# Patient Record
Sex: FEMALE | Race: WHITE | NOT HISPANIC OR LATINO | Employment: OTHER | URBAN - METROPOLITAN AREA
[De-identification: names, ages, dates, MRNs, and addresses within clinical notes are randomized per-mention and may not be internally consistent; named-entity substitution may affect disease eponyms.]

---

## 2017-01-17 ENCOUNTER — RESULTS ONLY (OUTPATIENT)
Dept: OTHER | Facility: CLINIC | Age: 72
End: 2017-01-17

## 2017-01-17 DIAGNOSIS — Z76.82 ORGAN TRANSPLANT CANDIDATE: ICD-10-CM

## 2017-01-17 PROCEDURE — 86833 HLA CLASS II HIGH DEFIN QUAL: CPT | Performed by: TRANSPLANT SURGERY

## 2017-01-17 PROCEDURE — 86832 HLA CLASS I HIGH DEFIN QUAL: CPT | Performed by: TRANSPLANT SURGERY

## 2017-01-20 LAB — PRA SINGLE ANTIGEN IGG ANTIBODY: NORMAL

## 2017-01-24 LAB — UNOS CPRA: 50

## 2017-01-25 LAB
SA1 CELL: NORMAL
SA1 COMMENTS: NORMAL
SA1 HI RISK ABY: NORMAL
SA1 MOD RISK ABY: NORMAL
SA1 TEST METHOD: NORMAL
SA2 CELL: NORMAL
SA2 COMMENTS: NORMAL
SA2 HI RISK ABY UA: NORMAL
SA2 MOD RISK ABY: NORMAL
SA2 TEST METHOD: NORMAL

## 2017-05-25 ENCOUNTER — TELEPHONE (OUTPATIENT)
Dept: TRANSPLANT | Facility: CLINIC | Age: 72
End: 2017-05-25

## 2017-05-25 NOTE — TELEPHONE ENCOUNTER
Call to Lexy today after a conversation with [facility]. She has moved and had all new phone numbers as well. She had labs done recently and Dr. Neri called her and reported they were stable. She has [facility] now and could not keep his appt. I have her a couple local nephrology group numbers and she was going to work on an appointment today. She is not on dialysis. Her numbers and address were updated. I ask that she call me after she has her appt with a nephrologist. She is aware she has 3 years of waiting time.

## 2017-06-28 ENCOUNTER — TRANSFERRED RECORDS (OUTPATIENT)
Dept: HEALTH INFORMATION MANAGEMENT | Facility: CLINIC | Age: 72
End: 2017-06-28

## 2017-09-25 ENCOUNTER — TELEPHONE (OUTPATIENT)
Dept: TRANSPLANT | Facility: CLINIC | Age: 72
End: 2017-09-25

## 2017-09-25 NOTE — TELEPHONE ENCOUNTER
Coordinator left pt msg, requesting call back. Wondering how pt is feeling and to check-in on her kidney function. Contact information provided.

## 2017-09-26 ENCOUNTER — MEDICAL CORRESPONDENCE (OUTPATIENT)
Dept: TRANSPLANT | Facility: CLINIC | Age: 72
End: 2017-09-26

## 2017-10-11 ENCOUNTER — TELEPHONE (OUTPATIENT)
Dept: TRANSPLANT | Facility: CLINIC | Age: 72
End: 2017-10-11

## 2017-10-11 NOTE — TELEPHONE ENCOUNTER
Coordinator left pt another msg. Wanted to check-in and see how pt is feeling. Reminded pt she is currently inactive on kidney wait list d/t being too well. Contact information provided.

## 2018-03-06 ENCOUNTER — TRANSFERRED RECORDS (OUTPATIENT)
Dept: HEALTH INFORMATION MANAGEMENT | Facility: CLINIC | Age: 73
End: 2018-03-06

## 2018-04-10 ENCOUNTER — TELEPHONE (OUTPATIENT)
Dept: TRANSPLANT | Facility: CLINIC | Age: 73
End: 2018-04-10

## 2018-04-10 NOTE — TELEPHONE ENCOUNTER
Coordinator spoke with pt. Pt states she's feeling great and is not on dialysis yet. Pt to call coordinator if she's nearing dialysis so wait list appointments can be made for active status consideration.

## 2018-04-24 ENCOUNTER — MEDICAL CORRESPONDENCE (OUTPATIENT)
Dept: TRANSPLANT | Facility: CLINIC | Age: 73
End: 2018-04-24

## 2018-04-25 ENCOUNTER — MEDICAL CORRESPONDENCE (OUTPATIENT)
Dept: TRANSPLANT | Facility: CLINIC | Age: 73
End: 2018-04-25

## 2019-04-15 ENCOUNTER — DOCUMENTATION ONLY (OUTPATIENT)
Dept: TRANSPLANT | Facility: CLINIC | Age: 74
End: 2019-04-15

## 2019-04-15 DIAGNOSIS — I10 HYPERTENSION: Primary | ICD-10-CM

## 2019-04-15 DIAGNOSIS — Z76.82 ORGAN TRANSPLANT CANDIDATE: ICD-10-CM

## 2019-04-15 DIAGNOSIS — N18.6 ESRD (END STAGE RENAL DISEASE) (H): ICD-10-CM

## 2019-07-11 ENCOUNTER — TELEPHONE (OUTPATIENT)
Dept: TRANSPLANT | Facility: CLINIC | Age: 74
End: 2019-07-11

## 2019-07-11 NOTE — TELEPHONE ENCOUNTER
Patient Call: General  Route to LPN    Reason for call: PT called because her nephrologist told her she can go from inactive to active on the wait list please connect with PT    Call back needed? Yes    Return Call Needed  Same as documented in contacts section  When to return call?: Greater than one day: Route standard priority

## 2019-07-13 ENCOUNTER — TELEPHONE (OUTPATIENT)
Dept: TRANSPLANT | Facility: CLINIC | Age: 74
End: 2019-07-13

## 2019-07-13 NOTE — TELEPHONE ENCOUNTER
Called patient to schedule [facility] appts, we talked over some possible open dates, she confirmed for Tues, Aug 20, mailing letter to home

## 2019-07-16 NOTE — TELEPHONE ENCOUNTER
Coordinator called pt to see if she had any additional questions about her upcoming return waitlist appointments for active status consideration on the kidney transplant list. Pt has no questions at this time.

## 2019-07-23 NOTE — TELEPHONE ENCOUNTER
RECORDS RECEIVED FROM: Internal/Care Everywhere   DATE RECEIVED: 8-20   NOTES STATUS DETAILS   OFFICE NOTE from referring provider    Internal    OFFICE NOTE from other cardiologist    Internal 2013   DISCHARGE SUMMARY from hospital    N/A    DISCHARGE REPORT from the ER   N/A    OPERATIVE REPORT    N/A    MEDICATION LIST   Internal    LABS     BMP   Care Everywhere 7-15-19   CBC   Internal 2013   CMP   Internal 2013   Lipids   Internal 2013   TSH   N/A    DIAGNOSTIC PROCEDURES     EKG   Internal 2013   Monitor Reports   N/A    IMAGING (DISC & REPORT)      Echo   In process Scheduled after appt   Stress Tests   In process Scheduled after appt   Cath   N/A    MRI/MRA   N/A    CT/CTA   N/A

## 2019-08-16 ENCOUNTER — TELEPHONE (OUTPATIENT)
Dept: NEPHROLOGY | Facility: CLINIC | Age: 74
End: 2019-08-16

## 2019-08-16 NOTE — TELEPHONE ENCOUNTER
Patient contacted and reminded of upcoming appointment.  Patient confirmed they will be attending.  Patient instructed to bring updated medications list to appointment.    María Tony CMA    8/16/2019 9:41 AM

## 2019-08-20 ENCOUNTER — PRE VISIT (OUTPATIENT)
Dept: CARDIOLOGY | Facility: CLINIC | Age: 74
End: 2019-08-20

## 2019-08-20 ENCOUNTER — TELEPHONE (OUTPATIENT)
Dept: TRANSPLANT | Facility: CLINIC | Age: 74
End: 2019-08-20

## 2019-08-20 DIAGNOSIS — I10 HYPERTENSION: Primary | ICD-10-CM

## 2019-08-20 DIAGNOSIS — Z76.82 ORGAN TRANSPLANT CANDIDATE: ICD-10-CM

## 2019-08-20 DIAGNOSIS — N18.6 END STAGE RENAL DISEASE (H): ICD-10-CM

## 2019-08-20 NOTE — TELEPHONE ENCOUNTER
Coordinator called and left pt msg. Pt will need to also see surgeon, transplant class, and see dietician as it has been over 5 years since she has been in waitlist clinic. If pt would like to reschedule everything for one day vs coming in over 2 days, requested pt call back. Contact number provided.    Pt returned coordinator's call and would prefer to reschedule all her needed appointments for one day. Staff msg sent to all scheduled providers to notify them pt will be rescheduling.

## 2019-10-10 ENCOUNTER — ALLIED HEALTH/NURSE VISIT (OUTPATIENT)
Dept: TRANSPLANT | Facility: CLINIC | Age: 74
End: 2019-10-10
Attending: INTERNAL MEDICINE
Payer: COMMERCIAL

## 2019-10-10 ENCOUNTER — OFFICE VISIT (OUTPATIENT)
Dept: CARDIOLOGY | Facility: CLINIC | Age: 74
End: 2019-10-10
Attending: NURSE PRACTITIONER
Payer: COMMERCIAL

## 2019-10-10 ENCOUNTER — ANCILLARY PROCEDURE (OUTPATIENT)
Dept: GENERAL RADIOLOGY | Facility: CLINIC | Age: 74
End: 2019-10-10
Attending: INTERNAL MEDICINE
Payer: COMMERCIAL

## 2019-10-10 ENCOUNTER — ANCILLARY PROCEDURE (OUTPATIENT)
Dept: CARDIOLOGY | Facility: CLINIC | Age: 74
End: 2019-10-10
Attending: INTERNAL MEDICINE
Payer: COMMERCIAL

## 2019-10-10 ENCOUNTER — OFFICE VISIT (OUTPATIENT)
Dept: TRANSPLANT | Facility: CLINIC | Age: 74
End: 2019-10-10
Attending: TRANSPLANT SURGERY
Payer: COMMERCIAL

## 2019-10-10 ENCOUNTER — ALLIED HEALTH/NURSE VISIT (OUTPATIENT)
Dept: TRANSPLANT | Facility: CLINIC | Age: 74
End: 2019-10-10
Attending: PHYSICIAN ASSISTANT
Payer: COMMERCIAL

## 2019-10-10 ENCOUNTER — DOCUMENTATION ONLY (OUTPATIENT)
Dept: TRANSPLANT | Facility: CLINIC | Age: 74
End: 2019-10-10

## 2019-10-10 VITALS
BODY MASS INDEX: 26.03 KG/M2 | WEIGHT: 162 LBS | HEART RATE: 59 BPM | HEIGHT: 66 IN | OXYGEN SATURATION: 98 % | SYSTOLIC BLOOD PRESSURE: 126 MMHG | DIASTOLIC BLOOD PRESSURE: 82 MMHG

## 2019-10-10 VITALS
BODY MASS INDEX: 26.07 KG/M2 | HEIGHT: 66 IN | HEART RATE: 59 BPM | RESPIRATION RATE: 18 BRPM | WEIGHT: 162.2 LBS | DIASTOLIC BLOOD PRESSURE: 82 MMHG | SYSTOLIC BLOOD PRESSURE: 126 MMHG

## 2019-10-10 DIAGNOSIS — I73.00 RAYNAUD'S DISEASE WITHOUT GANGRENE: ICD-10-CM

## 2019-10-10 DIAGNOSIS — N18.6 END STAGE RENAL DISEASE (H): ICD-10-CM

## 2019-10-10 DIAGNOSIS — R06.09 DYSPNEA ON EXERTION: ICD-10-CM

## 2019-10-10 DIAGNOSIS — I10 HYPERTENSION: ICD-10-CM

## 2019-10-10 DIAGNOSIS — Z01.818 PRE-TRANSPLANT EVALUATION FOR CHRONIC KIDNEY DISEASE: Primary | ICD-10-CM

## 2019-10-10 DIAGNOSIS — C64.1 RENAL CELL CARCINOMA OF RIGHT KIDNEY (H): ICD-10-CM

## 2019-10-10 DIAGNOSIS — Z76.82 ORGAN TRANSPLANT CANDIDATE: ICD-10-CM

## 2019-10-10 DIAGNOSIS — I10 ESSENTIAL HYPERTENSION: ICD-10-CM

## 2019-10-10 DIAGNOSIS — Z76.82 ORGAN TRANSPLANT CANDIDATE: Primary | ICD-10-CM

## 2019-10-10 DIAGNOSIS — N18.5 CHRONIC KIDNEY DISEASE, STAGE 5 (H): Primary | ICD-10-CM

## 2019-10-10 DIAGNOSIS — Z01.818 PRE-OPERATIVE EXAMINATION: Primary | ICD-10-CM

## 2019-10-10 DIAGNOSIS — R22.1 NECK SWELLING: ICD-10-CM

## 2019-10-10 DIAGNOSIS — Z98.84 GASTRIC BYPASS STATUS FOR OBESITY: ICD-10-CM

## 2019-10-10 DIAGNOSIS — I15.0 RENOVASCULAR HYPERTENSION: ICD-10-CM

## 2019-10-10 DIAGNOSIS — Z76.82 AWAITING ORGAN TRANSPLANT: Primary | ICD-10-CM

## 2019-10-10 LAB
ABO + RH BLD: NORMAL
ALBUMIN SERPL-MCNC: 3.8 G/DL (ref 3.4–5)
ALBUMIN UR-MCNC: NEGATIVE MG/DL
ALP SERPL-CCNC: 38 U/L (ref 40–150)
ALT SERPL W P-5'-P-CCNC: 16 U/L (ref 0–50)
ANION GAP SERPL CALCULATED.3IONS-SCNC: 8 MMOL/L (ref 3–14)
APPEARANCE UR: CLEAR
APTT PPP: 26 SEC (ref 22–37)
AST SERPL W P-5'-P-CCNC: 12 U/L (ref 0–45)
BACTERIA #/AREA URNS HPF: ABNORMAL /HPF
BASOPHILS # BLD AUTO: 0 10E9/L (ref 0–0.2)
BASOPHILS NFR BLD AUTO: 0.3 %
BILIRUB SERPL-MCNC: 0.4 MG/DL (ref 0.2–1.3)
BILIRUB UR QL STRIP: NEGATIVE
BLD GP AB SCN SERPL QL: NORMAL
BLOOD BANK CMNT PATIENT-IMP: NORMAL
BLOOD BANK CMNT PATIENT-IMP: NORMAL
BUN SERPL-MCNC: 60 MG/DL (ref 7–30)
CALCIUM SERPL-MCNC: 8.5 MG/DL (ref 8.5–10.1)
CHLORIDE SERPL-SCNC: 106 MMOL/L (ref 94–109)
CO2 SERPL-SCNC: 25 MMOL/L (ref 20–32)
COLOR UR AUTO: YELLOW
CREAT SERPL-MCNC: 3.68 MG/DL (ref 0.52–1.04)
DIFFERENTIAL METHOD BLD: ABNORMAL
EOSINOPHIL # BLD AUTO: 0.1 10E9/L (ref 0–0.7)
EOSINOPHIL NFR BLD AUTO: 2.3 %
ERYTHROCYTE [DISTWIDTH] IN BLOOD BY AUTOMATED COUNT: 14 % (ref 10–15)
GFR SERPL CREATININE-BSD FRML MDRD: 12 ML/MIN/{1.73_M2}
GLUCOSE SERPL-MCNC: 112 MG/DL (ref 70–99)
GLUCOSE UR STRIP-MCNC: NEGATIVE MG/DL
HCT VFR BLD AUTO: 31.3 % (ref 35–47)
HGB BLD-MCNC: 9.7 G/DL (ref 11.7–15.7)
HGB UR QL STRIP: NEGATIVE
HYALINE CASTS #/AREA URNS LPF: 1 /LPF (ref 0–2)
IMM GRANULOCYTES # BLD: 0 10E9/L (ref 0–0.4)
IMM GRANULOCYTES NFR BLD: 0.3 %
INR PPP: 0.93 (ref 0.86–1.14)
INTERPRETATION ECG - MUSE: NORMAL
KETONES UR STRIP-MCNC: NEGATIVE MG/DL
LEUKOCYTE ESTERASE UR QL STRIP: NEGATIVE
LYMPHOCYTES # BLD AUTO: 1.5 10E9/L (ref 0.8–5.3)
LYMPHOCYTES NFR BLD AUTO: 24.8 %
MCH RBC QN AUTO: 31.8 PG (ref 26.5–33)
MCHC RBC AUTO-ENTMCNC: 31 G/DL (ref 31.5–36.5)
MCV RBC AUTO: 103 FL (ref 78–100)
MONOCYTES # BLD AUTO: 0.4 10E9/L (ref 0–1.3)
MONOCYTES NFR BLD AUTO: 6.8 %
MUCOUS THREADS #/AREA URNS LPF: PRESENT /LPF
NEUTROPHILS # BLD AUTO: 4 10E9/L (ref 1.6–8.3)
NEUTROPHILS NFR BLD AUTO: 65.5 %
NITRATE UR QL: NEGATIVE
NRBC # BLD AUTO: 0 10*3/UL
NRBC BLD AUTO-RTO: 0 /100
PH UR STRIP: 5 PH (ref 5–7)
PHOSPHATE SERPL-MCNC: 4 MG/DL (ref 2.5–4.5)
PLATELET # BLD AUTO: 245 10E9/L (ref 150–450)
POTASSIUM SERPL-SCNC: 4.4 MMOL/L (ref 3.4–5.3)
PROT SERPL-MCNC: 7 G/DL (ref 6.8–8.8)
RBC # BLD AUTO: 3.05 10E12/L (ref 3.8–5.2)
RBC #/AREA URNS AUTO: 1 /HPF (ref 0–2)
SODIUM SERPL-SCNC: 139 MMOL/L (ref 133–144)
SOURCE: ABNORMAL
SP GR UR STRIP: 1.01 (ref 1–1.03)
SPECIMEN EXP DATE BLD: NORMAL
SPECIMEN EXP DATE BLD: NORMAL
SQUAMOUS #/AREA URNS AUTO: <1 /HPF (ref 0–1)
UROBILINOGEN UR STRIP-MCNC: 0 MG/DL (ref 0–2)
WBC # BLD AUTO: 6.2 10E9/L (ref 4–11)
WBC #/AREA URNS AUTO: 1 /HPF (ref 0–5)

## 2019-10-10 PROCEDURE — 80053 COMPREHEN METABOLIC PANEL: CPT | Performed by: NURSE PRACTITIONER

## 2019-10-10 PROCEDURE — 86481 TB AG RESPONSE T-CELL SUSP: CPT | Performed by: NURSE PRACTITIONER

## 2019-10-10 PROCEDURE — 86160 COMPLEMENT ANTIGEN: CPT | Performed by: NURSE PRACTITIONER

## 2019-10-10 PROCEDURE — G0463 HOSPITAL OUTPT CLINIC VISIT: HCPCS | Mod: ZF

## 2019-10-10 PROCEDURE — 86787 VARICELLA-ZOSTER ANTIBODY: CPT | Performed by: NURSE PRACTITIONER

## 2019-10-10 PROCEDURE — 86644 CMV ANTIBODY: CPT | Performed by: NURSE PRACTITIONER

## 2019-10-10 PROCEDURE — G0463 HOSPITAL OUTPT CLINIC VISIT: HCPCS | Mod: 25,ZF

## 2019-10-10 PROCEDURE — 36415 COLL VENOUS BLD VENIPUNCTURE: CPT | Performed by: NURSE PRACTITIONER

## 2019-10-10 PROCEDURE — 86706 HEP B SURFACE ANTIBODY: CPT | Performed by: NURSE PRACTITIONER

## 2019-10-10 PROCEDURE — 99214 OFFICE O/P EST MOD 30 MIN: CPT | Mod: ZP | Performed by: NURSE PRACTITIONER

## 2019-10-10 PROCEDURE — 86901 BLOOD TYPING SEROLOGIC RH(D): CPT | Performed by: NURSE PRACTITIONER

## 2019-10-10 PROCEDURE — 40000866 ZZHCL STATISTIC HIV 1/2 ANTIGEN/ANTIBODY PRETRANSPLANT ONLY: Performed by: NURSE PRACTITIONER

## 2019-10-10 PROCEDURE — 93010 ELECTROCARDIOGRAM REPORT: CPT | Mod: ZP | Performed by: INTERNAL MEDICINE

## 2019-10-10 PROCEDURE — 86886 COOMBS TEST INDIRECT TITER: CPT | Performed by: NURSE PRACTITIONER

## 2019-10-10 PROCEDURE — 84100 ASSAY OF PHOSPHORUS: CPT | Performed by: NURSE PRACTITIONER

## 2019-10-10 PROCEDURE — 86665 EPSTEIN-BARR CAPSID VCA: CPT | Performed by: NURSE PRACTITIONER

## 2019-10-10 PROCEDURE — 86147 CARDIOLIPIN ANTIBODY EA IG: CPT | Performed by: NURSE PRACTITIONER

## 2019-10-10 PROCEDURE — 81001 URINALYSIS AUTO W/SCOPE: CPT | Performed by: NURSE PRACTITIONER

## 2019-10-10 PROCEDURE — 86780 TREPONEMA PALLIDUM: CPT | Performed by: NURSE PRACTITIONER

## 2019-10-10 PROCEDURE — 85610 PROTHROMBIN TIME: CPT | Performed by: NURSE PRACTITIONER

## 2019-10-10 PROCEDURE — 86704 HEP B CORE ANTIBODY TOTAL: CPT | Performed by: NURSE PRACTITIONER

## 2019-10-10 PROCEDURE — 85730 THROMBOPLASTIN TIME PARTIAL: CPT | Performed by: NURSE PRACTITIONER

## 2019-10-10 PROCEDURE — 86850 RBC ANTIBODY SCREEN: CPT | Performed by: NURSE PRACTITIONER

## 2019-10-10 PROCEDURE — 87340 HEPATITIS B SURFACE AG IA: CPT | Performed by: NURSE PRACTITIONER

## 2019-10-10 PROCEDURE — 85670 THROMBIN TIME PLASMA: CPT | Performed by: NURSE PRACTITIONER

## 2019-10-10 PROCEDURE — 93005 ELECTROCARDIOGRAM TRACING: CPT | Mod: ZF

## 2019-10-10 PROCEDURE — 85613 RUSSELL VIPER VENOM DILUTED: CPT | Performed by: NURSE PRACTITIONER

## 2019-10-10 PROCEDURE — 86900 BLOOD TYPING SEROLOGIC ABO: CPT | Performed by: NURSE PRACTITIONER

## 2019-10-10 PROCEDURE — 85025 COMPLETE CBC W/AUTO DIFF WBC: CPT | Performed by: NURSE PRACTITIONER

## 2019-10-10 PROCEDURE — 86803 HEPATITIS C AB TEST: CPT | Performed by: NURSE PRACTITIONER

## 2019-10-10 PROCEDURE — 85730 THROMBOPLASTIN TIME PARTIAL: CPT | Mod: 91 | Performed by: NURSE PRACTITIONER

## 2019-10-10 PROCEDURE — 86905 BLOOD TYPING RBC ANTIGENS: CPT | Performed by: NURSE PRACTITIONER

## 2019-10-10 RX ORDER — TRIAMCINOLONE ACETONIDE 1 MG/G
OINTMENT TOPICAL
Status: ON HOLD | COMMUNITY
Start: 2019-04-03 | End: 2024-09-03

## 2019-10-10 RX ORDER — TRAZODONE HYDROCHLORIDE 50 MG/1
100 TABLET, FILM COATED ORAL AT BEDTIME
COMMUNITY
Start: 2019-04-16

## 2019-10-10 RX ORDER — SODIUM BICARBONATE 650 MG/1
TABLET ORAL
Status: ON HOLD | COMMUNITY
Start: 2019-06-17 | End: 2024-09-06

## 2019-10-10 ASSESSMENT — PATIENT HEALTH QUESTIONNAIRE - PHQ9: SUM OF ALL RESPONSES TO PHQ QUESTIONS 1-9: 1

## 2019-10-10 ASSESSMENT — PAIN SCALES - GENERAL
PAINLEVEL: NO PAIN (0)
PAINLEVEL: NO PAIN (0)

## 2019-10-10 ASSESSMENT — MIFFLIN-ST. JEOR
SCORE: 1261.58
SCORE: 1262.48

## 2019-10-10 NOTE — PROGRESS NOTES
"Kidney Transplant Waitlist - Qualified: 11/16/2013  Lexy Cobb attended the pre-transplant patient education class today by herself as part of her waitlist five year follow up. The [facility] website pre-transplant modules were viewed; class participants were educated on using the site. Pt. verbalized understanding of content presented.     Content reviewed:    Living Donation and how to access that program    Paired exchange    Kidney Donor Profile Index (KDPI)    Waiting list issues (right to decline without penalty, high PHS risk donors, what to expect when called with an offer)    Hospital experience,  length of stay , need to stay locally post-discharge (2-4 weeks)    Surgical options (with pictures)                             Post-surgery lifting and driving restrictions    Post-transplant routines, frequency of lab work and clinic visits    Need to stay locally post-discharge (2-4 weeks)    Role of Transplant Coordinator    Participants were informed of the benefits of transplant as well as potential risks such as infection, cancer, and death.  The need for total adherence with immunosuppression medications and following transplant regimens was stressed.  The overall evaluation/approval/listing process was reviewed.        The patient was provided with the following documents:  What You Need to Know About a Kidney Transplant  Adult Kidney Transplant - A Guide for Patients  SRTR Data Sheet - Kidney  Brochure - Kidney Allocation  Brochure - Multiple Listing and Waiting Time Transfer  What Every Patient Needs to Know (UNOS)  UNOS Facts and Figures  Finding a Donor  [facility] - Quick Start Guide  [facility] Consent  Receipt of Information form    Lexy Cobb signed the  Receipt of Information for Organ Transplant Recipient.\" She was provided Aleah Manley's business card and instructed to call with additional questions.      Summary    Team s concerns/comments:  1. Cardiology.  2  " Lymphadenopathy.  3. Stone HX  4. Rheumatological issues with positive ABDULLAHI and slightly low          complements.    Candidacy category: Yellow    Action/Plan:  1. Cardiology appointment and EKG completed this AM 10/10/2019.           Repeat stress test 10/15/2019.  2. US neck base was ordered.   3. Oxalate   4.  Complements ordered     Pt.to clinic today for five year waitlist follow up appointments.Pt. verbalized understanding of plan discussed today.    Expected Selection Meeting Discussion:10/16/2019

## 2019-10-10 NOTE — PROGRESS NOTES
"  Assessment and Plan:  # {[facility] TRANSPLANT LIVING DONOR ORGAN:[id number]} Transplant Evaluation: Patient is a {desc.:728272} candidate overall. Benefits of a living donor transplant were discussed.    # {[facility] TRANSPLANT ESKD/CKD/DM:241784948}: ***    # Cardiac Risk: ***    # ***: ***    # ***: ***    # ***: ***    # Left supraclavicular lymphadenopathy on exam     # Health Maintenance: 2016 pap smear (NIL), 6/2019 mammogram (negative), 10/2013 (repeart in 10 years per GI) all UTD, dental not UTD (poor dentition).       Discussed the risks and benefits of a transplant, including the risk of surgery and immunosuppression medications.  Patient's overall evaluation will be discussed in the Transplant Program's regular meeting with a final recommendation on the patients suitability for transplant to be made at that time.  Patient was seen in conjunction with {[facility] Nephrologist:[id number]} as part of a shared visit.    Evaluation:  Lexy Cobb was seen in consultation at the request of  {Referring Surgeon:[id number]} for evaluation as a potential {[facility] Living donor organ:170934} transplant recipient.    Reason for Visit:  Lexy Cobb is a 74 year old female with {[facility] TRANSPLANT ESKD/CKD/DM:703381796}, who presents for {[facility] Living donor organ:471063} transplant evaluation.    History of Present Illness:  Lexy Cobb is a 74-year-old female presents with CKD from unclear etiology with no previous biopsy. She had history of  RCC s/p right native nephrectomy with mass totally encapsulated in 2001. She did not require any chemotherapy or radiation. Following her nephrectomy she developed hypertension and had slow progressive kidney disease. She has been inactive on the wait list since 2013 due to being too well.  Please see Dr. Higginbotham's note from 11/12/2013 for full HPI.  Most recent labs include a serum creatinine of 3.0 and EGFR 14. Overall, she is fell fairly well. Her appetite is \"too good\". She complains of " some fatigue but denies nausea and vomiting. Her son is a potential donor, she reports he was evaluation in  2013 and had proteinuria and obesity issues. Her blood pressure is well controlled on triple antihypertensive therapy.      She has no history of heart disease or events. She had a stress test that her initial evaluation in 2013 that was negative. She does not participate in any regular exercise but has no limitations with walking.  She stays active doing household chores, grocery shopping, and caring for her 90-year-old mother.  With exertion she denies chest pain, shortness of breath or claudication symptoms.  She saw cardiology today with plans of a repeat stress test. She has history of obesity s/p  intestinal bypass 46 years ago, with a current BMI of 26.  Her nephrectomy in 2001 was complicated by recurrent SBO s/p ex lap with adhesions removal.            Kidney Disease Hx:        ***       Kidney Disease Dx: { :077152}       Biopsy Proven: {YES WITH WILD CARD/NO:26303240}         On Dialysis: {UMP YES NO NEPH:589492260}       Primary Nephrologist: Dr. Neri       H/o Kidney Stones: Yes; one episode, passed on own in her 20s.        H/o Recurrent/Frequent UTI: No             Cardiac/Vascular Disease Risk Factors:        Cardiac Risk Factors: Hypertension, CKD and Age (Male > 55, Female > 65)       Known CAD: No       Known PAD/Caludication Symptoms: No       Known Heart Failure: No       Arrhythmia: No       Pulmonary Hypertension: No       Valvular Disease: No       Other: None         Functional Capacity/Frailty:        ***      Fatigue/Decreased Energy: [] No [x] Yes    Chest Pain or SOB with Exertion: [x] No [] Yes    Significant Weight Change: [x] No [] Yes    Nausea, Vomiting or Diarrhea: [] No [x] Yes Occasional diarrhea   Fever, Sweats or Chills:  [x] No [] Yes    Leg Swelling [] No [x] Yes        History of Cancer:   ***    Other Significant Medical Issues:   Rheumatological issues including  Raynauds syndrome with positive ABDULLAHI and slightly low complements she has had episodes of vasculitis in the past and an occasional flare of gout, stable without medications.     Left supraclavicular lymphadenopathy on exam     Review of Systems:  A comprehensive review of systems was obtained and negative, except as noted in the HPI or PMH.    Past Medical History:   Medical record was reviewed and PMH was discussed with patient and noted below.  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 Mother      Hypertension Mother      Diabetes Father      Cancer Father         Lung CA       Personal History:   Social History     Socioeconomic History     Marital status:      Spouse name: Not on file     Number of children: 1     Years of education: Not on file     Highest education level: Not on file   Occupational History     Employer: RETIRED   Social Needs     Financial resource strain: Not on file     Food insecurity:      Worry: Not on file     Inability: Not on file     Transportation needs:     Medical: Not on file     Non-medical: Not on file   Tobacco Use     Smoking status: Never Smoker     Smokeless tobacco: Never Used   Substance and Sexual Activity     Alcohol use: No     Drug use: No     Sexual activity: Not on file   Lifestyle     Physical activity:     Days per week: Not on file     Minutes per session: Not on file     Stress: Not on file   Relationships     Social connections:     Talks on phone: Not on file     Gets together: Not on file     Attends Muslim service: Not on file     Active member of club or organization: Not on file     Attends meetings of clubs or organizations: Not on file     Relationship status: Not on file     Intimate partner violence:     Fear of current or ex partner: Not on file     Emotionally abused: Not on file     Physically abused: Not on file     Forced sexual activity: Not on file   Other Topics Concern     Parent/sibling w/ CABG, MI or angioplasty before 65F 55M? Not Asked   Social History Narrative     Not on file       Allergies:  Allergies   Allergen Reactions     Dapsone      Fluoxetine        Medications:  Current Outpatient Medications   Medication Sig     calcitRIOL (ROCALTROL) 0.25 MCG capsule Take 0.25 mcg by mouth daily     cloNIDine (CATAPRES) 0.1 MG tablet Take 0.1 mg by mouth 3 times daily     cyanocobalamin 1000 MCG/ML injection Inject 1 mL as directed every 30 days     darbepoetin braulio-polysorbate (ARANESP, ALBUMIN FREE,) 60 MCG/ML injection Inject 1 mL Subcutaneous every 28 days     diphenhydrAMINE-APAP, sleep, (TYLENOL PM EXTRA STRENGTH)  MG/30ML LIQD Take 1 capful by mouth nightly as needed     Furosemide (LASIX) 20 MG tablet Take 20 mg by mouth daily     hydrALAZINE (APRESOLINE) 25 MG tablet Take 2 tablets by mouth 3 times daily     HYDROcodone-acetaminophen (NORCO) 5-325 MG per tablet Take 1 tablet by mouth every 6 hours as needed     Hyoscyamine 0.15 MG TABS  Take 1 tablet by mouth daily     LORazepam (ATIVAN) 1 MG tablet Take 1 mg by mouth At Bedtime     magnesium oxide (MAG-) 400 MG tablet Take 1 tablet by mouth daily     metoprolol (TOPROL-XL) 100 MG 24 hr tablet Take 2 tablets by mouth daily     NIFEdipine osmotic (PROCARDIA XL) 90 MG 24 hr tablet Take 90 mg by mouth daily     omeprazole (PRILOSEC) 20 MG capsule Take 1 capsule by mouth daily     sodium bicarbonate 650 MG tablet TAKE ONE TABLET BY MOUTH TWICE DAILY     traZODone (DESYREL) 50 MG tablet TAKE 1/2-1 TABLET BY MOUTH AT BEDTIME FOR SLEEP, COULD INCREASE TO 2 TABLETS IF NEEDED     triamcinolone (KENALOG) 0.1 % external ointment      No current facility-administered medications for this visit.        Vitals:  There were no vitals taken for this visit.    Exam:  GENERAL APPEARANCE: alert and no distress  HENT: mouth without ulcers or lesions  LYMPHATICS: no cervical or supraclavicular nodes  RESP: lungs clear to auscultation - no rales, rhonchi or wheezes  CV: regular rhythm, normal rate, no rub, no murmur  FEMORAL PULSES: { :290183}  EDEMA: no LE edema bilaterally  ABDOMEN: soft, nondistended, nontender, bowel sounds normal  MS: extremities normal - no gross deformities noted, no evidence of inflammation in joints, no muscle tenderness  SKIN: no rash    Results:   Recent Results (from the past 336 hour(s))   EKG 12-lead, tracing only (Same Day)    Collection Time: 10/10/19  7:04 AM   Result Value Ref Range    Interpretation ECG Click View Image link to view waveform and result

## 2019-10-10 NOTE — LETTER
10/10/2019       RE: Lexy Cobb   [address]  [place] [address]     Dear Colleague,    Thank you for referring your patient, Lexy Cobb, to the [facility] at [facility]. Please see a copy of my visit note below.    Transplant Surgery Consult Note    Medical record number: [id number]  YOB: 1945,   Consult requested for evaluation of kidney transplant candidacy.    Assessment and Recommendations: Ms. Cobb appears to be a good candidate for kidney transplantation and has a good understanding of the risks and benefits of this approach to the management of renal failure. The following issues should be addressed prior to finalizing her transplant candidacy:     - search for live donors    The majority of our visit was spent in counselling, discussing the medical and surgical risks of kidney transplantation. We discussed approximate wait time and how that is influenced by issues such as blood type and sensitization (PRA) and access to a living donor. I contrasted potential waiting time for living vs  donor kidneys from  normal (0-85%) or higher (%) kidney donor profile index (KDPI) donors and their associated outcomes. I would recommend this individual to consider kidneys from high KDPI donors. Potential surgical complications of kidney transplantation include bleeding, superficial or deep wound complications (infection, hernia, lymphocele), ureteral anastomotic failure (leak or stenosis), graft thrombosis, need for reoperation and other issues such as cardiac complications, pneumonia, deep venous thrombosis, pulmonary embolism, post transplant diabetes and death. The potential for recurrent disease or need for retransplantation was also addressed. We discussed the possible need for ureteral stent (and subsequent removal), and the utility of protocol biopsy and laboratory studies to evaluate for  rejection or recurrent disease. We discussed the risk of graft rejection, our center's average graft and patient survival rates, immunosuppression protocols, as well as the potential opportunity to participate in clinical trials.  We also discussed the average length of stay, recovery process, and posttransplant lab and monitoring protocol.  I emphasized the need for strict immunosuppression medication adherence and the potential for complications of immunosuppression such as skin cancer or lymphoma, as well as a very low but not zero risk of donor-derived disease transmission risks (infection, cancer). Ms. Cobb asked good questions and her candidacy will be reviewed at our Multidisciplinary Selection Committee. Thank you for the opportunity to participate in Ms. Cobb's care.      I would recommend this individual to consider kidneys from high KDPI donors. The reason for this decision is best summarized as: decreased dialysis related morbidity/mortality, accepting lower kidney graft survival rates.    Total time: 30 minutes  Counselling time: 20 minutes    .      ---------------------------------------------------------------------------------------------------    HPI: Ms. Cobb has Chronic renal failure due to unknown etiology.   History of RCC s/p right native nephrectomy in 2001 without recurrence.  Was listed 2013 but has stayed on hold due to being too well.      The patient is not on dialysis.  Has kidney donors:  Yes .      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 Mother      Hypertension Mother      Diabetes Father      Cancer Father         Lung CA     Social History     Socioeconomic History     Marital status:      Spouse name: Not on file     Number of children: 1     Years of education: Not on file     Highest education level: Not on file   Occupational History     Employer: RETIRED   Social Needs     Financial resource strain: Not on file     Food insecurity:     Worry: Not on file     Inability: Not on file     Transportation needs:     Medical: Not on file     Non-medical: Not on file   Tobacco Use     Smoking status: Never Smoker     Smokeless tobacco: Never Used   Substance and Sexual Activity     Alcohol use: No     Drug use: No     Sexual activity: Not on file   Lifestyle     Physical activity:     Days per week: Not on file     Minutes per session: Not on file     Stress: Not on file   Relationships     Social connections:     Talks on phone: Not on file     Gets together: Not on file     Attends Confucianist service: Not on file     Active member of club or organization: Not on file     Attends meetings of clubs or organizations: Not on file     Relationship status: Not on file     Intimate partner violence:     Fear of current or ex partner: Not on file     Emotionally abused: Not on file     Physically abused: Not on file     Forced sexual activity: Not on file   Other Topics Concern     Parent/sibling w/ CABG, MI or angioplasty before 65F 55M? Not Asked    Social History Narrative     Not on file       ROS:   CONSTITUTIONAL:  No fevers or chills  EYES: negative for icterus  ENT:  negative for hearing loss, tinnitus and sore throat  RESPIRATORY:  negative for cough, sputum, dyspnea  CARDIOVASCULAR:  negative for chest pain    GASTROINTESTINAL:  negative for nausea, vomiting, diarrhea or constipation  GENITOURINARY:  negative for incontinence, dysuria, bladder emptying problems  HEME:  No easy bruising  INTEGUMENT:  negative for rash and pruritus  NEURO:  Negative for headache, seizure disorder    Allergies:   Allergies   Allergen Reactions     Dapsone      Fluoxetine        Medications:  Prescription Medications as of 10/16/2019       Rx Number Disp Refills Start End Last Dispensed Date Next Fill Date Owning Pharmacy    calcitRIOL (ROCALTROL) 0.25 MCG capsule            Sig: Take 0.25 mcg by mouth daily    Class: Historical    Route: Oral    cloNIDine (CATAPRES) 0.1 MG tablet            Sig: Take 0.1 mg by mouth 3 times daily    Class: Historical    Route: Oral    cyanocobalamin 1000 MCG/ML injection            Sig: Inject 1 mL as directed every 30 days    Class: Historical    Route: Injection    darbepoetin braulio-polysorbate (ARANESP, ALBUMIN FREE,) 60 MCG/ML injection            Sig: Inject 1 mL Subcutaneous every 28 days    Class: Historical    Route: Subcutaneous    diphenhydrAMINE-APAP, sleep, (TYLENOL PM EXTRA STRENGTH)  MG/30ML LIQD            Sig: Take 1 capful by mouth nightly as needed    Class: Historical    Route: Oral    Furosemide (LASIX) 20 MG tablet            Sig: Take 20 mg by mouth daily    Class: Historical    Route: Oral    hydrALAZINE (APRESOLINE) 25 MG tablet            Sig: Take 2 tablets by mouth 3 times daily    Class: Historical    Route: Oral    magnesium oxide (MAG-) 400 MG tablet            Sig: Take 1 tablet by mouth daily    Class: Historical    Route: Oral    metoprolol (TOPROL-XL) 100 MG 24 hr tablet            Sig: Take  "2 tablets by mouth daily    Class: Historical    Route: Oral    NIFEdipine osmotic (PROCARDIA XL) 90 MG 24 hr tablet            Sig: Take 90 mg by mouth daily    Class: Historical    Route: Oral    omeprazole (PRILOSEC) 20 MG capsule            Sig: Take 1 capsule by mouth daily    Class: Historical    Route: Oral    sodium bicarbonate 650 MG tablet    6/17/2019        Sig: TAKE ONE TABLET BY MOUTH TWICE DAILY    Class: Historical    traZODone (DESYREL) 50 MG tablet    4/16/2019        Sig: TAKE 1/2-1 TABLET BY MOUTH AT BEDTIME FOR SLEEP, COULD INCREASE TO 2 TABLETS IF NEEDED    Class: Historical    triamcinolone (KENALOG) 0.1 % external ointment    4/3/2019        Class: Historical    Route: Topical          Exam:   Vitals:   [unfilled]  Estimated body mass index is 26.18 kg/m  as calculated from the following:    Height as of an earlier encounter on 10/10/19: 1.676 m (5' 6\").    Weight as of an earlier encounter on 10/10/19: 73.6 kg (162 lb 3.2 oz).  Appearance: in no apparent distress.   Skin: normal  Head and Neck: Normal, no rashes or jaundice  Respiratory: easy respirations, no audible wheezing.  Abdomen: rounded    Diagnostics:   Recent Results (from the past 672 hour(s))   EKG 12-lead, tracing only (Same Day)    Collection Time: 10/10/19  7:04 AM   Result Value Ref Range    Interpretation ECG Click View Image link to view waveform and result    Factor 2 and 5 mutation analysis    Collection Time: 10/10/19  1:57 PM   Result Value Ref Range    Copath Report       Patient Name: LIZA MCCLAIN  MR#: [id number]  Specimen #: [id number]  Collected: 10/10/2019 13:57  Received: 10/11/2019 10:48  Reported: 10/11/2019 13:17  Ordering Phy(s): CHIOMA GONZALEZ    For improved result formatting, select 'View Enhanced Report Format' under   Linked Documents section.  _________________________________________    TEST(S) REQUESTED:  Factor 5 Leiden and Factor 2 by PCR    SPECIMEN DESCRIPTION:  Blood    COMMENTS:  Factor 2 " factor 5 testing was previously ordered on this patient. This   testing will be canceled and credited.  See [facility] number [id number] on sample collected 10/28/2013.    [facility] Report Patient Name: LIZA MCCLAIN [facility]#: [id number] #:   [id number]: 10/28/2013  07:07Received: 10/28/2013 09:49Reported: 10/29/2013 17:11Ordering Phy(s):   NAIM S  ISSA_________________________________________TEST(S) REQUESTED:Factor 5   Leiden and Factor 2 by PCRSPECIMEN  DESCRIPTION:BloodMETHODOLOGY:  The regions of genomic DNA containing the    [id number] Factor 5gene mutation (Factor  V Leiden) and the Factor 2(Prothrombin H54806E)gene mutation were   simultaneously amplified using the  polymerase chainreaction. The amplified products were digested with   restrictionendonuclease TaqI and products  were analyzed by gel electrophoresis.RESULTS:Factor V 1691G>A (Leiden)   RESULTS:Mutation analyzed:  1691G>AFactor V 1691G>A (Leiden) Interpretation:   ABSENTFactor V 1691G>A   (Leiden) mutation genotype:  G/GFACTOR 2/PROTHROMBIN RESULTS:Mutation analyzed:   29078D>AFactor 2   Mutation Interpretation:   ABSENTFactor  2 Mutation genotype:   G/GINTERPRETATION:The patient is negative for the   Factor V 1691G>A (Leiden) and  negativefor the Factor 2 mutation.This test was developed and its   performance determined by the [facility].It has   not been cleared or approved by the  U.S. Food and DrugAdministration. The FDA has determined that such   clearance or approvalis  not necessary.  Pursuant to the requirements of CLIA'88, thislaboratory has established   and verified the test's accuracy  andprecision. This test is used for clinical purposes.Electronically   Signed Out By:Tom Griggs MD, PhD  UMPhysiciansTESTING LAB LOCATION:[address]  [phone]    Electronically Signed Out By:  GER     CPT Codes:    TESTING LAB LOCATION:  [facility]  [address] [facility] [address]  [address] [place] [address]  [phone]    COLLECTION SITE:  Client:  [facility]  Location:  [facility] (B)     UA with Microscopic reflex to Culture    Collection Time: 10/10/19  2:15 PM   Result Value Ref Range    Color Urine Yellow     Appearance Urine Clear     Glucose Urine Negative NEG^Negative mg/dL    Bilirubin Urine Negative NEG^Negative    Ketones Urine Negative NEG^Negative mg/dL    Specific Gravity Urine 1.011 1.003 - 1.035    Blood Urine Negative NEG^Negative    pH Urine 5.0 5.0 - 7.0 pH    Protein Albumin Urine Negative NEG^Negative mg/dL    Urobilinogen mg/dL 0.0 0.0 - 2.0 mg/dL    Nitrite Urine Negative NEG^Negative    Leukocyte Esterase Urine Negative NEG^Negative    Source Midstream Urine     WBC Urine 1 0 - 5 /HPF    RBC Urine 1 0 - 2 /HPF    Bacteria Urine Few (A) NEG^Negative /HPF    Squamous Epithelial /HPF Urine <1 0 - 1 /HPF    Mucous Urine Present (A) NEG^Negative /LPF    Hyaline Casts 1 0 - 2 /LPF   ABO type [SFS5809]    Collection Time: 10/10/19  2:24 PM   Result Value Ref Range    ABO A     RH(D) Pos     Specimen Expires 10/13/2019    ABO/Rh type and screen    Collection Time: 10/10/19  2:29 PM   Result Value Ref Range    ABO A     RH(D) Pos     Antibody Screen Neg     Test Valid Only At          [facility]    Specimen Expires 10/13/2019    PRA Single Antigen IgG Antibody    Collection Time: 10/10/19  2:30 PM   Result Value Ref Range    SA1 Test Method SA FCS     SA1 Cell Class I     SA1 Hi Risk Ashley None     SA1 Mod Risk Ashley None     SA1 Comments        Test performed by modified procedure. Serum heat inactivated and tested   by a modified ([place]) protocol including fetal calf serum addition.   High-risk,  mfi >3,000. Mod-risk, mfi 500-3,000.      SA2 Test Method SA FCS     SA2 Cell Class II     SA2 Hi Risk Ashley None     SA2 Mod Risk Ashley None     SA2 Comments        Test performed by modified procedure. Serum heat inactivated and tested   by a modified ([place]) protocol including fetal calf serum addition.   High-risk, mfi >3,000. Mod-risk, mfi 500-3,000.      Protocol Cutoff Plan A, 500 mfi/cumulative      UNOS cPRA 0     Unacceptable Antigen None    CMV Antibody IgG [PFB3768]    Collection Time: 10/10/19  2:30 PM   Result Value Ref Range    CMV Antibody IgG <0.2 0.0 - 0.8 AI   EBV Capsid Antibody IgG [[id number]]    Collection Time: 10/10/19  2:30 PM   Result Value Ref Range    EBV Capsid Antibody IgG >8.0 (H) 0.0 - 0.8 AI   Hepatitis B core antibody [[id number]]    Collection Time: 10/10/19  2:30 PM   Result Value Ref Range    Hepatitis B Core Ashley Nonreactive NR^Nonreactive   Hepatitis B Surface Antibody [[id number]]    Collection Time: 10/10/19  2:30 PM   Result Value Ref Range    Hepatitis B Surface Antibody 46.32 (H) <8.00 m[IU]/mL   Hepatitis B surface antigen [[id number]]    Collection Time: 10/10/19  2:30 PM   Result Value Ref Range    Hep B Surface Agn Nonreactive NR^Nonreactive   Hepatitis C antibody [NFV266]    Collection Time: 10/10/19  2:30 PM   Result Value Ref Range    Hepatitis C Antibody Nonreactive NR^Nonreactive   HIV Antigen Antibody Combo Pretransplant    Collection Time: 10/10/19  2:30 PM   Result Value Ref Range    HIV Antigen Antibody Combo Pretransplant Nonreactive NR^Nonreactive   Varicella Zoster Virus Antibody IgG [[id number]]    Collection Time: 10/10/19  2:30 PM   Result Value Ref Range    Varicella Zoster Virus Antibody IgG 5.5 (H) 0.0 - 0.8 AI   Treponema Abs w Reflex to RPR and Titer    Collection Time: 10/10/19  2:30 PM   Result Value Ref Range    Treponema Antibodies Nonreactive NR^Nonreactive   Antibody titer red cell [[id number]]    Collection Time: 10/10/19  2:30 PM   Result Value Ref Range     Antibody Titer Anti B IgM=16  IgG=8    Blood Group A Subtype [[id number]]    Collection Time: 10/10/19  2:30 PM   Result Value Ref Range    Antigen Type A1 Positive     Comprehensive metabolic panel [LAB17]    Collection Time: 10/10/19  2:31 PM   Result Value Ref Range    Sodium 139 133 - 144 mmol/L    Potassium 4.4 3.4 - 5.3 mmol/L    Chloride 106 94 - 109 mmol/L    Carbon Dioxide 25 20 - 32 mmol/L    Anion Gap 8 3 - 14 mmol/L    Glucose 112 (H) 70 - 99 mg/dL    Urea Nitrogen 60 (H) 7 - 30 mg/dL    Creatinine 3.68 (H) 0.52 - 1.04 mg/dL    GFR Estimate 12 (L) >60 mL/min/[1.73_m2]    GFR Estimate If Black 13 (L) >60 mL/min/[1.73_m2]    Calcium 8.5 8.5 - 10.1 mg/dL    Bilirubin Total 0.4 0.2 - 1.3 mg/dL    Albumin 3.8 3.4 - 5.0 g/dL    Protein Total 7.0 6.8 - 8.8 g/dL    Alkaline Phosphatase 38 (L) 40 - 150 U/L    ALT 16 0 - 50 U/L    AST 12 0 - 45 U/L   Phosphorus    Collection Time: 10/10/19  2:31 PM   Result Value Ref Range    Phosphorus 4.0 2.5 - 4.5 mg/dL   Quantiferon TB Gold Plus    Collection Time: 10/10/19  2:31 PM   Result Value Ref Range    Quantiferon-TB Gold Plus Result Negative NEG^Negative    TB1 Ag minus Nil Value 0.00 IU/mL    TB2 Ag minus Nil Value 0.00 IU/mL    Mitogen minus Nil Result 6.49 IU/mL    Nil Result 0.03 IU/mL   CBC with platelets differential [[id number]]    Collection Time: 10/10/19  2:31 PM   Result Value Ref Range    WBC 6.2 4.0 - 11.0 10e9/L    RBC Count 3.05 (L) 3.8 - 5.2 10e12/L    Hemoglobin 9.7 (L) 11.7 - 15.7 g/dL    Hematocrit 31.3 (L) 35.0 - 47.0 %     (H) 78 - 100 fl    MCH 31.8 26.5 - 33.0 pg    MCHC 31.0 (L) 31.5 - 36.5 g/dL    RDW 14.0 10.0 - 15.0 %    Platelet Count 245 150 - 450 10e9/L    Diff Method Automated Method     % Neutrophils 65.5 %    % Lymphocytes 24.8 %    % Monocytes 6.8 %    % Eosinophils 2.3 %    % Basophils 0.3 %    % Immature Granulocytes 0.3 %    Nucleated RBCs 0 0 /100    Absolute Neutrophil 4.0 1.6 - 8.3 10e9/L    Absolute Lymphocytes 1.5 0.8 - 5.3  10e9/L    Absolute Monocytes 0.4 0.0 - 1.3 10e9/L    Absolute Eosinophils 0.1 0.0 - 0.7 10e9/L    Absolute Basophils 0.0 0.0 - 0.2 10e9/L    Abs Immature Granulocytes 0.0 0 - 0.4 10e9/L    Absolute Nucleated RBC 0.0    Cardiolipin Ashley IgG and IgM [LAB 6836]    Collection Time: 10/10/19  2:31 PM   Result Value Ref Range    Cardiolipin Antibody IgG <1.6 0.0 - 19.9 GPL-U/mL    Cardiolipin Antibody IgM 2.1 0.0 - 19.9 MPL-U/mL   Lupus Anticoagulant Panel [[id number]]    Collection Time: 10/10/19  2:31 PM   Result Value Ref Range    Lupus Result Negative NEG^Negative   INR [[id number]]    Collection Time: 10/10/19  2:31 PM   Result Value Ref Range    INR 0.93 0.86 - 1.14   Partial thromboplastin time [LAB56]    Collection Time: 10/10/19  2:31 PM   Result Value Ref Range    PTT 26 22 - 37 sec   Thrombin time [EWS692]    Collection Time: 10/10/19  2:31 PM   Result Value Ref Range    Thrombin Time 15.2 13.0 - 19.0 sec   Complement C4    Collection Time: 10/10/19  2:31 PM   Result Value Ref Range    Complement C4 21 15 - 50 mg/dL   Complement C3    Collection Time: 10/10/19  2:31 PM   Result Value Ref Range    Complement C3 67 (L) 76 - 169 mg/dL       Again, thank you for allowing me to participate in the care of your patient.      Sincerely,    BLAIR

## 2019-10-10 NOTE — LETTER
10/10/2019       RE: Lexy Cobb  [address] [facility] [facility] [address]     Dear Colleague,    Thank you for referring your patient, Lexy Cobb, to the [facility] SOLID ORGAN TRANSPLANT at [facility]. Please see a copy of my visit note below.      Assessment and Plan:  # Kidney Transplant Evaluation: Patient is a good candidate overall. Benefits of a living donor transplant were discussed.    # CKD unclear etiology with no previous biopsy: although likely from unilateral kidney with history of RCC. She has been inactive on the wait list since 2013 due to being too well. She has had creatinine levels ranging 3s-4 with correlating eGFR of 10-15 for the past few years. She has some fatigue, but no other uremic symptoms. She is ABO-A with a cPRA of 50%. One potential donor. When ready, she may benefit from a kidney transplant.     # RCC (2001): s/p right native nephrectomy with mass totally encapsulated. She did not require any chemotherapy or radiation. CXR today is pending.     # Cardiac Risk: no history of heart disease or events. She has no limitations with walking and denies exertional symptoms.  She had a stress test that her initial evaluation in 2013 that was negative. She saw cariology today with plans for a repeat stress test next week. ECHO/EKG results are pending.     # Rheumatological issues:  with slightly low C3 and positive ABDULLAHI, but no other signs of SLE.  Recommend continued close follow up with Rheumatology. Will recheck complement levels today.      # S/p gastric bypass: over 40 years ago. BMI 26. Will check oxalate level.     # Left supraclavicular fullness on exam: will need to complete neck US.     # Health Maintenance: 2016 pap smear (NIL), 6/2019 mammogram (negative), 10/2013 (repeat in 10 years per GI) all UTD, dental not UTD (poor dentition).      Discussed the risks and benefits of a transplant, including the risk of surgery and  "immunosuppression medications.  Patient's overall evaluation will be discussed in the Transplant Program's regular meeting with a final recommendation on the patients suitability for transplant to be made at that time.  Patient was seen in conjunction with Dr. Ottoniel Boswell as part of a shared visit.    Evaluation:  Lexy Cobb was seen in consultation at the request of Dr. Kannan House for evaluation as a potential kidney transplant recipient.    Reason for Visit:  Lexy Cobb is a 74 year old female with CKD from unknown etiology (no kidney biopsy), who presents for kidney transplant evaluation.    History of Present Illness:  Lexy Cobb is a 74-year-old female presents with CKD from unclear etiology with no previous biopsy, but likely from unilateral kidney. She had history of RCC (2001) s/p right native nephrectomy with mass totally encapsulated. She did not require any chemotherapy or radiation. Following her nephrectomy, she developed hypertension and had slow progressive kidney disease. She has been inactive on the wait list since 2013 due to being too well.  Please see Dr. Higginbotham's note from 11/12/2013 for full HPI. She has had creatinine levels ranging 3s-4 with correlating eGFR of 10-15 for the past few years. Overall, she is feeling fairly well. Her appetite is \"too good\". She complains of some fatigue but denies nausea and vomiting. Her blood pressure is well controlled on triple antihypertensive therapy. Her son is a potential donor.     She has no history of heart disease or events. She had a stress test that her initial evaluation in 2013 that was negative. She does not participate in any regular exercise but has no limitations with walking.  She stays active doing household chores, grocery shopping, and caring for her 90-year-old mother.  With exertion she denies chest pain, shortness of breath or claudication symptoms. She saw cardiology today and will have repeat stress test next " week. Additional surgical history includes intestinal bypass 46 years ago (current BMI of 26).  Her nephrectomy in 2001 was complicated by recurrent SBO s/p ex lap with adhesions removal.         Kidney Disease Hx:        Kidney Disease Dx: Unknown etiology (no kidney biopsy)       Biopsy Proven: No         On Dialysis: No       Primary Nephrologist: Dr. Neri       H/o Kidney Stones: Yes; one episode, passed on own in her 20s.        H/o Recurrent/Frequent UTI: No             Cardiac/Vascular Disease Risk Factors:        Cardiac Risk Factors: Hypertension, CKD and Age (Male > 55, Female > 65)       Known CAD: No       Known PAD/Caludication Symptoms: No       Known Heart Failure: No       Arrhythmia: No       Pulmonary Hypertension: No       Valvular Disease: No       Other: None         Functional Capacity/Frailty:        No limitations with walking.      Fatigue/Decreased Energy: [] No [x] Yes    Chest Pain or SOB with Exertion: [x] No [] Yes    Significant Weight Change: [x] No [] Yes    Nausea, Vomiting or Diarrhea: [] No [x] Yes Occasional diarrhea   Fever, Sweats or Chills:  [x] No [] Yes    Leg Swelling [] No [x] Yes        History of Cancer:   RCC     Other Significant Medical Issues:   Rheumatologic issues: including official diagnosis of Raynauds syndrome. Positive ABDULLAHI and slightly low C3. 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 Seen last by rhematology in 2017 with new onset of gout (appears stable without medications).    Left supraclavicular lymphadenopathy on exam     Review of Systems:  A comprehensive review of systems was obtained and negative, except as noted in the HPI or PMH.    Past Medical History:   Medical record was reviewed and PMH was discussed with patient and noted below.  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 Mother      Hypertension Mother      Diabetes Father      Cancer Father         Lung CA       Personal History:   Social History     Socioeconomic History     Marital status:      Spouse name: Not on file     Number of children: 1     Years of education: Not on file     Highest education level: Not on file   Occupational History     Employer: RETIRED   Social Needs     Financial resource strain: Not on file     Food insecurity:     Worry: Not on file     Inability: Not on file     Transportation needs:     Medical: Not on file     Non-medical: Not on file   Tobacco Use     Smoking status: Never Smoker     Smokeless tobacco: Never Used   Substance and Sexual Activity     Alcohol use: No     Drug use: No     Sexual activity: Not on file   Lifestyle     Physical activity:     Days per week: Not on file     Minutes per session: Not on file     Stress: Not on file   Relationships     Social connections:     Talks on phone: Not on file     Gets together: Not on file     Attends [employer] service: Not on file     Active member of club or organization: Not on  file     Attends meetings of clubs or organizations: Not on file     Relationship status: Not on file     Intimate partner violence:     Fear of current or ex partner: Not on file     Emotionally abused: Not on file     Physically abused: Not on file     Forced sexual activity: Not on file   Other Topics Concern     Parent/sibling w/ CABG, MI or angioplasty before 65F 55M? Not Asked   Social History Narrative     Not on file       Allergies:  Allergies   Allergen Reactions     Dapsone      Fluoxetine        Medications:  Current Outpatient Medications   Medication Sig     calcitRIOL (ROCALTROL) 0.25 MCG capsule Take 0.25 mcg by mouth daily     cloNIDine (CATAPRES) 0.1 MG tablet Take 0.1 mg by mouth 3 times daily     cyanocobalamin 1000 MCG/ML injection Inject 1 mL as directed every 30 days     darbepoetin braulio-polysorbate (ARANESP, ALBUMIN FREE,) 60 MCG/ML injection Inject 1 mL Subcutaneous every 28 days     diphenhydrAMINE-APAP, sleep, (TYLENOL PM EXTRA STRENGTH)  MG/30ML LIQD Take 1 capful by mouth nightly as needed     Furosemide (LASIX) 20 MG tablet Take 20 mg by mouth daily     hydrALAZINE (APRESOLINE) 25 MG tablet Take 2 tablets by mouth 3 times daily     HYDROcodone-acetaminophen (NORCO) 5-325 MG per tablet Take 1 tablet by mouth every 6 hours as needed     Hyoscyamine 0.15 MG TABS Take 1 tablet by mouth daily     LORazepam (ATIVAN) 1 MG tablet Take 1 mg by mouth At Bedtime     magnesium oxide (MAG-) 400 MG tablet Take 1 tablet by mouth daily     metoprolol (TOPROL-XL) 100 MG 24 hr tablet Take 2 tablets by mouth daily     NIFEdipine osmotic (PROCARDIA XL) 90 MG 24 hr tablet Take 90 mg by mouth daily     omeprazole (PRILOSEC) 20 MG capsule Take 1 capsule by mouth daily     sodium bicarbonate 650 MG tablet TAKE ONE TABLET BY MOUTH TWICE DAILY     traZODone (DESYREL) 50 MG tablet TAKE 1/2-1 TABLET BY MOUTH AT BEDTIME FOR SLEEP, COULD INCREASE TO 2 TABLETS IF NEEDED     triamcinolone (KENALOG) 0.1 %  "external ointment      No current facility-administered medications for this visit.        Vitals:  /82   Pulse 59   Resp 18   Ht 1.676 m (5' 6\")   Wt 73.6 kg (162 lb 3.2 oz)   BMI 26.18 kg/m       Exam:  GENERAL APPEARANCE: alert and no distress  HENT: mouth without ulcers or lesions  LYMPHATICS: no cervical or supraclavicular nodes  RESP: lungs clear to auscultation - no rales, rhonchi or wheezes  CV: regular rhythm, normal rate, no rub, no murmur  FEMORAL PULSES: 2+ equal bilaterally.   EDEMA: no LE edema bilaterally  ABDOMEN: soft, nondistended, nontender, bowel sounds normal  MS: extremities normal - no gross deformities noted, no evidence of inflammation in joints, no muscle tenderness  SKIN: no rash    Results:   Recent Results (from the past 336 hour(s))   EKG 12-lead, tracing only (Same Day)    Collection Time: 10/10/19  7:04 AM   Result Value Ref Range    Interpretation ECG Click View Image link to view waveform and result      Patient was seen by myself, Dr. Ottoniel Boswell, in conjunction with Yoel Amanda NP as part of a shared visit.    I personally reviewed past medical and surgical history, vital signs, medications and labs.  Present and past medical history, along with significant physical exam findings were all reviewed with OLEGARIO.    My rush findings:  Lexy Cobb is 74 year old, who presents for Kidney transplant evaluation.    Key management decisions made by me and discussed with OLEGARIO:     1.  Advanced CKD not yet on dialysis.  2.  Transplant candidacy evaluation.  3.  History of for Raynaud's with abnormal ABDULLAHI follows with rheumatology needs to update her complements.  4.  History of renal cell cancer status post unilateral nephrectomy this was remote without evidence of recurrence we will need to update her imaging.  5.  History of gastric bypass will need to update her oxalate level.  6.  Cardiovascular risk stratification patient will need formal cardiology assessment.  7.  " Slight neck fullness on exam most likely normal will get an ultrasound to rule out abnormalities.  8.  Age-appropriate cancer screening as noted above.  Discussion:  Overall Ms. Cobb appears to be a reasonable candidate.  She is fairly active and did not appear to have any barriers to transplantation.  She will need to complete her work-up as delineated above.  Final candidacy decision will be rendered at our multidisciplinary meeting.     Again, thank you for allowing me to participate in the care of your patient.      Sincerely,    BLAIR

## 2019-10-10 NOTE — PATIENT INSTRUCTIONS
Patient Instructions:    It was a pleasure to see you in the cardiology clinic today.    If you have any questions you can reach our nurse triage line at [phone].  Press Option #1 for the [facility], then press Option #4 for nursing or Option #1 for scheduling. We also encourage the use of [facility] to communicate with your HealthCare Provider    Note new medications: none  Stop the following medications: no changes    The results from today include: EKG was normal  Please schedule the stress test and I will contact you with the results.     Control your risk of coronary artery disease with these four lifestyle changes:  - Eating a heart healthy diet by following the American Heart Association Recommendations: Reduce saturated fat and trans fat to 5-6 percent of daily calories and minimizing the amount of trans fat you eat by limiting your intake of red meat and dairy products made with whole milk. It also means choosing skim milk, low-fat or fat-free dairy products, limiting fried food, and cooking with healthy oils such as vegetable oil. A healthy diet should include emphasis on fruits, vegetables, whole grains, poultry, fish and nuts, and limiting sugary foods and beverages. We recommend following the DASH (Dietary Approaches to Stop Hypertension) or Mediterranean Diet.  - Regular Exercise: Just 40 minutes of aerobic exercise of moderate to vigorous intensity done 3-4 times per week is enough to lower both cholesterol and high blood pressure. Brisk walking, swimming, bicycling or a dance class are examples.  - Avoiding Tobacco Smoking: Smoking compounds the risk from other risk factors for heart disease including high cholesterol, high blood pressure, and diabetes. Smokers can lower cholesterol, blood pressure, and protect their arteries by quitting. Ask to learn more about quitting smoking.  - Losing Weight: Being overweight or obese raises your risk of high cholesterol, high  blood pressure, and diabetes which are all risk factors for heart disease. Losing excess weight can improve cholesterol levels, blood pressure, and reduce incidence of diabetes and potentially reverse these disease processes.     Get help if you experience any of these heart attack warning signs: Although some heart attacks are sudden and intense, most start slowly, with mild pain or discomfort. Pay attention to your body -- and call 911 if you feel:  - Chest discomfort: Most heart attacks involve discomfort in the center of the chest that lasts more than a few minutes, or that goes away and comes back. It can feel like uncomfortable pressure, squeezing, fullness or pain.  - Discomfort in other areas of the upper body: Symptoms can include pain or discomfort in one or both arms, the back, neck, jaw or stomach.   - Shortness of breath with or without chest discomfort   - Other signs may include breaking out in a cold sweat, nausea or lightheadedness      Sincerely,    Janak Foster, CNP

## 2019-10-10 NOTE — PROGRESS NOTES
Patient Name: Lexy Cobb  : 1945  Age: 74 year old  MRN: [id number]  Date of Initial Social Work Evaluation: 10/28/2019    Patient on kidney transplant wait list.  Saw today to update psychosocial assessment.      Presenting Information   Living Situation: Patient lives in a home with her  Black in [place], [place].  If not local, plans for short term stay: N/A  Previous Functional Status: Independent with ADLs, wears glasses  Cultural/Language/Spiritual Considerations: None identified at this time    Support System  Primary Support Person  Black  Other support:  Son Rm (l[facility]), a brother in [place], [place], and two sisters who live out of state  Plan for support in immediate post-transplant period:  Black and son Rm    Health Care Directive  Decision Maker: Self  Alternate Decision Maker:  Black is NOK  Health Care Directive: Provided education    Mental Health/Coping:   History of Mental Health: Denied  History of Chemical Health: Denied  Current status: Patient denied any current mental health concerns. Patient reported she has an alcoholic drink a day.  Coping: Patient appears to be coping well.  Services Needed/Recommended: None identified at this time    Financial   Income: Social Security halfway (both patient and )  Impact of transplant on income: None identified at this time  Insurance and medication coverage: [facility] Medicare Advantage Plan with Part D- Discussed Medicare Part B will cover 80% of anti-rejection medications and patient will pay the 20% copay ($200-400) until patient's $3,400 out of pocket max with are is met.   Financial concerns: Patient expressed concerns about affording the cost of Valcyte in the Part D donut hole.  Resources needed: Patient would benefit from applying for the [employer] Val[employer] Patient Assistance Program if the Valcyte copay is high. Patient reported her household income is about  $30,000.    Assessment and recommendations and plan:  Patient continues to not be on dialysis. Patient reported she follows her physician's recommendations, takes her medications as prescribed, and attends her appointments as scheduled. Reviewed transplant education (Medicare, rehabilitation, donor issues, community/financial resources, and psych/family adjustment) as well as psychosocial risks of transplant. Provided patient with a copy of post-transplant informational sheet that includes information on potential costs of medications, Medicare ESRD, post-transplant lodging, etc. Patient seemed to process information well. Appeared well informed, motivated, and able to follow post transplant requirements. Behavior was appropriate during interview. Has adequate income and insurance coverage. Adequate social support. No major contraindications noted for transplant. At this time, patient appears to understand the risks and benefits of transplant.     Nanda Barksdale [facility]    Kidney/Pancreas/Auto Islet Transplant Programs

## 2019-10-10 NOTE — LETTER
10/10/2019      RE: Lexy Cobb  [address]  [facility] [id number]       Dear Colleague,    Thank you for the opportunity to participate in the care of your patient, Lexy Cobb, at the [facility]. Please see a copy of my visit note below.    Chief Complaint:   Chief Complaint   Patient presents with     Follow Up     kidney transplant eval - last seen in 2013       HPI: Returns to clinic today for follow-up regarding possible kidney transplant surgery.  She is a history of chronic kidney disease secondary to hypertensive nephrosclerosis and solitary kidney secondary to nephrectomy for renal cell carcinoma.  She was evaluated for transplant in 2013 at which time she had a dobutamine echocardiogram that did not demonstrate any ischemia.  She reports no major changes or hospitalizations since we saw her back in 2013.  She tells me she is retired and she is quite sedentary.  She does tell me she can walk to the local [employer] restaurant which is approximately 1 mile from her house on a flat surface.  She does not experience any dyspnea or chest discomfort while doing that walk.  She does note that she becomes dyspneic with vacuuming however.  She denies family history of premature coronary disease or prior tobacco use.  She also tells me she is never had any prior cardiac procedures or cardiac history.    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 Mother      Hypertension Mother      Diabetes Father      Cancer Father         Lung CA       Medications:  Current Outpatient Medications   Medication Sig     calcitRIOL (ROCALTROL) 0.25 MCG capsule Take 0.25 mcg by mouth daily     cloNIDine (CATAPRES) 0.1 MG tablet Take 0.1 mg by mouth 3 times daily     cyanocobalamin 1000 MCG/ML injection Inject 1 mL as directed every 30 days     darbepoetin braulio-polysorbate (ARANESP, ALBUMIN FREE,) 60 MCG/ML injection Inject 1 mL Subcutaneous every 28 days     diphenhydrAMINE-APAP, sleep, (TYLENOL PM EXTRA STRENGTH)  MG/30ML LIQD Take 1 capful by mouth nightly as needed     Furosemide (LASIX) 20 MG tablet Take 20 mg by mouth daily     hydrALAZINE (APRESOLINE) 25 MG tablet Take 2 tablets by mouth 3 times daily     magnesium oxide (MAG-) 400 MG tablet Take 1 tablet by mouth daily     metoprolol (TOPROL-XL) 100 MG 24 hr tablet Take 2 tablets by mouth daily     NIFEdipine osmotic (PROCARDIA XL) 90 MG 24 hr tablet Take 90 mg by mouth daily     omeprazole (PRILOSEC) 20 MG capsule Take 1 capsule by mouth daily     sodium bicarbonate 650 MG tablet TAKE ONE TABLET BY MOUTH TWICE DAILY     traZODone (DESYREL) 50 MG tablet TAKE 1/2-1 TABLET BY MOUTH AT BEDTIME FOR SLEEP, COULD INCREASE TO 2 TABLETS IF NEEDED     triamcinolone (KENALOG) 0.1  "% external ointment      HYDROcodone-acetaminophen (NORCO) 5-325 MG per tablet Take 1 tablet by mouth every 6 hours as needed     Hyoscyamine 0.15 MG TABS Take 1 tablet by mouth daily     LORazepam (ATIVAN) 1 MG tablet Take 1 mg by mouth At Bedtime     No current facility-administered medications for this visit.        Review of Systems:  As per HPI otherwise all other systems are negative    Physical Exam:   /82 (BP Location: Left arm, Patient Position: Chair, Cuff Size: Adult Regular)   Pulse 59   Ht 1.676 m (5' 6\")   Wt 73.5 kg (162 lb)   SpO2 98%   BMI 26.15 kg/m     GEN:patient is in no apparent distress.    HEENT: NC/AT.  Sclerae white, no incertus  Neck: No adenopathy.Carotids brisk bilaterally without bruits.  No jugular venous distension.   Heart:RRR. Normal S1, S2. No murmur, rub, click, or gallop.   Lungs: Lungs clear to ausculation bilaterally.  No ronchi, wheezes, rales.  Abdomen: Soft, nontender, nondistended.  Extremities: Trace bilateral lower extremity edema.  2+ bilateral radial pulses  Neurologic: Awake and alert, normal speech, gait and affect  Skin: No petechiae, purpura or rash noted    Labs:  LIPID RESULTS:  Lab Results   Component Value Date    CHOL 166 10/28/2013    HDL 64 10/28/2013    LDL 67 10/28/2013    TRIG 172 (H) 10/28/2013    CHOLHDLRATIO 2.6 10/28/2013       LIVER ENZYME RESULTS:  Lab Results   Component Value Date    AST 18 10/28/2013    ALT 26 10/28/2013       CBC RESULTS:  Lab Results   Component Value Date    WBC 4.8 10/28/2013    RBC 3.22 (L) 10/28/2013    HGB 9.8 (L) 10/28/2013    HCT 31.3 (L) 10/28/2013    MCV 97 10/28/2013    MCH 30.4 10/28/2013    MCHC 31.3 (L) 10/28/2013    RDW 15.0 10/28/2013     10/28/2013       BMP RESULTS:  Lab Results   Component Value Date     10/28/2013    POTASSIUM 4.4 10/28/2013    CHLORIDE 109 10/28/2013    CO2 22 10/28/2013    ANIONGAP 9 10/28/2013    GLC 84 10/28/2013    BUN 45 (H) 10/28/2013    CR 2.23 (H) 10/28/2013    " GFRESTIMATED 22 (L) 10/28/2013    GFRESTBLACK 27 (L) 10/28/2013    CIARA 8.8 10/28/2013        A1C RESULTS:  No results found for: A1C    INR RESULTS:  Lab Results   Component Value Date    INR 0.87 10/28/2013       Diagnostics:  EKG today demonstrates sinus bradycardia at 58 bpm with normal axis and intervals.    Dobutamine echo stress test November 5, 2013:  Interpretation Summary  Normal dobutamine stress echocardiogram. No wall motion abnormalities. Normal   LVEF at rest and with infusion.    Assessment and Plan:   1.  Preoperative Cardiovascular Evaluation:    Revised Cardiovascular Risk Index: 2  Duke Activity Status Index 5.29 METS    She is at elevated cardiovascular risk in the perioperative setting primarily due to high risk surgery and kidney dysfunction.  Her DASI is approximately 5 minutes however somewhat indeterminate and she is unsure what her functional capacity is to some of the questions.  Given her risk factor profile which also includes hypertension I think it is reasonable to proceed with stress testing.  I have ordered a Lexiscan stress test.  She is already scheduled for a transthoracic echocardiogram today.    2.  Hypertension: Blood pressure is well controlled at today's visit    3.  Chronic kidney disease secondary to prior nephrectomy (renal cell carcinoma), hypertension: She is followed by [facility] nephrology.    Given her advanced age she should be evaluated on an annual basis going forward.    YOLETTE Calix CNP  10/10/19  7:34 AM    Addendum on 10/11/19 @ 7:07 AM    Echocardiogram 10/10/2019  Interpretation Summary  Left ventricular function is normal, EF >65%.  Left ventricular hypertrophy.  No significant valve dysfunction.  No pericardial effusion is present.  IVC is not dilated.  Moderately dilated ascending aorta, indexed to 2.4 cm/m2.     Compared to previous study in 2013, there has been an increase in LV  thickening and ascending aorta is now 4.3 cm compared to  prior reported 4.1  Cm.    Janak Foster, YOLETTE CNP  10/11/19  7:07 AM        CC  Patient Care Team:  Khurram Rm as PCP - General (Internal Medicine)  Hal Neri MD as MD (Nephrology)  ULYSSES OSMAN

## 2019-10-10 NOTE — NURSING NOTE
"Chief Complaint   Patient presents with     Transplant Evaluation     Kidney       Blood pressure 126/82, pulse 59, resp. rate 18, height 1.676 m (5' 6\"), weight 73.6 kg (162 lb 3.2 oz).    Amanda Bosch CMA on 10/10/2019 at 9:39 AM    "

## 2019-10-10 NOTE — PROGRESS NOTES
"Outpatient MNT: Kidney Transplant Evaluation    Current BMI: 26.2 (HT 66 in,  lbs/74 kg)  BMI is within criteria of <35 for kidney transplant     Time Spent: 15 minutes  Visit Type: F/U (last seen by OP RD 10/2013)  Referring Physician: Finger  Pt accompanied by: self    Medical dx associated with RD referral  - CKD IV    History of previous txp: none, but is currently inactive on the waitlist   Dialysis: no     Nutrition Assessment  Pt cooks for self and does not follow a renal diet. She does add salt to her cooking and reports she \"eats out as much as I can so I don't have to cook\".     Pt with h/o gastric bypass in 1973.    Appetite: very good     Vitamins, Supplements, Pertinent Meds: mg ox, B12 injection   Herbal Medicines/Supplements: none     Diet Recall  Breakfast None    Lunch Soup and 1/2 s/w (out) or something similar at home    Dinner Quesadilla (out) or meat, potatoes, gravy at home    Snacks Radishes    Beverages Water/carbonated water, coffee    Alcohol 2-3 vodka tonic per day    Dining out A few times/week      Physical Activity  None      Anthropometrics  Height:   66 in   BMI:    26.2    Weight Status:Overweight BMI 25-29.9   Weight:  162 lbs            IBW (lb): 130  % IBW: 125    Wt Hx: Pt reports + MARCUS. Weight overall stable.     Adj/dosing BW: 138 lbs/63 kg       Labs  No results found for: A1C  Potassium   Date Value Ref Range Status   10/28/2013 4.4 3.4 - 5.3 mmol/L Final   5.1 on 8/16/19     PHOSPHORUS: mostly wnl levels per chart review, with high level in June    Malnutrition  % Intake: No decreased intake noted  % Weight Loss: None noted  Subcutaneous Fat Loss: None  Muscle Loss: None  Fluid Accumulation/Edema: None noted  Malnutrition Diagnosis: Patient does not meet two of the above criteria necessary for diagnosing malnutrition     Estimated Nutrition Needs  Energy  7038-9334     (25-30 kcal/kg for maintenance)     Protein  38-50    (0.6-0.8 g/kg for CKD)           Fluid  1 " ml/kcal or per MD   Micronutrient   Na+: <2000 mg/day  K+: 4318-6431 mg/day  Phos: 800-1000 mg/day            Nutrition Diagnosis  Excessive Na+ intake r/t food and nutrition related knowledge deficit AEB diet recall reveals high Na+ foods.    Food and nutrition related knowledge deficit r/t pre kidney transplant eval AEB pt verbalized not hearing pre/post transplant diet guidelines.    Nutrition Intervention  Nutrition education provided:  Discussed sodium intake (low sodium foods and drinks, seasoning food without salt and tips for low sodium diet). Encouraged pt to monitor how much salt she adds in her cooking in addition to frequency of dining out, especially as pt notes MARCUS.     Reviewed post txp diet guidelines in brief (will review in further detail post txp):  (1) Review of proper food safety measures d/t immunosuppressant therapy post-op and increased risk for food-borne illness    (2) Avoid the following post txp d/t risk for rejection, unknown effects on the organs, and/or potential interactions with immunosuppressants:  - Herbal, Chinese, holistic, chiropractic, natural, alternative medicines and supplements  - Detoxes and cleanses  - Weight loss pills  - Protein powders or other products with extracts or herbs (ie green tea extract)    (3) Med regimen and possible side effects    Patient Understanding: Pt verbalized understanding of education provided.  Expected Compliance: Good  Follow-Up Plans: PRN     Nutrition Goals  1. Limit Na+ <2000mg/day  2. Pt to verbalize understanding of 3 aspects of post txp education provided    Provided pt with contact info.   Kaur Diaz RD, LD  Pgr [phone]

## 2019-10-10 NOTE — PROGRESS NOTES
Chief Complaint:   Chief Complaint   Patient presents with     Follow Up     kidney transplant eval - last seen in 2013       HPI: Returns to clinic today for follow-up regarding possible kidney transplant surgery.  She is a history of chronic kidney disease secondary to hypertensive nephrosclerosis and solitary kidney secondary to nephrectomy for renal cell carcinoma.  She was evaluated for transplant in 2013 at which time she had a dobutamine echocardiogram that did not demonstrate any ischemia.  She reports no major changes or hospitalizations since we saw her back in 2013.  She tells me she is retired and she is quite sedentary.  She does tell me she can walk to the local [employer] restaurant which is approximately 1 mile from her house on a flat surface.  She does not experience any dyspnea or chest discomfort while doing that walk.  She does note that she becomes dyspneic with vacuuming however.  She denies family history of premature coronary disease or prior tobacco use.  She also tells me she is never had any prior cardiac procedures or cardiac history.    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 Mother      Hypertension Mother      Diabetes Father      Cancer Father         Lung CA       Medications:  Current Outpatient Medications   Medication Sig     calcitRIOL (ROCALTROL) 0.25 MCG capsule Take 0.25 mcg by mouth daily     cloNIDine (CATAPRES) 0.1 MG tablet Take 0.1 mg by mouth 3 times daily     cyanocobalamin 1000 MCG/ML injection Inject 1 mL as directed every 30 days     darbepoetin braulio-polysorbate (ARANESP, ALBUMIN FREE,) 60 MCG/ML injection Inject 1 mL Subcutaneous every 28 days     diphenhydrAMINE-APAP, sleep, (TYLENOL PM EXTRA STRENGTH)  MG/30ML LIQD Take 1 capful by mouth nightly as needed     Furosemide (LASIX) 20 MG tablet Take 20 mg by mouth daily     hydrALAZINE (APRESOLINE) 25 MG tablet Take 2 tablets by mouth 3 times daily     magnesium oxide (MAG-) 400 MG tablet Take 1 tablet by mouth daily     metoprolol (TOPROL-XL) 100 MG 24 hr tablet Take 2 tablets by mouth daily     NIFEdipine osmotic (PROCARDIA XL) 90 MG 24 hr tablet Take 90 mg by mouth daily     omeprazole (PRILOSEC) 20 MG capsule Take 1 capsule by mouth daily     sodium bicarbonate 650 MG tablet TAKE ONE TABLET BY MOUTH TWICE DAILY     traZODone (DESYREL) 50 MG tablet TAKE 1/2-1 TABLET BY MOUTH AT BEDTIME FOR SLEEP, COULD INCREASE TO 2 TABLETS IF NEEDED     triamcinolone (KENALOG) 0.1 % external ointment      HYDROcodone-acetaminophen (NORCO) 5-325 MG per tablet Take 1 tablet by mouth every 6 hours as needed     Hyoscyamine 0.15 MG TABS Take 1 tablet by mouth daily     LORazepam (ATIVAN) 1 MG tablet Take 1 mg by mouth At Bedtime     No current facility-administered medications for this visit.   "      Review of Systems:  As per HPI otherwise all other systems are negative    Physical Exam:   /82 (BP Location: Left arm, Patient Position: Chair, Cuff Size: Adult Regular)   Pulse 59   Ht 1.676 m (5' 6\")   Wt 73.5 kg (162 lb)   SpO2 98%   BMI 26.15 kg/m    GEN:patient is in no apparent distress.    HEENT: NC/AT.  Sclerae white, no incertus  Neck: No adenopathy.Carotids brisk bilaterally without bruits.  No jugular venous distension.   Heart:RRR. Normal S1, S2. No murmur, rub, click, or gallop.   Lungs: Lungs clear to ausculation bilaterally.  No ronchi, wheezes, rales.  Abdomen: Soft, nontender, nondistended.  Extremities: Trace bilateral lower extremity edema.  2+ bilateral radial pulses  Neurologic: Awake and alert, normal speech, gait and affect  Skin: No petechiae, purpura or rash noted    Labs:  LIPID RESULTS:  Lab Results   Component Value Date    CHOL 166 10/28/2013    HDL 64 10/28/2013    LDL 67 10/28/2013    TRIG 172 (H) 10/28/2013    CHOLHDLRATIO 2.6 10/28/2013       LIVER ENZYME RESULTS:  Lab Results   Component Value Date    AST 18 10/28/2013    ALT 26 10/28/2013       CBC RESULTS:  Lab Results   Component Value Date    WBC 4.8 10/28/2013    RBC 3.22 (L) 10/28/2013    HGB 9.8 (L) 10/28/2013    HCT 31.3 (L) 10/28/2013    MCV 97 10/28/2013    MCH 30.4 10/28/2013    MCHC 31.3 (L) 10/28/2013    RDW 15.0 10/28/2013     10/28/2013       BMP RESULTS:  Lab Results   Component Value Date     10/28/2013    POTASSIUM 4.4 10/28/2013    CHLORIDE 109 10/28/2013    CO2 22 10/28/2013    ANIONGAP 9 10/28/2013    GLC 84 10/28/2013    BUN 45 (H) 10/28/2013    CR 2.23 (H) 10/28/2013    GFRESTIMATED 22 (L) 10/28/2013    GFRESTBLACK 27 (L) 10/28/2013    CIARA 8.8 10/28/2013        A1C RESULTS:  No results found for: A1C    INR RESULTS:  Lab Results   Component Value Date    INR 0.87 10/28/2013       Diagnostics:  EKG today demonstrates sinus bradycardia at 58 bpm with normal axis and " intervals.    Dobutamine echo stress test November 5, 2013:  Interpretation Summary  Normal dobutamine stress echocardiogram. No wall motion abnormalities. Normal   LVEF at rest and with infusion.    Assessment and Plan:   1.  Preoperative Cardiovascular Evaluation:    Revised Cardiovascular Risk Index: 2  Duke Activity Status Index 5.29 METS    She is at elevated cardiovascular risk in the perioperative setting primarily due to high risk surgery and kidney dysfunction.  Her DASI is approximately 5 minutes however somewhat indeterminate and she is unsure what her functional capacity is to some of the questions.  Given her risk factor profile which also includes hypertension I think it is reasonable to proceed with stress testing.  I have ordered a Lexiscan stress test.  She is already scheduled for a transthoracic echocardiogram today.    2.  Hypertension: Blood pressure is well controlled at today's visit    3.  Chronic kidney disease secondary to prior nephrectomy (renal cell carcinoma), hypertension: She is followed by [facility] nephrology.    Given her advanced age she should be evaluated on an annual basis going forward.    YOLETTE Calix CNP  10/10/19  7:34 AM    Addendum on 10/11/19 @ 7:07 AM    Echocardiogram 10/10/2019  Interpretation Summary  Left ventricular function is normal, EF >65%.  Left ventricular hypertrophy.  No significant valve dysfunction.  No pericardial effusion is present.  IVC is not dilated.  Moderately dilated ascending aorta, indexed to 2.4 cm/m2.     Compared to previous study in 2013, there has been an increase in LV  thickening and ascending aorta is now 4.3 cm compared to prior reported 4.1  Cm.    YOLETTE Calix CNP  10/11/19  7:07 AM    Addendum on 12/16/19 @ 1:13 PM    Lexiscan nuclear stress:  Conclusion        The nuclear stress test is negative for inducible myocardial ischemia or infarction.     Left ventricular function is normal.     There is no  prior study for comparison.  CT Images are unremarkable.     ECG Summary   ECG Baseline electrocardiogram demonstrates sinus rhythm. Possible old inferior infarct. The stress electrocardiogram is negative for inducible ischemic EKG changes.       Stress normal.  May be listed for kidney transplant from CV perspective. Repeat stress and echo in one year.     YOLETTE Calix CNP  12/16/19  1:13 PM      CC  Patient Care Team:  Khurram Rm as PCP - General (Internal Medicine)  Hal Neri MD as MD (Nephrology)  ULYSSES OSMAN

## 2019-10-11 LAB
BLD GP AB SCN TITR SERPL: NORMAL {TITER}
C3 SERPL-MCNC: 67 MG/DL (ref 76–169)
C4 SERPL-MCNC: 21 MG/DL (ref 15–50)
CARDIOLIPIN ANTIBODY IGG: <1.6 GPL-U/ML (ref 0–19.9)
CARDIOLIPIN ANTIBODY IGM: 2.1 MPL-U/ML (ref 0–19.9)
CMV IGG SERPL QL IA: <0.2 AI (ref 0–0.8)
COPATH REPORT: NORMAL
EBV VCA IGG SER QL IA: >8 AI (ref 0–0.8)
HBV CORE AB SERPL QL IA: NONREACTIVE
HBV SURFACE AB SERPL IA-ACNC: 46.32 M[IU]/ML
HBV SURFACE AG SERPL QL IA: NONREACTIVE
HCV AB SERPL QL IA: NONREACTIVE
HIV 1+2 AB+HIV1 P24 AG SERPL QL IA: NONREACTIVE
T PALLIDUM AB SER QL: NONREACTIVE
THROMBIN TIME: 15.2 SEC (ref 13–19)
VZV IGG SER QL IA: 5.5 AI (ref 0–0.8)

## 2019-10-11 NOTE — PROGRESS NOTES
Assessment and Plan:  # Kidney Transplant Evaluation: Patient is a good candidate overall. Benefits of a living donor transplant were discussed.    # CKD unclear etiology with no previous biopsy: although likely from unilateral kidney with history of RCC. She has been inactive on the wait list since 2013 due to being too well. She has had creatinine levels ranging 3s-4 with correlating eGFR of 10-15 for the past few years. She has some fatigue, but no other uremic symptoms. She is ABO-A with a cPRA of 50%. One potential donor. When ready, she may benefit from a kidney transplant.     # RCC (2001): s/p right native nephrectomy with mass totally encapsulated. She did not require any chemotherapy or radiation. CXR today is pending.     # Cardiac Risk: no history of heart disease or events. She has no limitations with walking and denies exertional symptoms.  She had a stress test that her initial evaluation in 2013 that was negative. She saw cariology today with plans for a repeat stress test next week. ECHO/EKG results are pending.     # Rheumatological issues:  with slightly low C3 and positive ABDULLAHI, but no other signs of SLE.  Recommend continued close follow up with Rheumatology. Will recheck complement levels today.      # S/p gastric bypass: over 40 years ago. BMI 26. Will check oxalate level.     # Left supraclavicular fullness on exam: will need to complete neck US.     # Health Maintenance: 2016 pap smear (NIL), 6/2019 mammogram (negative), 10/2013 (repeat in 10 years per GI) all UTD, dental not UTD (poor dentition).      Discussed the risks and benefits of a transplant, including the risk of surgery and immunosuppression medications.  Patient's overall evaluation will be discussed in the Transplant Program's regular meeting with a final recommendation on the patients suitability for transplant to be made at that time.  Patient was seen in conjunction with Dr. Ottoniel Boswell as part of a shared  "visit.    Evaluation:  Lexy Cobb was seen in consultation at the request of Dr. Kannan House for evaluation as a potential kidney transplant recipient.    Reason for Visit:  Lexy Cobb is a 74 year old female with CKD from unknown etiology (no kidney biopsy), who presents for kidney transplant evaluation.    History of Present Illness:  Lexy Cobb is a 74-year-old female presents with CKD from unclear etiology with no previous biopsy, but likely from unilateral kidney. She had history of RCC (2001) s/p right native nephrectomy with mass totally encapsulated. She did not require any chemotherapy or radiation. Following her nephrectomy, she developed hypertension and had slow progressive kidney disease. She has been inactive on the wait list since 2013 due to being too well.  Please see Dr. Higginbotham's note from 11/12/2013 for full HPI. She has had creatinine levels ranging 3s-4 with correlating eGFR of 10-15 for the past few years. Overall, she is feeling fairly well. Her appetite is \"too good\". She complains of some fatigue but denies nausea and vomiting. Her blood pressure is well controlled on triple antihypertensive therapy. Her son is a potential donor.     She has no history of heart disease or events. She had a stress test that her initial evaluation in 2013 that was negative. She does not participate in any regular exercise but has no limitations with walking.  She stays active doing household chores, grocery shopping, and caring for her 90-year-old mother.  With exertion she denies chest pain, shortness of breath or claudication symptoms. She saw cardiology today and will have repeat stress test next week. Additional surgical history includes intestinal bypass 46 years ago (current BMI of 26).  Her nephrectomy in 2001 was complicated by recurrent SBO s/p ex lap with adhesions removal.         Kidney Disease Hx:        Kidney Disease Dx: Unknown etiology (no kidney biopsy)       Biopsy " Proven: No         On Dialysis: No       Primary Nephrologist: Dr. Neri       H/o Kidney Stones: Yes; one episode, passed on own in her 20s.        H/o Recurrent/Frequent UTI: No             Cardiac/Vascular Disease Risk Factors:        Cardiac Risk Factors: Hypertension, CKD and Age (Male > 55, Female > 65)       Known CAD: No       Known PAD/Caludication Symptoms: No       Known Heart Failure: No       Arrhythmia: No       Pulmonary Hypertension: No       Valvular Disease: No       Other: None         Functional Capacity/Frailty:        No limitations with walking.      Fatigue/Decreased Energy: [] No [x] Yes    Chest Pain or SOB with Exertion: [x] No [] Yes    Significant Weight Change: [x] No [] Yes    Nausea, Vomiting or Diarrhea: [] No [x] Yes Occasional diarrhea   Fever, Sweats or Chills:  [x] No [] Yes    Leg Swelling [] No [x] Yes        History of Cancer:   RCC     Other Significant Medical Issues:   Rheumatologic issues: including official diagnosis of Raynauds syndrome. Positive ABDULLAHI and slightly low C3. 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 Seen last by rhematology in 2017 with new onset of gout (appears stable without medications).    Left supraclavicular lymphadenopathy on exam     Review of Systems:  A comprehensive review of systems was obtained and negative, except as noted in the HPI or PMH.    Past Medical History:   Medical record was reviewed and PMH was discussed with patient and noted below.  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 Mother      Hypertension Mother      Diabetes Father      Cancer Father         Lung CA       Personal History:   Social History     Socioeconomic History     Marital status:      Spouse name: Not on file     Number of children: 1     Years of education: Not on file     Highest education level: Not on file   Occupational History     Employer: RETIRED   Social Needs     Financial resource strain: Not on file     Food insecurity:     Worry: Not on file     Inability: Not on file     Transportation needs:     Medical: Not on file     Non-medical: Not on file   Tobacco Use     Smoking status: Never Smoker     Smokeless tobacco: Never Used   Substance and Sexual Activity     Alcohol use: No     Drug use: No     Sexual activity: Not on file   Lifestyle     Physical activity:     Days per week: Not on file     Minutes per session: Not on file     Stress: Not on file   Relationships     Social connections:     Talks on phone: Not on file     Gets together: Not on file     Attends Rastafari service: Not on file     Active member of club or organization: Not on file     Attends meetings of clubs or organizations: Not on file     Relationship status: Not on file     Intimate partner violence:     Fear of current or ex partner: Not on file     Emotionally abused: Not on file     Physically abused: Not on file     Forced sexual activity: Not on  "file   Other Topics Concern     Parent/sibling w/ CABG, MI or angioplasty before 65F 55M? Not Asked   Social History Narrative     Not on file       Allergies:  Allergies   Allergen Reactions     Dapsone      Fluoxetine        Medications:  Current Outpatient Medications   Medication Sig     calcitRIOL (ROCALTROL) 0.25 MCG capsule Take 0.25 mcg by mouth daily     cloNIDine (CATAPRES) 0.1 MG tablet Take 0.1 mg by mouth 3 times daily     cyanocobalamin 1000 MCG/ML injection Inject 1 mL as directed every 30 days     darbepoetin braulio-polysorbate (ARANESP, ALBUMIN FREE,) 60 MCG/ML injection Inject 1 mL Subcutaneous every 28 days     diphenhydrAMINE-APAP, sleep, (TYLENOL PM EXTRA STRENGTH)  MG/30ML LIQD Take 1 capful by mouth nightly as needed     Furosemide (LASIX) 20 MG tablet Take 20 mg by mouth daily     hydrALAZINE (APRESOLINE) 25 MG tablet Take 2 tablets by mouth 3 times daily     HYDROcodone-acetaminophen (NORCO) 5-325 MG per tablet Take 1 tablet by mouth every 6 hours as needed     Hyoscyamine 0.15 MG TABS Take 1 tablet by mouth daily     LORazepam (ATIVAN) 1 MG tablet Take 1 mg by mouth At Bedtime     magnesium oxide (MAG-) 400 MG tablet Take 1 tablet by mouth daily     metoprolol (TOPROL-XL) 100 MG 24 hr tablet Take 2 tablets by mouth daily     NIFEdipine osmotic (PROCARDIA XL) 90 MG 24 hr tablet Take 90 mg by mouth daily     omeprazole (PRILOSEC) 20 MG capsule Take 1 capsule by mouth daily     sodium bicarbonate 650 MG tablet TAKE ONE TABLET BY MOUTH TWICE DAILY     traZODone (DESYREL) 50 MG tablet TAKE 1/2-1 TABLET BY MOUTH AT BEDTIME FOR SLEEP, COULD INCREASE TO 2 TABLETS IF NEEDED     triamcinolone (KENALOG) 0.1 % external ointment      No current facility-administered medications for this visit.        Vitals:  /82   Pulse 59   Resp 18   Ht 1.676 m (5' 6\")   Wt 73.6 kg (162 lb 3.2 oz)   BMI 26.18 kg/m      Exam:  GENERAL APPEARANCE: alert and no distress  HENT: mouth without ulcers " or lesions  LYMPHATICS: no cervical or supraclavicular nodes  RESP: lungs clear to auscultation - no rales, rhonchi or wheezes  CV: regular rhythm, normal rate, no rub, no murmur  FEMORAL PULSES: 2+ equal bilaterally.   EDEMA: no LE edema bilaterally  ABDOMEN: soft, nondistended, nontender, bowel sounds normal  MS: extremities normal - no gross deformities noted, no evidence of inflammation in joints, no muscle tenderness  SKIN: no rash    Results:   Recent Results (from the past 336 hour(s))   EKG 12-lead, tracing only (Same Day)    Collection Time: 10/10/19  7:04 AM   Result Value Ref Range    Interpretation ECG Click View Image link to view waveform and result      Patient was seen by myself, Dr. Ottoniel Boswell, in conjunction with Yoel Amanda NP as part of a shared visit.    I personally reviewed past medical and surgical history, vital signs, medications and labs.  Present and past medical history, along with significant physical exam findings were all reviewed with OLEGARIO.    My rush findings:  Lexy Cobb is 74 year old, who presents for Kidney transplant evaluation.    Key management decisions made by me and discussed with OLEGARIO:     1.  Advanced CKD not yet on dialysis.  2.  Transplant candidacy evaluation.  3.  History of for Raynaud's with abnormal ABDULLAHI follows with rheumatology needs to update her complements.  4.  History of renal cell cancer status post unilateral nephrectomy this was remote without evidence of recurrence we will need to update her imaging.  5.  History of gastric bypass will need to update her oxalate level.  6.  Cardiovascular risk stratification patient will need formal cardiology assessment.  7.  Slight neck fullness on exam most likely normal will get an ultrasound to rule out abnormalities.  8.  Age-appropriate cancer screening as noted above.  Discussion:  Overall Ms. Cobb appears to be a reasonable candidate.  She is fairly active and did not appear to have any barriers  to transplantation.  She will need to complete her work-up as delineated above.  Final candidacy decision will be rendered at our multidisciplinary meeting.

## 2019-10-13 LAB
GAMMA INTERFERON BACKGROUND BLD IA-ACNC: 0.03 IU/ML
M TB IFN-G BLD-IMP: NEGATIVE
M TB IFN-G CD4+ BCKGRND COR BLD-ACNC: 6.49 IU/ML
MITOGEN IGNF BCKGRD COR BLD-ACNC: 0 IU/ML
MITOGEN IGNF BCKGRD COR BLD-ACNC: 0 IU/ML

## 2019-10-14 LAB
LA PPP-IMP: NEGATIVE
PROTOCOL CUTOFF: NORMAL
SA1 CELL: NORMAL
SA1 COMMENTS: NORMAL
SA1 HI RISK ABY: NORMAL
SA1 MOD RISK ABY: NORMAL
SA1 TEST METHOD: NORMAL
SA2 CELL: NORMAL
SA2 COMMENTS: NORMAL
SA2 HI RISK ABY UA: NORMAL
SA2 MOD RISK ABY: NORMAL
SA2 TEST METHOD: NORMAL
UNACCEPTABLE ANTIGEN: NORMAL
UNOS CPRA: 0

## 2019-10-16 NOTE — PROGRESS NOTES
Transplant Surgery Consult Note    Medical record number: [id number]  YOB: 1945,   Consult requested for evaluation of kidney transplant candidacy.    Assessment and Recommendations: Ms. Cobb appears to be a good candidate for kidney transplantation and has a good understanding of the risks and benefits of this approach to the management of renal failure. The following issues should be addressed prior to finalizing her transplant candidacy:     - search for live donors    The majority of our visit was spent in counselling, discussing the medical and surgical risks of kidney transplantation. We discussed approximate wait time and how that is influenced by issues such as blood type and sensitization (PRA) and access to a living donor. I contrasted potential waiting time for living vs  donor kidneys from  normal (0-85%) or higher (%) kidney donor profile index (KDPI) donors and their associated outcomes. I would recommend this individual to consider kidneys from high KDPI donors. Potential surgical complications of kidney transplantation include bleeding, superficial or deep wound complications (infection, hernia, lymphocele), ureteral anastomotic failure (leak or stenosis), graft thrombosis, need for reoperation and other issues such as cardiac complications, pneumonia, deep venous thrombosis, pulmonary embolism, post transplant diabetes and death. The potential for recurrent disease or need for retransplantation was also addressed. We discussed the possible need for ureteral stent (and subsequent removal), and the utility of protocol biopsy and laboratory studies to evaluate for rejection or recurrent disease. We discussed the risk of graft rejection, our center's average graft and patient survival rates, immunosuppression protocols, as well as the potential opportunity to participate in clinical trials.  We also discussed the average length of stay, recovery process, and  posttransplant lab and monitoring protocol.  I emphasized the need for strict immunosuppression medication adherence and the potential for complications of immunosuppression such as skin cancer or lymphoma, as well as a very low but not zero risk of donor-derived disease transmission risks (infection, cancer). Ms. Cobb asked good questions and her candidacy will be reviewed at our Multidisciplinary Selection Committee. Thank you for the opportunity to participate in Ms. Cobb's care.      I would recommend this individual to consider kidneys from high KDPI donors. The reason for this decision is best summarized as: decreased dialysis related morbidity/mortality, accepting lower kidney graft survival rates.    Total time: 30 minutes  Counselling time: 20 minutes    .      ---------------------------------------------------------------------------------------------------    HPI: Ms. Cobb has Chronic renal failure due to unknown etiology.   History of RCC s/p right native nephrectomy in 2001 without recurrence.  Was listed 2013 but has stayed on hold due to being too well.      The patient is not on dialysis.  Has kidney donors:  Yes .      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 Mother      Hypertension Mother      Diabetes Father      Cancer Father         Lung CA     Social History     Socioeconomic History     Marital status:      Spouse name: Not on file     Number of children: 1     Years of education: Not on file     Highest education level: Not on file   Occupational History     Employer: RETIRED   Social Needs     Financial resource strain: Not on file     Food insecurity:     Worry: Not on file     Inability: Not on file     Transportation needs:     Medical: Not on file     Non-medical: Not on file   Tobacco Use     Smoking status: Never Smoker     Smokeless tobacco: Never Used   Substance and Sexual Activity     Alcohol use: No     Drug use: No     Sexual activity: Not on file   Lifestyle     Physical activity:     Days per week: Not on file     Minutes per session: Not on file     Stress: Not on file   Relationships     Social connections:     Talks on phone: Not on file     Gets together: Not on file     Attends Gnosticist service: Not on file     Active member of club or organization: Not on file     Attends meetings of clubs or organizations: Not on file     Relationship status: Not on file     Intimate partner violence:     Fear of current or ex partner: Not on file     Emotionally abused: Not on file     Physically abused: Not on file     Forced sexual activity: Not on file   Other Topics Concern     Parent/sibling w/ CABG, MI or angioplasty before 65F 55M? Not Asked   Social History Narrative     Not on file       ROS:   CONSTITUTIONAL:  No fevers or chills  EYES: negative for icterus  ENT:  negative for hearing loss, tinnitus and sore throat  RESPIRATORY:  negative for cough, sputum, dyspnea  CARDIOVASCULAR:  negative for chest pain    GASTROINTESTINAL:  negative  for nausea, vomiting, diarrhea or constipation  GENITOURINARY:  negative for incontinence, dysuria, bladder emptying problems  HEME:  No easy bruising  INTEGUMENT:  negative for rash and pruritus  NEURO:  Negative for headache, seizure disorder    Allergies:   Allergies   Allergen Reactions     Dapsone      Fluoxetine        Medications:  Prescription Medications as of 10/16/2019       Rx Number Disp Refills Start End Last Dispensed Date Next Fill Date Owning Pharmacy    calcitRIOL (ROCALTROL) 0.25 MCG capsule            Sig: Take 0.25 mcg by mouth daily    Class: Historical    Route: Oral    cloNIDine (CATAPRES) 0.1 MG tablet            Sig: Take 0.1 mg by mouth 3 times daily    Class: Historical    Route: Oral    cyanocobalamin 1000 MCG/ML injection            Sig: Inject 1 mL as directed every 30 days    Class: Historical    Route: Injection    darbepoetin braulio-polysorbate (ARANESP, ALBUMIN FREE,) 60 MCG/ML injection            Sig: Inject 1 mL Subcutaneous every 28 days    Class: Historical    Route: Subcutaneous    diphenhydrAMINE-APAP, sleep, (TYLENOL PM EXTRA STRENGTH)  MG/30ML LIQD            Sig: Take 1 capful by mouth nightly as needed    Class: Historical    Route: Oral    Furosemide (LASIX) 20 MG tablet            Sig: Take 20 mg by mouth daily    Class: Historical    Route: Oral    hydrALAZINE (APRESOLINE) 25 MG tablet            Sig: Take 2 tablets by mouth 3 times daily    Class: Historical    Route: Oral    magnesium oxide (MAG-) 400 MG tablet            Sig: Take 1 tablet by mouth daily    Class: Historical    Route: Oral    metoprolol (TOPROL-XL) 100 MG 24 hr tablet            Sig: Take 2 tablets by mouth daily    Class: Historical    Route: Oral    NIFEdipine osmotic (PROCARDIA XL) 90 MG 24 hr tablet            Sig: Take 90 mg by mouth daily    Class: Historical    Route: Oral    omeprazole (PRILOSEC) 20 MG capsule            Sig: Take 1 capsule by mouth daily    Class: Historical     "Route: Oral    sodium bicarbonate 650 MG tablet    6/17/2019        Sig: TAKE ONE TABLET BY MOUTH TWICE DAILY    Class: Historical    traZODone (DESYREL) 50 MG tablet    4/16/2019        Sig: TAKE 1/2-1 TABLET BY MOUTH AT BEDTIME FOR SLEEP, COULD INCREASE TO 2 TABLETS IF NEEDED    Class: Historical    triamcinolone (KENALOG) 0.1 % external ointment    4/3/2019        Class: Historical    Route: Topical          Exam:   Vitals:   [unfilled]  Estimated body mass index is 26.18 kg/m  as calculated from the following:    Height as of an earlier encounter on 10/10/19: 1.676 m (5' 6\").    Weight as of an earlier encounter on 10/10/19: 73.6 kg (162 lb 3.2 oz).  Appearance: in no apparent distress.   Skin: normal  Head and Neck: Normal, no rashes or jaundice  Respiratory: easy respirations, no audible wheezing.  Abdomen: rounded    Diagnostics:   Recent Results (from the past 672 hour(s))   EKG 12-lead, tracing only (Same Day)    Collection Time: 10/10/19  7:04 AM   Result Value Ref Range    Interpretation ECG Click View Image link to view waveform and result    Factor 2 and 5 mutation analysis    Collection Time: 10/10/19  1:57 PM   Result Value Ref Range    Copath Report       Patient Name: LIZA MCCLAIN  MR#: [id number]  Specimen #: [id number]  Collected: 10/10/2019 13:57  Received: 10/11/2019 10:48  Reported: 10/11/2019 13:17  Ordering Phy(s): CHIOMA GONZALEZ    For improved result formatting, select 'View Enhanced Report Format' under   Linked Documents section.  _________________________________________    TEST(S) REQUESTED:  Factor 5 Leiden and Factor 2 by PCR    SPECIMEN DESCRIPTION:  Blood    COMMENTS:  Factor 2 factor 5 testing was previously ordered on this patient. This   testing will be canceled and credited.  See [facility] number [id number] on sample collected 10/28/2013.    [facility] Report Patient Name: LIZA MCCLAIN CMR#: [id number] #:   [id number]: 10/28/2013  07:07Received: 10/28/2013 " 09:49Reported: 10/29/2013 17:11Ordering Phy(s):   BRET SOLOMON_________________________________________TEST(S) REQUESTED:Factor 5   Leiden and Factor 2 by PCRSPECIMEN  DESCRIPTION:BloodMETHODOLOGY:  The regions of genomic DNA containing the    N5298F Factor 5gene mutation (Factor  V Leiden) and the Factor 2(Prothrombin W40185P)gene mutation were   simultaneously amplified using the  polymerase chainreaction. The amplified products were digested with   restrictionendonuclease TaqI and products  were analyzed by gel electrophoresis.RESULTS:Factor V 1691G>A (Leiden)   RESULTS:Mutation analyzed:  1691G>AFactor V 1691G>A (Leiden) Interpretation:   ABSENTFactor V 1691G>A   (Leiden) mutation genotype:  G/GFACTOR 2/PROTHROMBIN RESULTS:Mutation analyzed:   79187A>AFactor 2   Mutation Interpretation:   ABSENTFactor  2 Mutation genotype:   G/GINTERPRETATION:The patient is negative for the   Factor V 1691G>A (Leiden) and  negativefor the Factor 2 mutation.This test was developed and its   performance determined by the [facility].It has   not been cleared or approved by the  U.S. Food and DrugAdministration. The FDA has determined that such   clearance or approvalis  not necessary.  Pursuant to the requirements of CLIA'88, thislaboratory has established   and verified the test's accuracy  andprecision. This test is used for clinical purposes.Electronically   Signed Out By:Tom Griggs MD, PhD  UMPhysiciansTESTING LAB [facility] [id number] [place] [phone]    Electronically Signed Out By:  GER     CPT Codes:    TESTING LAB LOCATION:  [facility]  [address] [facility] [address] [place] [address] [phone]    COLLECTION SITE:  Client:  [facility]  Location:   [facility] (B)     UA with Microscopic reflex to Culture    Collection Time: 10/10/19  2:15 PM   Result Value Ref Range    Color Urine Yellow     Appearance Urine Clear     Glucose Urine Negative NEG^Negative mg/dL    Bilirubin Urine Negative NEG^Negative    Ketones Urine Negative NEG^Negative mg/dL    Specific Gravity Urine 1.011 1.003 - 1.035    Blood Urine Negative NEG^Negative    pH Urine 5.0 5.0 - 7.0 pH    Protein Albumin Urine Negative NEG^Negative mg/dL    Urobilinogen mg/dL 0.0 0.0 - 2.0 mg/dL    Nitrite Urine Negative NEG^Negative    Leukocyte Esterase Urine Negative NEG^Negative    Source Midstream Urine     WBC Urine 1 0 - 5 /HPF    RBC Urine 1 0 - 2 /HPF    Bacteria Urine Few (A) NEG^Negative /HPF    Squamous Epithelial /HPF Urine <1 0 - 1 /HPF    Mucous Urine Present (A) NEG^Negative /LPF    Hyaline Casts 1 0 - 2 /LPF   ABO type [OZJ6008]    Collection Time: 10/10/19  2:24 PM   Result Value Ref Range    ABO A     RH(D) Pos     Specimen Expires 10/13/2019    ABO/Rh type and screen    Collection Time: 10/10/19  2:29 PM   Result Value Ref Range    ABO A     RH(D) Pos     Antibody Screen Neg     Test Valid Only At          [facility]    Specimen Expires 10/13/2019    PRA Single Antigen IgG Antibody    Collection Time: 10/10/19  2:30 PM   Result Value Ref Range    SA1 Test Method SA FCS     SA1 Cell Class I     SA1 Hi Risk Ashley None     SA1 Mod Risk Ashley None     SA1 Comments        Test performed by modified procedure. Serum heat inactivated and tested   by a modified ([place]) protocol including fetal calf serum addition.   High-risk, mfi >3,000. Mod-risk, mfi 500-3,000.      SA2 Test Method SA FCS     SA2 Cell Class II     SA2 Hi Risk Ashley None     SA2 Mod Risk Ashley None     SA2 Comments        Test performed by modified procedure. Serum heat inactivated and tested   by a modified ([place]) protocol including fetal calf serum addition.   High-risk, mfi >3,000. Mod-risk,  mfi 500-3,000.      Protocol Cutoff Plan A, 500 mfi/cumulative      UNOS cPRA 0     Unacceptable Antigen None    CMV Antibody IgG [AXH4553]    Collection Time: 10/10/19  2:30 PM   Result Value Ref Range    CMV Antibody IgG <0.2 0.0 - 0.8 AI   EBV Capsid Antibody IgG [XSO0870]    Collection Time: 10/10/19  2:30 PM   Result Value Ref Range    EBV Capsid Antibody IgG >8.0 (H) 0.0 - 0.8 AI   Hepatitis B core antibody [MDC8689]    Collection Time: 10/10/19  2:30 PM   Result Value Ref Range    Hepatitis B Core Ashley Nonreactive NR^Nonreactive   Hepatitis B Surface Antibody [[id number]]    Collection Time: 10/10/19  2:30 PM   Result Value Ref Range    Hepatitis B Surface Antibody 46.32 (H) <8.00 m[IU]/mL   Hepatitis B surface antigen [PMY086]    Collection Time: 10/10/19  2:30 PM   Result Value Ref Range    Hep B Surface Agn Nonreactive NR^Nonreactive   Hepatitis C antibody [PEI140]    Collection Time: 10/10/19  2:30 PM   Result Value Ref Range    Hepatitis C Antibody Nonreactive NR^Nonreactive   HIV Antigen Antibody Combo Pretransplant    Collection Time: 10/10/19  2:30 PM   Result Value Ref Range    HIV Antigen Antibody Combo Pretransplant Nonreactive NR^Nonreactive   Varicella Zoster Virus Antibody IgG [UUL3108]    Collection Time: 10/10/19  2:30 PM   Result Value Ref Range    Varicella Zoster Virus Antibody IgG 5.5 (H) 0.0 - 0.8 AI   Treponema Abs w Reflex to RPR and Titer    Collection Time: 10/10/19  2:30 PM   Result Value Ref Range    Treponema Antibodies Nonreactive NR^Nonreactive   Antibody titer red cell [PLT0668]    Collection Time: 10/10/19  2:30 PM   Result Value Ref Range    Antibody Titer Anti B IgM=16  IgG=8    Blood Group A Subtype [IRF6339]    Collection Time: 10/10/19  2:30 PM   Result Value Ref Range    Antigen Type A1 Positive     Comprehensive metabolic panel [LAB17]    Collection Time: 10/10/19  2:31 PM   Result Value Ref Range    Sodium 139 133 - 144 mmol/L    Potassium 4.4 3.4 - 5.3 mmol/L    Chloride  106 94 - 109 mmol/L    Carbon Dioxide 25 20 - 32 mmol/L    Anion Gap 8 3 - 14 mmol/L    Glucose 112 (H) 70 - 99 mg/dL    Urea Nitrogen 60 (H) 7 - 30 mg/dL    Creatinine 3.68 (H) 0.52 - 1.04 mg/dL    GFR Estimate 12 (L) >60 mL/min/[1.73_m2]    GFR Estimate If Black 13 (L) >60 mL/min/[1.73_m2]    Calcium 8.5 8.5 - 10.1 mg/dL    Bilirubin Total 0.4 0.2 - 1.3 mg/dL    Albumin 3.8 3.4 - 5.0 g/dL    Protein Total 7.0 6.8 - 8.8 g/dL    Alkaline Phosphatase 38 (L) 40 - 150 U/L    ALT 16 0 - 50 U/L    AST 12 0 - 45 U/L   Phosphorus    Collection Time: 10/10/19  2:31 PM   Result Value Ref Range    Phosphorus 4.0 2.5 - 4.5 mg/dL   Quantiferon TB Gold Plus    Collection Time: 10/10/19  2:31 PM   Result Value Ref Range    Quantiferon-TB Gold Plus Result Negative NEG^Negative    TB1 Ag minus Nil Value 0.00 IU/mL    TB2 Ag minus Nil Value 0.00 IU/mL    Mitogen minus Nil Result 6.49 IU/mL    Nil Result 0.03 IU/mL   CBC with platelets differential [DDM994]    Collection Time: 10/10/19  2:31 PM   Result Value Ref Range    WBC 6.2 4.0 - 11.0 10e9/L    RBC Count 3.05 (L) 3.8 - 5.2 10e12/L    Hemoglobin 9.7 (L) 11.7 - 15.7 g/dL    Hematocrit 31.3 (L) 35.0 - 47.0 %     (H) 78 - 100 fl    MCH 31.8 26.5 - 33.0 pg    MCHC 31.0 (L) 31.5 - 36.5 g/dL    RDW 14.0 10.0 - 15.0 %    Platelet Count 245 150 - 450 10e9/L    Diff Method Automated Method     % Neutrophils 65.5 %    % Lymphocytes 24.8 %    % Monocytes 6.8 %    % Eosinophils 2.3 %    % Basophils 0.3 %    % Immature Granulocytes 0.3 %    Nucleated RBCs 0 0 /100    Absolute Neutrophil 4.0 1.6 - 8.3 10e9/L    Absolute Lymphocytes 1.5 0.8 - 5.3 10e9/L    Absolute Monocytes 0.4 0.0 - 1.3 10e9/L    Absolute Eosinophils 0.1 0.0 - 0.7 10e9/L    Absolute Basophils 0.0 0.0 - 0.2 10e9/L    Abs Immature Granulocytes 0.0 0 - 0.4 10e9/L    Absolute Nucleated RBC 0.0    Cardiolipin Ashley IgG and IgM [LAB 6836]    Collection Time: 10/10/19  2:31 PM   Result Value Ref Range    Cardiolipin Antibody  IgG <1.6 0.0 - 19.9 GPL-U/mL    Cardiolipin Antibody IgM 2.1 0.0 - 19.9 MPL-U/mL   Lupus Anticoagulant Panel [[id number]]    Collection Time: 10/10/19  2:31 PM   Result Value Ref Range    Lupus Result Negative NEG^Negative   INR [YMB9533]    Collection Time: 10/10/19  2:31 PM   Result Value Ref Range    INR 0.93 0.86 - 1.14   Partial thromboplastin time [LAB56]    Collection Time: 10/10/19  2:31 PM   Result Value Ref Range    PTT 26 22 - 37 sec   Thrombin time [YRG075]    Collection Time: 10/10/19  2:31 PM   Result Value Ref Range    Thrombin Time 15.2 13.0 - 19.0 sec   Complement C4    Collection Time: 10/10/19  2:31 PM   Result Value Ref Range    Complement C4 21 15 - 50 mg/dL   Complement C3    Collection Time: 10/10/19  2:31 PM   Result Value Ref Range    Complement C3 67 (L) 76 - 169 mg/dL

## 2019-11-07 ENCOUNTER — DOCUMENTATION ONLY (OUTPATIENT)
Dept: TRANSPLANT | Facility: CLINIC | Age: 74
End: 2019-11-07

## 2019-11-08 ENCOUNTER — TELEPHONE (OUTPATIENT)
Dept: TRANSPLANT | Facility: CLINIC | Age: 74
End: 2019-11-08

## 2019-11-19 ENCOUNTER — HOSPITAL ENCOUNTER (OUTPATIENT)
Dept: NUCLEAR MEDICINE | Facility: CLINIC | Age: 74
Setting detail: NUCLEAR MEDICINE
End: 2019-11-19
Attending: NURSE PRACTITIONER
Payer: COMMERCIAL

## 2019-11-19 ENCOUNTER — HOSPITAL ENCOUNTER (OUTPATIENT)
Dept: ULTRASOUND IMAGING | Facility: CLINIC | Age: 74
Discharge: HOME | End: 2019-11-19
Attending: NURSE PRACTITIONER | Admitting: NURSE PRACTITIONER
Payer: COMMERCIAL

## 2019-11-19 ENCOUNTER — HOSPITAL ENCOUNTER (OUTPATIENT)
Dept: CARDIOLOGY | Facility: CLINIC | Age: 74
End: 2019-11-19
Attending: NURSE PRACTITIONER
Payer: COMMERCIAL

## 2019-11-19 DIAGNOSIS — Z01.818 PRE-OPERATIVE EXAMINATION: ICD-10-CM

## 2019-11-19 DIAGNOSIS — Z98.84 GASTRIC BYPASS STATUS FOR OBESITY: ICD-10-CM

## 2019-11-19 DIAGNOSIS — R22.1 NECK SWELLING: ICD-10-CM

## 2019-11-19 DIAGNOSIS — R06.09 DYSPNEA ON EXERTION: ICD-10-CM

## 2019-11-19 DIAGNOSIS — Z76.82 ORGAN TRANSPLANT CANDIDATE: ICD-10-CM

## 2019-11-19 PROCEDURE — 83945 ASSAY OF OXALATE: CPT | Performed by: NURSE PRACTITIONER

## 2019-11-19 PROCEDURE — 93017 CV STRESS TEST TRACING ONLY: CPT

## 2019-11-19 PROCEDURE — 78452 HT MUSCLE IMAGE SPECT MULT: CPT

## 2019-11-19 PROCEDURE — 93016 CV STRESS TEST SUPVJ ONLY: CPT | Performed by: INTERNAL MEDICINE

## 2019-11-19 PROCEDURE — 93018 CV STRESS TEST I&R ONLY: CPT | Performed by: INTERNAL MEDICINE

## 2019-11-19 PROCEDURE — 25000128 H RX IP 250 OP 636: Performed by: INTERNAL MEDICINE

## 2019-11-19 PROCEDURE — A9502 TC99M TETROFOSMIN: HCPCS | Performed by: NURSE PRACTITIONER

## 2019-11-19 PROCEDURE — 34300033 ZZH RX 343: Performed by: NURSE PRACTITIONER

## 2019-11-19 PROCEDURE — 36415 COLL VENOUS BLD VENIPUNCTURE: CPT | Performed by: NURSE PRACTITIONER

## 2019-11-19 PROCEDURE — 76536 US EXAM OF HEAD AND NECK: CPT

## 2019-11-19 RX ORDER — ALBUTEROL SULFATE 90 UG/1
2 AEROSOL, METERED RESPIRATORY (INHALATION) EVERY 5 MIN PRN
Status: DISCONTINUED | OUTPATIENT
Start: 2019-11-19 | End: 2019-11-20 | Stop reason: HOSPADM

## 2019-11-19 RX ORDER — REGADENOSON 0.08 MG/ML
0.4 INJECTION, SOLUTION INTRAVENOUS ONCE
Status: COMPLETED | OUTPATIENT
Start: 2019-11-19 | End: 2019-11-19

## 2019-11-19 RX ORDER — ACYCLOVIR 200 MG/1
0-1 CAPSULE ORAL
Status: DISCONTINUED | OUTPATIENT
Start: 2019-11-19 | End: 2019-11-20 | Stop reason: HOSPADM

## 2019-11-19 RX ORDER — AMINOPHYLLINE 25 MG/ML
50-100 INJECTION, SOLUTION INTRAVENOUS
Status: DISCONTINUED | OUTPATIENT
Start: 2019-11-19 | End: 2019-11-20 | Stop reason: HOSPADM

## 2019-11-19 RX ADMIN — TETROFOSMIN 9.6 MCI.: 1.38 INJECTION, POWDER, LYOPHILIZED, FOR SOLUTION INTRAVENOUS at 13:58

## 2019-11-19 RX ADMIN — TETROFOSMIN 34.6 MCI.: 1.38 INJECTION, POWDER, LYOPHILIZED, FOR SOLUTION INTRAVENOUS at 15:05

## 2019-11-19 RX ADMIN — REGADENOSON 0.4 MG: 0.08 INJECTION, SOLUTION INTRAVENOUS at 15:03

## 2019-11-19 NOTE — PROGRESS NOTES
Pt here for Lexiscan nuclear stress test.  Medication and side effects reviewed with patient. Lung sounds clear to auscultation bilaterally. Denied caffeine use. Patient tolerated Lexiscan dose without any adverse reactions. Monitored post injection and then taken back to nuclear medicine for follow up imaging.

## 2019-11-23 LAB — OXALATE SERPL-MCNC: 7.5 UMOL/L

## 2019-12-16 LAB
CV STRESS MAX HR HE: 80
RATE PRESSURE PRODUCT: NORMAL
STRESS ECHO BASELINE DIASTOLIC HE: 85
STRESS ECHO BASELINE HR: 67
STRESS ECHO BASELINE SYSTOLIC BP: 123
STRESS ECHO CALCULATED PERCENT HR: 54 %
STRESS ECHO LAST STRESS DIASTOLIC BP: 68
STRESS ECHO LAST STRESS SYSTOLIC BP: 128
STRESS ECHO TARGET HR: 148

## 2019-12-27 ENCOUNTER — TELEPHONE (OUTPATIENT)
Dept: TRANSPLANT | Facility: CLINIC | Age: 74
End: 2019-12-27

## 2019-12-27 NOTE — TELEPHONE ENCOUNTER
Left message asking for return call regarding if she has made a dentist appt or seen dentist. Reminded patient that this is the only thing holding her from being Active on Kidney Transplant wait list.

## 2020-01-09 ENCOUNTER — TRANSFERRED RECORDS (OUTPATIENT)
Dept: HEALTH INFORMATION MANAGEMENT | Facility: CLINIC | Age: 75
End: 2020-01-09

## 2020-01-10 ENCOUNTER — DOCUMENTATION ONLY (OUTPATIENT)
Dept: TRANSPLANT | Facility: CLINIC | Age: 75
End: 2020-01-10

## 2020-01-10 ENCOUNTER — TELEPHONE (OUTPATIENT)
Dept: TRANSPLANT | Facility: CLINIC | Age: 75
End: 2020-01-10

## 2020-01-10 NOTE — TELEPHONE ENCOUNTER
Patient returned call and reports being seen at [facility] in [place] yesterday. Writer requested records for coordinator's review.

## 2020-01-15 ENCOUNTER — DOCUMENTATION ONLY (OUTPATIENT)
Dept: TRANSPLANT | Facility: CLINIC | Age: 75
End: 2020-01-15

## 2020-01-15 ENCOUNTER — TELEPHONE (OUTPATIENT)
Dept: TRANSPLANT | Facility: CLINIC | Age: 75
End: 2020-01-15

## 2020-01-15 ENCOUNTER — COMMITTEE REVIEW (OUTPATIENT)
Dept: TRANSPLANT | Facility: CLINIC | Age: 75
End: 2020-01-15

## 2020-01-15 NOTE — TELEPHONE ENCOUNTER
Coordinator called and spoke to pt's , Reviewed pt has completed her transplant workup and her status was changed to active on kidney wait list. . Reviewed what to do when pt gets a call with a kidney offer.  Reviewed general components of inpt stay and post-transplant routines including frequent appointments here as an outpt x1-2 weeks post-transplant.  Reviewed that pt will be receiving a listing letter and ALA order in the mail.  Next ALA sample is due now(pt will have drawn at nephrologist's office).  Encouraged pt to share living donor web site to anyone who expresses interest in getting tested as a living donor. Requested pt contact me with any changes in her health, insurance, or contact information.  Instructed pt to call with any questions.  verbalized good understanding and had no further questions.       UNOS updated, immunology & PFR notified, change in status letter routed to admin team to send out.

## 2020-01-15 NOTE — COMMITTEE REVIEW
Abdominal Committee Review Note     Evaluation Date: 10/28/2013  Committee Review Date: 1/15/2020    Organ being evaluated for: Kidney    Transplant Phase: Waitlist  Transplant Status: Inactive    Transplant Coordinator: Aleah Manley  Transplant Surgeon:       Referring Physician:     Primary Diagnosis: Hypertensive Nephrosclerosis  Secondary Diagnosis:     Committee Review Members:  Vasu Cannon, MARY   Nephrology Reed Damico MD, Yoel Amanda, APRN CNP, Ottoniel Boswell MD   Nurse Vero Cordero, RN    - Clinical Nanda Barksdale, [facility]   Transplant Jessenia Sheets, DOROTHY, Hannah Guan, DOROTHY, Chiquita Mazariegos, RN, Aleah Manley, DOROTHY, Courtney Robertson, RN, Laury Reich, DOROTHY, Ciera Fregoso MD, Anna Carlton, DOROTHY   Transplant Surgery Emily Magana MD, MD       Transplant Eligibility:     Committee Review Decision: Make Active    Relative Contraindications:     Absolute Contraindications:     Committee Chair Emily Magana MD verbally attested to the committee's decision.    Committee Discussion Details: Committee reviewed pt's workup, including cardiology's note and approved pt for active status on the waitlist.

## 2020-02-06 ENCOUNTER — ORGAN (OUTPATIENT)
Dept: TRANSPLANT | Facility: CLINIC | Age: 75
End: 2020-02-06

## 2020-02-06 NOTE — Clinical Note
Read note for details. Patient doesn't want offers at the moment due to her mother needing extra care in moving from assisted living to a nursing home. Can we have a conversation about this with her--get a timeline for how long she doesn't want offers for?Thanks!

## 2020-02-07 NOTE — TELEPHONE ENCOUNTER
Organ Offer Encounter Information    Organ Offer Information  Organ offer date & time:  2/6/2020  4:24 PM  Coordinator/Fellow/Attending name:  Angie Mendez RN   Organ(s):   Organ UNOS ID Match Run ID Comment Organ Laterality   Kidney  [id number]       Recent infections?:  No    New medications?:  No Recent pregnancy?:  No (Comment: NA)   Angicoagulation medications?:  No Recent vaccinations?:  No (Comment: Unsure when flu shot--maybe in Oct?)   Recent blood transfusions?:  No Recent hospitalizations?:  No   Has your insurance changed in the last 6-12 months?:  Neg    Discussed organ offer with:  Patient  Discussed risk category with Patient/Other:  DCD  Organ offer decision status Patient/Other:  Declined Offer  UNOS decline reason:  801 - Candidate ill, unavailable, refused, or temporarily unsuitable  Additional Comments:  2/6/2020 4:25 PM  Kidney: Backup  MD: Ayesha/Finger  Plan (XM, NPO, Donor OR): Called patient regarding a backup kidney offer from a local donor. Patient informed now is not a good time for her as her mother is moving from an assisted living to a nursing home and she is her primary helper and can't be incapacitated right now. Informed patient we would inform her coordinator so they could discuss timing in the future. She understood and thanked for the call.  Angie Mendez  Transplant Coordinator    Attestation I have discussed all of the above with the Patient/Legal Guardian/Caregiver regarding this organ offer.:  Yes  Coordinator/Fellow/Attending name:  Angie Mendez RN

## 2020-02-10 ENCOUNTER — TELEPHONE (OUTPATIENT)
Dept: TRANSPLANT | Facility: CLINIC | Age: 75
End: 2020-02-10

## 2020-02-10 NOTE — TELEPHONE ENCOUNTER
Pt states her mom is on a waitlist for nursing home. Once her mom is admitted pt will be eligible for kidney offers again. [facility] placed on pt's chart. Pt states she will call coordinator once we can contact her again for offers.

## 2020-02-14 DIAGNOSIS — N18.6 ESRD (END STAGE RENAL DISEASE) (H): ICD-10-CM

## 2020-02-14 DIAGNOSIS — I10 HYPERTENSION: ICD-10-CM

## 2020-02-14 DIAGNOSIS — Z76.82 ORGAN TRANSPLANT CANDIDATE: ICD-10-CM

## 2020-02-18 LAB
PROTOCOL CUTOFF: NORMAL
SA1 CELL: NORMAL
SA1 COMMENTS: NORMAL
SA1 HI RISK ABY: NORMAL
SA1 MOD RISK ABY: NORMAL
SA1 TEST METHOD: NORMAL
SA2 CELL: NORMAL
SA2 COMMENTS: NORMAL
SA2 HI RISK ABY UA: NORMAL
SA2 MOD RISK ABY: NORMAL
SA2 TEST METHOD: NORMAL
UNACCEPTABLE ANTIGEN: NORMAL
UNOS CPRA: 0

## 2020-04-22 ENCOUNTER — RESULTS ONLY (OUTPATIENT)
Dept: OTHER | Facility: CLINIC | Age: 75
End: 2020-04-22

## 2020-04-22 ENCOUNTER — APPOINTMENT (OUTPATIENT)
Dept: LAB | Facility: CLINIC | Age: 75
End: 2020-04-22
Attending: TRANSPLANT SURGERY
Payer: COMMERCIAL

## 2020-04-22 PROCEDURE — 86833 HLA CLASS II HIGH DEFIN QUAL: CPT | Performed by: TRANSPLANT SURGERY

## 2020-04-22 PROCEDURE — 86832 HLA CLASS I HIGH DEFIN QUAL: CPT | Performed by: TRANSPLANT SURGERY

## 2020-04-22 PROCEDURE — 86833 HLA CLASS II HIGH DEFIN QUAL: CPT | Performed by: SURGERY

## 2020-04-28 DIAGNOSIS — Z76.82 ORGAN TRANSPLANT CANDIDATE: ICD-10-CM

## 2020-04-28 DIAGNOSIS — N18.6 ESRD (END STAGE RENAL DISEASE) (H): ICD-10-CM

## 2020-04-29 ENCOUNTER — TELEPHONE (OUTPATIENT)
Dept: TRANSPLANT | Facility: CLINIC | Age: 75
End: 2020-04-29

## 2020-04-29 DIAGNOSIS — N18.6 ESRD (END STAGE RENAL DISEASE) (H): ICD-10-CM

## 2020-04-29 DIAGNOSIS — Z76.82 ORGAN TRANSPLANT CANDIDATE: ICD-10-CM

## 2020-04-29 NOTE — TELEPHONE ENCOUNTER
Provider Call: General  Route to LPN    Reason for call:  Please connect with Dr Neri  (pager#:[phone] ) regarding Wait list update     Call back needed? Yes    Return Call Needed  Same as documented in contacts section  When to return call?: Same day: Route High Priority

## 2020-04-29 NOTE — TELEPHONE ENCOUNTER
Coordinator spoke to Dr. Neri. He discussed with pt that she should be inactive on our waitlist. Dr. Neri states pt's creat has decreased and he feels she may continue to stay steady for a few years. Reviewed pt is currently taking care of her Mother and is not interested in getting a transplant at this time either. Coordinator reviewed pt is on [facility]'s predictive model so likely to get called again with another offer soon (most recent few months ago). Dr. Neri said he had discussed this with the pt as well and they both agreed she should be inactive. Dr. Neri will contact our office when he feels pt should be reviewed again for active status.    Coordinator called pt and left msg with above details. Contact information provided.

## 2020-04-30 ENCOUNTER — DOCUMENTATION ONLY (OUTPATIENT)
Dept: TRANSPLANT | Facility: CLINIC | Age: 75
End: 2020-04-30

## 2020-05-06 ENCOUNTER — COMMITTEE REVIEW (OUTPATIENT)
Dept: TRANSPLANT | Facility: CLINIC | Age: 75
End: 2020-05-06

## 2020-05-07 NOTE — COMMITTEE REVIEW
Abdominal Patient Discussion Note Transplant Coordinator: Aleah Manley  Transplant Surgeon:       Referring Physician:     Committee Review Members:  Nephrology Reed Damico MD, Yoel Amanda, APRN CNP   Nutrition Kaur Diaz, RD   Pharmacy Janak Young, [facility]    - Clinical Nanda Barksdale, [facility], Graciela Keane, [facility]   Transplant Lourdes Salter PA-C, Noreen Morrow RN, Hannah Guan RN, Kannan House MD, Aleah Manley, DOROTHY, Veronica Ozuna, SHAKA, Corutney Robertson, DOROTHY, Laury Reich, DOROTHY, Anna Carlton, DOROTHY       Additional Discussion Notes and Findings: Pt's status changed to inactive per pt and nephrologist's choice.

## 2021-01-13 ENCOUNTER — DOCUMENTATION ONLY (OUTPATIENT)
Dept: TRANSPLANT | Facility: CLINIC | Age: 76
End: 2021-01-13

## 2021-05-28 ENCOUNTER — RECORDS - HEALTHEAST (OUTPATIENT)
Dept: ADMINISTRATIVE | Facility: CLINIC | Age: 76
End: 2021-05-28

## 2021-05-29 ENCOUNTER — RECORDS - HEALTHEAST (OUTPATIENT)
Dept: ADMINISTRATIVE | Facility: CLINIC | Age: 76
End: 2021-05-29

## 2021-07-31 ENCOUNTER — APPOINTMENT (OUTPATIENT)
Dept: RADIOLOGY | Facility: HOSPITAL | Age: 76
End: 2021-07-31
Attending: STUDENT IN AN ORGANIZED HEALTH CARE EDUCATION/TRAINING PROGRAM
Payer: COMMERCIAL

## 2021-07-31 ENCOUNTER — HOSPITAL ENCOUNTER (EMERGENCY)
Facility: HOSPITAL | Age: 76
Discharge: HOME | End: 2021-07-31
Attending: EMERGENCY MEDICINE | Admitting: EMERGENCY MEDICINE
Payer: COMMERCIAL

## 2021-07-31 VITALS
SYSTOLIC BLOOD PRESSURE: 177 MMHG | TEMPERATURE: 97.2 F | RESPIRATION RATE: 16 BRPM | BODY MASS INDEX: 25.5 KG/M2 | HEART RATE: 67 BPM | OXYGEN SATURATION: 97 % | DIASTOLIC BLOOD PRESSURE: 90 MMHG | WEIGHT: 158 LBS

## 2021-07-31 DIAGNOSIS — S52.502A CLOSED FRACTURE OF DISTAL END OF LEFT RADIUS, UNSPECIFIED FRACTURE MORPHOLOGY, INITIAL ENCOUNTER: ICD-10-CM

## 2021-07-31 PROCEDURE — 73090 X-RAY EXAM OF FOREARM: CPT | Mod: LT

## 2021-07-31 PROCEDURE — 25600 CLTX DST RDL FX/EPHYS SEP WO: CPT | Mod: LT

## 2021-07-31 PROCEDURE — 90715 TDAP VACCINE 7 YRS/> IM: CPT | Performed by: EMERGENCY MEDICINE

## 2021-07-31 PROCEDURE — 250N000013 HC RX MED GY IP 250 OP 250 PS 637: Performed by: EMERGENCY MEDICINE

## 2021-07-31 PROCEDURE — 99284 EMERGENCY DEPT VISIT MOD MDM: CPT | Mod: 25

## 2021-07-31 PROCEDURE — 250N000011 HC RX IP 250 OP 636: Performed by: EMERGENCY MEDICINE

## 2021-07-31 PROCEDURE — 90471 IMMUNIZATION ADMIN: CPT | Performed by: EMERGENCY MEDICINE

## 2021-07-31 RX ORDER — HYDROCODONE BITARTRATE AND ACETAMINOPHEN 5; 325 MG/1; MG/1
1 TABLET ORAL EVERY 6 HOURS PRN
Qty: 5 TABLET | Refills: 0 | Status: SHIPPED | OUTPATIENT
Start: 2021-07-31 | End: 2021-08-03

## 2021-07-31 RX ORDER — HYDROCODONE BITARTRATE AND ACETAMINOPHEN 5; 325 MG/1; MG/1
1 TABLET ORAL ONCE
Status: COMPLETED | OUTPATIENT
Start: 2021-07-31 | End: 2021-07-31

## 2021-07-31 RX ADMIN — HYDROCODONE BITARTRATE AND ACETAMINOPHEN 1 TABLET: 5; 325 TABLET ORAL at 18:40

## 2021-07-31 RX ADMIN — TETANUS TOXOID, REDUCED DIPHTHERIA TOXOID AND ACELLULAR PERTUSSIS VACCINE, ADSORBED 0.5 ML: 5; 2.5; 8; 8; 2.5 SUSPENSION INTRAMUSCULAR at 18:41

## 2021-07-31 NOTE — ED PROVIDER NOTES
EMERGENCY DEPARTMENT ENCOUNTER       ED Course & Medical Decision Making       Final Impression  76 year old female here after mechanical fall onto outstretched nondominant left hand resulting in pain and swelling of the left wrist.  X-ray demonstrates distal radius fracture without significant displacement.  She is neurovascularly intact with no signs of open fracture though some very superficial abrasions elsewhere for which Td will be updated.  Volar splint placed and patient will follow-up as outpatient with [facility].  Discussed plan for Tylenol for pain with a small number of Norco tablets for breakthrough pain/sleep.  Discussed risks benefits and need to limit total acetaminophen intake and patient understands and agrees.  Low concern for any other serious injuries as a result of her fall.    6:14 PM I met with the patient to gather history and to perform my initial exam. I discussed the plan for care while in the Emergency Department. PPE (gloves, eye protection, and N95 mask) was worn by me during patient encounters while patient wore mask. Pt declines imaging of L great toe that was injured but not yet imaged out of triage - discussed empiric ian taping in case of fracture.   6:27 PM We discussed plans for discharge and patient is agreeable.    Prior to making a final disposition on this patient the results of patient's tests and other diagnostic studies were discussed with the patient. All questions were answered. Patient expressed understanding of the plan and was amenable to it.    Medications   HYDROcodone-acetaminophen (NORCO) 5-325 MG per tablet 1 tablet (has no administration in time range)   Tdap (tetanus-diptheria-acell pertussis) (BOOSTRIX) injection 0.5 mL (has no administration in time range)          Final Impression     1. Closed fracture of distal end of left radius, unspecified fracture morphology, initial encounter          Chief Complaint     Chief Complaint   Patient presents with      Fall     Arm Pain       Fall and Arm Pain      HPI     Lexy Cobb is a 76 year old female with a history of HTN, CKD4, who presents for evaluation of left arm pain.    Patient reports 1 hour PTA she tripped and fell, landing on her outstretched nondominant left hand. Patient endorses pain to left wrist that is provoked by movement with associated swelling. Denies numbness, tingling, or weakness. She denies head trauma or loss of consciousness. Denies blood thinner use. She notes pain to left great toe and superficial abrasions to left elbow and left index finger. Patient is unsure when her last tetanus was. Patient denies additional medical concerns or complaints at this time. Of note, patient was driven to ED by .    I, Priya Agee am serving as a scribe to document services personally performed by Lillie Salguero MD, based on my observation and the provider's statements to me. I, Lillie Salguero MD attest that Priya Agee is acting in a scribe capacity, has observed my performance of the services and has documented them in accordance with my direction.    Past Medical History     Patient Active Problem List    Diagnosis Date Noted     CKD (chronic kidney disease) stage 4, GFR 15-29 ml/min (H)      Priority: Medium     Renal cell cancer (H)      Priority: Medium     s/p right native nephrectomy       Anemia in chronic renal disease      Priority: Medium     Problem list name updated by automated process. Provider to review       Secondary renal hyperparathyroidism (H)      Priority: Medium     Problem list name updated by automated process. Provider to review       Vitamin D deficiency      Priority: Medium     Hypomagnesemia      Priority: Medium     Obesity      Priority: Medium     s/p gastric bypass       Vitamin B12 deficiency      Priority: Medium     Raynaud's syndrome      Priority: Medium     ABDULLAHI positive      Priority: Medium     Chronic lower back pain      Priority: Medium     Irritable  bowel syndrome      Priority: Medium     GERD (gastroesophageal reflux disease)      Priority: Medium     Trauma to right eye      Priority: Medium     optic nerve injury       Hypertension, renal      Priority: Medium       Past Medical History:   Diagnosis Date     ABDULLAHI positive      Anemia in chronic kidney disease(285.21)      Chronic lower back pain      CKD (chronic kidney disease) stage 4, GFR 15-29 ml/min (H)      GERD (gastroesophageal reflux disease)      Hemorrhoids      Hypertension, renal      Hypomagnesemia      Irritable bowel syndrome      Obesity 1973     Raynaud's syndrome      Renal cell cancer (H) 2001     Secondary hyperparathyroidism (of renal origin)      Small bowel obstruction (H)      Trauma to right eye      Vasculitis of skin      Vitamin B12 deficiency      Vitamin D deficiency      Past Surgical History:   Procedure Laterality Date     APPENDECTOMY       s/p exploratory laproscopic surgery       s/p eye surgery       s/p gastric bypass       s/p right native nephrectomy      RCC     TONSILLECTOMY       Family History   Problem Relation Age of Onset     C.A.D. Mother      Hypertension Mother      Diabetes Father      Cancer Father         Lung CA      Social History     Tobacco Use     Smoking status: Never Smoker     Smokeless tobacco: Never Used   Substance Use Topics     Alcohol use: No     Drug use: No       Relevant past medical, surgical, family and social history as documented above, has been reviewed and discussed with patient. No changes or additions, unless otherwise noted in the HPI.    Current Medications     Patient's Medications   New Prescriptions    HYDROCODONE-ACETAMINOPHEN (NORCO) 5-325 MG TABLET    Take 1 tablet by mouth every 6 hours as needed for breakthrough pain   Previous Medications    CALCITRIOL (ROCALTROL) 0.25 MCG CAPSULE    Take 0.25 mcg by mouth daily    CLONIDINE (CATAPRES) 0.1 MG TABLET    Take 0.1 mg by mouth 3 times daily    CYANOCOBALAMIN 1000 MCG/ML  INJECTION    Inject 1 mL as directed every 30 days    DARBEPOETIN LUIS-POLYSORBATE (ARANESP, ALBUMIN FREE,) 60 MCG/ML INJECTION    Inject 1 mL Subcutaneous every 28 days    DIPHENHYDRAMINE-APAP, SLEEP, (TYLENOL PM EXTRA STRENGTH)  MG/30ML LIQD    Take 1 capful by mouth nightly as needed    FUROSEMIDE (LASIX) 20 MG TABLET    Take 20 mg by mouth daily    HYDRALAZINE (APRESOLINE) 25 MG TABLET    Take 2 tablets by mouth 3 times daily    MAGNESIUM OXIDE (MAG-) 400 MG TABLET    Take 1 tablet by mouth daily    METOPROLOL (TOPROL-XL) 100 MG 24 HR TABLET    Take 2 tablets by mouth daily    NIFEDIPINE OSMOTIC (PROCARDIA XL) 90 MG 24 HR TABLET    Take 90 mg by mouth daily    OMEPRAZOLE (PRILOSEC) 20 MG CAPSULE    Take 1 capsule by mouth daily    SODIUM BICARBONATE 650 MG TABLET    TAKE ONE TABLET BY MOUTH TWICE DAILY    TRAZODONE (DESYREL) 50 MG TABLET    TAKE 1/2-1 TABLET BY MOUTH AT BEDTIME FOR SLEEP, COULD INCREASE TO 2 TABLETS IF NEEDED    TRIAMCINOLONE (KENALOG) 0.1 % EXTERNAL OINTMENT       Modified Medications    No medications on file   Discontinued Medications    No medications on file       Allergies     Allergies   Allergen Reactions     Dapsone      Fluoxetine        Review of Systems     Constitutional: Denies fever,   Eyes: Denies discharge  HENT: Denies sore throat  Respiratory: Denies cough    Cardiovascular: Denies chest pain  GI: Denies vomiting  : Denies dysuria  Musculoskeletal: Positive for mechanical fall, pain and swelling to left wrist, pain to left great toe. Denies myalgias  Skin: Positive for abrasions to left index finger and left elbow. Denies rashes  Neurologic: Denies headache, loss of consciousness, numbness, tingling, weakness      Remainder of systems reviewed, unless noted in HPI all others negative.    Physical Exam     BP (!) 180/90   Pulse 62   Temp 97.2  F (36.2  C) (Tympanic)   Resp 16   Wt 71.7 kg (158 lb)   SpO2 98%   BMI 25.50 kg/m    Constitutional: Awake, alert,  in no acute distress  Head: Normocephalic, atraumatic.  Respiratory: Respirations even, unlabored. Lungs clear to ascultation bilaterally.  Cardiovascular: Regular rate and rhythm. No pitting edema. 2+ radial pulses and pedal pulses. Brisk capillary refill distally.  Musculoskeletal: No deformity. Swelling to left wrist, tenderness over left wrist radial aspect, no step offs or crepitus appreciated, ROM limited by pain. No skin breaks at wrist. Pain with active & passing ROM about left great toe w/o stepoffs/crepitus/deformity/ecchymosis/skin breaks.  Integument: Very superficial abrasion over left index finger overlying DIP joint dorsal aspect. Very superficial partial thickness abrasion to left elbow.  Warm, dry. No rash. No skin breaks over left wrist.  Neurologic: Alert & oriented x 3. Normal speech. Grossly normal motor and sensory function. No focal deficits noted. Strength and sensation intact distally to radial, medial, and ulnar nerve distribution. Full strength and sensation throughout.   Psychiatric: Normal mood and affect. Normal judgment.     Labs   Labs Ordered and Resulted from Time of ED Arrival Up to the Time of Departure from the ED - No data to display      Radiology     I have ordered and reviewed the following imaging exams. Upon independent review of the images and radiology reads, I agree with the reads documented below.    XR Forearm Left 2 Views   Final Result   IMPRESSION: Nondisplaced fracture of the distal radial metaphysis. Moderate surrounding soft tissue swelling. No other fracture visualized. Generalized demineralization. Normal joint alignment is maintained in the elbow and wrist. Moderate degenerative    arthritic changes are present at the base of the thumb and the first CMC and STT joints. Maintained joint spacing elsewhere.         Procedures     PROCEDURE: Splint Placement   INDICATIONS: Nondisplaced fracture of the distal radial metaphysis   NOTE:  A volar splint made of  fiberglass was applied to the left forearm by me with assistance from RN.  As noted in the physical exam, distal CMS was intact prior to placement.  The splint was checked and the fit was adjusted to ensure proper positioning after placement.  Sensation and circulation, as well as motor function, are intact after splint placement and the patient is more comfortable with the splint in place.        Lillie Salguero MD  07/31/21 2239

## 2021-07-31 NOTE — DISCHARGE INSTRUCTIONS
Tylenol 650-1000 mg every 4-6 hours as needed for pain. We have prescribed a stronger opiate pain medication called Norco (hydrocodone-acetaminophen), This medication contains acetaminophen (Tylenol), so please be cautious if taking this with other medications containing acetaminophen. Please ensure that you do not take more than 4,000 mg of acetaminophen total per day. This medication can cause drowsiness, so do not operate heavy machinery or perform certain tasks while taking it.  The medication also commonly causes constipation, so we recommend that you obtain a stool softener over-the-counter to take with it.  This medication can also be habit-forming (addicting), so please take the minimum amount possible for minimum possible duration.    Call for a follow-up appointment with an orthopedic specialist like those at [facility] within the next week or 2 for recheck of your symptoms and decide if you may need any further treatment.  Come back to the ER in the meantime if you have worsening symptoms including severe uncontrolled pain, changes in strength or sensation, or cool/blue/pale fingers that do not improve readily with loosening the Ace wrap.  Thanks, and we hope you feel better soon.

## 2021-07-31 NOTE — ED TRIAGE NOTES
Patient arrives by private car for evaluation of left arm/wrist pain after a fall that occurred at approx 1330 today.

## 2021-08-24 ENCOUNTER — DOCUMENTATION ONLY (OUTPATIENT)
Dept: TRANSPLANT | Facility: CLINIC | Age: 76
End: 2021-08-24

## 2021-08-26 ENCOUNTER — TELEPHONE (OUTPATIENT)
Dept: TRANSPLANT | Facility: CLINIC | Age: 76
End: 2021-08-26

## 2021-08-26 NOTE — TELEPHONE ENCOUNTER
Pt returned call- states that she is still feeling well but has another visit with Dr Neri on 9/13/21. Explained that we could have her active for pristine offers only as an option. Pt will discuss with Dr Neri at next visit and follow up with RNCC. Pt verbalized understanding of information and has no further questions. Encouraged to reach out if questions arise.

## 2021-09-29 ENCOUNTER — TELEPHONE (OUTPATIENT)
Dept: TRANSPLANT | Facility: CLINIC | Age: 76
End: 2021-09-29

## 2021-09-29 NOTE — TELEPHONE ENCOUNTER
Pt states that Dr Neri is going to keep everything the same after this visit and is not looking at dialysis yet. Pt to reach out to coordinator when she is ready to schedule transplant visits. Will follow up in 1/2022 as well. Pt verbalized understanding of information and has no further questions. Encouraged to reach out if questions arise.

## 2022-07-15 ENCOUNTER — TELEPHONE (OUTPATIENT)
Dept: TRANSPLANT | Facility: CLINIC | Age: 77
End: 2022-07-15

## 2022-07-15 NOTE — TELEPHONE ENCOUNTER
Called pt to check in on how she is feeling and interest in transplant. Pt reports she is feeling well and things are stable would like to hold of on RWL appts at this time. RNCC will check in with pt in the Fall. Gave direct line in case questions arise. Pt verbalized understanding of information and has no further questions. Encouraged to reach out if questions arise.

## 2022-11-17 ENCOUNTER — TELEPHONE (OUTPATIENT)
Dept: TRANSPLANT | Facility: CLINIC | Age: 77
End: 2022-11-17

## 2022-11-17 NOTE — TELEPHONE ENCOUNTER
Called pt to check in on interest in RWL appts. Pt reports she is feeling well and would like to hold off for now. Will check in after next neph visit. Pt verbalized understanding of information and has no further questions. Encouraged to reach out if questions arise.

## 2023-04-20 ENCOUNTER — TELEPHONE (OUTPATIENT)
Dept: TRANSPLANT | Facility: CLINIC | Age: 78
End: 2023-04-20
Payer: COMMERCIAL

## 2023-04-20 NOTE — TELEPHONE ENCOUNTER
Pt is on hold on the list  Her Neph wanted her to check in with the transplant Office to review status

## 2023-04-20 NOTE — TELEPHONE ENCOUNTER
Returned call to pt. Pt wanted to verify she is inactive on our list. Verified pt is still inactive and accuring time. She is inactive pt choice as kidney function has been stable, pt reports primary neph feels function has still been stable and do not want to pursue active status yet. Reviewed pt will need multiple return visits with transplant office once ready to pursue active status. Pt will update writer when she is ready to come in for updated visits. Provided direct line for return call if any questions arise.

## 2024-02-01 ENCOUNTER — TELEPHONE (OUTPATIENT)
Dept: TRANSPLANT | Facility: CLINIC | Age: 79
End: 2024-02-01
Payer: COMMERCIAL

## 2024-02-01 DIAGNOSIS — Z76.82 ORGAN TRANSPLANT CANDIDATE: ICD-10-CM

## 2024-02-01 DIAGNOSIS — N18.9 CHRONIC KIDNEY DISEASE (CKD): Primary | ICD-10-CM

## 2024-02-01 DIAGNOSIS — I10 HYPERTENSION: ICD-10-CM

## 2024-02-01 NOTE — TELEPHONE ENCOUNTER
Called pt to check in on interest in updating transplant visits as kidney function is declining. Pt would like to update visits. Reviewed need to update neph, SW, Surgeon, CT a/p, iliac US, Cards and lab (PRA). Orders placed scheduling will be reaching out to arrange.

## 2024-03-29 NOTE — TELEPHONE ENCOUNTER
RECORDS RECEIVED FROM:    DATE RECEIVED:    NOTES STATUS DETAILS   OFFICE NOTE from referring provider  Internal 02/01/24 Vasiliy DE LA VEGA CNP    OFFICE NOTE from other cardiologists  Internal 10/10/19 Neftaly Campos MD [facility]    MEDICATION LIST Internal    GENERAL CARDIO RECORDS   (ALL APPOINTMENT TYPES)     LABS (CBC,BMP,CMP, TSH) Care Everywhere 08/25/23 [facility]    EKG (STRIPS & REPORTS) N/A    MONITORS (STRIPS & REPORTS) N/A    ECHOS (IMAGES AND REPORTS) N/A    STRESS TESTS (IMAGES AND REPORTS) N/A    cMRI (IMAGES AND REPORTS) N/A    Cardiac cath (IMAGES AND REPORTS) N/A    CT/CTA (IMAGES AND REPORTS) N/A

## 2024-04-11 ENCOUNTER — ALLIED HEALTH/NURSE VISIT (OUTPATIENT)
Dept: TRANSPLANT | Facility: CLINIC | Age: 79
End: 2024-04-11
Attending: NURSE PRACTITIONER
Payer: COMMERCIAL

## 2024-04-11 ENCOUNTER — ANCILLARY PROCEDURE (OUTPATIENT)
Dept: CT IMAGING | Facility: CLINIC | Age: 79
End: 2024-04-11
Attending: NURSE PRACTITIONER
Payer: COMMERCIAL

## 2024-04-11 ENCOUNTER — LAB (OUTPATIENT)
Dept: LAB | Facility: CLINIC | Age: 79
End: 2024-04-11
Attending: NURSE PRACTITIONER
Payer: COMMERCIAL

## 2024-04-11 ENCOUNTER — ANCILLARY PROCEDURE (OUTPATIENT)
Dept: ULTRASOUND IMAGING | Facility: CLINIC | Age: 79
End: 2024-04-11
Attending: NURSE PRACTITIONER
Payer: COMMERCIAL

## 2024-04-11 VITALS
HEART RATE: 66 BPM | WEIGHT: 131 LBS | BODY MASS INDEX: 21.83 KG/M2 | SYSTOLIC BLOOD PRESSURE: 153 MMHG | OXYGEN SATURATION: 98 % | HEIGHT: 65 IN | DIASTOLIC BLOOD PRESSURE: 84 MMHG

## 2024-04-11 DIAGNOSIS — Z76.82 ORGAN TRANSPLANT CANDIDATE: ICD-10-CM

## 2024-04-11 DIAGNOSIS — N18.9 CHRONIC KIDNEY DISEASE (CKD): ICD-10-CM

## 2024-04-11 DIAGNOSIS — I10 HYPERTENSION: ICD-10-CM

## 2024-04-11 DIAGNOSIS — I10 HYPERTENSION, UNSPECIFIED TYPE: ICD-10-CM

## 2024-04-11 DIAGNOSIS — I15.0 RENOVASCULAR HYPERTENSION: ICD-10-CM

## 2024-04-11 DIAGNOSIS — N18.5 STAGE 5 CHRONIC KIDNEY DISEASE NOT ON CHRONIC DIALYSIS (H): Primary | ICD-10-CM

## 2024-04-11 DIAGNOSIS — Z98.84 HISTORY OF GASTRIC BYPASS: ICD-10-CM

## 2024-04-11 DIAGNOSIS — Z90.5 H/O RIGHT NEPHRECTOMY: ICD-10-CM

## 2024-04-11 PROCEDURE — 99000 SPECIMEN HANDLING OFFICE-LAB: CPT | Performed by: PATHOLOGY

## 2024-04-11 PROCEDURE — 86833 HLA CLASS II HIGH DEFIN QUAL: CPT | Performed by: NURSE PRACTITIONER

## 2024-04-11 PROCEDURE — 74176 CT ABD & PELVIS W/O CONTRAST: CPT | Mod: GC | Performed by: STUDENT IN AN ORGANIZED HEALTH CARE EDUCATION/TRAINING PROGRAM

## 2024-04-11 PROCEDURE — 36415 COLL VENOUS BLD VENIPUNCTURE: CPT | Performed by: PATHOLOGY

## 2024-04-11 PROCEDURE — 99203 OFFICE O/P NEW LOW 30 MIN: CPT | Performed by: TRANSPLANT SURGERY

## 2024-04-11 PROCEDURE — 99207 PR NO CHARGE COORDINATED CARE PS: CPT

## 2024-04-11 PROCEDURE — 93978 VASCULAR STUDY: CPT | Performed by: RADIOLOGY

## 2024-04-11 PROCEDURE — 86832 HLA CLASS I HIGH DEFIN QUAL: CPT | Performed by: NURSE PRACTITIONER

## 2024-04-11 PROCEDURE — G0463 HOSPITAL OUTPT CLINIC VISIT: HCPCS | Performed by: TRANSPLANT SURGERY

## 2024-04-11 PROCEDURE — 99204 OFFICE O/P NEW MOD 45 MIN: CPT | Performed by: NURSE PRACTITIONER

## 2024-04-11 RX ORDER — OXYBUTYNIN CHLORIDE 5 MG/1
5 TABLET, EXTENDED RELEASE ORAL DAILY
COMMUNITY
Start: 2024-01-10 | End: 2024-09-09

## 2024-04-11 NOTE — PROGRESS NOTES
TRANSPLANT NEPHROLOGY WAITLIST VISIT    Assessment and Plan:  # Kidney Transplant Wait List Evaluation: Patient is a good candidate overall.     Recommendations:  - repeat colonoscopy  due in 2024.   - cardiac risk assessment     # CKD unclear etiology with no previous biopsy: although likely contributed by surgical solitary kidney.  She has been inactive on the wait list since  due to being too well. eGFR low but stable ~ 10-13 ml/min. She has some fatigue, but no other uremic symptoms. She is ABO-A with a cPRA of 50%. When ready, she would likely benefit from a kidney transplant.      # RCC (): s/p right native nephrectomy with mass totally encapsulated. She did not require any chemotherapy or radiation. CT today pending to reassess recurrence.      # Cardiac Risk:  no known history of cardiac disease. Neg stress test last in . Risk assessment pending for .      # Rheumatological issues:  with slightly low C3, positive ABDULLAHI and Raynaud's, but no other signs of SLE.  Outside rheumatology following.     # S/p gastric bypass: over 40 years ago. BMI 22. Last oxalate ~ 7.5.      # Multiple colon polyps: on 2023 colonoscopy, one measuring 30 mm. Path result:    FINAL DIAGNOSIS    A.  Colon, ascending, ascending colon polyp, biopsy:  Tubular adenoma fragments     B.  Colon, transverse, polyps, polypectomies:  Multiple tubular adenomas fragments  Sessile serrated adenomas (x2)       6 month repeat colonoscopy recommended.       # Health Maintenance: 2023 colonoscopy (6 month repeat per GI): mammogram UTD,  dental  UTD.      Discussed the risks and benefits of a transplant, including the risk of surgery and immunosuppression medications.    KDPI: We discussed approximate remaining wait time and how that is influenced by issues such as blood type and sensitization (PRA) and access to a living donor. I contrasted potential waiting time for living vs  donor kidneys from  normal (0-85%) or  "higher (%) kidney donor profile index (KDPI) donors and their associated outcomes. I would recommend Ms. Cobb to consider kidneys from high KDPI donors. The reason for this decision is best summarized as: decreased dialysis related morbidity/mortality, accepting lower kidney graft survival rates.    Patient presently appears to be enough of an acceptable kidney transplant recipient candidate to have any potential kidney donors start the evaluation process.  Patient s overall re-evaluation may require further discussion in the Transplant Program s multidisciplinary selection committee for a final recommendation on the patient s suitability for transplant.     Reason for Visit:  Lexy Cobb is a 79 year old female with CKD from unknown etiology, who presents for kidney transplant wait list evaluation.     Date of Initial Transplant Evaluation:  2012        Current Transplant Phase: Waitlist: Inactive  Official [employer] Listing Date: 11/16/2013  Blood Type: A        cPRA: 0%       Date of cPRA: 4/2020  Transplant Coordinator: Temi Caraballo Transplant Office phone number [phone]        History of Present Illness:   Lexy Cobb is a 79-year-old female who presents for wait list evaluation with history of CKD from unclear etiology, RCC s/p right native nephrectomy (2001).            Interim Events:        One ED visit in 2021 for hand injury.           Kidney Disease:        Kidney Disease Dx: Unknown etiology        On Dialysis: No       Primary Nephrologist: Dr. Neri         Cardiac/Vascular Disease History:  No known history of cardiac disease. Neg stress test last in 2019. Risk assessment pending for 4/24.          Functional Capacity/Frailty:        Stays active with house hold chores, can walk \"blocks and blocks\". Gets a little winded walking at an incline, but denies chest pains.     # Identified Care Partner Post Transplant Surgery:          Allergy Testing " Questions:   Medication that caused a reaction None   Antibiotics used that didn't give an allergic reaction?  Patient doesn't know    COVID Vaccination Up To Date: Not asked      Other Pertinent Transplant Surgical Issues:  Recent Blood Transfusion: No  Previous Abdominal Transplant: No  Bladder Dysfunction: No  Chronic/Recurrent Infections: No  Chronic Anticoagulation: No  Jehovah s Witness: No       Active Problem List:   Patient Active Problem List   Diagnosis    Hypertension, renal    CKD (chronic kidney disease) stage 4, GFR 15-29 ml/min (H)    Renal cell cancer (H)    Anemia in chronic renal disease    Secondary renal hyperparathyroidism (H24)    Vitamin D deficiency    Hypomagnesemia    Obesity    Vitamin B12 deficiency    Raynaud's syndrome    ABDULLAHI positive    Chronic lower back pain    Irritable bowel syndrome    GERD (gastroesophageal reflux disease)    Trauma to right eye       Personal History:  Nonsmoker      Allergies:  Allergies   Allergen Reactions    Dapsone     Fluoxetine         Medications:  Current Outpatient Medications   Medication Sig Dispense Refill    calcitRIOL (ROCALTROL) 0.25 MCG capsule Take 0.25 mcg by mouth daily      cloNIDine (CATAPRES) 0.1 MG tablet Take 0.1 mg by mouth 3 times daily      cyanocobalamin 1000 MCG/ML injection Inject 1 mL as directed every 30 days      darbepoetin braulio-polysorbate (ARANESP, ALBUMIN FREE,) 60 MCG/ML injection Inject 1 mL Subcutaneous every 28 days      diphenhydrAMINE-APAP, sleep, (TYLENOL PM EXTRA STRENGTH)  MG/30ML LIQD Take 1 capful by mouth nightly as needed      Furosemide (LASIX) 20 MG tablet Take 20 mg by mouth daily      hydrALAZINE (APRESOLINE) 25 MG tablet Take 2 tablets by mouth 3 times daily      magnesium oxide (MAG-) 400 MG tablet Take 1 tablet by mouth daily      metoprolol (TOPROL-XL) 100 MG 24 hr tablet Take 2 tablets by mouth daily      NIFEdipine osmotic (PROCARDIA XL) 90 MG 24 hr tablet Take 90 mg by mouth daily       omeprazole (PRILOSEC) 20 MG capsule Take 1 capsule by mouth daily      sodium bicarbonate 650 MG tablet TAKE ONE TABLET BY MOUTH TWICE DAILY      traZODone (DESYREL) 50 MG tablet TAKE 1/2-1 TABLET BY MOUTH AT BEDTIME FOR SLEEP, COULD INCREASE TO 2 TABLETS IF NEEDED      triamcinolone (KENALOG) 0.1 % external ointment        No current facility-administered medications for this visit.        Vitals:  There were no vitals taken for this visit.     Exam:  GENERAL APPEARANCE: alert and no distress  HENT: mouth without ulcers or lesions  LYMPHATICS: no cervical or supraclavicular nodes  RESP: lungs clear to auscultation - no rales, rhonchi or wheezes  CV: regular rhythm, normal rate, no rub, no murmur  EDEMA: no LE edema bilaterally  ABDOMEN: soft, nondistended, nontender, bowel sounds normal  MS: extremities normal - no gross deformities noted, no evidence of inflammation in joints, no muscle tenderness  SKIN: no rash

## 2024-04-11 NOTE — LETTER
"    4/11/2024         RE: Lexy Cobb  [address] [place]  [facility] [address]        Dear Colleague,    Thank you for referring your patient, Lexy Cobb, to the [facility] TRANSPLANT [facility]. Please see a copy of my visit note below.    Surgical eval for kidney transplant in 80 yo with ESRD, no dialysis.     IP  No anatomic contraindications to KT.  Vessels ok and no clinical bladder problems.  PVR a bit high (92 ml), may want to recheck.  Remaining medical issues need serious discussion with 80 yo to assess comorbidities.  Needs updated labs. At 80 yo, there is a high likelihood that she will have some medical comorbidity that increases risk too much.  However, wants a kidney txp despite IS risks.    I spent over 30 min reiterating surgical procedure, risks, recovery and the importance/risk of IS.    Surg eval  No arterial or venous diseases of LE.  No DVT.  No recent UTI, stones, hematuria.  Post void residual <100 ml.  Abd: nothing that precludes    Has no live donors.  No bleeding/infectious/clotting issues with prior surgical procedures (nephrectomy,appy, GIB (1973)  No recent cardiac, neurologic, neoplastic issues.    BP (!) 153/84   Pulse 66   Ht 1.638 m (5' 4.5\")   Wt 59.4 kg (131 lb)   SpO2 98%   BMI 22.14 kg/m    Abd soft, nontender.  No skin lesions.  Groin pulses 4+  No sig edema.    No EPIC labs since 2019 (last eval).  CT: iliac vessels  fine.    Current Outpatient Medications   Medication Sig Dispense Refill     calcitRIOL (ROCALTROL) 0.25 MCG capsule Take 0.25 mcg by mouth daily       cholecalciferol 125 MCG (5000 UT) CAPS 5,000 Units       cyanocobalamin 1000 MCG/ML injection Inject 1 mL as directed every 30 days       darbepoetin braulio-polysorbate (ARANESP, ALBUMIN FREE,) 60 MCG/ML injection Inject 1 mL Subcutaneous every 28 days       diphenhydrAMINE-APAP, sleep, (TYLENOL PM EXTRA STRENGTH)  MG/30ML LIQD Take 1 capful by mouth nightly as needed       Furosemide " (LASIX) 20 MG tablet Take 40 mg by mouth daily       hydrALAZINE (APRESOLINE) 25 MG tablet Take 100 mg by mouth 3 times daily       magnesium oxide (MAG-) 400 MG tablet Take 1 tablet by mouth daily       metoprolol (TOPROL-XL) 100 MG 24 hr tablet Take 50 mg by mouth daily       Multiple Vitamins-Minerals (PRESERVISION AREDS 2 PO) Take 1 capsule by mouth       NIFEdipine osmotic (PROCARDIA XL) 90 MG 24 hr tablet Take 90 mg by mouth daily       omeprazole (PRILOSEC) 20 MG capsule Take 1 capsule by mouth daily       oxyBUTYnin ER (DITROPAN XL) 5 MG 24 hr tablet Take 5 mg by mouth daily       sodium bicarbonate 650 MG tablet TAKE ONE TABLET BY MOUTH TWICE DAILY       traZODone (DESYREL) 50 MG tablet 100 mg at bedtime       cloNIDine (CATAPRES) 0.1 MG tablet Take 0.1 mg by mouth 3 times daily (Patient not taking: Reported on 4/11/2024)       triamcinolone (KENALOG) 0.1 % external ointment  (Patient not taking: Reported on 4/11/2024)       No current facility-administered medications for this visit.   Again, thank you for allowing me to participate in the care of your patient.        Sincerely,        Wesley Forbes MD

## 2024-04-11 NOTE — PROGRESS NOTES
Patient Name: Lexy Cobb  : 1945  Age: 79 year old  MRN: [id number]  Date of Initial Social Work Evaluation:     10/28/2019    Patient on transplant wait list.  Saw today to update psychosocial assessment.      Presenting Information   Living Situation:   Pt resides with spouse in a one level home in [place], [place]    If not local, plans for short term stay:  NA   Previous Functional Status: Independent ADL's     Cultural/Language/Spiritual Considerations: None indicated     Support System  Primary Support Person Spouse Black     Other support:  Son Rm     Plan for support in immediate post-transplant period:   Pt son and spouse will assist her post transplant.    Health Care Directive  Decision Maker: Self     Alternate Decision Maker: NOK- spouse     Health Care Directive: Will bring in copy and Provided education    Mental Health/Coping:   History of Mental Health: No concerns     History of Chemical Health: No concerns     Current status: Pt reports that she is doing mentally and physically good post transplant.    Coping: Appropriate     Services Needed/Recommended: NA     Financial   Income: penitentiary   Impact of transplant on income: None     Insurance and medication coverage: [facility] Medicare Advantage Plan with Part D- Discussed Medicare Part B will cover 80% of anti rejection medications and the patient will pay 20% of co-pay until patient's OOPM with [facility] is met.     Financial concerns: NA- Pt is aware of Part D donut hole concerns.    Resources needed: SW informed Pt of financial costs of transplant     Assessment and recommendations and plan:  Reviewed transplant education (Medicare, rehabilitation, donor issues, community/financial resources, and psych/family adjustment) as well as psychosocial risks of transplant. Provided patient with a copy of post-transplant informational sheet that includes information on potential costs of medications, Medicare ESRD, post-transplant lodging,  etc. Patient seemed to process information well. Appeared well informed, motivated, and able to follow post transplant requirements. Behavior was appropriate during interview. Has adequate income and insurance coverage. Adequate social support. No major contraindications noted for transplant. At this time, patient appears to understand the risks and benefits of transplant.      JULIAN Leiva, [facility]  Outpatient Kidney/Pancreas/Auto Islet Transplant Program   [address] [place] [id number]  [email or web address]  Office: [phone] I Fax: [phone]

## 2024-04-11 NOTE — LETTER
4/11/2024         RE: Lexy Cobb  [address]  [facility] [address]        Dear Colleague,    Thank you for referring your patient, Lexy Cobb, to the [facility] TRANSPLANT [facility]IN[facility]. Please see a copy of my visit note below.    TRANSPLANT NEPHROLOGY WAITLIST VISIT    Assessment and Plan:  # Kidney Transplant Wait List Evaluation: Patient is a good candidate overall.     Recommendations:  - repeat colonoscopy  due in June 2024.   - cardiac risk assessment     # CKD unclear etiology with no previous biopsy: although likely contributed by surgical solitary kidney.  She has been inactive on the wait list since 2013 due to being too well. eGFR low but stable ~ 10-13 ml/min. She has some fatigue, but no other uremic symptoms. She is ABO-A with a cPRA of 50%. When ready, she would likely benefit from a kidney transplant.      # RCC (2001): s/p right native nephrectomy with mass totally encapsulated. She did not require any chemotherapy or radiation. CT today pending to reassess recurrence.      # Cardiac Risk:  no known history of cardiac disease. Neg stress test last in 2019. Risk assessment pending for 4/24.      # Rheumatological issues:  with slightly low C3, positive ABDULLAHI and Raynaud's, but no other signs of SLE.  Outside rheumatology following.     # S/p gastric bypass: over 40 years ago. BMI 22. Last oxalate ~ 7.5.      # Multiple colon polyps: on 12/2023 colonoscopy, one measuring 30 mm. Path result:    FINAL DIAGNOSIS    A.  Colon, ascending, ascending colon polyp, biopsy:  Tubular adenoma fragments     B.  Colon, transverse, polyps, polypectomies:  Multiple tubular adenomas fragments  Sessile serrated adenomas (x2)       6 month repeat colonoscopy recommended.       # Health Maintenance: 12/2023 colonoscopy (6 month repeat per GI): mammogram UTD,  dental  UTD.      Discussed the risks and benefits of a transplant, including the risk of surgery and immunosuppression  medications.    KDPI: We discussed approximate remaining wait time and how that is influenced by issues such as blood type and sensitization (PRA) and access to a living donor. I contrasted potential waiting time for living vs  donor kidneys from  normal (0-85%) or higher (%) kidney donor profile index (KDPI) donors and their associated outcomes. I would recommend Ms. Cobb to consider kidneys from high KDPI donors. The reason for this decision is best summarized as: decreased dialysis related morbidity/mortality, accepting lower kidney graft survival rates.    Patient presently appears to be enough of an acceptable kidney transplant recipient candidate to have any potential kidney donors start the evaluation process.  Patient s overall re-evaluation may require further discussion in the Transplant Program s multidisciplinary selection committee for a final recommendation on the patient s suitability for transplant.     Reason for Visit:  Lexy Cobb is a 79 year old female with CKD from unknown etiology, who presents for kidney transplant wait list evaluation.     Date of Initial Transplant Evaluation:          Current Transplant Phase: Waitlist: Inactive  Official UNOS Listing Date: 2013  Blood Type: A        cPRA: 0%       Date of cPRA: 2020  Transplant Coordinator: Temi Caraballo Transplant Office phone number [phone]        History of Present Illness:   Lexy Cobb is a 79-year-old female who presents for wait list evaluation with history of CKD from unclear etiology, RCC s/p right native nephrectomy ().            Interim Events:        One ED visit in  for hand injury.           Kidney Disease:        Kidney Disease Dx: Unknown etiology        On Dialysis: No       Primary Nephrologist: Dr. Neri         Cardiac/Vascular Disease History:  No known history of cardiac disease. Neg stress test last in . Risk assessment pending for .           "Functional Capacity/Frailty:        Stays active with house hold chores, can walk \"blocks and blocks\". Gets a little winded walking at an incline, but denies chest pains.     # Identified Care Partner Post Transplant Surgery:          Allergy Testing Questions:   Medication that caused a reaction None   Antibiotics used that didn't give an allergic reaction?  Patient doesn't know    COVID Vaccination Up To Date: Not asked      Other Pertinent Transplant Surgical Issues:  Recent Blood Transfusion: No  Previous Abdominal Transplant: No  Bladder Dysfunction: No  Chronic/Recurrent Infections: No  Chronic Anticoagulation: No  Jehovah s Witness: No       Active Problem List:   Patient Active Problem List   Diagnosis     Hypertension, renal     CKD (chronic kidney disease) stage 4, GFR 15-29 ml/min (H)     Renal cell cancer (H)     Anemia in chronic renal disease     Secondary renal hyperparathyroidism (H24)     Vitamin D deficiency     Hypomagnesemia     Obesity     Vitamin B12 deficiency     Raynaud's syndrome     ABDULLAHI positive     Chronic lower back pain     Irritable bowel syndrome     GERD (gastroesophageal reflux disease)     Trauma to right eye       Personal History:  Nonsmoker      Allergies:  Allergies   Allergen Reactions     Dapsone      Fluoxetine         Medications:  Current Outpatient Medications   Medication Sig Dispense Refill     calcitRIOL (ROCALTROL) 0.25 MCG capsule Take 0.25 mcg by mouth daily       cloNIDine (CATAPRES) 0.1 MG tablet Take 0.1 mg by mouth 3 times daily       cyanocobalamin 1000 MCG/ML injection Inject 1 mL as directed every 30 days       darbepoetin braulio-polysorbate (ARANESP, ALBUMIN FREE,) 60 MCG/ML injection Inject 1 mL Subcutaneous every 28 days       diphenhydrAMINE-APAP, sleep, (TYLENOL PM EXTRA STRENGTH)  MG/30ML LIQD Take 1 capful by mouth nightly as needed       Furosemide (LASIX) 20 MG tablet Take 20 mg by mouth daily       hydrALAZINE (APRESOLINE) 25 MG tablet " Take 2 tablets by mouth 3 times daily       magnesium oxide (MAG-) 400 MG tablet Take 1 tablet by mouth daily       metoprolol (TOPROL-XL) 100 MG 24 hr tablet Take 2 tablets by mouth daily       NIFEdipine osmotic (PROCARDIA XL) 90 MG 24 hr tablet Take 90 mg by mouth daily       omeprazole (PRILOSEC) 20 MG capsule Take 1 capsule by mouth daily       sodium bicarbonate 650 MG tablet TAKE ONE TABLET BY MOUTH TWICE DAILY       traZODone (DESYREL) 50 MG tablet TAKE 1/2-1 TABLET BY MOUTH AT BEDTIME FOR SLEEP, COULD INCREASE TO 2 TABLETS IF NEEDED       triamcinolone (KENALOG) 0.1 % external ointment        No current facility-administered medications for this visit.        Vitals:  There were no vitals taken for this visit.     Exam:  GENERAL APPEARANCE: alert and no distress  HENT: mouth without ulcers or lesions  LYMPHATICS: no cervical or supraclavicular nodes  RESP: lungs clear to auscultation - no rales, rhonchi or wheezes  CV: regular rhythm, normal rate, no rub, no murmur  EDEMA: no LE edema bilaterally  ABDOMEN: soft, nondistended, nontender, bowel sounds normal  MS: extremities normal - no gross deformities noted, no evidence of inflammation in joints, no muscle tenderness  SKIN: no rash           Again, thank you for allowing me to participate in the care of your patient.        Sincerely,        YOLETTE Wallace CNP

## 2024-04-12 NOTE — PROGRESS NOTES
"Surgical eval for kidney transplant in 80 yo with ESRD, no dialysis.     IP  No anatomic contraindications to KT.  Vessels ok and no clinical bladder problems.  PVR a bit high (92 ml), may want to recheck.  Remaining medical issues need serious discussion with 80 yo to assess comorbidities.  Needs updated labs. At 80 yo, there is a high likelihood that she will have some medical comorbidity that increases risk too much.  However, wants a kidney txp despite IS risks.    I spent over 30 min reiterating surgical procedure, risks, recovery and the importance/risk of IS.    Surg eval  No arterial or venous diseases of LE.  No DVT.  No recent UTI, stones, hematuria.  Post void residual <100 ml.  Abd: nothing that precludes    Has no live donors.  No bleeding/infectious/clotting issues with prior surgical procedures (nephrectomy,appy, GIB (1973)  No recent cardiac, neurologic, neoplastic issues.    BP (!) 153/84   Pulse 66   Ht 1.638 m (5' 4.5\")   Wt 59.4 kg (131 lb)   SpO2 98%   BMI 22.14 kg/m    Abd soft, nontender.  No skin lesions.  Groin pulses 4+  No sig edema.    No EPIC labs since 2019 (last eval).  CT: iliac vessels  fine.    Current Outpatient Medications   Medication Sig Dispense Refill    calcitRIOL (ROCALTROL) 0.25 MCG capsule Take 0.25 mcg by mouth daily      cholecalciferol 125 MCG (5000 UT) CAPS 5,000 Units      cyanocobalamin 1000 MCG/ML injection Inject 1 mL as directed every 30 days      darbepoetin braulio-polysorbate (ARANESP, ALBUMIN FREE,) 60 MCG/ML injection Inject 1 mL Subcutaneous every 28 days      diphenhydrAMINE-APAP, sleep, (TYLENOL PM EXTRA STRENGTH)  MG/30ML LIQD Take 1 capful by mouth nightly as needed      Furosemide (LASIX) 20 MG tablet Take 40 mg by mouth daily      hydrALAZINE (APRESOLINE) 25 MG tablet Take 100 mg by mouth 3 times daily      magnesium oxide (MAG-) 400 MG tablet Take 1 tablet by mouth daily      metoprolol (TOPROL-XL) 100 MG 24 hr tablet Take 50 mg by " mouth daily      Multiple Vitamins-Minerals (PRESERVISION AREDS 2 PO) Take 1 capsule by mouth      NIFEdipine osmotic (PROCARDIA XL) 90 MG 24 hr tablet Take 90 mg by mouth daily      omeprazole (PRILOSEC) 20 MG capsule Take 1 capsule by mouth daily      oxyBUTYnin ER (DITROPAN XL) 5 MG 24 hr tablet Take 5 mg by mouth daily      sodium bicarbonate 650 MG tablet TAKE ONE TABLET BY MOUTH TWICE DAILY      traZODone (DESYREL) 50 MG tablet 100 mg at bedtime      cloNIDine (CATAPRES) 0.1 MG tablet Take 0.1 mg by mouth 3 times daily (Patient not taking: Reported on 4/11/2024)      triamcinolone (KENALOG) 0.1 % external ointment  (Patient not taking: Reported on 4/11/2024)       No current facility-administered medications for this visit.

## 2024-04-14 LAB — RADIOLOGIST FLAGS: NORMAL

## 2024-04-15 DIAGNOSIS — Z76.82 ORGAN TRANSPLANT CANDIDATE: ICD-10-CM

## 2024-04-15 DIAGNOSIS — N28.9 KIDNEY LESION, NATIVE, LEFT: Primary | ICD-10-CM

## 2024-04-16 ENCOUNTER — TELEPHONE (OUTPATIENT)
Dept: TRANSPLANT | Facility: CLINIC | Age: 79
End: 2024-04-16
Payer: COMMERCIAL

## 2024-04-18 LAB
PROTOCOL CUTOFF: NORMAL
SA 1 CELL: NORMAL
SA 1 TEST METHOD: NORMAL
SA 2 CELL: NORMAL
SA 2 TEST METHOD: NORMAL
SA1 HI RISK ABY: NORMAL
SA1 MOD RISK ABY: NORMAL
SA2 HI RISK ABY: NORMAL
SA2 MOD RISK ABY: NORMAL
UNACCEPTABLE ANTIGENS: NORMAL
UNOS CPRA: 0
ZZZSA 1  COMMENTS: NORMAL
ZZZSA 2 COMMENTS: NORMAL

## 2024-04-24 ENCOUNTER — OFFICE VISIT (OUTPATIENT)
Dept: CARDIOLOGY | Facility: CLINIC | Age: 79
End: 2024-04-24
Attending: NURSE PRACTITIONER
Payer: COMMERCIAL

## 2024-04-24 ENCOUNTER — PRE VISIT (OUTPATIENT)
Dept: CARDIOLOGY | Facility: CLINIC | Age: 79
End: 2024-04-24
Payer: COMMERCIAL

## 2024-04-24 ENCOUNTER — ANCILLARY PROCEDURE (OUTPATIENT)
Dept: ULTRASOUND IMAGING | Facility: CLINIC | Age: 79
End: 2024-04-24
Attending: NURSE PRACTITIONER
Payer: COMMERCIAL

## 2024-04-24 VITALS
DIASTOLIC BLOOD PRESSURE: 74 MMHG | SYSTOLIC BLOOD PRESSURE: 132 MMHG | BODY MASS INDEX: 23.36 KG/M2 | WEIGHT: 138.2 LBS | OXYGEN SATURATION: 98 % | HEART RATE: 69 BPM

## 2024-04-24 DIAGNOSIS — Z76.82 ORGAN TRANSPLANT CANDIDATE: ICD-10-CM

## 2024-04-24 DIAGNOSIS — N28.1 RENAL CYSTS, ACQUIRED, BILATERAL: Primary | ICD-10-CM

## 2024-04-24 DIAGNOSIS — N28.9 KIDNEY LESION, NATIVE, LEFT: ICD-10-CM

## 2024-04-24 DIAGNOSIS — Z01.810 ENCOUNTER FOR PREOPERATIVE ASSESSMENT FOR NONCORONARY CARDIAC SURGERY: Primary | ICD-10-CM

## 2024-04-24 PROCEDURE — 76770 US EXAM ABDO BACK WALL COMP: CPT | Mod: GC | Performed by: RADIOLOGY

## 2024-04-24 PROCEDURE — 93010 ELECTROCARDIOGRAM REPORT: CPT | Performed by: INTERNAL MEDICINE

## 2024-04-24 PROCEDURE — 93005 ELECTROCARDIOGRAM TRACING: CPT

## 2024-04-24 PROCEDURE — 99205 OFFICE O/P NEW HI 60 MIN: CPT | Performed by: INTERNAL MEDICINE

## 2024-04-24 PROCEDURE — G0463 HOSPITAL OUTPT CLINIC VISIT: HCPCS | Performed by: INTERNAL MEDICINE

## 2024-04-24 ASSESSMENT — PAIN SCALES - GENERAL: PAINLEVEL: NO PAIN (0)

## 2024-04-24 NOTE — NURSING NOTE
Chief Complaint   Patient presents with    New Patient     Patient is referred by Yoel Amanda APRN CNP for cardiac evaluation related to possible kidney transplant.     Vitals were taken, medications reconciled, and EKG was performed.    Claudia Gr CNA  9:44 AM

## 2024-04-24 NOTE — PROGRESS NOTES
"Reason for Visit:  Today I have seen Lexy Cobb for CV risk assessmenty  Consult: Yes    HPI : Status / Symptoms / Concerns     80 y/o f with Stage 5 chronic kidney disease not on chronic dialysis for possible kidney transplant.Her past medical history is notable for renal cell carcinoma in 2001 (s/p right native nephrectomy with mass totally encapsulated), hypertension, anemia and Raynaud's. She has been inactive on renal transplant wait list since 2013 due to being too well (eGFR has been low but stable around 10-13 ml/min).     No recent hospitalizations or ED visits. She experiences some shortness of breath with moderate intensity activity, although she doesn't feel \"winded\" per se. She has chronic bilateral lower extremity edema which tends to worsens towards the end of the day. She denies orthopnea, PND, chest pain or pre-syncope.     She is a retired  for [employer]. She had an unremarkable nuclear stress test in 2019.    Cardiovascular risk factor profile:   (+) HTN, (-) DM, (-) hypercholesterolemia, (+)  prior tobacco use, (-) fam Hx premature CAD.     Chest Pain:   No  Shortness of Breath:  No  Ankle Swelling:  Mild  Muscle Aches:  No  Palpitations:   No    Lightheadedness:  No  Fainting:   No  Medication Issues:  No  Recent Test:  No    Past Medical History:   Diagnosis Date    ABDULLAHI positive     Anemia in chronic kidney disease(285.21)     Chronic lower back pain     CKD (chronic kidney disease) stage 4, GFR 15-29 ml/min (H)     GERD (gastroesophageal reflux disease)     Hemorrhoids     Hypertension, renal     Hypomagnesemia     Irritable bowel syndrome     Obesity 1973    s/p gastric bypass    Raynaud's syndrome     Renal cell cancer (H) 2001    s/p right native nephrectomy    Secondary hyperparathyroidism (of renal origin)     Small bowel obstruction (H)     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    Vasculitis of skin     Vitamin B12 deficiency     Vitamin D deficiency     "   Past Surgical History:   Procedure Laterality Date    APPENDECTOMY      s/p exploratory laproscopic surgery      s/p eye surgery      s/p gastric bypass      s/p right native nephrectomy      RCC    TONSILLECTOMY          Other Systems:  Resp - / GI - /MS - /Jasen - /Psy - /Derm - /Hem - / - /Lymph - /ENT -/ Endo -  No other pertinent concern in systems review.     Social History: reports that she has never smoked. She has never used smokeless tobacco. She reports that she does not drink alcohol and does not use drugs.   I have reviewed this patient's social history and updated it with pertinent information if needed.    Family History:  She indicated that the status of her mother is unknown. She indicated that the status of her father is unknown.     Family History   Problem Relation Age of Onset    C.A.D. Mother     Hypertension Mother     Diabetes Father     Cancer Father         Lung CA       Medications:  Current Outpatient Medications   Medication Sig Dispense Refill    calcitRIOL (ROCALTROL) 0.25 MCG capsule Take 0.25 mcg by mouth daily      cholecalciferol 125 MCG (5000 UT) CAPS 5,000 Units      cloNIDine (CATAPRES) 0.1 MG tablet Take 0.1 mg by mouth 3 times daily (Patient not taking: Reported on 4/11/2024)      cyanocobalamin 1000 MCG/ML injection Inject 1 mL as directed every 30 days      darbepoetin braulio-polysorbate (ARANESP, ALBUMIN FREE,) 60 MCG/ML injection Inject 1 mL Subcutaneous every 28 days      diphenhydrAMINE-APAP, sleep, (TYLENOL PM EXTRA STRENGTH)  MG/30ML LIQD Take 1 capful by mouth nightly as needed      Furosemide (LASIX) 20 MG tablet Take 40 mg by mouth daily      hydrALAZINE (APRESOLINE) 25 MG tablet Take 100 mg by mouth 3 times daily      magnesium oxide (MAG-) 400 MG tablet Take 1 tablet by mouth daily      metoprolol (TOPROL-XL) 100 MG 24 hr tablet Take 50 mg by mouth daily      Multiple Vitamins-Minerals (PRESERVISION AREDS 2 PO) Take 1 capsule by mouth      NIFEdipine  osmotic (PROCARDIA XL) 90 MG 24 hr tablet Take 90 mg by mouth daily      omeprazole (PRILOSEC) 20 MG capsule Take 1 capsule by mouth daily      oxyBUTYnin ER (DITROPAN XL) 5 MG 24 hr tablet Take 5 mg by mouth daily      sodium bicarbonate 650 MG tablet TAKE ONE TABLET BY MOUTH TWICE DAILY      traZODone (DESYREL) 50 MG tablet 100 mg at bedtime      triamcinolone (KENALOG) 0.1 % external ointment  (Patient not taking: Reported on 4/11/2024)       No current facility-administered medications for this visit.        Exam:  /74 (BP Location: Right arm, Patient Position: Sitting, Cuff Size: Adult Regular)   Pulse 69   Wt 62.7 kg (138 lb 3.2 oz)   SpO2 98%   BMI 23.36 kg/m     Body mass index is 23.36 kg/m .   General:  Alert, oriented, no acute distress  Eyes:  External exam normal, Conjunctivae noninjected and nonicteric.  Mouth:  Moist mucosa, restored teeth  Ears:  Hearing grossly intact  Neck:  No thyromegaly. Carotid upstroke normal, no carotid bruit, no JVD  Lungs:  Clear to auscultation. No wheezes, crackles, rales or rhonchi,      no accessory muscle use   Heart:  Regular, normal S1 and S2, no obvious murmurs, no rubs or gallops  Abdomen: Soft, non-tender  Extremities: No ankle edema, age appropriate skin without stasis  Pulses: Pedal 2+ bilaterally  Jasen/Psy: Non-focal, normal mood, normal affect      Vital Trend:  Wt Readings from Last 5 Encounters:   04/24/24 62.7 kg (138 lb 3.2 oz)   04/11/24 59.4 kg (131 lb)   07/31/21 71.7 kg (158 lb)   10/10/19 73.6 kg (162 lb 3.2 oz)   10/10/19 73.5 kg (162 lb)     BP Readings from Last 5 Encounters:   04/24/24 132/74   04/11/24 (!) 153/84   07/31/21 (!) 177/90   10/10/19 126/82   10/10/19 126/82     Pulse Readings from Last 5 Encounters:   04/24/24 69   04/11/24 66   07/31/21 67   10/10/19 59   10/10/19 59        Data:     ECG (2024):  Sinusbrady, low voltage QRS    Echocardiogram (2019)  Interpretation Summary  Left ventricular function is normal, EF >65%.  Left  ventricular hypertrophy.  No significant valve dysfunction.  No pericardial effusion is present.  IVC is not dilated.  Moderately dilated ascending aorta, indexed to 2.4 cm/m2.     Nuclear Perfusion Scan (2019):    The nuclear stress test is negative for inducible myocardial ischemia or infarction.    Left ventricular function is normal.    There is no prior study for comparison.    Iliac Duplex (2024)  Occasional ectopic beats. Otherwise negative aorta to  femoral arterial ultrasound. Measurements as in the report.     Lab Review:  Lab Results   Component Value Date    CR 3.68 (H) 10/10/2019    CR 2.23 (H) 10/28/2013    LDL 67 10/28/2013    HDL 64 10/28/2013    POTASSIUM 4.4 10/10/2019    POTASSIUM 4.4 10/28/2013     10/10/2019     10/28/2013         Assessment:     Lexy Cobb is a 79 year old female with CKD5 and HTN with likely some degree of diastolic dysfunction.  Good functional status for age but did not have any recent stress test.  Given that echocardiography provide spoke with structural and functional information I will arrange for a stress echocardiogram.    Age-adjusted clinically low risk of perioperative cardiovascular complications from kidney transplantation.    Addendum:  Nuclear stress test was normal  ZioPatch revealed ectopy (no need for therapy), no evidence for atrial fibrillation       Plan:     1.  Proceed with transplantation    Contingency Plan: Consider to replace metoprolol with a superior antihypertensive agent  Follow-up: As needed    I spent 60min for the chart preparation, visit and documentation   This note was software transcribed.

## 2024-04-24 NOTE — LETTER
"4/24/2024      RE: Lexy Cobb  [address]  [facility] [address]       Dear Colleague,    Thank you for the opportunity to participate in the care of your patient, Lexy Cobb, at the [facility] [place] [facility]. Please see a copy of my visit note below.    Reason for Visit:  Today I have seen Lexy Cobb for CV risk assessmenty  Consult: Yes    HPI : Status / Symptoms / Concerns     80 y/o f with Stage 5 chronic kidney disease not on chronic dialysis for possible kidney transplant.Her past medical history is notable for renal cell carcinoma in 2001 (s/p right native nephrectomy with mass totally encapsulated), hypertension, anemia and Raynaud's. She has been inactive on renal transplant wait list since 2013 due to being too well (eGFR has been low but stable around 10-13 ml/min).     No recent hospitalizations or ED visits. She experiences some shortness of breath with moderate intensity activity, although she doesn't feel \"winded\" per se. She has chronic bilateral lower extremity edema which tends to worsens towards the end of the day. She denies orthopnea, PND, chest pain or pre-syncope.     She is a retired  for [employer]. She had an unremarkable nuclear stress test in 2019.    Cardiovascular risk factor profile:   (+) HTN, (-) DM, (-) hypercholesterolemia, (+)  prior tobacco use, (-) fam Hx premature CAD.     Chest Pain:   No  Shortness of Breath:  No  Ankle Swelling:  Mild  Muscle Aches:  No  Palpitations:   No    Lightheadedness:  No  Fainting:   No  Medication Issues:  No  Recent Test:  No    Past Medical History:   Diagnosis Date     ABDULLAHI positive      Anemia in chronic kidney disease(285.21)      Chronic lower back pain      CKD (chronic kidney disease) stage 4, GFR 15-29 ml/min (H)      GERD (gastroesophageal reflux disease)      Hemorrhoids      Hypertension, renal      Hypomagnesemia      " Irritable bowel syndrome      Obesity 1973    s/p gastric bypass     Raynaud's syndrome      Renal cell cancer (H) 2001    s/p right native nephrectomy     Secondary hyperparathyroidism (of renal origin)      Small bowel obstruction (H)      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     Social History: reports that she has never smoked. She has never used smokeless tobacco. She reports that she does not drink alcohol and does not use drugs.   I have reviewed this patient's social history and updated it with pertinent information if needed.    Family History:  She indicated that the status of her mother is unknown. She indicated that the status of her father is unknown.     Family History   Problem Relation Age of Onset     C.A.D. Mother      Hypertension Mother      Diabetes Father      Cancer Father         Lung CA       Medications:  Current Outpatient Medications   Medication Sig Dispense Refill     calcitRIOL (ROCALTROL) 0.25 MCG capsule Take 0.25 mcg by mouth daily       cholecalciferol 125 MCG (5000 UT) CAPS 5,000 Units       cloNIDine (CATAPRES) 0.1 MG tablet Take 0.1 mg by mouth 3 times daily (Patient not taking: Reported on 4/11/2024)       cyanocobalamin 1000 MCG/ML injection Inject 1 mL as directed every 30 days       darbepoetin braulio-polysorbate (ARANESP, ALBUMIN FREE,) 60 MCG/ML injection Inject 1 mL Subcutaneous every 28 days       diphenhydrAMINE-APAP, sleep, (TYLENOL PM EXTRA STRENGTH)  MG/30ML LIQD Take 1 capful by mouth nightly as needed        Furosemide (LASIX) 20 MG tablet Take 40 mg by mouth daily       hydrALAZINE (APRESOLINE) 25 MG tablet Take 100 mg by mouth 3 times daily       magnesium oxide (MAG-) 400 MG tablet Take 1 tablet by mouth daily       metoprolol (TOPROL-XL) 100 MG 24 hr tablet Take 50 mg by mouth daily       Multiple Vitamins-Minerals (PRESERVISION AREDS 2 PO) Take 1 capsule by mouth       NIFEdipine osmotic (PROCARDIA XL) 90 MG 24 hr tablet Take 90 mg by mouth daily       omeprazole (PRILOSEC) 20 MG capsule Take 1 capsule by mouth daily       oxyBUTYnin ER (DITROPAN XL) 5 MG 24 hr tablet Take 5 mg by mouth daily       sodium bicarbonate 650 MG tablet TAKE ONE TABLET BY MOUTH TWICE DAILY       traZODone (DESYREL) 50 MG tablet 100 mg at bedtime       triamcinolone (KENALOG) 0.1 % external ointment  (Patient not taking: Reported on 4/11/2024)       No current facility-administered medications for this visit.        Exam:  /74 (BP Location: Right arm, Patient Position: Sitting, Cuff Size: Adult Regular)   Pulse 69   Wt 62.7 kg (138 lb 3.2 oz)   SpO2 98%   BMI 23.36 kg/m     Body mass index is 23.36 kg/m .   General:  Alert, oriented, no acute distress  Eyes:  External exam normal, Conjunctivae noninjected and nonicteric.  Mouth:  Moist mucosa, restored teeth  Ears:  Hearing grossly intact  Neck:  No thyromegaly. Carotid upstroke normal, no carotid bruit, no JVD  Lungs:  Clear to auscultation. No wheezes, crackles, rales or rhonchi,      no accessory muscle use   Heart:  Regular, normal S1 and S2, no obvious murmurs, no rubs or gallops  Abdomen: Soft, non-tender  Extremities: No ankle edema, age appropriate skin without stasis  Pulses: Pedal 2+ bilaterally  Jasen/Psy: Non-focal, normal mood, normal affect      Vital Trend:  Wt Readings from Last 5 Encounters:   04/24/24 62.7 kg (138 lb 3.2 oz)   04/11/24 59.4 kg (131 lb)   07/31/21 71.7 kg (158 lb)   10/10/19 73.6 kg (162 lb 3.2 oz)   10/10/19 73.5 kg (162 lb)     BP Readings  from Last 5 Encounters:   04/24/24 132/74   04/11/24 (!) 153/84   07/31/21 (!) 177/90   10/10/19 126/82   10/10/19 126/82     Pulse Readings from Last 5 Encounters:   04/24/24 69   04/11/24 66   07/31/21 67   10/10/19 59   10/10/19 59        Data:     ECG (2024):  Sinusbrady, low voltage QRS    Echocardiogram (2019)  Interpretation Summary  Left ventricular function is normal, EF >65%.  Left ventricular hypertrophy.  No significant valve dysfunction.  No pericardial effusion is present.  IVC is not dilated.  Moderately dilated ascending aorta, indexed to 2.4 cm/m2.     Nuclear Perfusion Scan (2019):     The nuclear stress test is negative for inducible myocardial ischemia or infarction.     Left ventricular function is normal.     There is no prior study for comparison.    Iliac Duplex (2024)  Occasional ectopic beats. Otherwise negative aorta to  femoral arterial ultrasound. Measurements as in the report.     Lab Review:  Lab Results   Component Value Date    CR 3.68 (H) 10/10/2019    CR 2.23 (H) 10/28/2013    LDL 67 10/28/2013    HDL 64 10/28/2013    POTASSIUM 4.4 10/10/2019    POTASSIUM 4.4 10/28/2013     10/10/2019     10/28/2013         Assessment:     Lexy Cobb is a 79 year old female with CKD5 and HTN with likely some degree of diastolic dysfunction.  Good functional status for age but did not have any recent stress test.  Given that echocardiography provide spoke with structural and functional information I will arrange for a dobutamine stress echocardiogram.    Age-adjusted clinically low risk of perioperative cardiovascular complications from kidney transplantation.    Plan:     1.  Dobutamine stress test    Contingency Plan: Consider to replace metoprolol with a superior antihypertensive agent  Follow-up: As needed    I spent 60min for the chart preparation, visit and documentation   This note was software transcribed.        Please do not hesitate to contact me if you have any  questions/concerns.     Sincerely,     Paxton Andersen MD

## 2024-04-24 NOTE — PATIENT INSTRUCTIONS
Dr. Andersen recommends:    Dobutamine stress echo for the near future.    Follow up as needed.    Thank you for your visit today.  Please call me with any questions or concerns.   Kermit Zamora RN  Cardiology Care Coordinator  [phone]

## 2024-04-25 ENCOUNTER — TELEPHONE (OUTPATIENT)
Dept: TRANSPLANT | Facility: CLINIC | Age: 79
End: 2024-04-25
Payer: COMMERCIAL

## 2024-04-25 LAB
ATRIAL RATE - MUSE: 76 BPM
DIASTOLIC BLOOD PRESSURE - MUSE: NORMAL MMHG
INTERPRETATION ECG - MUSE: NORMAL
P AXIS - MUSE: 71 DEGREES
PR INTERVAL - MUSE: 174 MS
QRS DURATION - MUSE: 88 MS
QT - MUSE: 408 MS
QTC - MUSE: 459 MS
R AXIS - MUSE: 12 DEGREES
SYSTOLIC BLOOD PRESSURE - MUSE: NORMAL MMHG
T AXIS - MUSE: 55 DEGREES
VENTRICULAR RATE- MUSE: 76 BPM

## 2024-04-25 NOTE — TELEPHONE ENCOUNTER
Called pt to update MRI needed to better look at hypoechoic region on left kidney. Orders are in. Scheduling should be reaching out. Provided imaging scheduling number for pt to arrange as well. Left  with direct line for return call.

## 2024-05-01 ENCOUNTER — HOSPITAL ENCOUNTER (OUTPATIENT)
Dept: CARDIOLOGY | Facility: CLINIC | Age: 79
Discharge: HOME | End: 2024-05-01
Attending: INTERNAL MEDICINE | Admitting: INTERNAL MEDICINE
Payer: COMMERCIAL

## 2024-05-01 DIAGNOSIS — Z76.82 ORGAN TRANSPLANT CANDIDATE: ICD-10-CM

## 2024-05-01 DIAGNOSIS — Z01.810 ENCOUNTER FOR PREOPERATIVE ASSESSMENT FOR NONCORONARY CARDIAC SURGERY: ICD-10-CM

## 2024-05-01 LAB — LVEF ECHO: NORMAL

## 2024-05-01 PROCEDURE — 93306 TTE W/DOPPLER COMPLETE: CPT

## 2024-05-01 PROCEDURE — 93306 TTE W/DOPPLER COMPLETE: CPT | Mod: 26 | Performed by: INTERNAL MEDICINE

## 2024-05-01 RX ORDER — ATROPINE SULFATE 0.4 MG/ML
.2-2 AMPUL (ML) INJECTION
Status: DISCONTINUED | OUTPATIENT
Start: 2024-05-01 | End: 2024-05-01

## 2024-05-01 RX ORDER — METOPROLOL TARTRATE 1 MG/ML
1-20 INJECTION, SOLUTION INTRAVENOUS
Status: DISCONTINUED | OUTPATIENT
Start: 2024-05-01 | End: 2024-05-01

## 2024-05-01 RX ORDER — DOBUTAMINE HYDROCHLORIDE 200 MG/100ML
10-50 INJECTION INTRAVENOUS CONTINUOUS
Status: DISCONTINUED | OUTPATIENT
Start: 2024-05-01 | End: 2024-05-01

## 2024-05-01 NOTE — PROGRESS NOTES
Pt arrived for Dobutamine stress echo. After pt was hooked up to EKG, it was noticed that patient is currently in atrial fibrillation. Per Dr. Gerardo, we will do a complete echo without dobutamine. Dr. Andersen updated.       Nelia Hamilton  Echo Lab RN  [phone]

## 2024-05-02 ENCOUNTER — ORDERS ONLY (AUTO-RELEASED) (OUTPATIENT)
Dept: CARDIOLOGY | Facility: CLINIC | Age: 79
End: 2024-05-02
Payer: COMMERCIAL

## 2024-05-02 ENCOUNTER — TELEPHONE (OUTPATIENT)
Dept: CARDIOLOGY | Facility: CLINIC | Age: 79
End: 2024-05-02
Payer: COMMERCIAL

## 2024-05-02 ENCOUNTER — CARE COORDINATION (OUTPATIENT)
Dept: CARDIOLOGY | Facility: CLINIC | Age: 79
End: 2024-05-02
Payer: COMMERCIAL

## 2024-05-02 DIAGNOSIS — Z76.82 ORGAN TRANSPLANT CANDIDATE: ICD-10-CM

## 2024-05-02 DIAGNOSIS — Z01.810 ENCOUNTER FOR PREOPERATIVE ASSESSMENT FOR NONCORONARY CARDIAC SURGERY: ICD-10-CM

## 2024-05-02 DIAGNOSIS — I48.91 A-FIB (H): ICD-10-CM

## 2024-05-02 DIAGNOSIS — I48.91 A-FIB (H): Primary | ICD-10-CM

## 2024-05-02 NOTE — PROGRESS NOTES
Patient was not able to have the dobutamine stress test related to being in A-fib. New orders received.  Date: 5/2/2024    Time of Call: 2:07 PM     Diagnosis:  Pre kidney transplant cardiac evaluation     [ VORB ] Ordering provider: Paxton Andersen  Order: 1wk RicooPajaylon and a Lexiscan      Order received by: Kermit Zamora RN     Follow-up/additional notes: Orders placed and patient notified. Message sent to schedulers to reach out to patient to help schedule. Patient also given scheduling number.

## 2024-05-09 ENCOUNTER — HOSPITAL ENCOUNTER (OUTPATIENT)
Dept: CARDIOLOGY | Facility: CLINIC | Age: 79
Discharge: HOME | End: 2024-05-09
Attending: INTERNAL MEDICINE
Payer: COMMERCIAL

## 2024-05-09 ENCOUNTER — HOSPITAL ENCOUNTER (OUTPATIENT)
Dept: NUCLEAR MEDICINE | Facility: CLINIC | Age: 79
Setting detail: NUCLEAR MEDICINE
Discharge: HOME | End: 2024-05-09
Attending: INTERNAL MEDICINE
Payer: COMMERCIAL

## 2024-05-09 VITALS — SYSTOLIC BLOOD PRESSURE: 134 MMHG | DIASTOLIC BLOOD PRESSURE: 73 MMHG | HEART RATE: 74 BPM

## 2024-05-09 DIAGNOSIS — Z76.82 ORGAN TRANSPLANT CANDIDATE: ICD-10-CM

## 2024-05-09 DIAGNOSIS — Z01.810 ENCOUNTER FOR PREOPERATIVE ASSESSMENT FOR NONCORONARY CARDIAC SURGERY: ICD-10-CM

## 2024-05-09 LAB
CV STRESS MAX HR HE: 84
RATE PRESSURE PRODUCT: NORMAL
STRESS ECHO BASELINE DIASTOLIC HE: 77
STRESS ECHO BASELINE HR: 69 BPM
STRESS ECHO BASELINE SYSTOLIC BP: 141
STRESS ECHO CALCULATED PERCENT HR: 59 %
STRESS ECHO LAST STRESS DIASTOLIC BP: 73
STRESS ECHO LAST STRESS SYSTOLIC BP: 124
STRESS ECHO TARGET HR: 143

## 2024-05-09 PROCEDURE — 343N000001 HC RX 343: Performed by: INTERNAL MEDICINE

## 2024-05-09 PROCEDURE — 93017 CV STRESS TEST TRACING ONLY: CPT

## 2024-05-09 PROCEDURE — 93018 CV STRESS TEST I&R ONLY: CPT | Performed by: INTERNAL MEDICINE

## 2024-05-09 PROCEDURE — 93016 CV STRESS TEST SUPVJ ONLY: CPT | Performed by: INTERNAL MEDICINE

## 2024-05-09 PROCEDURE — 78452 HT MUSCLE IMAGE SPECT MULT: CPT

## 2024-05-09 PROCEDURE — 250N000011 HC RX IP 250 OP 636: Performed by: INTERNAL MEDICINE

## 2024-05-09 PROCEDURE — 78452 HT MUSCLE IMAGE SPECT MULT: CPT | Mod: 26 | Performed by: RADIOLOGY

## 2024-05-09 PROCEDURE — A9502 TC99M TETROFOSMIN: HCPCS | Performed by: INTERNAL MEDICINE

## 2024-05-09 RX ORDER — REGADENOSON 0.08 MG/ML
0.4 INJECTION, SOLUTION INTRAVENOUS ONCE
Status: COMPLETED | OUTPATIENT
Start: 2024-05-09 | End: 2024-05-09

## 2024-05-09 RX ORDER — AMINOPHYLLINE 25 MG/ML
50-100 INJECTION, SOLUTION INTRAVENOUS
Status: DISCONTINUED | OUTPATIENT
Start: 2024-05-09 | End: 2024-05-10 | Stop reason: HOSPADM

## 2024-05-09 RX ORDER — CAFFEINE CITRATE 20 MG/ML
60 SOLUTION INTRAVENOUS
Status: DISCONTINUED | OUTPATIENT
Start: 2024-05-09 | End: 2024-05-10 | Stop reason: HOSPADM

## 2024-05-09 RX ORDER — ALBUTEROL SULFATE 90 UG/1
2 AEROSOL, METERED RESPIRATORY (INHALATION) EVERY 5 MIN PRN
Status: DISCONTINUED | OUTPATIENT
Start: 2024-05-09 | End: 2024-05-10 | Stop reason: HOSPADM

## 2024-05-09 RX ORDER — ACYCLOVIR 200 MG/1
0-1 CAPSULE ORAL
Status: DISCONTINUED | OUTPATIENT
Start: 2024-05-09 | End: 2024-05-10 | Stop reason: HOSPADM

## 2024-05-09 RX ADMIN — TETROFOSMIN 42 MILLICURIE: 1.38 INJECTION, POWDER, LYOPHILIZED, FOR SOLUTION INTRAVENOUS at 13:51

## 2024-05-09 RX ADMIN — TETROFOSMIN 11.4 MILLICURIE: 1.38 INJECTION, POWDER, LYOPHILIZED, FOR SOLUTION INTRAVENOUS at 12:21

## 2024-05-09 RX ADMIN — REGADENOSON 0.4 MG: 0.08 INJECTION, SOLUTION INTRAVENOUS at 13:16

## 2024-05-09 NOTE — PROGRESS NOTES
Pt here for Lexiscan nuclear stress test. Medication and side effects reviewed with patient. Lung sounds clear to auscultation bilaterally. Denied caffeine use. Patient tolerated Lexiscan dose without any adverse reactions. VSS. Monitored post injection and then taken nuclear medicine for follow up imaging.

## 2024-05-21 PROCEDURE — 93244 EXT ECG>48HR<7D REV&INTERPJ: CPT | Performed by: INTERNAL MEDICINE

## 2024-05-25 ENCOUNTER — ANCILLARY PROCEDURE (OUTPATIENT)
Dept: MRI IMAGING | Facility: CLINIC | Age: 79
End: 2024-05-25
Attending: NURSE PRACTITIONER
Payer: COMMERCIAL

## 2024-05-25 DIAGNOSIS — Z76.82 ORGAN TRANSPLANT CANDIDATE: ICD-10-CM

## 2024-05-25 DIAGNOSIS — N28.1 RENAL CYSTS, ACQUIRED, BILATERAL: ICD-10-CM

## 2024-05-25 PROCEDURE — 74183 MRI ABD W/O CNTR FLWD CNTR: CPT | Performed by: STUDENT IN AN ORGANIZED HEALTH CARE EDUCATION/TRAINING PROGRAM

## 2024-05-25 PROCEDURE — A9585 GADOBUTROL INJECTION: HCPCS | Performed by: STUDENT IN AN ORGANIZED HEALTH CARE EDUCATION/TRAINING PROGRAM

## 2024-05-25 RX ORDER — GADOBUTROL 604.72 MG/ML
7.5 INJECTION INTRAVENOUS ONCE
Status: COMPLETED | OUTPATIENT
Start: 2024-05-25 | End: 2024-05-25

## 2024-05-25 RX ADMIN — GADOBUTROL 6 ML: 604.72 INJECTION INTRAVENOUS at 14:25

## 2024-05-25 NOTE — DISCHARGE INSTRUCTIONS
MRI Contrast Discharge Instructions    The IV contrast you received today will pass out of your body in your  urine. This will happen in the next 24 hours. You will not feel this process.  Your urine will not change color.    Drink at least 4 extra glasses of water or juice today (unless your doctor  has restricted your fluids). This reduces the stress on your kidneys.  You may take your regular medicines.    If you are on dialysis: It is best to have dialysis today.    If you have a reaction: Most reactions happen right away. If you have  any new symptoms after leaving the hospital (such as hives or swelling),  call your hospital at the correct number below. Or call your family doctor.  If you have breathing distress or wheezing, call 911.    Special instructions: ***    I have read and understand the above information.    Signature:______________________________________ Date:___________    Staff:__________________________________________ Date:___________     Time:__________    [place] [facility] Departments:    ___[place]: [phone]  ___[facility]: [phone]  ___[place]: [phone] ___[facility]: [phone]  ___[facility]: [phone]  ___[facility]: [phone]  ___[phone]  ___[facility]: [phone]  ___[phone]

## 2024-05-29 DIAGNOSIS — K82.4 GALL BLADDER POLYP: Primary | ICD-10-CM

## 2024-05-29 DIAGNOSIS — K76.0 HEPATIC STEATOSIS: ICD-10-CM

## 2024-05-29 DIAGNOSIS — Z76.82 ORGAN TRANSPLANT CANDIDATE: ICD-10-CM

## 2024-05-31 ENCOUNTER — CARE COORDINATION (OUTPATIENT)
Dept: CARDIOLOGY | Facility: CLINIC | Age: 79
End: 2024-05-31
Payer: COMMERCIAL

## 2024-05-31 ENCOUNTER — TELEPHONE (OUTPATIENT)
Dept: TRANSPLANT | Facility: CLINIC | Age: 79
End: 2024-05-31
Payer: COMMERCIAL

## 2024-05-31 NOTE — PROGRESS NOTES
"Patient wore a zio monitor and Dr. Andersen reviewed the results and states: \"Looks like some extra-beats - not concerning \"    Patient was called and notified of Dr. Andersen's comments on zio report.    "

## 2024-05-31 NOTE — TELEPHONE ENCOUNTER
Called after receiving voicemail wondering updates after MRI and cards work up. Writer sent message to cards to follow up with pt on zio patch results. Lexiscan looks negative and requested clearance comments. Discussed need for abdominal MRI for gallbladder polyps and hepatic steatosis. Pt has colonoscopy scheduled at [facility] on 6/10/24.

## 2024-06-06 ENCOUNTER — HOSPITAL ENCOUNTER (OUTPATIENT)
Dept: ULTRASOUND IMAGING | Facility: HOSPITAL | Age: 79
Discharge: HOME | End: 2024-06-06
Attending: NURSE PRACTITIONER | Admitting: NURSE PRACTITIONER
Payer: COMMERCIAL

## 2024-06-06 DIAGNOSIS — K82.4 GALL BLADDER POLYP: ICD-10-CM

## 2024-06-06 DIAGNOSIS — Z76.82 ORGAN TRANSPLANT CANDIDATE: ICD-10-CM

## 2024-06-06 DIAGNOSIS — K76.0 HEPATIC STEATOSIS: ICD-10-CM

## 2024-06-06 PROCEDURE — 76705 ECHO EXAM OF ABDOMEN: CPT

## 2024-06-10 ENCOUNTER — TELEPHONE (OUTPATIENT)
Dept: TRANSPLANT | Facility: CLINIC | Age: 79
End: 2024-06-10
Payer: COMMERCIAL

## 2024-06-12 ENCOUNTER — COMMITTEE REVIEW (OUTPATIENT)
Dept: TRANSPLANT | Facility: CLINIC | Age: 79
End: 2024-06-12
Payer: COMMERCIAL

## 2024-06-12 DIAGNOSIS — N18.6 ESRD (END STAGE RENAL DISEASE) (H): Primary | ICD-10-CM

## 2024-06-12 DIAGNOSIS — Z76.82 ORGAN TRANSPLANT CANDIDATE: ICD-10-CM

## 2024-06-12 NOTE — COMMITTEE REVIEW
Kidney/Pancreas Committee Review Note     Evaluation Date: 10/28/2013  Committee Review Date: 6/12/2024    Organ being evaluated for: Kidney    Transplant Phase: Waitlist  Transplant Status: Inactive    Transplant Coordinator: Temi Caraballo  Transplant Surgeon:        Referring Physician: Hal Neri    Primary Diagnosis: Hypertensive Nephrosclerosis  Secondary Diagnosis:     Committee Review Members:  Nephrology Edin Cardoza MD, Joe De La Cruz MD   Nutrition Kaur Diaz, MARY   Pharmacist Yessica Salamanca, [facility]   Transplant SAE SALEEM, DOROTHY, Romy Acevedo, DOROTHY, Hannah Guan, RN, Chiquita Mazariegos, RN, Nery Carrasco, RN, Veroncia Ozuna, NP, Veronica Jo, RN, Ciera Fregoso MD, Anna Carlton, DOROTHY, Awa Granados RN   Transplant Surgery Ciera Fregoso MD       Transplant Eligibility: Irreversible chronic kidney disease treated w/dialysis or expected need for dialysis    Committee Review Decision: Remain Inactive    Relative Contraindications:     Absolute Contraindications:      Committee Chair Ciera Fregoso MD verbally attested to the committee's decision.    Committee Discussion Details:     CKD from likely from HTN, NSAID use, prior triamterene use, reduced nephron mass, potentially secondary FSGS, solitary kidney (s/p unilateral nephrectomy for RCC) not yet on HD most recent GFR 9 6/2024 ([facility])       RCC: s/p right native nephrectomy with mass totally encapsulated. No chemo or radiation     Cards: risk assessment done with Dr. Andersen (4/24/24), cleared with negative lexiscan 5/9/24      Rheum: (per primary neph notes in [facility]) positive ABDULLAHI and raynauds. Has low c3. Evaluated by Dr. Browne, no clear autoimmune disorder identified (Dr. Browne visit in [place] 3/15/2017) has not been seen since.      S/p gastric bypass: BMI 23, no concerns with BMI in Dr. Forbes visit note from 4/11/24     PAD screening: CT a/p and iliac US updated and reviewed by Dr. Forbes at visit, vessels okay  Completed renal US for lobulated  appearance on left kidney with indeterminate lobulation/ density along posterior lower pole. Done 4/24/24: MRI was recommended due to hypoechoic region with internal vascularity.    abdominal MRI done 5/25/24, hemorrhagic/ proteinaceous cyst in left lower pole. Impression also noted enhancing foci in the gallbladder fundus and hepatic steatosis. Abdominal US recommended.   Abdominal US done: 6/6/24 tiny foci likely represent polyps, recommending follow up US in 12 months. Hepatic steatosis.     GI: multiple colon polyps on colonoscopy. 12/2023 colonoscopy one measured 30mm. Had follow up colonoscopy with Dr. Amezquita with [facility] on 6/10/24. He reached out to me after completing to get recs from transplant. 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 Colonoscopy 6 months ago they removed 13 polyps and none grew back but prep was not sufficient. Today 28 polyps were removed. Report is in [facility]. Appears to be adenomas. Planning on follow up in 6 months but would be willing to do follow up in 3 months if transplant felt needed.     Health maintenance: mammo: UTD, PAP: 4/15/2019 per [facility] no longer needs pap Dental: UTD     Committee determined abdominal US will need to be completed in 1 year but should not hold up active status. She will need to see our colorectal team due to frequent colonoscopies and number of polyps being removed. Also needs updated oxylate level. Pt to be re-reviewed after colorectal visit.

## 2024-06-12 NOTE — TELEPHONE ENCOUNTER
Diagnosis, Referred by & from: Yoel Amanda APRN CNP    Appt date:    NOTES STATUS DETAILS   OFFICE NOTE from referring provider N/A    OFFICE NOTE from other specialist [facility]:   6/5/2024, 12/11/2023 OV with Sujey, CASS   11/27/2023 OV with Jessie, B   11/1/2023 OV with KAILEY Andersen   DISCHARGE SUMMARY from hospital N/A    DISCHARGE REPORT from the ER N/A    OPERATIVE REPORT N/A    MEDICATION LIST Internal    LABS     ANAL PAP/CEA N/A    BIOPSIES/PATHOLOGY RELATED TO DIAGNOSIS [facility]:   11/6/2023, 3/24/2023, 12/21/2023   DIAGNOSTIC PROCEDURES     PFC TESTING (from the Pelvic floor center includes Manometry, PDNL, EMG, etc.) N/A    COLONOSCOPY (most recent all time after 5 years) Care Everywhere 6/10/2024 [facility]  12/21/2023 [facility]  11/6/2023 [facility]  3/24/2023 [facility]   FLEX SIGMOIDOSCOPY N/A    UPPER ENDOSCOPY (EGD) N/A    ERCP N/A    IMAGING (DISC & REPORT)      CT Internal 4/11/2024 CT ABD PEL    MRI Internal 5/25/2024 MR ABD    XRAY N/A    ULTRASOUND  (ENDOANAL/ENDORECTAL) Internal 5/6/2024 US ABD        Records Requested  06/12/24    Facility    Fax:    Outcome

## 2024-06-12 NOTE — PROGRESS NOTES
Called pt to update on selection committee. Pt has genetics consult with [facility] next Friday 6/21/24. Committee would like update oxylate level and colorectal visit prior to active status consideration. Discussed possibility of living donor only status pending colorectal visit. Pt will reach out to family and friends to see if anyone is interested in donating. Provided central scheduling number for pt to arrange lab appt. Colorectal clinic to reach out to pt for scheduling with their clinic. Pt verbalized understanding of information and has no further questions. Encouraged to reach out if questions arise.

## 2024-06-15 ENCOUNTER — LAB (OUTPATIENT)
Dept: LAB | Facility: CLINIC | Age: 79
End: 2024-06-15
Payer: COMMERCIAL

## 2024-06-15 DIAGNOSIS — Z76.82 ORGAN TRANSPLANT CANDIDATE: ICD-10-CM

## 2024-06-15 DIAGNOSIS — N18.6 ESRD (END STAGE RENAL DISEASE) (H): ICD-10-CM

## 2024-06-15 PROCEDURE — 36415 COLL VENOUS BLD VENIPUNCTURE: CPT

## 2024-06-15 PROCEDURE — 83945 ASSAY OF OXALATE: CPT | Mod: 90

## 2024-06-15 PROCEDURE — 99000 SPECIMEN HANDLING OFFICE-LAB: CPT

## 2024-06-18 LAB — OXALATE SERPL-SCNC: 11.1 UMOL/L

## 2024-06-20 ENCOUNTER — TELEPHONE (OUTPATIENT)
Dept: TRANSPLANT | Facility: CLINIC | Age: 79
End: 2024-06-20
Payer: COMMERCIAL

## 2024-06-20 NOTE — TELEPHONE ENCOUNTER
Called pt to update oxalate needs to be <10 for active status consideration. Pt to work with primary nephrologist to lower level. Left VM with direct line for return call.     Called Dr. Neri's office to update on recommendation for pt to work with primary neph to lower oxalate level. Left  with direct line for return call.     6/24/24 addendum: Received voicemail from Dr. Neri requesting call back. Reviewed need oxalate level <10. He will work with pt to lower level.

## 2024-06-21 ENCOUNTER — TRANSFERRED RECORDS (OUTPATIENT)
Dept: HEALTH INFORMATION MANAGEMENT | Facility: CLINIC | Age: 79
End: 2024-06-21

## 2024-06-27 ENCOUNTER — PRE VISIT (OUTPATIENT)
Dept: SURGERY | Facility: CLINIC | Age: 79
End: 2024-06-27

## 2024-06-27 NOTE — TELEPHONE ENCOUNTER
Diagnosis, Referred by & from: FAP Needs princess for Kidney Transplant   Appt date: 7/1/2024   NOTES STATUS DETAILS   OFFICE NOTE from referring provider Internal [facility]:  6/12/24 - SOT Referral from Yoel Amanda NP  4/11/24 - SOT OV with Yoel Amanda NP   OFFICE NOTE from other specialist Care Everywhere [facility]:  6/21/24 - ONC OV with Gina Sheriff GC  6/10/24 - GI OV with Dr. Rehman  6/5/24 - PCC OV with Dr. Rm  11/27/23 - GEN SURG OV with Dr. Negron  11/9/23 - GEN SURG OV with Dr. Holt   DISCHARGE SUMMARY from hospital N/A    DISCHARGE REPORT from the ER N/A    OPERATIVE REPORT N/A    MEDICATION LIST Internal    LABS     BIOPSIES/PATHOLOGY RELATED TO DIAGNOSIS Care Everywhere [facility]:  6/10/24 - Colon Biopsy (Case: [id number])  12/21/23 - Colon Biopsy (Case: [id number])    [place]:  11/6/23 - Colon Biopsy (Case: [id number])  3/24/23 - Colon Biopsy (Case: [id number])   DIAGNOSTIC PROCEDURES     COLONOSCOPY (most recent all time after 5 years) Care Everywhere [facility]:  6/10/24 - Colonoscopy  12/21/23 - Colonoscopy  11/6/23 - Colonoscopy  3/24/23 - Colonoscopy   IMAGING (DISC & REPORT)      CT Internal [facility]:  4/11/24 - CT Abd/Pelvis   MRI Internal [facility]:  5/25/24 - MRI Abdomen   ULTRASOUND  (ENDOANAL/ENDORECTAL) Internal [facility]:  4/24/24 - US Renal

## 2024-07-01 ENCOUNTER — PRE VISIT (OUTPATIENT)
Dept: SURGERY | Facility: CLINIC | Age: 79
End: 2024-07-01

## 2024-07-01 ENCOUNTER — OFFICE VISIT (OUTPATIENT)
Dept: SURGERY | Facility: CLINIC | Age: 79
End: 2024-07-01
Payer: COMMERCIAL

## 2024-07-01 VITALS
HEIGHT: 65 IN | BODY MASS INDEX: 21.89 KG/M2 | OXYGEN SATURATION: 98 % | WEIGHT: 131.4 LBS | DIASTOLIC BLOOD PRESSURE: 64 MMHG | SYSTOLIC BLOOD PRESSURE: 137 MMHG | HEART RATE: 66 BPM

## 2024-07-01 DIAGNOSIS — D13.91 FAP (FAMILIAL ADENOMATOUS POLYPOSIS): Primary | ICD-10-CM

## 2024-07-01 PROCEDURE — 99205 OFFICE O/P NEW HI 60 MIN: CPT | Performed by: COLON & RECTAL SURGERY

## 2024-07-01 ASSESSMENT — PAIN SCALES - GENERAL: PAINLEVEL: NO PAIN (0)

## 2024-07-01 NOTE — NURSING NOTE
"Chief Complaint   Patient presents with    Consult     FAP       Vitals:    07/01/24 0911   BP: 137/64   BP Location: Left arm   Patient Position: Sitting   Cuff Size: Adult Regular   Pulse: 66   SpO2: 98%   Weight: 131 lb 6.4 oz   Height: 5' 4.5\"       Body mass index is 22.21 kg/m .    Ilana Portillo CMA    "

## 2024-07-01 NOTE — LETTER
2024       RE: Lexy Cobb   [address] [place]  [place] [address]       Dear Colleague,    Thank you for referring your patient, Lexy Cobb, to the [facility] [place] at [facility]. Please see a copy of my visit note below.    Colon and Rectal Surgery Clinic Note    RE: Lexy Cobb.  : 1945.  RIA: 2024.    Reason for visit: Colon polyposis - needs clearance for kidney transplant.      HPI: 79 year old female referred here by Yoel DE LA VEGA CNP with nephrology for clearance for a kidney transplant. Patient has past medical history of FAP, h/o renal cell cancer s/p right nephrectomy, CKD stage 4, anemia in setting of chronic renal disease, secondary renal hyperparathyroidism, vitamin D deficiency, hypomagnesemia, vitamin B12 deficiency, Raynaud's syndrome, IBS, and GERD. She [facility] with [facility].   Colonoscopy done by Dr Rehman (2023):  Findings:       A 30 mm polyp was found in the cecum. The polyp was semi-sessile,        Yohana classification Is (protruding, sessile) and lateral spreading.        Preparations were made for mucosal resection. Demarcation of the        lesion was performed with narrow band imaging to clearly identify the        boundaries of the lesion. Eleview was injected to raise the lesion.        Cap and snare mucosal resection was performed. Resection and        retrieval were complete. Resected tissue margins were examined and        clear of polyp tissue. To prevent bleeding after mucosal resection,        five hemostatic clips were successfully placed (MR conditional). Clip        : [employer] OPS. There was no bleeding during, or at        the end, of the procedure.        A 10 mm polyp was found in the cecum. The polyp was sessile. The        polyp was removed with a cold  snare. Resection and retrieval were        complete.        Nine sessile polyps were found in the ascending colon. The polyps        were 5 to 15 mm in size. These polyps were removed with a cold snare.        Resection and retrieval were complete. Estimated blood loss: none.        A 35 mm polyp was found in the proximal ascending colon. The polyp        was multi-lobulated, semi-pedunculated (with a very broad base) and        Yohana classification Is (protruding, sessile). Preparations were made        for mucosal resection. Demarcation of the lesion was performed with        narrow band imaging to clearly identify the boundaries of the lesion.        20 mL of Eleview was injected with adequate lift of the lesion from        the muscularis propria. Cap and snare mucosal resection with Moran net        retrieval was performed. A 40 mm area was resected. Resection and        retrieval were complete. Resected tissue margins were examined and        clear of polyp tissue. There was no bleeding during and at the end of        the procedure. To prevent bleeding after mucosal resection, two        hemostatic clips were successfully placed (MR conditional). Clip        : [employer]. There was no bleeding during, or at        the end, of the procedure.        A 10 mm polyp was found in the transverse colon. The polyp was        sessile. The polyp was removed with a cold snare. Resection and        retrieval were complete.        Diverticula were found in the sigmoid colon.        A large amount of liquid stool was found in the entire colon, making        visualization difficult. Lavage of the area was performed using        copious amounts of sterile water, resulting in clearance with fair        visualization.        There was a large lipoma, at the hepatic flexure.   Impression:            - One 30 mm polyp in the cecum, removed with                          Eleview lift and piecemeal hot cap-assisted  mucosal                          resection. Resected and retrieved. Clips (MR                          conditional) were placed. Clip : Authentic Response.                          - One 10 mm polyp in the cecum, removed with a cold                          snare. Resected and retrieved.                          - Nine 5 to 15 mm polyps in the ascending colon,                          removed with a cold snare. Resected and retrieved.                          - One 35 mm polyp in the proximal ascending colon,                          removed with Eleview lift and piecemeal hot,                          cap-assisted mucosal resection. Resected and                          retrieved. Clips (MR conditional) were placed. Clip                          : Authentic Response.                          - One 10 mm polyp in the transverse colon, removed                          with a cold snare. Resected and retrieved.                          - A large lipoma was seen at the hepatic flexure                          - Diverticulosis in the sigmoid colon.                          - Stool in the entire examined colon.                          - Mucosal resection was performed. Resection and                          retrieval were complete.   FINAL DIAGNOSIS     A.  Colon, cecum, polypectomy:  Tubular adenoma     B.  Colon, cecum, ascending, polypectomies:  Tubulovillous adenoma and tubular adenoma fragments     C.  Colon, ascending, transverse, polypectomies:  Tubulovillous adenoma  Tubular adenoma fragments          Colonoscopy by Dr. Rehman (6/10/24):  Impression:   - Post-polypectomy scar in the cecum. Biopsied.  - Three 5 to 8 mm polyps in the ascending colon,   removed with a cold snare. Resected and retrieved.  - A tattoo was seen in the transverse colon. A   post-polypectomy scar was found at the tattoo site.   There was no evidence of residual polyp tissue.  - 18 4 to 12  mm polyps in the transverse colon,   removed with a cold snare. Resected and retrieved.  - One 12 mm polyp in the transverse colon, removed   with saline lift and en bloc hot snare mucosal   resection. Resected and retrieved. Clip (MR   conditional) was placed. Clip : SysClass. Tattooed.  - One 15 mm polyp in the descending colon, removed   with underwater EMR technique with en bloc hot   snare mucosal resection. Resected and retrieved.   Clips (MR conditional) were placed. Clip   : SysClass. Tattooed.  - Two 7 to 8 mm polyps in the descending colon,   removed with a cold snare. Resected and retrieved.  - One 5 mm polyp in the sigmoid colon, removed with   a cold snare. Resected and retrieved.  - Diverticulosis in the sigmoid colon, in the   descending colon and in the ascending colon.  - Non-bleeding internal hemorrhoids.  Recommendation: - Discharge patient to home.  - Refer to a genetics counselor at appointment to   be scheduled.  - Repeat colonoscopy in 6 months with extended   bowel preparation for surveillance of multiple   polyps. This can be performed sooner (but no sooner   than 3 months) if needed to expedite the renal   transplant process.  - Return to referring physician.  - MRI testing above 3 gerry should not be performed   without physician approval due to recent endoclip   placement.  - Patient has a contact number available for   emergencies. The signs and symptoms of potential   delayed complications (bleeding, clip migration,   perforation requiring emergent surgery or repeat   endoscopy, post polypectomy syndrome) were   discussed with the patient. Return to normal   activities tomorrow. Written discharge instructions   were provided to the patient.  - No aspirin, ibuprofen, naproxen, or other      PATHOLOGY:  A. Colon, cecum, scar, biopsy:  No diagnostic abnormality  B. Colon, ascending, transverse, descending, sigmoid, polypectomy:  Tubular adenoma  fragments  C. Colon, transverse, polypectomy:  Tubulovillous adenoma  D. Colon, descending, polypectomy:  Tubular adenoma    CT AP (4/11/2024)   IMPRESSION:   1.Moderate atherosclerotic calcification of the abdominal aorta; mild  atherosclerotic calcification of bilateral common iliac arteries; no  significant atherosclerotic calcification of bilateral internal iliac  arteries and bilateral external iliac arteries.  2. Postsurgical changes of right-sided nephrectomy. No recurrent mass.  Lobulated appearance of the left kidney with indeterminate  lobulation/density along the posterior lower pole, uncertain if it  represents parenchymal lobulation versus cyst with  hemorrhagic/proteinaceous content or focal lesion. Recommend renal  ultrasound for further evaluation.  3. Intraluminal oval-shaped hyperdensity in the distal ileum and right  lower quadrant possibly ingested  hyperdensity/foreign body, no associated transition to suggest  high-grade obstruction, mild fecalization of the adjacent small bowel  contents may suggest slow transit versus less likely developing   partial obstruction, if clinical concern repeat imaging  evaluation  may be considered to evaluate for passage.  4. Colonic fat attenuations possibly lipomas.  5.  Trace left pleural effusion. Additional findings as above   US abdomen (6/6/24)  IMPRESSION:  1.  Multiple tiny foci along the gallbladder wall likely represent polyps. Recommend follow-up ultrasound in 12 months.  2.  Hepatic steatosis.   MR abdomen (5/25/24)   IMPRESSION:  1.  Several left renal cysts including a hemorrhagic/ proteinaceous  cyst in the left lower pole which likely corresponds to the findings  on recent ultrasound. No suspicious renal masses. Status post right  nephrectomy.   2.  Several enhancing foci in the gallbladder fundus may represent  polyps. Consider ultrasound for further evaluation.   3.  Small left and trace right pleural effusions.   4.  Hepatic steatosis.      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 Mother     Hypertension Mother     Diabetes Father     Cancer Father         Lung CA       Medications:  Current Outpatient Medications   Medication Sig Dispense Refill    calcitRIOL (ROCALTROL) 0.25 MCG capsule Take 0.25 mcg by mouth daily      cholecalciferol 125 MCG (5000 UT) CAPS 5,000 Units      cyanocobalamin 1000 MCG/ML injection Inject 1 mL as directed every 30 days      darbepoetin braulio-polysorbate (ARANESP, ALBUMIN FREE,) 60 MCG/ML injection Inject 1 mL Subcutaneous every 28 days      diphenhydrAMINE-APAP, sleep, (TYLENOL PM EXTRA STRENGTH)  MG/30ML LIQD Take 1 capful by mouth nightly as needed      Furosemide (LASIX) 20 MG tablet Take 40 mg by mouth daily      hydrALAZINE (APRESOLINE) 25 MG tablet Take 100 mg by mouth 3 times daily      magnesium oxide (MAG-) 400 MG tablet Take 1 tablet by mouth daily      metoprolol (TOPROL-XL) 100 MG 24 hr tablet Take 50 mg by mouth daily      Multiple Vitamins-Minerals (PRESERVISION  "AREDS 2 PO) Take 1 capsule by mouth      NIFEdipine osmotic (PROCARDIA XL) 90 MG 24 hr tablet Take 90 mg by mouth daily      omeprazole (PRILOSEC) 20 MG capsule Take 1 capsule by mouth daily      oxyBUTYnin ER (DITROPAN XL) 5 MG 24 hr tablet Take 5 mg by mouth daily      sodium bicarbonate 650 MG tablet TAKE ONE TABLET BY MOUTH TWICE DAILY      traZODone (DESYREL) 50 MG tablet 100 mg at bedtime      cloNIDine (CATAPRES) 0.1 MG tablet Take 0.1 mg by mouth 3 times daily (Patient not taking: Reported on 4/11/2024)      triamcinolone (KENALOG) 0.1 % external ointment  (Patient not taking: Reported on 4/11/2024)         Allergies:  Allergies   Allergen Reactions    Dapsone     Fluoxetine        Social history:   Social History     Tobacco Use    Smoking status: Never    Smokeless tobacco: Never   Substance Use Topics    Alcohol use: No     Marital status: .    Physical Examination:  /64 (BP Location: Left arm, Patient Position: Sitting, Cuff Size: Adult Regular)   Pulse 66   Ht 1.638 m (5' 4.5\")   Wt 59.6 kg (131 lb 6.4 oz)   SpO2 98%   BMI 22.21 kg/m    General: Well hydrated. No acute distress.    Investigations:  None.    Procedures:  None.    ASSESSMENT  79-year-old female with colon polyposis with tubular and tubulovillous adenomas status post endoscopic excision.  Pathology report does not specify if presence of high-grade dysplasia.  Repeat colonoscopy this month showed 1 additional transverse colon adenoma but otherwise normal.  Her genetic testing is pending.  This is unlikely to be FAP. Risks, benefits, and alternatives of operative treatment were thoroughly discussed with the patient, he/she understands these well and agrees to proceed.    PLAN  Would recommend pathology review to rule out high-grade dysplasia as well as finalize genetic testing before proceeding with transplant.  Continue close endoscopic surveillance per GI.  No indication for surgery at this time.    60 minutes spent on " the date of encounter performing chart review, history and exam, documentation.       Referring Provider:  YOLETTE Cochran CNP  [address] [place] [address]     Primary Care Provider:  Khurram Rm      Again, thank you for allowing me to participate in the care of your patient.      Sincerely,    Og Rock MD

## 2024-07-01 NOTE — PROGRESS NOTES
Colon and Rectal Surgery Clinic Note    RE: Lexy Cobb.  : 1945.  RIA: 2024.    Reason for visit: Colon polyposis - needs clearance for kidney transplant.      HPI: 79 year old female referred here by Yoel DE LA VEGA CNP with nephrology for clearance for a kidney transplant. Patient has past medical history of FAP, h/o renal cell cancer s/p right nephrectomy, CKD stage 4, anemia in setting of chronic renal disease, secondary renal hyperparathyroidism, vitamin D deficiency, hypomagnesemia, vitamin B12 deficiency, Raynaud's syndrome, IBS, and GERD. She met with a genetic counselor on 2024 with [facility] with results pending.   Colonoscopy done by Dr Rehman (2023):  Findings:       A 30 mm polyp was found in the cecum. The polyp was semi-sessile,        Yohana classification Is (protruding, sessile) and lateral spreading.        Preparations were made for mucosal resection. Demarcation of the        lesion was performed with narrow band imaging to clearly identify the        boundaries of the lesion. Eleview was injected to raise the lesion.        Cap and snare mucosal resection was performed. Resection and        retrieval were complete. Resected tissue margins were examined and        clear of polyp tissue. To prevent bleeding after mucosal resection,        five hemostatic clips were successfully placed (MR conditional). Clip        : [employer]. There was no bleeding during, or at        the end, of the procedure.        A 10 mm polyp was found in the cecum. The polyp was sessile. The        polyp was removed with a cold snare. Resection and retrieval were        complete.        Nine sessile polyps were found in the ascending colon. The polyps        were 5 to 15 mm in size. These polyps were removed with a cold snare.        Resection and retrieval were complete. Estimated blood loss: none.        A 35 mm polyp was found in the proximal ascending  colon. The polyp        was multi-lobulated, semi-pedunculated (with a very broad base) and        Yohana classification Is (protruding, sessile). Preparations were made        for mucosal resection. Demarcation of the lesion was performed with        narrow band imaging to clearly identify the boundaries of the lesion.        20 mL of Eleview was injected with adequate lift of the lesion from        the muscularis propria. Cap and snare mucosal resection with Moran net        retrieval was performed. A 40 mm area was resected. Resection and        retrieval were complete. Resected tissue margins were examined and        clear of polyp tissue. There was no bleeding during and at the end of        the procedure. To prevent bleeding after mucosal resection, two        hemostatic clips were successfully placed (MR conditional). Clip        : [employer]. There was no bleeding during, or at        the end, of the procedure.        A 10 mm polyp was found in the transverse colon. The polyp was        sessile. The polyp was removed with a cold snare. Resection and        retrieval were complete.        Diverticula were found in the sigmoid colon.        A large amount of liquid stool was found in the entire colon, making        visualization difficult. Lavage of the area was performed using        copious amounts of sterile water, resulting in clearance with fair        visualization.        There was a large lipoma, at the hepatic flexure.   Impression:            - One 30 mm polyp in the cecum, removed with                          Eleview lift and piecemeal hot cap-assisted mucosal                          resection. Resected and retrieved. Clips (MR                          conditional) were placed. Clip : [employer].                          - One 10 mm polyp in the cecum, removed with a cold                          snare. Resected and retrieved.                           - Nine 5 to 15 mm polyps in the ascending colon,                          removed with a cold snare. Resected and retrieved.                          - One 35 mm polyp in the proximal ascending colon,                          removed with Eleview lift and piecemeal hot,                          cap-assisted mucosal resection. Resected and                          retrieved. Clips (MR conditional) were placed. Clip                          : [employer].                          - One 10 mm polyp in the transverse colon, removed                          with a cold snare. Resected and retrieved.                          - A large lipoma was seen at the hepatic flexure                          - Diverticulosis in the sigmoid colon.                          - Stool in the entire examined colon.                          - Mucosal resection was performed. Resection and                          retrieval were complete.   FINAL DIAGNOSIS     A.  Colon, cecum, polypectomy:  Tubular adenoma     B.  Colon, cecum, ascending, polypectomies:  Tubulovillous adenoma and tubular adenoma fragments     C.  Colon, ascending, transverse, polypectomies:  Tubulovillous adenoma  Tubular adenoma fragments          Colonoscopy by Dr. Rehman (6/10/24):  Impression:   - Post-polypectomy scar in the cecum. Biopsied.  - Three 5 to 8 mm polyps in the ascending colon,   removed with a cold snare. Resected and retrieved.  - A tattoo was seen in the transverse colon. A   post-polypectomy scar was found at the tattoo site.   There was no evidence of residual polyp tissue.  - 18 4 to 12 mm polyps in the transverse colon,   removed with a cold snare. Resected and retrieved.  - One 12 mm polyp in the transverse colon, removed   with saline lift and en bloc hot snare mucosal   resection. Resected and retrieved. Clip (MR   conditional) was placed. Clip : [employer]. Tattooed.  - One 15 mm polyp in the  descending colon, removed   with underwater EMR technique with en bloc hot   snare mucosal resection. Resected and retrieved.   Clips (MR conditional) were placed. Clip   : [employer]. Tattooed.  - Two 7 to 8 mm polyps in the descending colon,   removed with a cold snare. Resected and retrieved.  - One 5 mm polyp in the sigmoid colon, removed with   a cold snare. Resected and retrieved.  - Diverticulosis in the sigmoid colon, in the   descending colon and in the ascending colon.  - Non-bleeding internal hemorrhoids.  Recommendation: - Discharge patient to home.  - Refer to a genetics counselor at appointment to   be scheduled.  - Repeat colonoscopy in 6 months with extended   bowel preparation for surveillance of multiple   polyps. This can be performed sooner (but no sooner   than 3 months) if needed to expedite the renal   transplant process.  - Return to referring physician.  - MRI testing above 3 gerry should not be performed   without physician approval due to recent endoclip   placement.  - Patient has a contact number available for   emergencies. The signs and symptoms of potential   delayed complications (bleeding, clip migration,   perforation requiring emergent surgery or repeat   endoscopy, post polypectomy syndrome) were   discussed with the patient. Return to normal   activities tomorrow. Written discharge instructions   were provided to the patient.  - No aspirin, ibuprofen, naproxen, or other      PATHOLOGY:  A. Colon, cecum, scar, biopsy:  No diagnostic abnormality  B. Colon, ascending, transverse, descending, sigmoid, polypectomy:  Tubular adenoma fragments  C. Colon, transverse, polypectomy:  Tubulovillous adenoma  D. Colon, descending, polypectomy:  Tubular adenoma    CT AP (4/11/2024)   IMPRESSION:   1.Moderate atherosclerotic calcification of the abdominal aorta; mild  atherosclerotic calcification of bilateral common iliac arteries; no  significant atherosclerotic  calcification of bilateral internal iliac  arteries and bilateral external iliac arteries.  2. Postsurgical changes of right-sided nephrectomy. No recurrent mass.  Lobulated appearance of the left kidney with indeterminate  lobulation/density along the posterior lower pole, uncertain if it  represents parenchymal lobulation versus cyst with  hemorrhagic/proteinaceous content or focal lesion. Recommend renal  ultrasound for further evaluation.  3. Intraluminal oval-shaped hyperdensity in the distal ileum and right  lower quadrant possibly ingested  hyperdensity/foreign body, no associated transition to suggest  high-grade obstruction, mild fecalization of the adjacent small bowel  contents may suggest slow transit versus less likely developing   partial obstruction, if clinical concern repeat imaging  evaluation  may be considered to evaluate for passage.  4. Colonic fat attenuations possibly lipomas.  5.  Trace left pleural effusion. Additional findings as above   US abdomen (6/6/24)  IMPRESSION:  1.  Multiple tiny foci along the gallbladder wall likely represent polyps. Recommend follow-up ultrasound in 12 months.  2.  Hepatic steatosis.   MR abdomen (5/25/24)   IMPRESSION:  1.  Several left renal cysts including a hemorrhagic/ proteinaceous  cyst in the left lower pole which likely corresponds to the findings  on recent ultrasound. No suspicious renal masses. Status post right  nephrectomy.   2.  Several enhancing foci in the gallbladder fundus may represent  polyps. Consider ultrasound for further evaluation.   3.  Small left and trace right pleural effusions.   4.  Hepatic steatosis.     Medical history:  Past Medical History:   Diagnosis Date    ABDULLAHI positive     Anemia in chronic kidney disease(285.21)     Chronic lower back pain     CKD (chronic kidney disease) stage 4, GFR 15-29 ml/min (H)     GERD (gastroesophageal reflux disease)     Hemorrhoids     Hypertension, renal     Hypomagnesemia     Irritable bowel  syndrome     Obesity 1973    s/p gastric bypass    Raynaud's syndrome     Renal cell cancer (H) 2001    s/p right native nephrectomy    Secondary hyperparathyroidism (of renal origin)     Small bowel obstruction (H)     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    Vasculitis of skin     Vitamin B12 deficiency     Vitamin D deficiency        Surgical history:  Past Surgical History:   Procedure Laterality Date    APPENDECTOMY      s/p exploratory laproscopic surgery      s/p eye surgery      s/p gastric bypass      s/p right native nephrectomy      RCC    TONSILLECTOMY         Family history:  Family History   Problem Relation Age of Onset    C.A.D. Mother     Hypertension Mother     Diabetes Father     Cancer Father         Lung CA       Medications:  Current Outpatient Medications   Medication Sig Dispense Refill    calcitRIOL (ROCALTROL) 0.25 MCG capsule Take 0.25 mcg by mouth daily      cholecalciferol 125 MCG (5000 UT) CAPS 5,000 Units      cyanocobalamin 1000 MCG/ML injection Inject 1 mL as directed every 30 days      darbepoetin braulio-polysorbate (ARANESP, ALBUMIN FREE,) 60 MCG/ML injection Inject 1 mL Subcutaneous every 28 days      diphenhydrAMINE-APAP, sleep, (TYLENOL PM EXTRA STRENGTH)  MG/30ML LIQD Take 1 capful by mouth nightly as needed      Furosemide (LASIX) 20 MG tablet Take 40 mg by mouth daily      hydrALAZINE (APRESOLINE) 25 MG tablet Take 100 mg by mouth 3 times daily      magnesium oxide (MAG-) 400 MG tablet Take 1 tablet by mouth daily      metoprolol (TOPROL-XL) 100 MG 24 hr tablet Take 50 mg by mouth daily      Multiple Vitamins-Minerals (PRESERVISION AREDS 2 PO) Take 1 capsule by mouth      NIFEdipine osmotic (PROCARDIA XL) 90 MG 24 hr tablet Take 90 mg by mouth daily      omeprazole (PRILOSEC) 20 MG capsule Take 1 capsule by mouth daily      oxyBUTYnin ER (DITROPAN XL) 5 MG 24 hr tablet Take 5 mg by mouth daily      sodium bicarbonate 650 MG tablet TAKE ONE TABLET BY MOUTH  "TWICE DAILY      traZODone (DESYREL) 50 MG tablet 100 mg at bedtime      cloNIDine (CATAPRES) 0.1 MG tablet Take 0.1 mg by mouth 3 times daily (Patient not taking: Reported on 4/11/2024)      triamcinolone (KENALOG) 0.1 % external ointment  (Patient not taking: Reported on 4/11/2024)         Allergies:  Allergies   Allergen Reactions    Dapsone     Fluoxetine        Social history:   Social History     Tobacco Use    Smoking status: Never    Smokeless tobacco: Never   Substance Use Topics    Alcohol use: No     Marital status: .    Physical Examination:  /64 (BP Location: Left arm, Patient Position: Sitting, Cuff Size: Adult Regular)   Pulse 66   Ht 1.638 m (5' 4.5\")   Wt 59.6 kg (131 lb 6.4 oz)   SpO2 98%   BMI 22.21 kg/m    General: Well hydrated. No acute distress.    Investigations:  None.    Procedures:  None.    ASSESSMENT  79-year-old female with colon polyposis with tubular and tubulovillous adenomas status post endoscopic excision.  Pathology report does not specify if presence of high-grade dysplasia.  Repeat colonoscopy this month showed 1 additional transverse colon adenoma but otherwise normal.  Her genetic testing is pending.  This is unlikely to be FAP. Risks, benefits, and alternatives of operative treatment were thoroughly discussed with the patient, he/she understands these well and agrees to proceed.    PLAN  Would recommend pathology review to rule out high-grade dysplasia as well as finalize genetic testing before proceeding with transplant.  Continue close endoscopic surveillance per GI.  No indication for surgery at this time.    60 minutes spent on the date of encounter performing chart review, history and exam, documentation.     Og Rock MD, MSc, FACS, FASCRS.    Chief, Division of Colon & Rectal Surgery  Stanley M. Goldberg, MD Endowed Chair, Colon & Rectal Surgery  Department of Surgery  [facility]      Referring " Provider:  YOLETTE Cochran CNP  [address] [place] [address]     Primary Care Provider:  Khurram Rm    CC:  YOLETTE Cochran CNP

## 2024-08-22 ENCOUNTER — TELEPHONE (OUTPATIENT)
Dept: TRANSPLANT | Facility: CLINIC | Age: 79
End: 2024-08-22
Payer: COMMERCIAL

## 2024-08-22 NOTE — TELEPHONE ENCOUNTER
Received call from pt. Checking in on next steps, she has had colorectal visit. Genetics sent her a letter stating no issues and oxalate recheck completed. Will review chart and present at committee next week. Will notify pt if anything further is needed. Pt verbalized understanding of information and has no further questions. Encouraged to reach out if questions arise.     Called genetic clinic to get results from testing completed. Transferred to genetic assistant. Left  with direct line for return call.     Called primary neph office to touch base on oxlate level. Oxalate repeat was done 7/31/24. They will fax results. Oxalate level was <2.0.     Received call back from Genetic clinic. They will fax results to us. Provided clinic fax.

## 2024-08-27 NOTE — TELEPHONE ENCOUNTER
Called genetics clinic. Have not received report, requesting report be faxed to our clinic provided fax. Left VM with direct line for return call.     8/27/24 1400 addendum: Received call back from [facility]. They faxed report on Thursday last week. They will send again. Direct line for call back is [phone]    8/28/24 1022 addendum: returned call to genetics. Spoke with Cindi. Discussed have not received report. Provided alternative fax and requested attention to writer.

## 2024-08-28 ENCOUNTER — COMMITTEE REVIEW (OUTPATIENT)
Dept: TRANSPLANT | Facility: CLINIC | Age: 79
End: 2024-08-28
Payer: COMMERCIAL

## 2024-08-28 ENCOUNTER — TELEPHONE (OUTPATIENT)
Dept: TRANSPLANT | Facility: CLINIC | Age: 79
End: 2024-08-28
Payer: COMMERCIAL

## 2024-08-28 NOTE — TELEPHONE ENCOUNTER
Called patient to inform them of status change to ACTIVE on the Kidney alone list today 24. Status change letter and PRA orders to be mailed. Patient is in agreement with listing ACTIVE status.      Discussed:   - Pt is eligible for  donor offers and pt can receive calls 24 hours a day, 7 days a week, and on call staff need to be able to reach pt or one of emergency contacts within 30 minutes or they might skip over to next recipient on list.   -Please make sure your phone is charged at all times and your voicemail is not full   -Your transplant on call coordinator will give you directions about when and where you will need to come to the hospital for transplant  -The transplant coordinator will call you to discuss your current health status, the offer, and plans to move forward.  Some organ offer calls will require you to come to the hospital immediately; some may not be as time sensitive.  It may be possible for you to come to the hospital and, for various reasons, the transplant could be cancelled.  This is often called a  dry run .  - Reviewed the right to accept or decline offer, without penalty  - Expected length of surgical procedure is about 4-6 hours, expected inpatient length of stay post transplant is 3-4 days, and potential to stay locally post transplant. Offered resources for lodging in the area  - Verified contact information as documented in Epic. Confirmed emergency contact information  -Instructed patient about importance of having PRAs drawn every 3 months via: (Mail[facility]). Encouraged them to set reminder in their preferred calendar to stay on top of their quarterly labs  -Reminded pt to stay up on health maintenance and to call with any updates on their health status, insurance or contact information.   -Reminded pt to inform RNCC as soon as possible if they test positive for COVID.  -Donor website was provided     -Last PRA was 24     -Provided name and contact information  for additional questions or concerns.  Pt expressed excellent understanding of all and was in good agreement with the plan.

## 2024-08-28 NOTE — COMMITTEE REVIEW
Kidney/Pancreas Committee Review Note     Evaluation Date: 10/28/2013  Committee Review Date: 8/28/2024    Organ being evaluated for: Kidney    Transplant Phase: Waitlist  Transplant Status: Inactive    Transplant Coordinator: Temi Caraballo  Transplant Surgeon:        Referring Physician: Hal Neri    Primary Diagnosis: Hypertensive Nephrosclerosis  Secondary Diagnosis:     Committee Review Members:  Nephrology Janna Scott, NP, Yoel Amanda, APRN CNP, Andres Higginbotham MD   Nutrition Kaur Diaz, MARY   Pharmacist Rosa Ball, [facility]    - Clinical Haylie Nicholas, MSW, Jb Oakes, MSW, Page Gallegos, MSW   Transplant SAE SALEEM, RN, Romy Acevedo, RN, Hannah Guan, RN, Chiquita Mazariegos, RN, Nery Carrasco, RN, Temi Caraballo, RN, Veronica Jo, RN, Rocio To, RN, Anna Carlton, DOROTHY, Awa Granados, RN   Transplant Surgery Ciera Fregoso MD       Transplant Eligibility: Irreversible chronic kidney disease treated w/dialysis or expected need for dialysis    Committee Review Decision: Make Active    Relative Contraindications:     Absolute Contraindications:      Committee Chair Ciera Fregoso MD verbally attested to the committee's decision.    Committee Discussion Details:     80yo with CKD from likely from HTN, NSAID use, prior triamterene use, reduced nephron mass, potentially secondary FSGS, solitary kidney (s/p unilateral nephrectomy for RCC) not yet on HD most recent GFR 8 8/2024 (CE)       Last discussed at committee on 6/12/24. at that time she needed colorectal and genetics consult completed due to large number of polyps. Also needed oxalate level improved.      Oxalate recheck with primary neph from 7/31/24 was <2.      Genetics: completed through [facility]. Waiting on official report but no genetic cause for polyps per pt and genetic clinic.      Colorectal: Seen by Dr. Rock on 7/1/24. unlikely to be FAP. No indication for surgery. Should  continue to follow with endoscopic surveillance per GI.      Pathology from 6/2024 colonoscopy was tubular adenoma and tubulovillous adenoma      Dialysis access issues: unable to place PD cath due to dense adhesions to anterior abdominal wall (unable to get insufflation) 8/5/24 (CE). She was seen by outside vascular surgery as well and does not appear that she has an option for fistula in either upper or lower arm. (8/21/24 CE)      Urology: seen after discharge from PD cath attempt due to retention. Felt it was related to anesthesia constipation and narcotics. Catheter removed and normal voiding trial. 8/15/24 (CE)     Committee determined that pt is acceptable for active status on the waitlist. Will verify insurance and update listing once obtained.

## 2024-08-29 ENCOUNTER — DOCUMENTATION ONLY (OUTPATIENT)
Dept: TRANSPLANT | Facility: CLINIC | Age: 79
End: 2024-08-29
Payer: COMMERCIAL

## 2024-08-29 ENCOUNTER — TELEPHONE (OUTPATIENT)
Dept: TRANSPLANT | Facility: CLINIC | Age: 79
End: 2024-08-29
Payer: COMMERCIAL

## 2024-08-29 DIAGNOSIS — N18.6 ESRD (END STAGE RENAL DISEASE) (H): Primary | ICD-10-CM

## 2024-08-29 DIAGNOSIS — Z76.82 ORGAN TRANSPLANT CANDIDATE: ICD-10-CM

## 2024-08-29 NOTE — TELEPHONE ENCOUNTER
Hepatitis C Donor Acceptance      -Called pt in regards to the possibility of accepting a  or living donor kidney that has known Hepatitis C infection (past or current).  Informed pt due to shortage of organs, the transplant team is always looking for safe alternatives to increase chances of transplant. One means of increasing chance of transplant is to accept an organ that may have been exposed to hepatitis C.  Even if the organ accepted has preliminary evidence of hepatitis C infection, there is a possibility that pt may not contract hepatitis C from it.  If hepatitis C is contracted, it is treatable.     -Reviewed that Hepatitis C (HCV) is an infection caused by the Hepatitis C virus. Hepatitis C may be spread from one person to another through contact with the blood of an infected person.  This virus can attack the liver and cause injury. If not treated, this virus can lead to liver injury.     -Reviewed that all  donor offer organs are checked for hepatitis C infection. A small number of organs test clearly positive for active hepatitis C infection. A few organs have testing that can indicate the donor may have been infected with hepatitis C but it is not clear whether the organ is currently infected with the virus or may have been infected in the past.     -If the donor has been exposed to hepatitis C but DOES NOT have active virus in the bloodstream, it is very unlikely that pt will develop hepatitis C infection. If the donor DOES have active virus in the bloodstream, there is a very high likelihood (almost 100%) that hep C will develop. Regardless, pt will be monitored closely by your post transplant team for any signs of Hepatitis C infection and treated appropriately.     -Reviewed there are several medications available to treat hepatitis C, and the treatments are more effective and have fewer side effects than previous treatment options. These medications are taken by mouth daily for 4 to  24 weeks.  When taken as prescribed, these medications have cure rates of 90% to 100% based on currently available data from large studies.  They are safe to take after transplant.     -Reviewed that if pt chooses not to accept a hepatitis C exposed donor, it will not affect their status on the transplant list.  Donor organs that are hepatitis C-exposed will not be offered to you. We will continue to consider non-hepatitis C -exposed donor offers for you. Pt care will not be negatively affected in any way.      -Answered patient questions regarding Hep C donor acceptance     -Pt declines to be listed for Hep C positive donors  and Pt would like to review consent and pt education and make decision at a later time.  Will send consent and pt education via [employer]. Pt is aware that consent will be required prior to listing change. Once signed consent is uploaded will update chart and UNOS appropriately   Pt does not have an email for docusign and would like consent mailed. Requested.

## 2024-08-31 DIAGNOSIS — Z76.82 AWAITING ORGAN TRANSPLANT: Primary | ICD-10-CM

## 2024-09-01 ENCOUNTER — ORGAN (OUTPATIENT)
Dept: TRANSPLANT | Facility: CLINIC | Age: 79
End: 2024-09-01
Payer: COMMERCIAL

## 2024-09-01 DIAGNOSIS — Z76.82 AWAITING ORGAN TRANSPLANT: Primary | ICD-10-CM

## 2024-09-01 PROCEDURE — 36415 COLL VENOUS BLD VENIPUNCTURE: CPT

## 2024-09-01 PROCEDURE — 86833 HLA CLASS II HIGH DEFIN QUAL: CPT | Performed by: SURGERY

## 2024-09-01 PROCEDURE — 86825 HLA X-MATH NON-CYTOTOXIC: CPT | Performed by: SURGERY

## 2024-09-01 PROCEDURE — 86832 HLA CLASS I HIGH DEFIN QUAL: CPT | Performed by: SURGERY

## 2024-09-01 NOTE — TELEPHONE ENCOUNTER
There are three types of donors - living donors, brain dead donors, and DCD donors.  Living donation would be like if your family member donated an organ to you.  Brain death donors are  donors who have no brain stem reflexes, who have been declared dead, and who's heart is beating only because they are attached to a ventilator.  And then there is your type of offer - DCD donation.    DCD stands for donation after circulatory death.  It is when the potential donor had a brain injury that is not sustainable with meaningful life, but has not deteriorated to the point they are able to be declared brain dead. In these cases, the family is choosing to withdraw the ventilator and any life supporting treatments to allow the donors heart to stop naturally.  There is a chance that the donor may not pass in a time frame suitable for donation and transplant, but without moving forward, we have no way of knowing for sure.    The important part for you to know about this is kidneys from DCD donors have the same long term outcomes as kidneys from brain dead donors.  Sometimes they take longer to come to full power after transplant. This is called 'delayed graft function' and may result in the need for some dialysis treatments for the first few days or weeks.  We do our best to try to avoid situations where long term DGF is expected but as with every transplant, living donors and brain dead donors included, there is always a risk of DGF    Gabriela Beal RN

## 2024-09-02 ENCOUNTER — HOSPITAL ENCOUNTER (INPATIENT)
Facility: CLINIC | Age: 79
LOS: 3 days | Discharge: HOME | DRG: 652 | End: 2024-09-06
Attending: SURGERY | Admitting: SURGERY
Payer: COMMERCIAL

## 2024-09-02 ENCOUNTER — ANESTHESIA EVENT (OUTPATIENT)
Dept: SURGERY | Facility: CLINIC | Age: 79
DRG: 652 | End: 2024-09-02
Payer: COMMERCIAL

## 2024-09-02 ENCOUNTER — APPOINTMENT (OUTPATIENT)
Dept: LAB | Facility: CLINIC | Age: 79
DRG: 652 | End: 2024-09-02
Attending: SURGERY
Payer: COMMERCIAL

## 2024-09-02 ENCOUNTER — ORGAN (OUTPATIENT)
Dept: TRANSPLANT | Facility: CLINIC | Age: 79
End: 2024-09-02

## 2024-09-02 ENCOUNTER — APPOINTMENT (OUTPATIENT)
Dept: GENERAL RADIOLOGY | Facility: CLINIC | Age: 79
DRG: 652 | End: 2024-09-02
Payer: COMMERCIAL

## 2024-09-02 DIAGNOSIS — Z76.82 AWAITING ORGAN TRANSPLANT: Primary | ICD-10-CM

## 2024-09-02 DIAGNOSIS — D84.9 IMMUNOSUPPRESSION (H): Primary | ICD-10-CM

## 2024-09-02 DIAGNOSIS — Z94.0 STATUS POST KIDNEY TRANSPLANT: ICD-10-CM

## 2024-09-02 DIAGNOSIS — Z76.82 KIDNEY TRANSPLANT CANDIDATE: ICD-10-CM

## 2024-09-02 DIAGNOSIS — I12.9 HYPERTENSION, RENAL: ICD-10-CM

## 2024-09-02 LAB
ABO/RH(D): NORMAL
ALBUMIN SERPL BCG-MCNC: 4 G/DL (ref 3.5–5.2)
ALP SERPL-CCNC: 50 U/L (ref 40–150)
ALT SERPL W P-5'-P-CCNC: <5 U/L (ref 0–50)
ANION GAP SERPL CALCULATED.3IONS-SCNC: 20 MMOL/L (ref 7–15)
ANTIBODY SCREEN: NEGATIVE
APTT PPP: 28 SECONDS (ref 22–38)
AST SERPL W P-5'-P-CCNC: 13 U/L (ref 0–45)
BASOPHILS # BLD AUTO: 0 10E3/UL (ref 0–0.2)
BASOPHILS NFR BLD AUTO: 0 %
BILIRUB SERPL-MCNC: 0.3 MG/DL
BUN SERPL-MCNC: 84.5 MG/DL (ref 8–23)
CALCIUM SERPL-MCNC: 7.9 MG/DL (ref 8.8–10.4)
CHLORIDE SERPL-SCNC: 109 MMOL/L (ref 98–107)
CHOLEST SERPL-MCNC: 134 MG/DL
CREAT SERPL-MCNC: 5.18 MG/DL (ref 0.51–0.95)
EGFRCR SERPLBLD CKD-EPI 2021: 8 ML/MIN/1.73M2
EOSINOPHIL # BLD AUTO: 0.2 10E3/UL (ref 0–0.7)
EOSINOPHIL NFR BLD AUTO: 3 %
ERYTHROCYTE [DISTWIDTH] IN BLOOD BY AUTOMATED COUNT: 15 % (ref 10–15)
FASTING STATUS PATIENT QL REPORTED: NO
FASTING STATUS PATIENT QL REPORTED: NO
GLUCOSE SERPL-MCNC: 91 MG/DL (ref 70–99)
HBA1C MFR BLD: 4.4 %
HBV CORE AB SERPL QL IA: NONREACTIVE
HBV SURFACE AB SERPL IA-ACNC: 62.7 M[IU]/ML
HBV SURFACE AB SERPL IA-ACNC: REACTIVE M[IU]/ML
HBV SURFACE AG SERPL QL IA: NONREACTIVE
HCO3 SERPL-SCNC: 14 MMOL/L (ref 22–29)
HCT VFR BLD AUTO: 32.7 % (ref 35–47)
HCV AB SERPL QL IA: NONREACTIVE
HDLC SERPL-MCNC: 81 MG/DL
HGB BLD-MCNC: 10.2 G/DL (ref 11.7–15.7)
HIV 1+2 AB+HIV1 P24 AG SERPL QL IA: NONREACTIVE
IMM GRANULOCYTES # BLD: 0 10E3/UL
IMM GRANULOCYTES NFR BLD: 1 %
INR PPP: 0.93 (ref 0.85–1.15)
LACTATE SERPL-SCNC: 2 MMOL/L (ref 0.7–2)
LDLC SERPL CALC-MCNC: 33 MG/DL
LYMPHOCYTES # BLD AUTO: 1 10E3/UL (ref 0.8–5.3)
LYMPHOCYTES NFR BLD AUTO: 17 %
MCH RBC QN AUTO: 33.2 PG (ref 26.5–33)
MCHC RBC AUTO-ENTMCNC: 31.2 G/DL (ref 31.5–36.5)
MCV RBC AUTO: 107 FL (ref 78–100)
MONOCYTES # BLD AUTO: 0.6 10E3/UL (ref 0–1.3)
MONOCYTES NFR BLD AUTO: 9 %
NEUTROPHILS # BLD AUTO: 4.2 10E3/UL (ref 1.6–8.3)
NEUTROPHILS NFR BLD AUTO: 70 %
NONHDLC SERPL-MCNC: 53 MG/DL
NRBC # BLD AUTO: 0 10E3/UL
NRBC BLD AUTO-RTO: 0 /100
PLATELET # BLD AUTO: 233 10E3/UL (ref 150–450)
POTASSIUM SERPL-SCNC: 4.3 MMOL/L (ref 3.4–5.3)
PROT SERPL-MCNC: 6.7 G/DL (ref 6.4–8.3)
RBC # BLD AUTO: 3.07 10E6/UL (ref 3.8–5.2)
SARS-COV-2 RNA RESP QL NAA+PROBE: NEGATIVE
SODIUM SERPL-SCNC: 143 MMOL/L (ref 135–145)
SPECIMEN EXPIRATION DATE: NORMAL
TRIGL SERPL-MCNC: 101 MG/DL
WBC # BLD AUTO: 6 10E3/UL (ref 4–11)

## 2024-09-02 PROCEDURE — 83945 ASSAY OF OXALATE: CPT

## 2024-09-02 PROCEDURE — 86704 HEP B CORE ANTIBODY TOTAL: CPT

## 2024-09-02 PROCEDURE — 87635 SARS-COV-2 COVID-19 AMP PRB: CPT

## 2024-09-02 PROCEDURE — 86803 HEPATITIS C AB TEST: CPT

## 2024-09-02 PROCEDURE — 80061 LIPID PANEL: CPT

## 2024-09-02 PROCEDURE — 36415 COLL VENOUS BLD VENIPUNCTURE: CPT

## 2024-09-02 PROCEDURE — 86900 BLOOD TYPING SEROLOGIC ABO: CPT

## 2024-09-02 PROCEDURE — 86825 HLA X-MATH NON-CYTOTOXIC: CPT | Performed by: SURGERY

## 2024-09-02 PROCEDURE — 87389 HIV-1 AG W/HIV-1&-2 AB AG IA: CPT

## 2024-09-02 PROCEDURE — 83036 HEMOGLOBIN GLYCOSYLATED A1C: CPT

## 2024-09-02 PROCEDURE — 81001 URINALYSIS AUTO W/SCOPE: CPT

## 2024-09-02 PROCEDURE — 86901 BLOOD TYPING SEROLOGIC RH(D): CPT

## 2024-09-02 PROCEDURE — 86665 EPSTEIN-BARR CAPSID VCA: CPT

## 2024-09-02 PROCEDURE — 85730 THROMBOPLASTIN TIME PARTIAL: CPT

## 2024-09-02 PROCEDURE — 83605 ASSAY OF LACTIC ACID: CPT | Performed by: SURGERY

## 2024-09-02 PROCEDURE — 85025 COMPLETE CBC W/AUTO DIFF WBC: CPT

## 2024-09-02 PROCEDURE — 86706 HEP B SURFACE ANTIBODY: CPT

## 2024-09-02 PROCEDURE — 87340 HEPATITIS B SURFACE AG IA: CPT

## 2024-09-02 PROCEDURE — 999N000128 HC STATISTIC PERIPHERAL IV START W/O US GUIDANCE

## 2024-09-02 PROCEDURE — 86644 CMV ANTIBODY: CPT

## 2024-09-02 PROCEDURE — 80053 COMPREHEN METABOLIC PANEL: CPT

## 2024-09-02 PROCEDURE — 86790 VIRUS ANTIBODY NOS: CPT

## 2024-09-02 PROCEDURE — 87521 HEPATITIS C PROBE&RVRS TRNSC: CPT

## 2024-09-02 PROCEDURE — 93005 ELECTROCARDIOGRAM TRACING: CPT

## 2024-09-02 PROCEDURE — 85610 PROTHROMBIN TIME: CPT

## 2024-09-02 PROCEDURE — 84156 ASSAY OF PROTEIN URINE: CPT

## 2024-09-02 PROCEDURE — 71046 X-RAY EXAM CHEST 2 VIEWS: CPT

## 2024-09-02 PROCEDURE — 71046 X-RAY EXAM CHEST 2 VIEWS: CPT | Mod: 26 | Performed by: RADIOLOGY

## 2024-09-02 RX ORDER — ACETAMINOPHEN 650 MG/1
650 SUPPOSITORY RECTAL ONCE
Status: COMPLETED | OUTPATIENT
Start: 2024-09-02 | End: 2024-09-02

## 2024-09-02 RX ORDER — ACETAMINOPHEN 325 MG/1
975 TABLET ORAL ONCE
Status: COMPLETED | OUTPATIENT
Start: 2024-09-02 | End: 2024-09-02

## 2024-09-02 RX ORDER — LIDOCAINE 40 MG/G
CREAM TOPICAL
Status: DISCONTINUED | OUTPATIENT
Start: 2024-09-02 | End: 2024-09-03 | Stop reason: HOSPADM

## 2024-09-02 RX ORDER — CEFUROXIME SODIUM 1.5 G/16ML
1.5 INJECTION, POWDER, FOR SOLUTION INTRAVENOUS SEE ADMIN INSTRUCTIONS
Status: DISCONTINUED | OUTPATIENT
Start: 2024-09-02 | End: 2024-09-03 | Stop reason: HOSPADM

## 2024-09-02 RX ORDER — CEFUROXIME SODIUM 1.5 G/16ML
1.5 INJECTION, POWDER, FOR SOLUTION INTRAVENOUS ONCE
Status: DISCONTINUED | OUTPATIENT
Start: 2024-09-02 | End: 2024-09-03 | Stop reason: HOSPADM

## 2024-09-02 ASSESSMENT — COPD QUESTIONNAIRES: COPD: 0

## 2024-09-02 ASSESSMENT — ENCOUNTER SYMPTOMS
DYSRHYTHMIAS: 0
SEIZURES: 0

## 2024-09-02 ASSESSMENT — COLUMBIA-SUICIDE SEVERITY RATING SCALE - C-SSRS
6. HAVE YOU EVER DONE ANYTHING, STARTED TO DO ANYTHING, OR PREPARED TO DO ANYTHING TO END YOUR LIFE?: NO
2. HAVE YOU ACTUALLY HAD ANY THOUGHTS OF KILLING YOURSELF IN THE PAST MONTH?: NO
6. HAVE YOU EVER DONE ANYTHING, STARTED TO DO ANYTHING, OR PREPARED TO DO ANYTHING TO END YOUR LIFE?: NO
1. IN THE PAST MONTH, HAVE YOU WISHED YOU WERE DEAD OR WISHED YOU COULD GO TO SLEEP AND NOT WAKE UP?: NO
2. HAVE YOU ACTUALLY HAD ANY THOUGHTS OF KILLING YOURSELF SINCE LAST CONTACT?: NO

## 2024-09-02 ASSESSMENT — ACTIVITIES OF DAILY LIVING (ADL)
ADLS_ACUITY_SCORE: 22
ADLS_ACUITY_SCORE: 35
ADLS_ACUITY_SCORE: 35

## 2024-09-02 ASSESSMENT — LIFESTYLE VARIABLES: TOBACCO_USE: 0

## 2024-09-02 NOTE — TELEPHONE ENCOUNTER
TRANSPLANT OR REPORT    Organ: Kidney  Laterality (if known): pend.  Organ Location: Local    UN ID: [id number]   Donor OR Time: 2100  Expected/Actual Cross Clamp Time: 2300  Expected Organ Arrival Time: 0400    Surgeon: Ildefonso  Time in OR: 0600  Time in 3C (N/A for LI): 0500    Recipient Details  Admission ETA: 2000  Unit: 7A  Isolation: no  Latex Allergy: no  : no  Diagnosis: no    Kidney/Panc Transplants  XM Status (Need to wait for XM?): yes      Transplant Coordinator Contact Info: Ekaterina       Vessel Bank Information  Transplant hospitals must not store a donor s extra vessels if the donor has tested positive for any of the following:   - HIV by antibody, antigen, or nucleic acid test (VERÓNICA)   - Hepatitis B surface antigen (HBsAg)   - Hepatitis B (HBV) by VERÓNICA   - Hepatitis C (HCV) by antibody or VERÓNICA     Extra vessels from donors that do not test positive for HIV, HBV, or HCV as above may be stored

## 2024-09-02 NOTE — TELEPHONE ENCOUNTER
Organ Offer Encounter Information    Organ Offer Information  Organ offer date & time: 2024  9:42 AM  Coordinator/Fellow/Attending name: Gabriela Beal RN   Organ(s):  Organ UNOS ID Match Run ID Comment Organ Laterality   Kidney [id number] MNOP primary         Recent infections?: No      New medications?: No Recent pregnancy?: No     Angicoagulation medications?: No Recent vaccinations?: No     Recent blood transfusions?: No Recent hospitalizations?: No   Has your insurance changed in the last 6-12 months?: Neg    Discussed organ offer with: Patient  Patient/Caregiver name: Lexy  Discussed risk category with Patient/Other: DCD  Patient/Other asked to speak to a surgeon?: No  Discussed program-specific outcomes: Asked questions regarding SRTR, verbalized understanding  Right to decline organ offer without penalty, Patient/Other: Aware of option to decline without penalty  Organ offer decision status Patient/Other: Accepted Offer  Organ disposition: Case Cancelled - Other (specify) (Comment: DCD donor; donor did no )  Additional Comments: 2024 9:44 AM  Kidney: local DCD kidney offer  Called patient to discuss primary kidney offer. DCD offer reviewed along with risk for DGF. Patient agreeable to move forward. Patient able to come [facility] for lab draw for HLA FXM today at 1330. Plan to update patient with NPO time once donor OR is set, plan to keep at home until donor expires.   MD: Ildefonso  OPO Contact: [facility]  VXM Results: no recent samples. Remote DSA, low MFI. Likely compatible  XM Plan (FXM must be done with serum no older than 10 days from transplant): outpatient FXM prior to OR   Donor/Recip HCV Status: neg/neg  (HCV+ Donors - Discuss HCV genotyping/quant testing with MD & send Epic in basket message to [facility] HCV POOL - Include Donor UNOS ID.  Follow HCVGENOTYPING Smart Phrase)  Is this an ABO A2 donor to ABO B Recipient: no  (In the event of A2 to B transplant, Anti-A titers need  to be collected at the time of the crossmatch or admission - whichever is first.  Use Novonicse .A2toB for instructions.)  Plan (Admission, NPO, Donor OR): admit pending donor arresting. NPO pending donor OR time.   - - -   COVID Screening  In the past month, have you:  Or anyone close to you had a positive COVID test or suspected to have COVID: no   Had any COVID symptoms (Fever, Cough, Short of Breath, Loss of Taste/Smell, Rash): no    11:20 AM  HLA FXM ordered. Immunology Cristel notified. Acute care lab notified of ETA for lab draw for HLA FXM.   Gabriela Beal RN    9/1/2024 7:27 PM:   Called Lexy, introduced myself as the coordinator taking over. Provided my contact information if she were to have any questions. Informed her we are still waiting for her FXM results.   Veronica Fernandes RN  Transplant Coordinator     9/1/2024 11:20 PM:   FXM negative, Dr Nieto and Dr Fregoso updated. Per Dr Nieto, NPO at 10 am. Spoke to Lexy's spouse, Ashley, he is aware and will pass the information along to Lexy.   Veronica Fernandes RN  Transplant Coordinator     9/2/2024 7:45 AM:  Called Lexy and spoke to Ashley; gave them my contact information and confirmed NPO after 10:00. Let him know that I would call them between [phone] to let them know if the donor passed in a time suitable for transplant and admit her if so.   Ekaterina Chaudhari RN  Donor      Admissions: Christopher @ 0826  Unit: 7A  Update Provider Entering Orders (XM Plan & COVID Testing):  paged @ [phone]  Immunology: FXM is done and negative  [facility] ([phone]): paged @ [phone]    To Do:  Book OR:   Blood Bank:   TransNet/ABO Verification:   Add Organ:     9/2/2024 6:16 PM:  Donor did no arrest; notified rosalva Pugh, RAMYA and dr. Nieto.   Ekaterina Chaudhari RN  Donor    Attestation I have discussed all of the above with the Patient/Legal Guardian/Caregiver regarding this organ offer.:  Yes  Coordinator/Fellow/Attending name: Gabriela Beal, RN

## 2024-09-03 ENCOUNTER — LAB REQUISITION (OUTPATIENT)
Dept: LAB | Facility: CLINIC | Age: 79
End: 2024-09-03
Payer: COMMERCIAL

## 2024-09-03 ENCOUNTER — DOCUMENTATION ONLY (OUTPATIENT)
Dept: TRANSPLANT | Facility: CLINIC | Age: 79
End: 2024-09-03

## 2024-09-03 ENCOUNTER — APPOINTMENT (OUTPATIENT)
Dept: ULTRASOUND IMAGING | Facility: CLINIC | Age: 79
DRG: 652 | End: 2024-09-03
Attending: STUDENT IN AN ORGANIZED HEALTH CARE EDUCATION/TRAINING PROGRAM
Payer: COMMERCIAL

## 2024-09-03 ENCOUNTER — APPOINTMENT (OUTPATIENT)
Dept: GENERAL RADIOLOGY | Facility: CLINIC | Age: 79
DRG: 652 | End: 2024-09-03
Attending: STUDENT IN AN ORGANIZED HEALTH CARE EDUCATION/TRAINING PROGRAM
Payer: COMMERCIAL

## 2024-09-03 ENCOUNTER — ANESTHESIA (OUTPATIENT)
Dept: SURGERY | Facility: CLINIC | Age: 79
DRG: 652 | End: 2024-09-03
Payer: COMMERCIAL

## 2024-09-03 PROBLEM — Z76.82 KIDNEY TRANSPLANT CANDIDATE: Status: ACTIVE | Noted: 2024-09-03

## 2024-09-03 LAB
ALBUMIN MFR UR ELPH: 105 MG/DL
ALBUMIN UR-MCNC: 100 MG/DL
ANION GAP SERPL CALCULATED.3IONS-SCNC: 14 MMOL/L (ref 7–15)
APPEARANCE UR: ABNORMAL
ATRIAL RATE - MUSE: 72 BPM
BACTERIA #/AREA URNS HPF: ABNORMAL /HPF
BASE EXCESS BLDA CALC-SCNC: -11.1 MMOL/L (ref -3–3)
BILIRUB UR QL STRIP: NEGATIVE
BUN SERPL-MCNC: 75 MG/DL (ref 8–23)
CA-I BLD-MCNC: 4.3 MG/DL (ref 4.4–5.2)
CALCIUM SERPL-MCNC: 8.2 MG/DL (ref 8.8–10.4)
CHLORIDE SERPL-SCNC: 107 MMOL/L (ref 98–107)
CMV DNA SPEC NAA+PROBE-ACNC: NOT DETECTED IU/ML
CMV IGG SERPL IA-ACNC: <0.2 U/ML
CMV IGG SERPL IA-ACNC: NORMAL
COLOR UR AUTO: ABNORMAL
CREAT SERPL-MCNC: 4.24 MG/DL (ref 0.51–0.95)
CREAT UR-MCNC: 49.8 MG/DL
DIASTOLIC BLOOD PRESSURE - MUSE: NORMAL MMHG
EBV VCA IGG SER IA-ACNC: 632 U/ML
EBV VCA IGG SER IA-ACNC: POSITIVE
EBV VCA IGM SER IA-ACNC: <10 U/ML
EBV VCA IGM SER IA-ACNC: NORMAL
EGFRCR SERPLBLD CKD-EPI 2021: 10 ML/MIN/1.73M2
ERYTHROCYTE [DISTWIDTH] IN BLOOD BY AUTOMATED COUNT: 15 % (ref 10–15)
GLUCOSE BLD-MCNC: 117 MG/DL (ref 70–99)
GLUCOSE SERPL-MCNC: 132 MG/DL (ref 70–99)
GLUCOSE UR STRIP-MCNC: NEGATIVE MG/DL
HCO3 BLDA-SCNC: 15 MMOL/L (ref 21–28)
HCO3 SERPL-SCNC: 14 MMOL/L (ref 22–29)
HCT VFR BLD AUTO: 27.8 % (ref 35–47)
HGB BLD-MCNC: 8.1 G/DL (ref 11.7–15.7)
HGB BLD-MCNC: 8.8 G/DL (ref 11.7–15.7)
HGB BLD-MCNC: 8.8 G/DL (ref 11.7–15.7)
HGB BLD-MCNC: 9.2 G/DL (ref 11.7–15.7)
HGB UR QL STRIP: NEGATIVE
HOLD SPECIMEN: NORMAL
INTERPRETATION ECG - MUSE: NORMAL
KETONES UR STRIP-MCNC: NEGATIVE MG/DL
LACTATE BLD-SCNC: 0.9 MMOL/L (ref 0.7–2)
LEUKOCYTE ESTERASE UR QL STRIP: ABNORMAL
MAGNESIUM SERPL-MCNC: 1.5 MG/DL (ref 1.7–2.3)
MCH RBC QN AUTO: 33.7 PG (ref 26.5–33)
MCHC RBC AUTO-ENTMCNC: 31.7 G/DL (ref 31.5–36.5)
MCV RBC AUTO: 107 FL (ref 78–100)
MUCOUS THREADS #/AREA URNS LPF: PRESENT /LPF
NITRATE UR QL: NEGATIVE
O2/TOTAL GAS SETTING VFR VENT: 40 %
OXYHGB MFR BLDA: 96 % (ref 92–100)
P AXIS - MUSE: NORMAL DEGREES
PCO2 BLDA: 35 MM HG (ref 35–45)
PH BLDA: 7.25 [PH] (ref 7.35–7.45)
PH UR STRIP: 6 [PH] (ref 5–7)
PHOSPHATE SERPL-MCNC: 6 MG/DL (ref 2.5–4.5)
PLATELET # BLD AUTO: 223 10E3/UL (ref 150–450)
PO2 BLDA: 155 MM HG (ref 80–105)
POTASSIUM BLD-SCNC: 4.7 MMOL/L (ref 3.4–5.3)
POTASSIUM SERPL-SCNC: 3.5 MMOL/L (ref 3.4–5.3)
POTASSIUM SERPL-SCNC: 3.9 MMOL/L (ref 3.4–5.3)
POTASSIUM SERPL-SCNC: 4.9 MMOL/L (ref 3.4–5.3)
PR INTERVAL - MUSE: 160 MS
PROT/CREAT 24H UR: 2.11 MG/MG CR (ref 0–0.2)
QRS DURATION - MUSE: 90 MS
QT - MUSE: 436 MS
QTC - MUSE: 477 MS
R AXIS - MUSE: 41 DEGREES
RBC # BLD AUTO: 2.61 10E6/UL (ref 3.8–5.2)
RBC URINE: 1 /HPF
SAO2 % BLDA: 99 % (ref 95–96)
SODIUM BLD-SCNC: 134 MMOL/L (ref 135–145)
SODIUM SERPL-SCNC: 135 MMOL/L (ref 135–145)
SP GR UR STRIP: 1.01 (ref 1–1.03)
SPERM #/AREA URNS HPF: ABNORMAL /HPF
SQUAMOUS EPITHELIAL: 5 /HPF
SYSTOLIC BLOOD PRESSURE - MUSE: NORMAL MMHG
T AXIS - MUSE: 63 DEGREES
UROBILINOGEN UR STRIP-MCNC: NORMAL MG/DL
VENTRICULAR RATE- MUSE: 72 BPM
WBC # BLD AUTO: 10.3 10E3/UL (ref 4–11)
WBC CLUMPS #/AREA URNS HPF: PRESENT /HPF
WBC URINE: >182 /HPF

## 2024-09-03 PROCEDURE — 82947 ASSAY GLUCOSE BLOOD QUANT: CPT | Performed by: STUDENT IN AN ORGANIZED HEALTH CARE EDUCATION/TRAINING PROGRAM

## 2024-09-03 PROCEDURE — 50360 RNL ALTRNSPLJ W/O RCP NFRCT: CPT | Mod: LT | Performed by: SURGERY

## 2024-09-03 PROCEDURE — 710N000010 HC RECOVERY PHASE 1, LEVEL 2, PER MIN: Performed by: SURGERY

## 2024-09-03 PROCEDURE — 250N000025 HC SEVOFLURANE, PER MIN: Performed by: SURGERY

## 2024-09-03 PROCEDURE — 258N000003 HC RX IP 258 OP 636: Performed by: SURGERY

## 2024-09-03 PROCEDURE — 83735 ASSAY OF MAGNESIUM: CPT | Performed by: STUDENT IN AN ORGANIZED HEALTH CARE EDUCATION/TRAINING PROGRAM

## 2024-09-03 PROCEDURE — 812N000003 HC ACQUISITION KIDNEY CADAVER

## 2024-09-03 PROCEDURE — 36415 COLL VENOUS BLD VENIPUNCTURE: CPT | Performed by: STUDENT IN AN ORGANIZED HEALTH CARE EDUCATION/TRAINING PROGRAM

## 2024-09-03 PROCEDURE — 93010 ELECTROCARDIOGRAM REPORT: CPT | Performed by: INTERNAL MEDICINE

## 2024-09-03 PROCEDURE — 250N000011 HC RX IP 250 OP 636

## 2024-09-03 PROCEDURE — 999N000065 XR CHEST PORT 1 VIEW

## 2024-09-03 PROCEDURE — 250N000009 HC RX 250

## 2024-09-03 PROCEDURE — 120N000011 HC R&B TRANSPLANT UMMC

## 2024-09-03 PROCEDURE — 250N000011 HC RX IP 250 OP 636: Performed by: SURGERY

## 2024-09-03 PROCEDURE — 250N000009 HC RX 250: Performed by: PHYSICIAN ASSISTANT

## 2024-09-03 PROCEDURE — 360N000077 HC SURGERY LEVEL 4, PER MIN: Performed by: SURGERY

## 2024-09-03 PROCEDURE — 258N000003 HC RX IP 258 OP 636

## 2024-09-03 PROCEDURE — 93005 ELECTROCARDIOGRAM TRACING: CPT

## 2024-09-03 PROCEDURE — 250N000013 HC RX MED GY IP 250 OP 250 PS 637: Performed by: STUDENT IN AN ORGANIZED HEALTH CARE EDUCATION/TRAINING PROGRAM

## 2024-09-03 PROCEDURE — 76776 US EXAM K TRANSPL W/DOPPLER: CPT

## 2024-09-03 PROCEDURE — 99222 1ST HOSP IP/OBS MODERATE 55: CPT | Mod: FS | Performed by: NURSE PRACTITIONER

## 2024-09-03 PROCEDURE — 370N000017 HC ANESTHESIA TECHNICAL FEE, PER MIN: Performed by: SURGERY

## 2024-09-03 PROCEDURE — 84100 ASSAY OF PHOSPHORUS: CPT | Performed by: STUDENT IN AN ORGANIZED HEALTH CARE EDUCATION/TRAINING PROGRAM

## 2024-09-03 PROCEDURE — 84132 ASSAY OF SERUM POTASSIUM: CPT | Performed by: STUDENT IN AN ORGANIZED HEALTH CARE EDUCATION/TRAINING PROGRAM

## 2024-09-03 PROCEDURE — 258N000003 HC RX IP 258 OP 636: Performed by: STUDENT IN AN ORGANIZED HEALTH CARE EDUCATION/TRAINING PROGRAM

## 2024-09-03 PROCEDURE — 71045 X-RAY EXAM CHEST 1 VIEW: CPT | Mod: 26 | Performed by: RADIOLOGY

## 2024-09-03 PROCEDURE — 250N000013 HC RX MED GY IP 250 OP 250 PS 637

## 2024-09-03 PROCEDURE — 85018 HEMOGLOBIN: CPT | Performed by: STUDENT IN AN ORGANIZED HEALTH CARE EDUCATION/TRAINING PROGRAM

## 2024-09-03 PROCEDURE — 85027 COMPLETE CBC AUTOMATED: CPT | Performed by: STUDENT IN AN ORGANIZED HEALTH CARE EDUCATION/TRAINING PROGRAM

## 2024-09-03 PROCEDURE — 99100 ANES PT EXTEME AGE<1 YR&>70: CPT

## 2024-09-03 PROCEDURE — 250N000012 HC RX MED GY IP 250 OP 636 PS 637: Performed by: STUDENT IN AN ORGANIZED HEALTH CARE EDUCATION/TRAINING PROGRAM

## 2024-09-03 PROCEDURE — 0TY10Z0 TRANSPLANTATION OF LEFT KIDNEY, ALLOGENEIC, OPEN APPROACH: ICD-10-PCS | Performed by: SURGERY

## 2024-09-03 PROCEDURE — 258N000003 HC RX IP 258 OP 636: Performed by: PHYSICIAN ASSISTANT

## 2024-09-03 PROCEDURE — 86832 HLA CLASS I HIGH DEFIN QUAL: CPT | Performed by: TRANSPLANT SURGERY

## 2024-09-03 PROCEDURE — 86833 HLA CLASS II HIGH DEFIN QUAL: CPT | Performed by: TRANSPLANT SURGERY

## 2024-09-03 PROCEDURE — 82330 ASSAY OF CALCIUM: CPT

## 2024-09-03 PROCEDURE — 258N000002 HC RX IP 258 OP 250: Performed by: STUDENT IN AN ORGANIZED HEALTH CARE EDUCATION/TRAINING PROGRAM

## 2024-09-03 PROCEDURE — 250N000011 HC RX IP 250 OP 636: Performed by: STUDENT IN AN ORGANIZED HEALTH CARE EDUCATION/TRAINING PROGRAM

## 2024-09-03 PROCEDURE — 76776 US EXAM K TRANSPL W/DOPPLER: CPT | Mod: 26 | Performed by: RADIOLOGY

## 2024-09-03 PROCEDURE — 999N000141 HC STATISTIC PRE-PROCEDURE NURSING ASSESSMENT: Performed by: SURGERY

## 2024-09-03 PROCEDURE — 272N000001 HC OR GENERAL SUPPLY STERILE: Performed by: SURGERY

## 2024-09-03 PROCEDURE — 99100 ANES PT EXTEME AGE<1 YR&>70: CPT | Performed by: ANESTHESIOLOGY

## 2024-09-03 PROCEDURE — 250N000011 HC RX IP 250 OP 636: Mod: JZ

## 2024-09-03 RX ORDER — AMOXICILLIN 250 MG
1 CAPSULE ORAL 2 TIMES DAILY
Status: DISCONTINUED | OUTPATIENT
Start: 2024-09-03 | End: 2024-09-06 | Stop reason: HOSPADM

## 2024-09-03 RX ORDER — POLYETHYLENE GLYCOL 3350 17 G/17G
17 POWDER, FOR SOLUTION ORAL DAILY
Status: DISCONTINUED | OUTPATIENT
Start: 2024-09-04 | End: 2024-09-06 | Stop reason: HOSPADM

## 2024-09-03 RX ORDER — METOPROLOL TARTRATE 1 MG/ML
5 INJECTION, SOLUTION INTRAVENOUS EVERY 6 HOURS
Status: DISCONTINUED | OUTPATIENT
Start: 2024-09-04 | End: 2024-09-03

## 2024-09-03 RX ORDER — FENTANYL CITRATE 50 UG/ML
INJECTION, SOLUTION INTRAMUSCULAR; INTRAVENOUS PRN
Status: DISCONTINUED | OUTPATIENT
Start: 2024-09-03 | End: 2024-09-03

## 2024-09-03 RX ORDER — FENTANYL CITRATE 50 UG/ML
25 INJECTION, SOLUTION INTRAMUSCULAR; INTRAVENOUS EVERY 5 MIN PRN
Status: DISCONTINUED | OUTPATIENT
Start: 2024-09-03 | End: 2024-09-03 | Stop reason: HOSPADM

## 2024-09-03 RX ORDER — SODIUM CHLORIDE 9 MG/ML
1000 INJECTION, SOLUTION INTRAVENOUS CONTINUOUS PRN
Status: CANCELLED | OUTPATIENT
Start: 2024-09-03

## 2024-09-03 RX ORDER — VALGANCICLOVIR 450 MG/1
450 TABLET, FILM COATED ORAL
Status: DISCONTINUED | OUTPATIENT
Start: 2024-09-04 | End: 2024-09-05

## 2024-09-03 RX ORDER — MYCOPHENOLATE MOFETIL 250 MG/1
750 CAPSULE ORAL
Status: DISCONTINUED | OUTPATIENT
Start: 2024-09-03 | End: 2024-09-06 | Stop reason: HOSPADM

## 2024-09-03 RX ORDER — ATORVASTATIN CALCIUM 10 MG/1
10 TABLET, FILM COATED ORAL DAILY
Status: DISCONTINUED | OUTPATIENT
Start: 2024-09-04 | End: 2024-09-06 | Stop reason: HOSPADM

## 2024-09-03 RX ORDER — NALOXONE HYDROCHLORIDE 0.4 MG/ML
0.2 INJECTION, SOLUTION INTRAMUSCULAR; INTRAVENOUS; SUBCUTANEOUS
Status: DISCONTINUED | OUTPATIENT
Start: 2024-09-03 | End: 2024-09-06 | Stop reason: HOSPADM

## 2024-09-03 RX ORDER — ONDANSETRON 2 MG/ML
INJECTION INTRAMUSCULAR; INTRAVENOUS PRN
Status: DISCONTINUED | OUTPATIENT
Start: 2024-09-03 | End: 2024-09-03

## 2024-09-03 RX ORDER — FUROSEMIDE 10 MG/ML
INJECTION INTRAMUSCULAR; INTRAVENOUS PRN
Status: DISCONTINUED | OUTPATIENT
Start: 2024-09-03 | End: 2024-09-03

## 2024-09-03 RX ORDER — NALOXONE HYDROCHLORIDE 0.4 MG/ML
0.4 INJECTION, SOLUTION INTRAMUSCULAR; INTRAVENOUS; SUBCUTANEOUS
Status: DISCONTINUED | OUTPATIENT
Start: 2024-09-03 | End: 2024-09-06 | Stop reason: HOSPADM

## 2024-09-03 RX ORDER — FENTANYL CITRATE 50 UG/ML
50 INJECTION, SOLUTION INTRAMUSCULAR; INTRAVENOUS EVERY 5 MIN PRN
Status: DISCONTINUED | OUTPATIENT
Start: 2024-09-03 | End: 2024-09-03 | Stop reason: HOSPADM

## 2024-09-03 RX ORDER — METOPROLOL TARTRATE 1 MG/ML
5 INJECTION, SOLUTION INTRAVENOUS EVERY 6 HOURS
Status: DISCONTINUED | OUTPATIENT
Start: 2024-09-03 | End: 2024-09-04

## 2024-09-03 RX ORDER — CALCIUM CHLORIDE 100 MG/ML
INJECTION INTRAVENOUS; INTRAVENTRICULAR PRN
Status: DISCONTINUED | OUTPATIENT
Start: 2024-09-03 | End: 2024-09-03

## 2024-09-03 RX ORDER — FUROSEMIDE 40 MG
40 TABLET ORAL DAILY PRN
Status: ON HOLD | COMMUNITY
End: 2024-09-06

## 2024-09-03 RX ORDER — HYDROMORPHONE HCL IN WATER/PF 6 MG/30 ML
0.2 PATIENT CONTROLLED ANALGESIA SYRINGE INTRAVENOUS
Status: DISCONTINUED | OUTPATIENT
Start: 2024-09-03 | End: 2024-09-06 | Stop reason: HOSPADM

## 2024-09-03 RX ORDER — SODIUM CHLORIDE 450 MG/100ML
INJECTION, SOLUTION INTRAVENOUS CONTINUOUS PRN
Status: DISCONTINUED | OUTPATIENT
Start: 2024-09-03 | End: 2024-09-04

## 2024-09-03 RX ORDER — SODIUM CHLORIDE 9 MG/ML
1000 INJECTION, SOLUTION INTRAVENOUS CONTINUOUS PRN
Status: DISCONTINUED | OUTPATIENT
Start: 2024-09-03 | End: 2024-09-04

## 2024-09-03 RX ORDER — BISACODYL 10 MG
10 SUPPOSITORY, RECTAL RECTAL DAILY PRN
Status: DISCONTINUED | OUTPATIENT
Start: 2024-09-06 | End: 2024-09-06 | Stop reason: HOSPADM

## 2024-09-03 RX ORDER — SULFAMETHOXAZOLE AND TRIMETHOPRIM 400; 80 MG/1; MG/1
1 TABLET ORAL
Status: DISCONTINUED | OUTPATIENT
Start: 2024-09-04 | End: 2024-09-05

## 2024-09-03 RX ORDER — DEXAMETHASONE SODIUM PHOSPHATE 4 MG/ML
4 INJECTION, SOLUTION INTRA-ARTICULAR; INTRALESIONAL; INTRAMUSCULAR; INTRAVENOUS; SOFT TISSUE
Status: DISCONTINUED | OUTPATIENT
Start: 2024-09-03 | End: 2024-09-03 | Stop reason: HOSPADM

## 2024-09-03 RX ORDER — MANNITOL 20 G/100ML
INJECTION, SOLUTION INTRAVENOUS PRN
Status: DISCONTINUED | OUTPATIENT
Start: 2024-09-03 | End: 2024-09-03

## 2024-09-03 RX ORDER — DIPHENHYDRAMINE HCL 12.5MG/5ML
12.5 LIQUID (ML) ORAL EVERY 6 HOURS PRN
Status: DISCONTINUED | OUTPATIENT
Start: 2024-09-03 | End: 2024-09-06 | Stop reason: HOSPADM

## 2024-09-03 RX ORDER — LIDOCAINE HYDROCHLORIDE 20 MG/ML
INJECTION, SOLUTION INFILTRATION; PERINEURAL PRN
Status: DISCONTINUED | OUTPATIENT
Start: 2024-09-03 | End: 2024-09-03

## 2024-09-03 RX ORDER — NALOXONE HYDROCHLORIDE 0.4 MG/ML
0.1 INJECTION, SOLUTION INTRAMUSCULAR; INTRAVENOUS; SUBCUTANEOUS
Status: DISCONTINUED | OUTPATIENT
Start: 2024-09-03 | End: 2024-09-03 | Stop reason: HOSPADM

## 2024-09-03 RX ORDER — PANTOPRAZOLE SODIUM 40 MG/1
40 TABLET, DELAYED RELEASE ORAL
Status: DISCONTINUED | OUTPATIENT
Start: 2024-09-04 | End: 2024-09-04

## 2024-09-03 RX ORDER — MAGNESIUM OXIDE 400 MG/1
400 TABLET ORAL
Status: DISCONTINUED | OUTPATIENT
Start: 2024-09-05 | End: 2024-09-04

## 2024-09-03 RX ORDER — PROCHLORPERAZINE MALEATE 5 MG
5 TABLET ORAL EVERY 6 HOURS PRN
Status: DISCONTINUED | OUTPATIENT
Start: 2024-09-03 | End: 2024-09-06 | Stop reason: HOSPADM

## 2024-09-03 RX ORDER — HYDROMORPHONE HCL IN WATER/PF 6 MG/30 ML
0.4 PATIENT CONTROLLED ANALGESIA SYRINGE INTRAVENOUS EVERY 5 MIN PRN
Status: DISCONTINUED | OUTPATIENT
Start: 2024-09-03 | End: 2024-09-03 | Stop reason: HOSPADM

## 2024-09-03 RX ORDER — DIPHENHYDRAMINE HYDROCHLORIDE 50 MG/ML
12.5 INJECTION INTRAMUSCULAR; INTRAVENOUS EVERY 6 HOURS PRN
Status: DISCONTINUED | OUTPATIENT
Start: 2024-09-03 | End: 2024-09-06 | Stop reason: HOSPADM

## 2024-09-03 RX ORDER — METOPROLOL SUCCINATE 50 MG/1
50 TABLET, EXTENDED RELEASE ORAL DAILY
Status: ON HOLD | COMMUNITY
End: 2024-09-06

## 2024-09-03 RX ORDER — HYDRALAZINE HYDROCHLORIDE 100 MG/1
100 TABLET, FILM COATED ORAL 3 TIMES DAILY
Status: ON HOLD | COMMUNITY
End: 2024-09-06

## 2024-09-03 RX ORDER — ESMOLOL HYDROCHLORIDE 10 MG/ML
INJECTION INTRAVENOUS PRN
Status: DISCONTINUED | OUTPATIENT
Start: 2024-09-03 | End: 2024-09-03

## 2024-09-03 RX ORDER — PANTOPRAZOLE SODIUM 40 MG/1
40 TABLET, DELAYED RELEASE ORAL DAILY
Status: DISCONTINUED | OUTPATIENT
Start: 2024-09-03 | End: 2024-09-03

## 2024-09-03 RX ORDER — SODIUM CHLORIDE, SODIUM LACTATE, POTASSIUM CHLORIDE, CALCIUM CHLORIDE 600; 310; 30; 20 MG/100ML; MG/100ML; MG/100ML; MG/100ML
INJECTION, SOLUTION INTRAVENOUS CONTINUOUS PRN
Status: DISCONTINUED | OUTPATIENT
Start: 2024-09-03 | End: 2024-09-03

## 2024-09-03 RX ORDER — ACETAMINOPHEN 325 MG/1
975 TABLET ORAL ONCE
Status: COMPLETED | OUTPATIENT
Start: 2024-09-03 | End: 2024-09-03

## 2024-09-03 RX ORDER — HYDROMORPHONE HCL IN WATER/PF 6 MG/30 ML
0.2 PATIENT CONTROLLED ANALGESIA SYRINGE INTRAVENOUS EVERY 5 MIN PRN
Status: DISCONTINUED | OUTPATIENT
Start: 2024-09-03 | End: 2024-09-03 | Stop reason: HOSPADM

## 2024-09-03 RX ORDER — ONDANSETRON 2 MG/ML
4 INJECTION INTRAMUSCULAR; INTRAVENOUS EVERY 6 HOURS PRN
Status: DISCONTINUED | OUTPATIENT
Start: 2024-09-03 | End: 2024-09-06 | Stop reason: HOSPADM

## 2024-09-03 RX ORDER — TACROLIMUS 0.5 MG/1
2 CAPSULE ORAL
Status: DISCONTINUED | OUTPATIENT
Start: 2024-09-03 | End: 2024-09-06

## 2024-09-03 RX ORDER — DEXTROSE MONOHYDRATE AND SODIUM CHLORIDE 5; .9 G/100ML; G/100ML
INJECTION, SOLUTION INTRAVENOUS CONTINUOUS
Status: CANCELLED | OUTPATIENT
Start: 2024-09-03

## 2024-09-03 RX ORDER — LIDOCAINE 40 MG/G
CREAM TOPICAL
Status: DISCONTINUED | OUTPATIENT
Start: 2024-09-03 | End: 2024-09-03 | Stop reason: HOSPADM

## 2024-09-03 RX ORDER — FUROSEMIDE 10 MG/ML
80 INJECTION INTRAMUSCULAR; INTRAVENOUS
Status: DISCONTINUED | OUTPATIENT
Start: 2024-09-03 | End: 2024-09-04

## 2024-09-03 RX ORDER — HALOPERIDOL 5 MG/ML
1 INJECTION INTRAMUSCULAR
Status: DISCONTINUED | OUTPATIENT
Start: 2024-09-03 | End: 2024-09-03 | Stop reason: HOSPADM

## 2024-09-03 RX ORDER — ONDANSETRON 4 MG/1
4 TABLET, ORALLY DISINTEGRATING ORAL EVERY 30 MIN PRN
Status: DISCONTINUED | OUTPATIENT
Start: 2024-09-03 | End: 2024-09-03 | Stop reason: HOSPADM

## 2024-09-03 RX ORDER — ONDANSETRON 4 MG/1
4 TABLET, ORALLY DISINTEGRATING ORAL EVERY 6 HOURS PRN
Status: DISCONTINUED | OUTPATIENT
Start: 2024-09-03 | End: 2024-09-06 | Stop reason: HOSPADM

## 2024-09-03 RX ORDER — DEXTROSE, SODIUM CHLORIDE, SODIUM LACTATE, POTASSIUM CHLORIDE, AND CALCIUM CHLORIDE 5; .6; .31; .03; .02 G/100ML; G/100ML; G/100ML; G/100ML; G/100ML
INJECTION, SOLUTION INTRAVENOUS CONTINUOUS
Status: DISCONTINUED | OUTPATIENT
Start: 2024-09-03 | End: 2024-09-04

## 2024-09-03 RX ORDER — ACETAMINOPHEN 325 MG/1
650 TABLET ORAL EVERY 4 HOURS PRN
Status: DISCONTINUED | OUTPATIENT
Start: 2024-09-06 | End: 2024-09-06 | Stop reason: HOSPADM

## 2024-09-03 RX ORDER — SODIUM CHLORIDE, SODIUM LACTATE, POTASSIUM CHLORIDE, CALCIUM CHLORIDE 600; 310; 30; 20 MG/100ML; MG/100ML; MG/100ML; MG/100ML
INJECTION, SOLUTION INTRAVENOUS CONTINUOUS
Status: DISCONTINUED | OUTPATIENT
Start: 2024-09-03 | End: 2024-09-03 | Stop reason: HOSPADM

## 2024-09-03 RX ORDER — OXYCODONE HYDROCHLORIDE 5 MG/1
5 TABLET ORAL EVERY 4 HOURS PRN
Status: DISCONTINUED | OUTPATIENT
Start: 2024-09-03 | End: 2024-09-06 | Stop reason: HOSPADM

## 2024-09-03 RX ORDER — FUROSEMIDE 40 MG
80 TABLET ORAL DAILY
Status: ON HOLD | COMMUNITY
End: 2024-09-06

## 2024-09-03 RX ORDER — CEFUROXIME SODIUM 1.5 G/16ML
1.5 INJECTION, POWDER, FOR SOLUTION INTRAVENOUS SEE ADMIN INSTRUCTIONS
Status: DISCONTINUED | OUTPATIENT
Start: 2024-09-03 | End: 2024-09-03 | Stop reason: HOSPADM

## 2024-09-03 RX ORDER — CEFUROXIME SODIUM 1.5 G/16ML
1.5 INJECTION, POWDER, FOR SOLUTION INTRAVENOUS ONCE
Status: COMPLETED | OUTPATIENT
Start: 2024-09-03 | End: 2024-09-03

## 2024-09-03 RX ORDER — METHOCARBAMOL 500 MG/1
500 TABLET, FILM COATED ORAL EVERY 6 HOURS PRN
Status: DISCONTINUED | OUTPATIENT
Start: 2024-09-03 | End: 2024-09-06 | Stop reason: HOSPADM

## 2024-09-03 RX ORDER — ONDANSETRON 2 MG/ML
4 INJECTION INTRAMUSCULAR; INTRAVENOUS EVERY 30 MIN PRN
Status: DISCONTINUED | OUTPATIENT
Start: 2024-09-03 | End: 2024-09-03 | Stop reason: HOSPADM

## 2024-09-03 RX ORDER — SODIUM CHLORIDE 450 MG/100ML
INJECTION, SOLUTION INTRAVENOUS CONTINUOUS PRN
Status: CANCELLED | OUTPATIENT
Start: 2024-09-03

## 2024-09-03 RX ORDER — CALCIUM CARBONATE 500 MG/1
500 TABLET, CHEWABLE ORAL DAILY PRN
Status: DISCONTINUED | OUTPATIENT
Start: 2024-09-03 | End: 2024-09-06 | Stop reason: HOSPADM

## 2024-09-03 RX ORDER — ACETAMINOPHEN 325 MG/1
975 TABLET ORAL EVERY 8 HOURS
Status: COMPLETED | OUTPATIENT
Start: 2024-09-03 | End: 2024-09-06

## 2024-09-03 RX ORDER — PROPOFOL 10 MG/ML
INJECTION, EMULSION INTRAVENOUS PRN
Status: DISCONTINUED | OUTPATIENT
Start: 2024-09-03 | End: 2024-09-03

## 2024-09-03 RX ORDER — HYDROMORPHONE HCL IN WATER/PF 6 MG/30 ML
0.1 PATIENT CONTROLLED ANALGESIA SYRINGE INTRAVENOUS
Status: DISCONTINUED | OUTPATIENT
Start: 2024-09-03 | End: 2024-09-06 | Stop reason: HOSPADM

## 2024-09-03 RX ADMIN — Medication 10 MG: at 10:07

## 2024-09-03 RX ADMIN — CEFUROXIME 1.5 G: 1.5 INJECTION, POWDER, FOR SOLUTION INTRAVENOUS at 07:00

## 2024-09-03 RX ADMIN — FENTANYL CITRATE 50 MCG: 50 INJECTION INTRAMUSCULAR; INTRAVENOUS at 11:17

## 2024-09-03 RX ADMIN — FENTANYL CITRATE 50 MCG: 50 INJECTION, SOLUTION INTRAMUSCULAR; INTRAVENOUS at 11:35

## 2024-09-03 RX ADMIN — FUROSEMIDE 60 MG: 10 INJECTION, SOLUTION INTRAVENOUS at 09:40

## 2024-09-03 RX ADMIN — OXYCODONE HYDROCHLORIDE 2.5 MG: 5 TABLET ORAL at 22:36

## 2024-09-03 RX ADMIN — HYDROMORPHONE HYDROCHLORIDE 0.2 MG: 1 INJECTION, SOLUTION INTRAMUSCULAR; INTRAVENOUS; SUBCUTANEOUS at 11:08

## 2024-09-03 RX ADMIN — ESMOLOL HYDROCHLORIDE 50 MG: 10 INJECTION, SOLUTION INTRAVENOUS at 06:30

## 2024-09-03 RX ADMIN — PROCHLORPERAZINE EDISYLATE 5 MG: 5 INJECTION INTRAMUSCULAR; INTRAVENOUS at 16:40

## 2024-09-03 RX ADMIN — SENNOSIDES AND DOCUSATE SODIUM 1 TABLET: 8.6; 5 TABLET ORAL at 21:07

## 2024-09-03 RX ADMIN — MYCOPHENOLATE MOFETIL 1000 MG: 500 INJECTION, POWDER, LYOPHILIZED, FOR SOLUTION INTRAVENOUS at 07:31

## 2024-09-03 RX ADMIN — ONDANSETRON 4 MG: 2 INJECTION INTRAMUSCULAR; INTRAVENOUS at 22:10

## 2024-09-03 RX ADMIN — CALCIUM CHLORIDE INJECTION 200 MG: 100 INJECTION, SOLUTION INTRAVENOUS at 09:13

## 2024-09-03 RX ADMIN — SODIUM CHLORIDE 20 MG: 9 INJECTION, SOLUTION INTRAVENOUS at 07:23

## 2024-09-03 RX ADMIN — FENTANYL CITRATE 50 MCG: 50 INJECTION, SOLUTION INTRAMUSCULAR; INTRAVENOUS at 11:41

## 2024-09-03 RX ADMIN — SODIUM CHLORIDE, SODIUM LACTATE, POTASSIUM CHLORIDE, CALCIUM CHLORIDE AND DEXTROSE MONOHYDRATE: 5; 600; 310; 30; 20 INJECTION, SOLUTION INTRAVENOUS at 12:06

## 2024-09-03 RX ADMIN — Medication 50 MG: at 06:57

## 2024-09-03 RX ADMIN — MANNITOL 50 G: 20 INJECTION, SOLUTION INTRAVENOUS at 09:40

## 2024-09-03 RX ADMIN — HYDROMORPHONE HYDROCHLORIDE 0.2 MG: 0.2 INJECTION, SOLUTION INTRAMUSCULAR; INTRAVENOUS; SUBCUTANEOUS at 12:18

## 2024-09-03 RX ADMIN — PROPOFOL 20 MG: 10 INJECTION, EMULSION INTRAVENOUS at 11:09

## 2024-09-03 RX ADMIN — TACROLIMUS 2 MG: 0.5 CAPSULE ORAL at 17:58

## 2024-09-03 RX ADMIN — Medication 30 MG: at 08:41

## 2024-09-03 RX ADMIN — LIDOCAINE HYDROCHLORIDE 100 MG: 20 INJECTION, SOLUTION INFILTRATION; PERINEURAL at 06:26

## 2024-09-03 RX ADMIN — PROPOFOL 100 MG: 10 INJECTION, EMULSION INTRAVENOUS at 06:27

## 2024-09-03 RX ADMIN — CALCIUM CARBONATE (ANTACID) CHEW TAB 500 MG 500 MG: 500 CHEW TAB at 19:02

## 2024-09-03 RX ADMIN — SODIUM CHLORIDE 500 ML: 9 INJECTION, SOLUTION INTRAVENOUS at 12:50

## 2024-09-03 RX ADMIN — CALCIUM CHLORIDE INJECTION 400 MG: 100 INJECTION, SOLUTION INTRAVENOUS at 10:19

## 2024-09-03 RX ADMIN — Medication 50 MG: at 06:30

## 2024-09-03 RX ADMIN — CALCIUM CHLORIDE INJECTION 200 MG: 100 INJECTION, SOLUTION INTRAVENOUS at 09:43

## 2024-09-03 RX ADMIN — ONDANSETRON 4 MG: 2 INJECTION INTRAMUSCULAR; INTRAVENOUS at 15:38

## 2024-09-03 RX ADMIN — Medication 100 MG: at 10:54

## 2024-09-03 RX ADMIN — ESMOLOL HYDROCHLORIDE 50 MG: 10 INJECTION, SOLUTION INTRAVENOUS at 06:26

## 2024-09-03 RX ADMIN — FENTANYL CITRATE 50 MCG: 50 INJECTION INTRAMUSCULAR; INTRAVENOUS at 07:33

## 2024-09-03 RX ADMIN — HYDROMORPHONE HYDROCHLORIDE 0.2 MG: 0.2 INJECTION, SOLUTION INTRAMUSCULAR; INTRAVENOUS; SUBCUTANEOUS at 14:07

## 2024-09-03 RX ADMIN — SODIUM CHLORIDE 500 MG: 9 INJECTION, SOLUTION INTRAVENOUS at 06:47

## 2024-09-03 RX ADMIN — ACETAMINOPHEN 975 MG: 325 TABLET ORAL at 06:06

## 2024-09-03 RX ADMIN — MYCOPHENOLATE MOFETIL 750 MG: 250 CAPSULE ORAL at 17:58

## 2024-09-03 RX ADMIN — METOPROLOL TARTRATE 5 MG: 5 INJECTION INTRAVENOUS at 22:10

## 2024-09-03 RX ADMIN — Medication 20 MG: at 09:22

## 2024-09-03 RX ADMIN — SODIUM CHLORIDE, POTASSIUM CHLORIDE, SODIUM LACTATE AND CALCIUM CHLORIDE: 600; 310; 30; 20 INJECTION, SOLUTION INTRAVENOUS at 06:27

## 2024-09-03 RX ADMIN — CALCIUM CHLORIDE INJECTION 200 MG: 100 INJECTION, SOLUTION INTRAVENOUS at 10:07

## 2024-09-03 RX ADMIN — METOPROLOL TARTRATE 5 MG: 5 INJECTION INTRAVENOUS at 16:32

## 2024-09-03 RX ADMIN — SODIUM CHLORIDE 1000 ML: 9 INJECTION, SOLUTION INTRAVENOUS at 12:10

## 2024-09-03 RX ADMIN — PROPOFOL 50 MG: 10 INJECTION, EMULSION INTRAVENOUS at 06:30

## 2024-09-03 RX ADMIN — Medication 20 MG: at 08:00

## 2024-09-03 RX ADMIN — HYDROMORPHONE HYDROCHLORIDE 0.4 MG: 0.2 INJECTION, SOLUTION INTRAMUSCULAR; INTRAVENOUS; SUBCUTANEOUS at 11:48

## 2024-09-03 RX ADMIN — FENTANYL CITRATE 50 MCG: 50 INJECTION INTRAMUSCULAR; INTRAVENOUS at 08:57

## 2024-09-03 RX ADMIN — ONDANSETRON 4 MG: 2 INJECTION INTRAMUSCULAR; INTRAVENOUS at 10:24

## 2024-09-03 RX ADMIN — HYDROMORPHONE HYDROCHLORIDE 0.2 MG: 0.2 INJECTION, SOLUTION INTRAMUSCULAR; INTRAVENOUS; SUBCUTANEOUS at 12:24

## 2024-09-03 RX ADMIN — SODIUM CHLORIDE, POTASSIUM CHLORIDE, SODIUM LACTATE AND CALCIUM CHLORIDE 500 ML: 600; 310; 30; 20 INJECTION, SOLUTION INTRAVENOUS at 16:28

## 2024-09-03 RX ADMIN — HYDROMORPHONE HYDROCHLORIDE 0.4 MG: 0.2 INJECTION, SOLUTION INTRAMUSCULAR; INTRAVENOUS; SUBCUTANEOUS at 12:30

## 2024-09-03 RX ADMIN — FUROSEMIDE 80 MG: 10 INJECTION, SOLUTION INTRAMUSCULAR; INTRAVENOUS at 16:31

## 2024-09-03 RX ADMIN — HYDROMORPHONE HYDROCHLORIDE 0.4 MG: 0.2 INJECTION, SOLUTION INTRAMUSCULAR; INTRAVENOUS; SUBCUTANEOUS at 11:59

## 2024-09-03 RX ADMIN — HYDROMORPHONE HYDROCHLORIDE 0.3 MG: 1 INJECTION, SOLUTION INTRAMUSCULAR; INTRAVENOUS; SUBCUTANEOUS at 10:29

## 2024-09-03 RX ADMIN — SODIUM CHLORIDE 1000 ML: 4.5 INJECTION, SOLUTION INTRAVENOUS at 18:12

## 2024-09-03 RX ADMIN — FENTANYL CITRATE 50 MCG: 50 INJECTION INTRAMUSCULAR; INTRAVENOUS at 10:10

## 2024-09-03 ASSESSMENT — ACTIVITIES OF DAILY LIVING (ADL)
ADLS_ACUITY_SCORE: 22
DEPENDENT_IADLS:: INDEPENDENT
ADLS_ACUITY_SCORE: 22

## 2024-09-03 NOTE — CONSULTS
[facility]  Transplant Nephrology Consult Note  Date of Admission:  9/2/2024  Today's Date: 09/03/2024  Requesting physician: Ciera Fregoso MD    Reason for Consult:  Kidney transplant 09/03/24    Recommendations:   - No acute indications for dialysis.  - Check oxalate levels due to history of gastric bypass (pending).  - Continue current immunosuppressive regimen. Managed by Transplant Surgery.  - Continue to monitor strict I/Os.  - Monitor BMP daily per primary team. Replete electrolytes as indicated.     Assessment & Plan   # DDKT: Stable   - Baseline Creatinine: TBD   - Proteinuria: Not checked post transplant   - DSA Hx: No DSA   - Last cPRA: 0%   - BK Viremia: Not checked post transplant   - Kidney Tx Biopsy Hx: No biopsy history.    # Immunosuppression: Tacrolimus immediate release (goal 8-10), Mycophenolate mofetil (dose 750 mg every 12 hours), and Methylprednisolone (dose taper)   - Induction with Recent Transplant:  Low Intensity Protocol   - Continue with intensive monitoring of immunosuppression for efficacy and toxicity.   - Historical Changes in Immunosuppression: None   - Changes: Not at this time    # Infection Prevention:      - PJP: Sulfa/TMP (Bactrim)  - CMV: Valganciclovir (Valcyte)  - Thrush: None  - CMV IgG Ab High Risk Discordance (D+/R-): Unknown  - EBV IgG Ab High Risk Discordance (D+/R-): Unknown    # Hypertension: Controlled;  Goal BP: < 140/90 (Hospitalization goal)   - Changes: Not at this time    # Anemia in Chronic Renal Disease: Hgb: Stable, low      HIEU: No   - Iron studies: Iron WNL at 89.     # Mineral Bone Disorder:    - Secondary renal hyperparathyroidism; PTH level: Not checked recently        On treatment: Calcitriol  - Vitamin D; level: Normal        On supplement: Yes  - Calcium; level: Low        On supplement: No  - Phosphorus; level: High        On supplement: No    # Electrolytes:   - Potassium; level: Normal        On  supplement: No  - Magnesium; level: Low        On supplement: Yes  - Bicarbonate; level: Low        On supplement: Yes  - Sodium; level: Normal    # Paroxysmal atrial fibrillation   - Patient was reportedly in AF during her stress test in May 2024. Subsequent Zio patch was negative for AF. She is on metoprolol at home. Management per Transplant surgery.     # Other Significant PMH:   - Secondary hyperparathyroidism (of renal origin): Stable on Calcitrol    # Transplant History:  Etiology of Kidney Failure: Unknown etiology. CKD likely from hypertension, NSAID use, prior triamterene use, reduced nephron mass, potentially secondary FSGS, and solitary kidney (s/p unilateral nephrectomy for RCC).  Tx: DDKT  Transplant: 9/3/2024 (Kidney)  Significant transplant-related complications: None    Recommendations were communicated to the primary team via this note.    Seen and discussed with Dr. Higginbotham.     Paula Johnson APRN CNP  Transplant Nephrology  Contact information via Vocera Web Console or via [facility] Paging/Directory        Physician Attestation     I saw and evaluated Lexy Cobb as part of a shared APRN/PA visit.     I personally reviewed the vital signs, medications, and labs.    I personally provided a substantive portion of care for this patient and I approve the care plan as written by the OLEGARIO.  I was involved with Medical Decision Making including: Please see A&P for additional details of medical decision making.  MANAGEMENT DISCUSSED with the following over the past 24 hours: No acute indications for dialysis.  Agree with low intensity induction.  Recommend restarting metoprolol with atrial fibrillation.     Andres Higginbotham MD  Date of Service (when I saw the patient): 09/03/24    History of Present Illness  Lexy Cobb is a 79 year old female with past medical history significant for hypertension, chronic kidney disease stage IV, anemia, GERD, Raynaud's syndrome, obesity, secondary  hyperparathyroidism (of renal origin), Vitamin B12 deficiency, and Vitamin D deficiency who was admitted after kidney transplant. She has no history of dialysis or line placement. She denies history of anuria.     She has a history of RCC in 2001 s/p right native nephrectomy with mass totally encapsulated. She did not require chemoradiation. After her nephrectomy she developed hypertension and CKD in the remaining kidney. She underwent kidney transplant today. No lenora or post operative complications. She is currently vitally stable on 2L NC.     This afternoon she denies dizziness, chest pain, abdominal pain. She is not passing gas. Gutierrez catheter in place and she is making adequate amounts of urine.     Review of Systems   The 10 point Review of Systems is negative other than noted in the HPI or here.      MEDICATIONS:  Current Facility-Administered Medications   Medication Dose Route Frequency Provider Last Rate Last Admin    acetaminophen (TYLENOL) tablet 975 mg  975 mg Oral Q8H Micha Nieto MD        [START ON 9/4/2024] atorvastatin (LIPITOR) tablet 10 mg  10 mg Oral Daily Micha Nieto MD        furosemide (LASIX) injection 80 mg  80 mg Intravenous BID Micha Nieto MD        [START ON 9/5/2024] magnesium oxide (MAG-OX) tablet 400 mg  400 mg Oral Daily with lunch Micha Nieto MD        [START ON 9/4/2024] metoprolol (LOPRESSOR) injection 5 mg  5 mg Intravenous Q6H Micha Nieto MD        mycophenolate (GENERIC EQUIVALENT) capsule 750 mg  750 mg Oral BID IS Micha Nieto MD        pantoprazole (PROTONIX) EC tablet 40 mg  40 mg Oral Daily Micha Nieto MD        [START ON 9/4/2024] polyethylene glycol (MIRALAX) Packet 17 g  17 g Oral Daily Micha Nieto MD        senna-docusate (SENOKOT-S/PERICOLACE) 8.6-50 MG per tablet 1 tablet  1 tablet Oral BID Micha Nieto MD        sodium chloride (PF) 0.9% PF flush 10 mL  10 mL Intracatheter Q8H Micha Nieto MD        sulfamethoxazole-trimethoprim  (BACTRIM) 400-80 MG per tablet 1 tablet  1 tablet Oral Daily Micha Nieto MD        tacrolimus (GENERIC EQUIVALENT) capsule 1.5 mg  0.03 mg/kg Oral BID IS Micha Nieto MD        [START ON 2024] valGANciclovir (VALCYTE) tablet 450 mg  450 mg Oral Once per day on  Micha Nieto MD         Current Facility-Administered Medications   Medication Dose Route Frequency Provider Last Rate Last Admin    dextrose 5% in lactated ringers infusion   Intravenous Continuous Micha Nieto MD 75 mL/hr at 24 1500 Rate Verify at 24 1500    sodium chloride 0.45% infusion   Intravenous Continuous PRN Micha Nieto MD        sodium chloride 0.9 % infusion  1,000 mL Intravenous Continuous PRN Micha Nieto  mL/hr at 24 1500 Rate Change at 24 1500       Physical Exam   Temp  Av.2  F (36.8  C)  Min: 97.8  F (36.6  C)  Max: 98.8  F (37.1  C)  Arterial Line BP  Min: 99/44  Max: 126/56  Arterial Line MAP (mmHg)  Av.5 mmHg  Min: 65 mmHg  Max: 84 mmHg      Pulse  Av.4  Min: 63  Max: 84 Resp  Av.9  Min: 8  Max: 21  SpO2  Av.8 %  Min: 98 %  Max: 100 %    CVP (mmHg): 4 mmHgBP 133/74   Pulse 84   Temp 97.8  F (36.6  C) (Axillary)   Resp 11   Wt 55.7 kg (122 lb 12.8 oz)   SpO2 99%   BMI 20.75 kg/m     Date 24 07 - 24 0659   Shift 6406-8415 6370-1615 6153-9295 24 Hour Total   INTAKE   I.V. 2500   2500   Shift Total(mL/kg) 2500(44.88)   2500(44.88)   OUTPUT   Urine 705 250  955   Drains 180   180   Blood 200   200   Shift Total(mL/kg) 1085(19.48) 250(4.49)  1335(23.97)   Weight (kg) 55.7 55.7 55.7 55.7      Admit Weight: 55.7 kg (122 lb 12.8 oz)     GENERAL APPEARANCE: alert and no distress  HENT: mouth without ulcers or lesions  RESP: lungs clear to auscultation - no rales, rhonchi or wheezes  CV: regular rhythm, normal rate, no rub, no murmur  EDEMA: no LE edema bilaterally  ABDOMEN: soft, nondistended, nontender, bowel sounds normal  MS:  extremities normal - no gross deformities noted, no evidence of inflammation in joints, no muscle tenderness  SKIN: no rash  TX KIDNEY: mild TTP  DIALYSIS ACCESS:  None    Data   All labs reviewed by me.  CMP  Recent Labs   Lab 09/03/24 1136 09/03/24 0959 09/02/24 2157    134* 143   POTASSIUM 4.9 4.7 4.3   CHLORIDE 107  --  109*   CO2 14*  --  14*   ANIONGAP 14  --  20*   * 117* 91   BUN 75.0*  --  84.5*   CR 4.24*  --  5.18*   GFRESTIMATED 10*  --  8*   CIARA 8.2*  --  7.9*   MAG 1.5*  --   --    PHOS 6.0*  --   --    PROTTOTAL  --   --  6.7   ALBUMIN  --   --  4.0   BILITOTAL  --   --  0.3   ALKPHOS  --   --  50   AST  --   --  13   ALT  --   --  <5     CBC  Recent Labs   Lab 09/03/24 1136 09/03/24 0959 09/02/24 2157   HGB 8.8* 8.1* 10.2*   WBC 10.3  --  6.0   RBC 2.61*  --  3.07*   HCT 27.8*  --  32.7*   *  --  107*   MCH 33.7*  --  33.2*   MCHC 31.7  --  31.2*   RDW 15.0  --  15.0     --  233     INR  Recent Labs   Lab 09/02/24 2157   INR 0.93   PTT 28     ABG  Recent Labs   Lab 09/03/24 0959   PH 7.25*   PCO2 35   PO2 155*   HCO3 15*   O2PER 40.0      Urine Studies  Recent Labs   Lab Test 09/02/24  2345 10/10/19  1415   COLOR Light Yellow Yellow   APPEARANCE Slightly Cloudy* Clear   URINEGLC Negative Negative   URINEBILI Negative Negative   URINEKETONE Negative Negative   SG 1.011 1.011   UBLD Negative Negative   URINEPH 6.0 5.0   PROTEIN 100* Negative   NITRITE Negative Negative   LEUKEST Large* Negative   RBCU 1 1   WBCU >182* 1     No lab results found.    No lab results found.    No lab results found.    IMAGING:  All imaging studies reviewed by me.    Past Medical History    I have reviewed this patient's medical history and updated it with pertinent information if needed.   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  Past Surgical History:   Procedure Laterality Date    APPENDECTOMY      s/p exploratory laproscopic surgery      s/p eye surgery      s/p gastric bypass      s/p right native nephrectomy      RCC    TONSILLECTOMY         Family History   I have reviewed this patient's family history and updated it with pertinent information if needed.   Family History   Problem Relation Age of Onset    C.A.D. Mother     Hypertension Mother     Diabetes Father     Cancer Father         Lung CA       Social History   I have reviewed this patient's social history and updated it with pertinent information if needed. Lexy Cobb  reports that she has never smoked. She has never used smokeless tobacco. She reports that she does not drink alcohol and does not use drugs.    Prior to Admission Medications   Medications Prior to Admission   Medication Sig Dispense Refill Last Dose    Furosemide (LASIX) 20 MG tablet Take 40 mg by mouth daily   9/2/2024 at 0800    hydrALAZINE (APRESOLINE) 25 MG tablet Take 100 mg by mouth 3 times daily   9/2/2024 at 2000    metoprolol (TOPROL-XL) 100 MG 24 hr tablet Take 50 mg by mouth daily   9/2/2024 at 0800    NIFEdipine osmotic (PROCARDIA XL) 90 MG 24 hr tablet Take 90 mg by mouth daily   9/2/2024 at 2000    omeprazole (PRILOSEC) 20 MG capsule Take 1 capsule by mouth daily   9/2/2024 at 0800    calcitRIOL (ROCALTROL) 0.25 MCG  capsule Take 0.25 mcg by mouth daily       cholecalciferol 125 MCG (5000 UT) CAPS 5,000 Units       cloNIDine (CATAPRES) 0.1 MG tablet Take 0.1 mg by mouth 3 times daily (Patient not taking: Reported on 4/11/2024)       cyanocobalamin 1000 MCG/ML injection Inject 1 mL as directed every 30 days       darbepoetin braulio-polysorbate (ARANESP, ALBUMIN FREE,) 60 MCG/ML injection Inject 1 mL Subcutaneous every 28 days       diphenhydrAMINE-APAP, sleep, (TYLENOL PM EXTRA STRENGTH)  MG/30ML LIQD Take 1 capful by mouth nightly as needed       magnesium oxide (MAG-) 400 MG tablet Take 1 tablet by mouth daily       Multiple Vitamins-Minerals (PRESERVISION AREDS 2 PO) Take 1 capsule by mouth       oxyBUTYnin ER (DITROPAN XL) 5 MG 24 hr tablet Take 5 mg by mouth daily       sodium bicarbonate 650 MG tablet TAKE ONE TABLET BY MOUTH TWICE DAILY       traZODone (DESYREL) 50 MG tablet 100 mg at bedtime       triamcinolone (KENALOG) 0.1 % external ointment  (Patient not taking: Reported on 4/11/2024)

## 2024-09-03 NOTE — PROGRESS NOTES
Patient arrived to 7A from PACU at 1500 after kidney transplant surgery. 1500 urine measured per fluid replacement orders. A/Ox4, VSS, RA. Patient c/o nausea, waiting for med orders to be approved by pharmacy. Settled patient and gave report to evening RN.

## 2024-09-03 NOTE — H&P
[facility]    History and Physical  Transplant Surgery     Date of Admission:  9/2/2024    Assessment & Plan   Lexy Cobb is a 79 year old female who presents with CKD 5 from likely from HTN, NSAID use, prior triamterene use, reduced nephron mass, potentially secondary FSGS, solitary kidney (s/p unilateral nephrectomy for RCC) not yet on HD most recent GFR 8 8/2024 who presents for possible kidney transplant.     -NPO  -CXR, EKG, shower, and other pre-op checklist items  -Needs consent for OR in AM    Oleg Keith MD    Code Status   Full Code    Chief Complaint   Kidney Transplant    History of Present Illness   Lexy Cobb is a 79 yr old female with Chronic Kidney Disease Stage 5 due to hypertension, nonsteroidal use, prior triamterene use, reduced nephron mass, potentially secondary FSGS, solitary kidney (s/p unilateral nephrectomy for RCC) not yet on HD most recent GFR 8 8/2024 who presents for possible kidney transplant. She has had several dialysis access issues including being unable to place PD cath due to dense adhesions to anterior abdominal wall (unable to get insufflation). She was seen by outside vascular surgery as well and does not appear that she has an option for fistula in either upper or lower arm. She denies concerns on arrival to [facility]. Has taken her first pre-op shower and getting labs drawn.     Past Medical History    I have reviewed this patient's medical history and updated it with pertinent information if needed.   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  Past Surgical History:   Procedure Laterality Date    APPENDECTOMY      s/p exploratory laproscopic surgery      s/p eye surgery      s/p gastric bypass      s/p right native nephrectomy      RCC    TONSILLECTOMY         Prior to Admission Medications   Prior to Admission Medications   Prescriptions Last Dose Informant Patient Reported? Taking?   Furosemide (LASIX) 20 MG tablet   Yes No   Sig: Take 40 mg by mouth daily   Multiple Vitamins-Minerals (PRESERVISION AREDS 2 PO)   Yes No   Sig: Take 1 capsule by mouth   NIFEdipine osmotic (PROCARDIA XL) 90 MG 24 hr tablet   Yes No   Sig: Take 90 mg by mouth daily   calcitRIOL (ROCALTROL) 0.25 MCG capsule   Yes No   Sig: Take 0.25 mcg by mouth daily   cholecalciferol 125 MCG (5000 UT) CAPS   Yes No   Si,000 Units   cloNIDine (CATAPRES) 0.1 MG tablet   Yes No   Sig: Take 0.1 mg by mouth 3 times daily   Patient not taking: Reported on 2024   cyanocobalamin 1000 MCG/ML injection   Yes No   Sig: Inject 1 mL as directed every 30 days   darbepoetin braulio-polysorbate (ARANESP, ALBUMIN FREE,) 60 MCG/ML injection   Yes No   Sig: Inject 1 mL Subcutaneous every 28 days   diphenhydrAMINE-APAP, sleep, (TYLENOL PM EXTRA STRENGTH)  MG/30ML LIQD   Yes No   Sig: Take 1 capful by mouth nightly as needed   hydrALAZINE (APRESOLINE) 25 MG tablet   Yes No   Sig: Take 100 mg by mouth 3 times daily   magnesium oxide (MAG-) 400 MG tablet   Yes No   Sig: Take 1 tablet by mouth daily   metoprolol (TOPROL-XL) 100 MG 24 hr tablet   Yes No   Sig: Take 50 mg by mouth daily   omeprazole (PRILOSEC) 20 MG capsule   Yes No    Sig: Take 1 capsule by mouth daily   oxyBUTYnin ER (DITROPAN XL) 5 MG 24 hr tablet   Yes No   Sig: Take 5 mg by mouth daily   sodium bicarbonate 650 MG tablet   Yes No   Sig: TAKE ONE TABLET BY MOUTH TWICE DAILY   traZODone (DESYREL) 50 MG tablet   Yes No   Si mg at bedtime   triamcinolone (KENALOG) 0.1 % external ointment   Yes No   Patient not taking: Reported on 2024      Facility-Administered Medications: None     Allergies   Allergies   Allergen Reactions    Dapsone     Fluoxetine     Iron Diarrhea    Latex Rash       Social History   I have reviewed this patient's social history and updated it with pertinent information if needed. Lexy Cobb  reports that she has never smoked. She has never used smokeless tobacco. She reports that she does not drink alcohol and does not use drugs.    Family History   I have reviewed this patient's family history and updated it with pertinent information if needed.   Family History   Problem Relation Age of Onset    C.A.D. Mother     Hypertension Mother     Diabetes Father     Cancer Father         Lung CA       Review of Systems   The 10 point Review of Systems is negative other than noted in the HPI or here.     Physical Exam   Temp: 97.8  F (36.6  C) Temp src: Oral BP: 122/57 Pulse: 65   Resp: 16 SpO2: 99 % O2 Device: None (Room air)    Vital Signs with Ranges  Temp:  [97.8  F (36.6  C)-98.8  F (37.1  C)] 97.8  F (36.6  C)  Pulse:  [65] 65  Resp:  [16] 16  BP: (122-123)/(57-68) 122/57  SpO2:  [99 %] 99 %  122 lbs 12.8 oz    Gen: Alert and conversational adult female in NAD  Pulm: Nonlabored Breathing on RA  CV: RRR  Abd: Soft, nontender, nondistended  : No spicer present  Skin: WWP  Extremities: No peripheral edema  Neuro: CN grossly intact, no focal deficits  Psych: Appropriate in conversation     Data   Results for orders placed or performed during the hospital encounter of 24 (from the past 24 hour(s))   EKG 12-lead, tracing only   Result Value  Ref Range    Systolic Blood Pressure  mmHg    Diastolic Blood Pressure  mmHg    Ventricular Rate 72 BPM    Atrial Rate 72 BPM    SC Interval 160 ms    QRS Duration 90 ms     ms    QTc 477 ms    P Axis  degrees    R AXIS 41 degrees    T Axis 63 degrees    Interpretation ECG       Sinus rhythm with Premature atrial complexes  Low voltage QRS  Cannot rule out Anterior infarct (cited on or before 24-Apr-2024)  Abnormal ECG  When compared with ECG of 24-Apr-2024 09:40,  Nonspecific T wave abnormality no longer evident in Anterior leads     Asymptomatic COVID-19 Virus (Coronavirus) by PCR Nasopharyngeal    Specimen: Nasopharyngeal; Swab   Result Value Ref Range    SARS CoV2 PCR Negative Negative    Narrative    Testing was performed using the Xpert Xpress SARS-CoV-2 Assay on the Cepheid Gene-Xpert Instrument Systems. Additional information about this assay can be found via the Test Directory. This [place] FDA cleared test should be ordered for the detection of SARS-CoV-2 in individuals with signs and symptoms of respiratory tract infection. This test is for in vitro diagnostic use under the US FDA for laboratories certified under CLIA to perform high complexity testing. A negative result does not rule out the presence of PCR inhibitors in the specimen or target RNA concentration below the limit of detection for the assay. The possibility of a false negative should be considered if the patient's recent exposure or clinical presentation suggests COVID-19. This test was validated by [facility]. These Laboratories are certified under the  Clinical Laboratory Improvement Amendments (CLIA) as qualified to perform high complexity testing.   CBC with platelets differential    Narrative    The following orders were created for panel order CBC with platelets differential.  Procedure                               Abnormality         Status                     ---------                               -----------          ------                     CBC with platelets and d...[[id number]]  Abnormal            Final result                 Please view results for these tests on the individual orders.   INR   Result Value Ref Range    INR 0.93 0.85 - 1.15   Partial thromboplastin time   Result Value Ref Range    aPTT 28 22 - 38 Seconds   ABO/Rh type and screen    Narrative    The following orders were created for panel order ABO/Rh type and screen.  Procedure                               Abnormality         Status                     ---------                               -----------         ------                     Adult Type and Screen[[id number]]                            Final result                 Please view results for these tests on the individual orders.   Lactic Acid Whole Blood w/ 1x repeat in 2 hrs when >2   Result Value Ref Range    Lactic Acid, Initial 2.0 0.7 - 2.0 mmol/L   CBC with platelets and differential   Result Value Ref Range    WBC Count 6.0 4.0 - 11.0 10e3/uL    RBC Count 3.07 (L) 3.80 - 5.20 10e6/uL    Hemoglobin 10.2 (L) 11.7 - 15.7 g/dL    Hematocrit 32.7 (L) 35.0 - 47.0 %     (H) 78 - 100 fL    MCH 33.2 (H) 26.5 - 33.0 pg    MCHC 31.2 (L) 31.5 - 36.5 g/dL    RDW 15.0 10.0 - 15.0 %    Platelet Count 233 150 - 450 10e3/uL    % Neutrophils 70 %    % Lymphocytes 17 %    % Monocytes 9 %    % Eosinophils 3 %    % Basophils 0 %    % Immature Granulocytes 1 %    NRBCs per 100 WBC 0 <1 /100    Absolute Neutrophils 4.2 1.6 - 8.3 10e3/uL    Absolute Lymphocytes 1.0 0.8 - 5.3 10e3/uL    Absolute Monocytes 0.6 0.0 - 1.3 10e3/uL    Absolute Eosinophils 0.2 0.0 - 0.7 10e3/uL    Absolute Basophils 0.0 0.0 - 0.2 10e3/uL    Absolute Immature Granulocytes 0.0 <=0.4 10e3/uL    Absolute NRBCs 0.0 10e3/uL   Adult Type and Screen   Result Value Ref Range    ABO/RH(D) A POS     Antibody Screen Negative Negative    SPECIMEN EXPIRATION DATE [id number]        plan with the fellow/resident/OLEGARIO.    I concur with the findings in this note.    Time spent on admission activities: 45 minutes.

## 2024-09-03 NOTE — ANESTHESIA PROCEDURE NOTES
Central Line/PA Catheter Placement    Pre-Procedure   Staff -        Anesthesiologist:  Rosendo Scales MD       Resident/Fellow: Robert Wells MD       Performed By: anesthesiologist and resident       Location: OR       Pre-Anesthestic Checklist: patient identified, IV checked, site marked, risks and benefits discussed, informed consent, monitors and equipment checked, pre-op evaluation and at physician/surgeon's request  Timeout:       Correct Patient: Yes        Correct Procedure: Yes        Correct Site: Yes        Correct Position: Yes        Correct Laterality: Yes   Line Placement:   This line was placed Post Induction starting at 9/3/2024 7:01 AM and ending at 9/3/2024 7:13 AM    Procedure   Procedure: central line       Laterality: right       Insertion Site: internal jugular.       Patient Position: Trendelenburg  Sterile Prep        All elements of maximal sterile barrier technique followed       Patient Prep/Sterile Barriers: draped, hand hygiene, gloves , hat , mask , draped, gown, sterile gel and probe cover       Skin prep: Chloraprep  Insertion/Injection        Technique: ultrasound guided        1. Ultrasound was used to evaluate the access site.       2. Vein evaluated via ultrasound for patency/adequacy.       3. Using real-time ultrasound the needle/catheter was observed entering the artery/vein.       Type: CVC       Catheter Size: 7 Fr       Catheter Length: 20       Number of Lumens: triple lumen  Narrative         Secured by: suture       Tegaderm and Biopatch dressing used.       Complications: None apparent,        blood aspirated from all lumens,        All lumens flushed: Yes       Verification method: Ultrasound

## 2024-09-03 NOTE — OR NURSING
Dr. Nieto paged, patient systolic less than 100.    Ordered 500ml bolus of NS. Hold further pain medication with MAPs less than 65

## 2024-09-03 NOTE — ANESTHESIA PREPROCEDURE EVALUATION
Anesthesia Pre-Procedure Evaluation    Patient: Lexy Cobb   MRN: [id number] : 1945        Procedure : Procedure(s):  Transplant kidney recipient  donor          Past Medical History:   Diagnosis Date    ABDULLAHI positive     Anemia in chronic kidney disease(285.21)     Chronic lower back pain     CKD (chronic kidney disease) stage 4, GFR 15-29 ml/min (H)     GERD (gastroesophageal reflux disease)     Hemorrhoids     Hypertension, renal     Hypomagnesemia     Irritable bowel syndrome     Obesity 1973    s/p gastric bypass    Raynaud's syndrome     Renal cell cancer (H)     s/p right native nephrectomy    Secondary hyperparathyroidism (of renal origin)     Small bowel obstruction (H)     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    Vasculitis of skin     Vitamin B12 deficiency     Vitamin D deficiency       Past Surgical History:   Procedure Laterality Date    APPENDECTOMY      s/p exploratory laproscopic surgery      s/p eye surgery      s/p gastric bypass      s/p right native nephrectomy      RCC    TONSILLECTOMY        Allergies   Allergen Reactions    Dapsone     Fluoxetine     Iron Diarrhea    Latex Rash      Social History     Tobacco Use    Smoking status: Never    Smokeless tobacco: Never   Substance Use Topics    Alcohol use: No      Wt Readings from Last 1 Encounters:   24 55.7 kg (122 lb 12.8 oz)        Anesthesia Evaluation            ROS/MED HX  ENT/Pulmonary:    (-) tobacco use, asthma and COPD   Neurologic:    (-) no seizures, no CVA and no TIA   Cardiovascular:     (+)  hypertension- -   -  - -                                 Previous cardiac testing   Echo: Date: 2024 Results:  Left ventricular size, wall motion and function are normal. The ejection  fraction is 55-60%.  Right ventricular function, chamber size, wall motion, and thickness are  normal.  Severe left atrial enlargement is present.  Mild tricuspid insufficiency is present. The right ventricular  systolic  pressure is 32mmHg above the right atrial pressure.  Mild aortic insufficiency is present.  Mild mitral insufficiency is present.  Moderate dilation of ascending aorta, measuring 4.5 cm  IVC is normal.  No pericardial effusion is present.     This study was compared with the study from 10/10/19 .  No significant changes noted.    Stress Test:  Date: 5/2024 Results:     The nuclear stress test is negative for inducible myocardial ischemia or infarction.     Left ventricular function is normal.     A prior study was conducted on 11/19/2019.  This study has no change when compared with the prior study.  ECG Reviewed:  Date: 9/2024 Results:  NSR w/ PACs  Cath:  Date: Results:   (-) CAD, arrhythmias and stent   METS/Exercise Tolerance: >4 METS    Hematologic:       Musculoskeletal:       GI/Hepatic:     (+) GERD, Asymptomatic on medication,               (-) liver disease   Renal/Genitourinary:     (+) renal disease, type: CRI,            Endo:    (-) Type II DM, thyroid disease and obesity   Psychiatric/Substance Use:       Infectious Disease:       Malignancy:   (+) Malignancy, History of Other.Other CA RCC s/p R nephrectomy Remission status post Surgery.    Other:            Physical Exam    Airway        Mallampati: II   TM distance: > 3 FB   Neck ROM: full   Mouth opening: > 3 cm    Respiratory Devices and Support         Dental       (+) Modest Abnormalities - crowns, retainers, 1 or 2 missing teeth      Cardiovascular          Rhythm and rate: regular and normal     Pulmonary           breath sounds clear to auscultation   (-) no rhonchi and no rales        OUTSIDE LABS:  CBC:   Lab Results   Component Value Date    WBC 6.0 09/02/2024    WBC 6.2 10/10/2019    HGB 10.2 (L) 09/02/2024    HGB 9.7 (L) 10/10/2019    HCT 32.7 (L) 09/02/2024    HCT 31.3 (L) 10/10/2019     09/02/2024     10/10/2019     BMP:   Lab Results   Component Value Date     10/10/2019     10/28/2013    POTASSIUM  "4.4 10/10/2019    POTASSIUM 4.4 10/28/2013    CHLORIDE 106 10/10/2019    CHLORIDE 109 10/28/2013    CO2 25 10/10/2019    CO2 22 10/28/2013    BUN 60 (H) 10/10/2019    BUN 45 (H) 10/28/2013    CR 3.68 (H) 10/10/2019    CR 2.23 (H) 10/28/2013     (H) 10/10/2019    GLC 84 10/28/2013     COAGS:   Lab Results   Component Value Date    PTT 28 09/02/2024    INR 0.93 09/02/2024     POC: No results found for: \"BGM\", \"HCG\", \"HCGS\"  HEPATIC:   Lab Results   Component Value Date    ALBUMIN 3.8 10/10/2019    PROTTOTAL 7.0 10/10/2019    ALT 16 10/10/2019    AST 12 10/10/2019    ALKPHOS 38 (L) 10/10/2019    BILITOTAL 0.4 10/10/2019     OTHER:   Lab Results   Component Value Date    LACT 2.0 09/02/2024    CIARA 8.5 10/10/2019    PHOS 4.0 10/10/2019       Anesthesia Plan    ASA Status:  4    NPO Status:  NPO Appropriate    Anesthesia Type: General.     - Airway: ETT   Induction: Intravenous, Propofol.   Maintenance: Inhalation.   Techniques and Equipment:     - Lines/Monitors: Central Line, Arterial Line, 2nd IV     Consents    Anesthesia Plan(s) and associated risks, benefits, and realistic alternatives discussed. Questions answered and patient/representative(s) expressed understanding.     - Discussed:     - Discussed with:  Patient            Postoperative Care    Pain management: IV analgesics.   PONV prophylaxis: Dexamethasone or Solumedrol, Ondansetron (or other 5HT-3)     Comments:               Glenn Cody MD    I have reviewed the pertinent notes and labs in the chart from the past 30 days and (re)examined the patient.  Any updates or changes from those notes are reflected in this note.                  "

## 2024-09-03 NOTE — TELEPHONE ENCOUNTER
Organ Offer Encounter Information    Organ Offer Information  Organ offer date & time: 9/2/2024  6:04 PM  Coordinator/Fellow/Attending name: Ekaterina Chaudhari, RN   Organ(s):  Organ UNOS ID Match Run ID Comment Organ Laterality   Kidney [id number] MNOP, rank 2         Recent infections?: No      New medications?: No Recent pregnancy?: No     Angicoagulation medications?: No Recent vaccinations?: No     Recent blood transfusions?: No Recent hospitalizations?: No   Has your insurance changed in the last 6-12 months?: Neg    Discussed organ offer with: Patient  Patient/Caregiver name: Lexy  Discussed risk category with Patient/Other: N/A  Understood donor criteria, verbalized understanding  Patient/Other asked to speak to a surgeon?: No  Discussed program-specific outcomes: Did not have questions regarding SRTR  Right to decline organ offer without penalty, Patient/Other: Aware of option to decline without penalty  Organ offer decision status Patient/Other: Accepted Offer  Organ disposition: Transplanted  Additional Comments: 9/2/2024 6:06 PM  Kidney: Local, rank 2  MD: Ildefonso/Gerson  OPO Contact: LS imports  VXM Results: Compatible (but results >4 months prospective crossmatch needed)  XM Plan (FXM must be done with serum no older than 10 days from transplant): Will run crossmatch ASAP on in lab serum   Donor/Recip HCV Status: neg/neg  (HCV+ Donors - Discuss HCV genotyping/quant testing with MD & send Epic in basket message to [facility] HCV POOL - Include Donor UNOS ID.  Follow HCVGENOTYPING Smart Phrase)  Is this an ABO A2 donor to ABO B Recipient:no  (In the event of A2 to B transplant, Anti-A titers need to be collected at the time of the crossmatch or admission - whichever is first.  Use smartphrase .A2toB for instructions.)  Plan (Admission, NPO, Donor OR): NPO at midnight. Admit this evening, recipient OR to be set for 0600 pending biopsy and pump numbers  - - -   COVID Screening  In the past  month, have you:  Or anyone close to you had a positive COVID test or suspected to have COVID: no   Had any COVID symptoms (Fever, Cough, Short of Breath, Loss of Taste/Smell, Rash): no    Admissions: admission ETA changed to 2000  Unit: 7A; halle aware of [id number] ETA   Update Provider Entering Orders (XM Plan & COVID Testing):JUAN DAVID PABLO [ Msg Id [id number] ] paged @ [phone]  Immunology: Paged Cristel; orders placed to run FXM on in lab serum from yesterday; will start running around 2000  [facility] ([phone]): paged @ 1315  Book OR: Irasema @ [phone]  Blood Bank: Matt @ 0530  TransNet/ABO Verification: printed @ 0504  Add Organ:  added LEFT kidney @ 0515    9/3/2024 5:43 AM:  FXM is negative; Dr. Nieto notified and email sent to dr. Fregoso. Discussed anatomy, biopsy, pump and photos with Dr. Nieto. Accepted LEFT kidney.   Ekaterina Chaudhari RN  Donor    Attestation I have discussed all of the above with the Patient/Legal Guardian/Caregiver regarding this organ offer.: Yes  Coordinator/Fellow/Attending name: Ekaterina Chaudhari RN

## 2024-09-03 NOTE — ANESTHESIA PROCEDURE NOTES
Arterial Line Procedure Note    Pre-Procedure   Staff -        Anesthesiologist:  Glenn Cody MD       Resident/Fellow: Robert Wells MD       Performed By: anesthesiologist and resident       Location: OR       Pre-Anesthestic Checklist: patient identified, IV checked, risks and benefits discussed, informed consent, monitors and equipment checked, pre-op evaluation and at physician/surgeon's request  Timeout:       Correct Patient: Yes        Correct Procedure: Yes        Correct Site: Yes        Correct Position: Yes   Line Placement:   This line was placed Post Induction starting at 9/3/2024 6:35 AM and ending at 9/3/2024 6:46 AM  Procedure   Procedure: arterial line       Diagnosis: Hemodynamic monitoring       Laterality: right       Insertion Site: radial.  Sterile Prep        Standard elements of sterile barrier followed       Skin prep: Chloraprep  Insertion/Injection        Technique: ultrasound guided        1. Ultrasound was used to evaluate the access site.       2. Artery evaluated via ultrasound for patency/adequacy.       3. Using real-time ultrasound the needle/catheter was observed entering the artery/vein.       Catheter size: 20 G, 2 in.  Narrative        Tegaderm dressing used.       Complications: None apparent,        Arterial waveform: Yes        IBP within 10% of NIBP: Yes

## 2024-09-03 NOTE — PROVIDER NOTIFICATION
Kym S verbally made aware that pt has been afib since pacu. Last EKG yesterday was SR with PAC.       CVP is 0 to 1.  ML bolus x 1.

## 2024-09-03 NOTE — PLAN OF CARE
Goal Outcome Evaluation:      Plan of Care Reviewed With: patient  Overall Patient Progress: no change    Outcome Evaluation: Pt A&Ox4, VSS. Kidney transplant at 0600 today  /83 (BP Location: Left arm)   Pulse 63   Temp 98.8  F (37.1  C) (Oral)   Resp 16   Wt 55.7 kg (122 lb 12.8 oz)   SpO2 98%   BMI 20.75 kg/m      Shift:   Isolation Status: Standard  VS: Stable on RA, afebrile  Neuro: Aox4  Behaviors: Calm and cooperative  B  Labs/Imaging: Creatinine: 5.18, Hemoglobin: 10.2. Covid negative. EKG, chest x-ray, UA, done.  Respiratory: WDL  Cardiac: WDL  Pain/Nausea: Denies  PRN: None given  Diet: NPO  LDA: L PIV - SL  Infusion(s): None  GI/: Voids spontaneously without difficulty, last BM   Skin: No new deficits found  Mobility: Steady UAL  Plan: Plan for kidney transplant at 0600. Continue with plan of care, will notify MD with any changes.

## 2024-09-03 NOTE — PROGRESS NOTES
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     Graft function: POD#0 DBDKT  Kidney:   -Cr: 5.18-->4.24, good urine output, Post-op US normal    Immunosuppressed status secondary to medications:   -cPRA= 0  -Basiliximab 20mg intra-op  -Steroid taper per protocol  -MMF: 750 BID  -Tacro: Goal level 8-10. Check level POD#3    Neuro:   Acute post-op pain: Controlled. Continue scheduled acetaminophen and oxycodone PRN.     Hematology:   Anemia of chronic disease: Hgb 8.8 from 10.2 pre-op. No signs of bleeding. Continue to monitor.     Cardiorespiratory:   A fib: Post-op Afib. Only had 1 episode prior during stress test. Rate controlled. Received 5mg metoprolol. EKG unchanged from prior.   Hx of HTN: Metoprolol 5mg,       GI/Nutrition:   Diet: Clear liquid diet.   GERD: Tums   Bowel Regimen: Senna       Endocrine: No acute concerns.  Hgb A1c= 4.4     Fluid/Electrolytes:   CVP=1-2, 500cc bolus given   MIV: Straight rate today    :   Gutierrez x3 days due to new surgical anastomosis     Infectious disease: Afebrile. No acute issues     Prophylaxis: DVT, pneumocystis (Bactrim)    Disposition: 7A     Post-op check completed:  -PRN and scheduled analgesics  -PRN antiemetics  -D5LR 75 mL/hr  -Regular diet  -Monitor I&O  -IS/CDB Q1H  -OOB this evening    OLEGARIO/Fellow/Resident Provider: Chiquita Zapata MD  Urology, PGY-1    Faculty: Dr. Kellogg  _________________________________________________________________    Interval History: History is obtained from the patient  Doing well overall. She vomited x2 after surgery and is still mildly nauseous, but feels better after vomiting.  Admits to mild heartburn. Denies SOB, chest pain, heart palpitations, and HA. She is making good urine. Has not yet passed gas.     ROS:   A 10-point review of systems was negative except as noted above.    Meds:  Current Facility-Administered Medications   Medication Dose Route Frequency Provider Last Rate Last Admin    acetaminophen (TYLENOL) tablet 975 mg  975 mg Oral Q8H Micha Nieto MD        [START ON 9/4/2024] atorvastatin (LIPITOR) tablet 10 mg  10 mg Oral Daily Micha Nieto MD        furosemide (LASIX) injection 80 mg  80 mg Intravenous BID Micha Nieto MD   80 mg at 09/03/24 1631    [START ON 9/5/2024] magnesium oxide (MAG-OX) tablet 400 mg  400 mg Oral Daily with lunch Micha Nieto MD        metoprolol (LOPRESSOR) injection 5 mg  5 mg Intravenous Q6H Kym Cheung PA-C   5 mg at 09/03/24 1632    mycophenolate (GENERIC EQUIVALENT) capsule 750 mg  750 mg Oral BID IS Micha Nieto MD        [START ON 9/4/2024] pantoprazole (PROTONIX) EC tablet 40 mg  40 mg Oral BID AC Kym Cheung PA-C        [START ON 9/4/2024] polyethylene glycol (MIRALAX) Packet 17 g  17 g Oral Daily Micha Nieto MD        senna-docusate (SENOKOT-S/PERICOLACE) 8.6-50 MG per tablet 1 tablet  1 tablet Oral BID Micha Nieto MD        sodium chloride (PF) 0.9% PF flush 10 mL  10 mL Intracatheter Q8H Micha Nieto MD   10 mL at 09/03/24 1633    [START ON 9/4/2024] sulfamethoxazole-trimethoprim (BACTRIM) 400-80 MG per tablet 1 tablet  1 tablet Oral Once per day on Monday Wednesday Friday Micha Nieto MD        tacrolimus (GENERIC EQUIVALENT) capsule 2 mg  2 mg Oral BID IS Micha Nieto MD        [START ON 9/4/2024] valGANciclovir (VALCYTE) tablet 450 mg  450 mg Oral Once per day on Wednesday Saturday Micha Nieto MD           Physical Exam:     Admit Weight: 55.7 kg (122 lb 12.8 oz)    Current vitals:   /73   Pulse 74   Temp 98.1  F (36.7  C) (Oral)   Resp 12   Wt 55.7 kg (122 lb 12.8 oz)    SpO2 100%   BMI 20.75 kg/m      Vital sign ranges:    Temp:  [97.8  F (36.6  C)-98.8  F (37.1  C)] 98.1  F (36.7  C)  Pulse:  [] 74  Resp:  [8-21] 12  BP: ()/(45-83) 114/73  MAP:  [65 mmHg-84 mmHg] 65 mmHg  Arterial Line BP: ()/(44-56) 99/44  SpO2:  [98 %-100 %] 100 %    General Appearance: in no apparent distress.   Skin: Warm, perfused  Heart: Irregularly irregular. Perfused   Lungs: Non-labored on room air   Abdomen: The abdomen is soft, non-tender, and incision is c/d/i  : spicer is present.  Urine is clear.  Extremities: edema: mild  Neurologic: awake, alert, and oriented. Tremor absent..     Data:   CMP  Recent Labs   Lab 09/03/24  1136 09/03/24  0959 09/02/24  2157    134* 143   POTASSIUM 4.9 4.7 4.3   CHLORIDE 107  --  109*   CO2 14*  --  14*   * 117* 91   BUN 75.0*  --  84.5*   CR 4.24*  --  5.18*   GFRESTIMATED 10*  --  8*   CIARA 8.2*  --  7.9*   ICAW  --  4.3*  --    MAG 1.5*  --   --    PHOS 6.0*  --   --    ALBUMIN  --   --  4.0   BILITOTAL  --   --  0.3   ALKPHOS  --   --  50   AST  --   --  13   ALT  --   --  <5     CBC  Recent Labs   Lab 09/03/24  1136 09/03/24  0959 09/02/24  2157   HGB 8.8* 8.1* 10.2*   WBC 10.3  --  6.0     --  233   A1C  --   --  4.4

## 2024-09-03 NOTE — ANESTHESIA POSTPROCEDURE EVALUATION
Patient: Lexy Cobb    Procedure: Procedure(s):  Transplant kidney recipient  donor       Anesthesia Type:  General    Note:  Disposition: Inpatient   Postop Pain Control: Uneventful            Sign Out: Well controlled pain   PONV: No   Neuro/Psych: Uneventful            Sign Out: Acceptable/Baseline neuro status   Airway/Respiratory: Uneventful            Sign Out: Acceptable/Baseline resp. status   CV/Hemodynamics: Uneventful            Sign Out: Acceptable CV status; No obvious hypovolemia; No obvious fluid overload (Central line on XR appears okay to use, however, still awaiting final read on XR. Frequent PACs.)   Other NRE: NONE   DID A NON-ROUTINE EVENT OCCUR? No           Last vitals:  Vitals Value Taken Time   /54 24 1350   Temp 36.7  C (98  F) 24 1125   Pulse 67 24 1356   Resp 12 24 1355   SpO2 100 % 24 1356   Vitals shown include unfiled device data.    Electronically Signed By: Jessica Moody DO  September 3, 2024  1:56 PM

## 2024-09-03 NOTE — ANESTHESIA CARE TRANSFER NOTE
Patient: Lexy Cobb    Procedure: Procedure(s):  Transplant kidney recipient  donor       Diagnosis: ESRD (end stage renal disease) (H) [N18.6]  Diagnosis Additional Information: No value filed.    Anesthesia Type:   General     Note:    Oropharynx: oropharynx clear of all foreign objects and spontaneously breathing  Level of Consciousness: awake  Oxygen Supplementation: face mask  Level of Supplemental Oxygen (L/min / FiO2): 6  Independent Airway: airway patency satisfactory and stable  Dentition: dentition unchanged  Vital Signs Stable: post-procedure vital signs reviewed and stable  Report to RN Given: handoff report given  Patient transferred to: PACU    Handoff Report: Identifed the Patient, Identified the Reponsible Provider, Reviewed the pertinent medical history, Discussed the surgical course, Reviewed Intra-OP anesthesia mangement and issues during anesthesia, Set expectations for post-procedure period and Allowed opportunity for questions and acknowledgement of understanding  Vitals:  Vitals Value Taken Time   /55 24 1130   Temp     Pulse 69 24 1132   Resp 12 24 1132   SpO2 100 % 24 1132   Vitals shown include unfiled device data.    Electronically Signed By: YOLETTE Wigigns CRNA  September 3, 2024  11:33 AM

## 2024-09-03 NOTE — OR NURSING
Dr. Nieto aware patient urine output went from 150ml the first hour to 50ml the second hour. No further orders at this time.     Ok to proceed to the floor as along as MAPs are 65 or above.

## 2024-09-03 NOTE — PROGRESS NOTES
"CLINICAL NUTRITION SERVICES - ASSESSMENT NOTE     Nutrition Prescription    RECOMMENDATIONS FOR MDs/PROVIDERS TO ORDER:  ADAT to regular    Malnutrition Status:    Pt meets criteria for at least moderate malnutrition in the context of acute on chronic illness    Recommendations already ordered by Registered Dietitian (RD):  PRN snacks/supplements    Future/Additional Recommendations:  Minimize diet restrictions as able d/t high calorie/protein needs post-transplant.  Oral supplements as needed to help meet nutritional needs.     Provide post-transplant diet ed and handouts as able.    Obtain NFPE and nutrition hx as able.       REASON FOR ASSESSMENT  Lexy Cobb is a 79 year old female seen by the dietitian for MD Order- Assess & Educate post-op SOT    NUTRITION HISTORY  Pt seen by Outpatient SOT RD 10/2019. Per that note:  Nutrition Assessment  Pt cooks for self and does not follow a renal diet. She does add salt to her cooking and reports she \"eats out as much as I can so I don't have to cook\".      Pt with h/o gastric bypass in 1973.     Appetite: very good      Vitamins, Supplements, Pertinent Meds: mg ox, B12 injection   Herbal Medicines/Supplements: none      Diet Recall  Breakfast None    Lunch Soup and 1/2 s/w (out) or something similar at home    Dinner Ques[place]illa (out) or meat, potatoes, gravy at home    Snacks Radishes    Beverages Water/carbonated water, coffee    Alcohol 2-3 vodka tonic per day    Dining out A few times/week       Physical Activity  None      CURRENT NUTRITION ORDERS  Diet: RAMILA-Clear Liquid  Intake/Tolerance: Pt just arrived to unit s/p DDKT    LABS  Labs reviewed  -Phos 6.0 (H)    MEDICATIONS  Reviewed    ANTHROPOMETRICS  Height:   Ht Readings from Last 2 Encounters:   07/01/24 1.638 m (5' 4.5\")   04/11/24 1.638 m (5' 4.5\")   Most Recent Weight: 55.7 kg (122 lb 12.8 oz)    IBW: 56.8 kg   BMI: 20.75 kg/m2; Normal BMI  Weight History: ~8.3% wt loss within the past 3 months  Wt " Readings from Last 20 Encounters:   09/02/24 55.7 kg (122 lb 12.8 oz)   07/01/24 59.6 kg (131 lb 6.4 oz)   04/24/24 62.7 kg (138 lb 3.2 oz)   04/11/24 59.4 kg (131 lb)   07/31/21 71.7 kg (158 lb)   10/10/19 73.6 kg (162 lb 3.2 oz)   10/10/19 73.5 kg (162 lb)   11/05/13 71.2 kg (157 lb)   10/28/13 69.4 kg (153 lb)   Care Everywhere  60.3 kg (133 lb) 06/05/2024 10:26 AM CDT     60.3 kg (133 lb) 03/08/2024 10:51 AM CST     Dosing Weight: 56 kg (actual, based on admit wt of 55.7 kg on 9/2)    ASSESSED NUTRITION NEEDS:  Estimated Energy Needs: 1604-2630 kcals (30-35 Kcal/Kg)  Justification: increased needs post-op SOT x 6-8 wks  Estimated Protein Needs:  grams protein (1.3-2 gm/Kg)  Justification: increased needs post op SOT x 6-8 wks  Estimated Fluid Needs: per MD pending fluid status and adequate UOP    PHYSICAL FINDINGS  See malnutrition section below.  -S/p DDKT today 9/3    MALNUTRITION  % Intake: Unable to assess**  % Weight Loss: > 7.5% in 3 months (severe)  Subcutaneous Fat Loss: Unable to assess**  Muscle Loss: Unable to assess**  Fluid Accumulation/Edema: Mild (2+ edema per RN flowsheets)  Malnutrition Diagnosis: Pt meets criteria for at least moderate malnutrition in the context of acute on chronic illness  **pt just transferred to unit, deferred in-person visit today    NUTRITION DIAGNOSIS:  Food and nutrition-related knowledge deficit r/t length of time since previous post-transplant education AEB review of chart record, and MD consult for nutrition education.     INTERVENTIONS  Implementation  Nutrition Education: Unable to provide d/t not appropriate. Pt just arrived to unit s/p DDKT.    Goals  1. Patient will verbalize understanding of 3 important aspects of post-transplant diet guidelines.   2. PO intake >50% meals TID.    Monitoring/Evaluation  Progress toward goals will be monitored and evaluated per protocol.    Sylvia Dent RD, LD  Available on Vocera - can search by name or unit  Dietitian  **Clinical Nutrition is no longer available via pager

## 2024-09-03 NOTE — ANESTHESIA PROCEDURE NOTES
Airway       Patient location during procedure: OR       Procedure Start/Stop Times: 9/3/2024 6:33 AM  Staff -        Anesthesiologist:  Glenn Cody MD       Resident/Fellow: Robert Wells MD       Performed By: with residents and resident       Procedure performed by resident/fellow/CRNA in presence of a teaching physician.    Consent for Airway        Urgency: elective  Indications and Patient Condition       Indications for airway management: lenora-procedural       Induction type:intravenous       Mask difficulty assessment: 1 - vent by mask    Final Airway Details       Final airway type: endotracheal airway       Successful airway: ETT - single and Oral  Endotracheal Airway Details        ETT size (mm): 7.0       Cuffed: yes       Successful intubation technique: direct laryngoscopy       DL Blade Type: MAC 3       Grade View of Cords: 1       Adjucts: stylet       Position: Right       Measured from: lips       Secured at (cm): 21    Post intubation assessment        Placement verified by: capnometry, equal breath sounds and chest rise        Number of attempts at approach: 1       Number of other approaches attempted: 0       Secured with: pink tape       Ease of procedure: easy       Dentition: Intact and Unchanged    Medication(s) Administered   Medication Administration Time: 9/3/2024 6:33 AM

## 2024-09-03 NOTE — OR NURSING
Dr. Nieto at bedside, ok to proceed to the floor once patient meets criteria and labs, x-ray and ultrasound are completed.

## 2024-09-03 NOTE — PROGRESS NOTES
Admitted/transferred from:   Time of arrival on unit 2030  2 RN full  skin assessment completed by Sara BENSON and Pilar ADAMS  Skin assessment finding: issues found L PIV, healing abd incision x2 JENNIFER, generalized bruising on R and L arms    Interventions/actions: skin interventions        Will continue to monitor.

## 2024-09-03 NOTE — CONSULTS
"Care Management Initial Consult    General Information  Assessment completed with: Patient,    Type of CM/SW Visit: Initial Assessment    Primary Care Provider verified and updated as needed: No   Readmission within the last 30 days: no previous admission in last 30 days      Reason for Consult: discharge planning  Advance Care Planning: Advance Care Planning Reviewed: no concerns identified  Declined having a HCD or wanting a blank one at this time     Communication Assessment  Patient's communication style: spoken language (English or Bilingual)    Hearing Difficulty or Deaf: yes   Wear Glasses or Blind: yes    Cognitive  Cognitive/Neuro/Behavioral: WDL  Level of Consciousness: alert  Arousal Level: opens eyes spontaneously  Orientation: oriented x 4             Living Environment:   People in home: spouse Black  Current living Arrangements: house      Able to return to prior arrangements: yes     Family/Social Support:  Care provided by: self  Provides care for: no one  Marital Status:   Support system: Children,   Black       Description of Support System: Supportive, Involved    Support Assessment: Adequate family and caregiver support    Current Resources:   Patient receiving home care services: No        Community Resources: None  Equipment currently used at home: none  Supplies currently used at home: None    Employment/Financial:  Employment Status: retired        Financial Concerns: other (see comments) (\"We'll see\")     Does the patient's insurance plan have a 3 day qualifying hospital stay waiver?  No    Lifestyle & Psychosocial Needs:  Social Determinants of Health     Food Insecurity: Not on file   Depression: Not at risk (5/24/2024)    [facility] from [facility]    PHQ-2     PHQ-2 Score: 2   Housing Stability: Not on file   Tobacco Use: Low Risk  (8/21/2024)    Received from [facility]    Patient History     Smoking Tobacco Use: Never     Smokeless Tobacco Use: Never     " "Passive Exposure: Not on file   Financial Resource Strain: Not on file   Alcohol Use: Not on file   Transportation Needs: Not on file   Physical Activity: Not on file   Interpersonal Safety: Unknown (8/7/2024)    Received from [facility]    Humiliation, Afraid, Rape, and Kick questionnaire     Fear of Current or Ex-Partner: Not on file     Emotionally Abused: Patient unable to answer     Physically Abused: Patient unable to answer     Sexually Abused: Patient unable to answer   Stress: Not on file   Social Connections: Not on file   Health Literacy: Not on file       Functional Status:  Prior to admission patient needed assistance:   Dependent ADLs:: Independent  Dependent IADLs:: Independent       Mental Health Status:  Mental Health Status: No Current Concerns       Chemical Dependency Status:  Chemical Dependency Status: No Current Concerns             Values/Beliefs:  Spiritual, Cultural Beliefs, Yazidi Practices, Values that affect care: yes       Cultural/Yazidi Practices Patient Routinely Participates In: prayer  Values/Beliefs Comment: consult placed to spiritual care; RNCC consulted to assist with \"adjustment to disability\"    Discussed  Partnership in Safe Discharge Planning  document with patient/family: No    Additional Information:  RNCC spoke with patient at bedside and introduced care management role. Patient agreeable to start the conversation regarding patient's trajectory of care and potential discharge needs.    The following has been updated in the patient's chart: PCP, address, contact and insurance. Patient endorsed wanting her nephrologist (Dr Neri) as well as PCP (Dr Rm) sent a hand-off upon discharge. HCD not on file, although it was noted in previous notes she had one to scan in. At this time, she declines a blank HCD.  is NOK.     Previous Arrangements: Patient comes from home with spouse, performing ADLs and iADLs independently. Spouse was at bedside prior to this " "assessment and he appeared able-bodied and a good historian as well as their son.  Previous DME: None  Transportation: Patient typically drives self; family will be able to drive her home and to appointments as needed  Finances: When asked if she has concerns, she responded with \"We'll see.\" Patient stated she is currently receiving social security and has insurance. Reassurance given that care management team always attempts to assist patient in finding services that are covered if they are needed at time of discharge  Psychosocial: Patient/family identify as Ca[employer]ism; RNCC placed a consult to  services at this time.      Next Steps:   -follow through course of hospital stay for post-discharge needs    Writing RNCC will now defer future discharge needs to the primary care management team on unit 7A, and they have been made aware of this transition. It was a pleasure taking part in this patient's plan of care.    Olimpia Mari RN Care Coordinator  [facility]  [phone] (updated daily)        "

## 2024-09-03 NOTE — OP NOTE
Transplant Surgery  Operative Note     Procedure date:  09/03/24    Preoperative diagnosis:  Chronic renal failure due to Hypertension, NSAID overuse, triamterene and one remaining kidney after right nephrectomy for RCC    Postoperative diagnosis:  Same,     Procedure:  1. Left kidney  transplant,  Donation after Brain Death, Right  iliac fossa, without vascular reconstruction. A J-J ureteral stent was NOT placed.  2. Kidney allograft preparation on Back Table      Surgeon:  OLEKSANDR ZUNIGA    Fellow/Assistant:  Micha Nieto MD , fellow,   There was no qualified resident to assist with this procedure.    Anesthesia:  General    Specimen:  None    Drains:  Erlin drain    Urine output:  280 mls    Estimated blood loss:  200 cc    Fluids administered:   2500 cc     Indication: The patient has Chronic renal failure due to hypertenion, nsaid use, triamterene and solitary kidney s/p nephrectomy for RCC and received an organ offer for a Donation after Brain Death kidney allograft. After discussing the risks and benefits of proceeding, the patient agreed to proceed with surgery and provided informed consent.  Findings: Integrity of recipient artery: atherosclerotic soft plaque   Intraoperative Events: no unusual events, some bleeding from the donated kidney, the kidney had dense adherent fat which we did not completely remove due to the risk of decapsilating the kidney and thus there was some oozing from the perinephric fat,     Final ABO/Crossmatch verification: After the donor organ arrived to the operating room and prior to anastomosis, I participated in the transplant pre-verification upon organ receipt timeout by visually verifying the donor ID, organ and laterality, donor blood type, recipient unique identifier, recipient blood type, and that the donor and recipient are blood type compatible. The crossmatch was done prospectively; the T cell flow crossmatch result was negative and B cell flow crossmatch result was  negative prior to anastomosis.  The patient received Basiliximab on induction.    Donor Organ Information:   Donor UNOS ID:  [id number]    Donor arterial clamp on:  9/2/2024 10:25 PM    Total ischemic time:  679 min    Cold ischemic time:  679 min    Warm ischemic time:  39 min    Preservation fluid:  UW      Back Table Details:   Procedure:  Bench preparation of the kidney allograft for transplantation without vascular reconstruction    Surgeon:  OLEKSANDR ZUNIGA    Faculty Co-Surgeon:  OLEKSANDR ZUNIGA    Fellow/Assistant:  Micha Nieto MD fellow, No other qualified resident available    Donor arrival to recipient room:  9/3/2024  8:20 AM    Graft injury:  None    Graft biopsy:  yes    Organ received on:  Pump    Pump resistance:  0.17    Pump flow:  149    Arterial anatomy:  Single artery on a patch    Donor arterial quality:  adequate    Venous anatomy:  Single vein    Ureteral anatomy:  Single ureter    Any reconstruction:  No     Artery:  No reconstruction    Vein:  No reconstruction     Complications: None.    Findings:  adherent fat, and copious fat on the kidney      None.    Back Table Preparation:  The donor kidney was received and inspected. It had been flushed with heparinized. The graft was prepared on the back table by removing perinephric fat and ligating venous tributaries and lymphatics. The ureter was also cleaned of excess tissue. If required, reconstruction was performed as detailed above. The kidney was stored in iced cold preservation solution until ready for transplantation. Faculty was present for the critical portions of the procedure.    Operative Procedure:   Arterial anastomosis start:  9/3/2024  9:05 AM    Arterial unclamp:  9/3/2024  9:44 AM    Extra vessels used:    no      The patient was brought to the operating room, placed in a supine position, and a time out was performed. Sequential compression devices were placed on both lower extremities and general endotracheal anesthesia was  induced.  The patient was given IV antibiotics and a Gutierrez catheter. A central line was placed by Anesthesia service. The abdomen was then shaved, prepped, and draped in the usual sterile fashion.  An incision was made in the right lower quadrant and carried down through the subcutaneous tissue and the abdominal wall fascia. If encountered, the epigastric vessels were ligated in continuity, divided and secured with surgical clips. The right iliac artery and vein were exposed. The retractor system was placed and the lymphatics overlying the vessels were serially ligated and divided.     The patient was not heparinized. We applied atraumatic vascular clamps and the donor kidney was brought to the operative field. We made a venotomy and the renal vein was anastomosed to the recipient right external iliac vein  in an end-to-side fashion. An arteriotomy was made and the donor renal artery was anastomosed to the recipient right external iliac artery  in an end to side fashion. The patient was simultaneously loaded with IV mannitol, Lasix and volume. The renal artery was protected and the clamps were removed. After several cardiac cycles, we opened the renal artery and the kidney had excellent reperfusion and was firm, with mild edema, without congestion, and pink .    The transplant ureter was managed by creating a Lisch Gregoir anastomosis with absorbable suture. The kidney made immediate large amount of urine prior to implantation.    Hemostasis was obtained, the anastomoses inspected, and the kidney placed in the iliac fossa. After placement, the vessel lay was inspected and found to be acceptable. The kidney position was lateral. The field was irrigated with antibiotic solution. A drain was placed. The retractor was removed and the abdominal wall fascia reapproximated. Subcutaneous tissues were irrigated and hemostasis obtained.  The skin was reapproximated with running subcuticular stitch and dermabond was applied.    All needle, sponge and instrument counts were correct x 2. The patient was awakened, extubated, and transferred to PACU for post-op monitoring. Faculty was present for key portions of the procedure.    I was present during the key portions of the procedure, and I was immediately available for the entire procedure. There was no qualified resident available to assist with the entire procedure. The fellow noted above participated as the first assistant in all parts of the dictated procedure and was the primary in the opening and closure with assistance from me as needed.

## 2024-09-03 NOTE — PROGRESS NOTES
Patient removed from OS waitlist after  donor kidney transplant. OS ID [id number].    Donor Has Risk Criteria for Transmission of HIV/HCV/HBV: No  Recipient Notified of Risk Criteria: N/A

## 2024-09-03 NOTE — DISCHARGE INSTRUCTIONS
Nutrition Services - Post-Transplant Diet Guidelines   Follow recommendations on the Guide to Your Diet after Transplant handout (summary below).    You have increased energy and protein needs for six to eight weeks after transplant. Your goal is to consume at least 75 grams of protein per day during this time frame.   Eat a heart-healthy diet (low sodium, low saturated and trans-fat) once you are outside of the 6-8 week post-transplant window, and once your appetite improves to normal  In some cases (but not in all cases), adjust potassium intake   Take calcium and vitamin D supplement if your doctor or team orders  Take measures to prevent food poisoning: store and prepare foods to the proper temperature, practice good handwashing, heat all deli meat (to 165 degrees Fahrenheit), avoid raw fish and meats (including uncooked eggs, non-pasteurized or raw milk, and mold-ripened, non-pasteurized, and raw cheeses [including Brie, Camembert, Roquefort, Stilton, Gorgonzola and Diaz or other soft, unpasteurized cheeses such as Mexican queso fresco]), and throw out leftovers older than two days.   Do not consume grapefruit or pomegranate-containing products. These can interact with your medications.   Avoid the following post txp d/t risk for rejection, unknown effects on the organs, and/or potential interactions with immunosuppressants:       - Herbal, Chinese, holistic, chiropractic, natural, alternative medicines and supplements       - Detoxes and cleanses       - Weight loss pills       - Protein powders or other products with extracts or herbs (ie green tea extract)  If you have any nutrition-related questions or concerns after discharge, please contact our outpatient transplant dietitian, Kaur Diaz at [phone]

## 2024-09-04 ENCOUNTER — DOCUMENTATION ONLY (OUTPATIENT)
Dept: TRANSPLANT | Facility: CLINIC | Age: 79
End: 2024-09-04
Payer: COMMERCIAL

## 2024-09-04 ENCOUNTER — TELEPHONE (OUTPATIENT)
Dept: PHARMACY | Facility: CLINIC | Age: 79
End: 2024-09-04
Payer: COMMERCIAL

## 2024-09-04 PROBLEM — D84.9 IMMUNOSUPPRESSION (H): Status: ACTIVE | Noted: 2024-09-04

## 2024-09-04 LAB
ANION GAP SERPL CALCULATED.3IONS-SCNC: 12 MMOL/L (ref 7–15)
BASOPHILS # BLD AUTO: 0 10E3/UL (ref 0–0.2)
BASOPHILS NFR BLD AUTO: 0 %
BUN SERPL-MCNC: 46.1 MG/DL (ref 8–23)
CALCIUM SERPL-MCNC: 7.7 MG/DL (ref 8.8–10.4)
CHLORIDE SERPL-SCNC: 112 MMOL/L (ref 98–107)
CREAT SERPL-MCNC: 2.21 MG/DL (ref 0.51–0.95)
DONOR IDENTIFICATION: NORMAL
DSA COMMENTS: NORMAL
DSA PRESENT: NO
DSA TEST METHOD: NORMAL
EGFRCR SERPLBLD CKD-EPI 2021: 22 ML/MIN/1.73M2
EOSINOPHIL # BLD AUTO: 0 10E3/UL (ref 0–0.7)
EOSINOPHIL NFR BLD AUTO: 0 %
ERYTHROCYTE [DISTWIDTH] IN BLOOD BY AUTOMATED COUNT: 15.2 % (ref 10–15)
GLUCOSE BLDC GLUCOMTR-MCNC: 129 MG/DL (ref 70–99)
GLUCOSE BLDC GLUCOMTR-MCNC: 142 MG/DL (ref 70–99)
GLUCOSE BLDC GLUCOMTR-MCNC: 149 MG/DL (ref 70–99)
GLUCOSE BLDC GLUCOMTR-MCNC: 184 MG/DL (ref 70–99)
GLUCOSE SERPL-MCNC: 147 MG/DL (ref 70–99)
HBV DNA SERPL QL NAA+PROBE: NORMAL
HCO3 SERPL-SCNC: 19 MMOL/L (ref 22–29)
HCT VFR BLD AUTO: 25.4 % (ref 35–47)
HCV RNA SERPL QL NAA+PROBE: NORMAL
HGB BLD-MCNC: 7.8 G/DL (ref 11.7–15.7)
HGB BLD-MCNC: 8.2 G/DL (ref 11.7–15.7)
HGB BLD-MCNC: 8.4 G/DL (ref 11.7–15.7)
HGB BLD-MCNC: 9 G/DL (ref 11.7–15.7)
HIV1+2 RNA SERPL QL NAA+PROBE: NORMAL
IMM GRANULOCYTES # BLD: 0.1 10E3/UL
IMM GRANULOCYTES NFR BLD: 1 %
LYMPHOCYTES # BLD AUTO: 0.8 10E3/UL (ref 0.8–5.3)
LYMPHOCYTES NFR BLD AUTO: 8 %
MAGNESIUM SERPL-MCNC: 1.4 MG/DL (ref 1.7–2.3)
MCH RBC QN AUTO: 33.1 PG (ref 26.5–33)
MCHC RBC AUTO-ENTMCNC: 30.7 G/DL (ref 31.5–36.5)
MCV RBC AUTO: 108 FL (ref 78–100)
MONOCYTES # BLD AUTO: 1.1 10E3/UL (ref 0–1.3)
MONOCYTES NFR BLD AUTO: 11 %
NEUTROPHILS # BLD AUTO: 8.1 10E3/UL (ref 1.6–8.3)
NEUTROPHILS NFR BLD AUTO: 80 %
NRBC # BLD AUTO: 0 10E3/UL
NRBC BLD AUTO-RTO: 0 /100
ORGAN: NORMAL
PHOSPHATE SERPL-MCNC: 5 MG/DL (ref 2.5–4.5)
PLATELET # BLD AUTO: 218 10E3/UL (ref 150–450)
POTASSIUM SERPL-SCNC: 3.2 MMOL/L (ref 3.4–5.3)
POTASSIUM SERPL-SCNC: 3.2 MMOL/L (ref 3.4–5.3)
POTASSIUM SERPL-SCNC: 3.5 MMOL/L (ref 3.4–5.3)
RBC # BLD AUTO: 2.36 10E6/UL (ref 3.8–5.2)
SA 1  COMMENTS: NORMAL
SA 1  COMMENTS: NORMAL
SA 1 CELL: NORMAL
SA 1 CELL: NORMAL
SA 1 TEST METHOD: NORMAL
SA 1 TEST METHOD: NORMAL
SA 2 CELL: NORMAL
SA 2 CELL: NORMAL
SA 2 COMMENTS: NORMAL
SA 2 COMMENTS: NORMAL
SA 2 TEST METHOD: NORMAL
SA 2 TEST METHOD: NORMAL
SA1 HI RISK ABY: NORMAL
SA1 HI RISK ABY: NORMAL
SA1 MOD RISK ABY: NORMAL
SA1 MOD RISK ABY: NORMAL
SA2 HI RISK ABY: NORMAL
SA2 HI RISK ABY: NORMAL
SA2 MOD RISK ABY: NORMAL
SA2 MOD RISK ABY: NORMAL
SODIUM SERPL-SCNC: 143 MMOL/L (ref 135–145)
UNACCEPTABLE ANTIGENS: NORMAL
UNOS CPRA: 0
WBC # BLD AUTO: 10.1 10E3/UL (ref 4–11)

## 2024-09-04 PROCEDURE — 36592 COLLECT BLOOD FROM PICC: CPT | Performed by: STUDENT IN AN ORGANIZED HEALTH CARE EDUCATION/TRAINING PROGRAM

## 2024-09-04 PROCEDURE — 258N000003 HC RX IP 258 OP 636: Performed by: STUDENT IN AN ORGANIZED HEALTH CARE EDUCATION/TRAINING PROGRAM

## 2024-09-04 PROCEDURE — 36591 DRAW BLOOD OFF VENOUS DEVICE: CPT | Performed by: STUDENT IN AN ORGANIZED HEALTH CARE EDUCATION/TRAINING PROGRAM

## 2024-09-04 PROCEDURE — 84132 ASSAY OF SERUM POTASSIUM: CPT | Performed by: NURSE PRACTITIONER

## 2024-09-04 PROCEDURE — 80048 BASIC METABOLIC PNL TOTAL CA: CPT | Performed by: STUDENT IN AN ORGANIZED HEALTH CARE EDUCATION/TRAINING PROGRAM

## 2024-09-04 PROCEDURE — 85025 COMPLETE CBC W/AUTO DIFF WBC: CPT | Performed by: STUDENT IN AN ORGANIZED HEALTH CARE EDUCATION/TRAINING PROGRAM

## 2024-09-04 PROCEDURE — 36415 COLL VENOUS BLD VENIPUNCTURE: CPT | Performed by: STUDENT IN AN ORGANIZED HEALTH CARE EDUCATION/TRAINING PROGRAM

## 2024-09-04 PROCEDURE — 250N000009 HC RX 250: Performed by: PHYSICIAN ASSISTANT

## 2024-09-04 PROCEDURE — 85018 HEMOGLOBIN: CPT | Performed by: STUDENT IN AN ORGANIZED HEALTH CARE EDUCATION/TRAINING PROGRAM

## 2024-09-04 PROCEDURE — 250N000013 HC RX MED GY IP 250 OP 250 PS 637: Performed by: SURGERY

## 2024-09-04 PROCEDURE — 258N000003 HC RX IP 258 OP 636: Performed by: NURSE PRACTITIONER

## 2024-09-04 PROCEDURE — 250N000013 HC RX MED GY IP 250 OP 250 PS 637: Performed by: PHYSICIAN ASSISTANT

## 2024-09-04 PROCEDURE — 83735 ASSAY OF MAGNESIUM: CPT | Performed by: STUDENT IN AN ORGANIZED HEALTH CARE EDUCATION/TRAINING PROGRAM

## 2024-09-04 PROCEDURE — 87086 URINE CULTURE/COLONY COUNT: CPT

## 2024-09-04 PROCEDURE — 258N000003 HC RX IP 258 OP 636: Performed by: PHYSICIAN ASSISTANT

## 2024-09-04 PROCEDURE — 36592 COLLECT BLOOD FROM PICC: CPT | Performed by: NURSE PRACTITIONER

## 2024-09-04 PROCEDURE — 250N000012 HC RX MED GY IP 250 OP 636 PS 637: Performed by: STUDENT IN AN ORGANIZED HEALTH CARE EDUCATION/TRAINING PROGRAM

## 2024-09-04 PROCEDURE — 87086 URINE CULTURE/COLONY COUNT: CPT | Performed by: PHYSICIAN ASSISTANT

## 2024-09-04 PROCEDURE — 85004 AUTOMATED DIFF WBC COUNT: CPT | Performed by: STUDENT IN AN ORGANIZED HEALTH CARE EDUCATION/TRAINING PROGRAM

## 2024-09-04 PROCEDURE — 250N000011 HC RX IP 250 OP 636: Performed by: STUDENT IN AN ORGANIZED HEALTH CARE EDUCATION/TRAINING PROGRAM

## 2024-09-04 PROCEDURE — 250N000011 HC RX IP 250 OP 636: Performed by: NURSE PRACTITIONER

## 2024-09-04 PROCEDURE — 250N000012 HC RX MED GY IP 250 OP 636 PS 637: Performed by: PHYSICIAN ASSISTANT

## 2024-09-04 PROCEDURE — 99233 SBSQ HOSP IP/OBS HIGH 50: CPT | Performed by: INTERNAL MEDICINE

## 2024-09-04 PROCEDURE — 84100 ASSAY OF PHOSPHORUS: CPT | Performed by: STUDENT IN AN ORGANIZED HEALTH CARE EDUCATION/TRAINING PROGRAM

## 2024-09-04 PROCEDURE — 84132 ASSAY OF SERUM POTASSIUM: CPT | Performed by: STUDENT IN AN ORGANIZED HEALTH CARE EDUCATION/TRAINING PROGRAM

## 2024-09-04 PROCEDURE — 250N000013 HC RX MED GY IP 250 OP 250 PS 637: Performed by: STUDENT IN AN ORGANIZED HEALTH CARE EDUCATION/TRAINING PROGRAM

## 2024-09-04 PROCEDURE — 120N000003 HC R&B IMCU UMMC

## 2024-09-04 RX ORDER — POTASSIUM CHLORIDE 750 MG/1
20 TABLET, EXTENDED RELEASE ORAL ONCE
Status: COMPLETED | OUTPATIENT
Start: 2024-09-04 | End: 2024-09-04

## 2024-09-04 RX ORDER — EPINEPHRINE 1 MG/ML
0.3 INJECTION, SOLUTION, CONCENTRATE INTRAVENOUS EVERY 5 MIN PRN
Status: CANCELLED | OUTPATIENT
Start: 2024-09-07

## 2024-09-04 RX ORDER — ALBUTEROL SULFATE 90 UG/1
1-2 AEROSOL, METERED RESPIRATORY (INHALATION)
Status: CANCELLED
Start: 2024-09-07

## 2024-09-04 RX ORDER — PREDNISONE 10 MG/1
10 TABLET ORAL DAILY
Status: DISCONTINUED | OUTPATIENT
Start: 2024-09-24 | End: 2024-09-06 | Stop reason: HOSPADM

## 2024-09-04 RX ORDER — METOPROLOL TARTRATE 25 MG/1
25 TABLET, FILM COATED ORAL 2 TIMES DAILY
Status: DISCONTINUED | OUTPATIENT
Start: 2024-09-04 | End: 2024-09-06 | Stop reason: HOSPADM

## 2024-09-04 RX ORDER — METHYLPREDNISOLONE SODIUM SUCCINATE 125 MG/2ML
125 INJECTION, POWDER, LYOPHILIZED, FOR SOLUTION INTRAMUSCULAR; INTRAVENOUS
Status: CANCELLED
Start: 2024-09-07

## 2024-09-04 RX ORDER — NICOTINE POLACRILEX 4 MG
15-30 LOZENGE BUCCAL
Status: DISCONTINUED | OUTPATIENT
Start: 2024-09-04 | End: 2024-09-06 | Stop reason: HOSPADM

## 2024-09-04 RX ORDER — HEPARIN SODIUM,PORCINE 10 UNIT/ML
5-20 VIAL (ML) INTRAVENOUS DAILY PRN
Status: CANCELLED | OUTPATIENT
Start: 2024-09-07

## 2024-09-04 RX ORDER — PANTOPRAZOLE SODIUM 40 MG/1
40 TABLET, DELAYED RELEASE ORAL 2 TIMES DAILY
Status: DISCONTINUED | OUTPATIENT
Start: 2024-09-04 | End: 2024-09-06 | Stop reason: HOSPADM

## 2024-09-04 RX ORDER — PREDNISONE 5 MG/1
5 TABLET ORAL DAILY
Status: DISCONTINUED | OUTPATIENT
Start: 2024-10-01 | End: 2024-09-06 | Stop reason: HOSPADM

## 2024-09-04 RX ORDER — DEXTROSE MONOHYDRATE 25 G/50ML
25-50 INJECTION, SOLUTION INTRAVENOUS
Status: DISCONTINUED | OUTPATIENT
Start: 2024-09-04 | End: 2024-09-06 | Stop reason: HOSPADM

## 2024-09-04 RX ORDER — METHYLPREDNISOLONE SODIUM SUCCINATE 125 MG/2ML
100 INJECTION, POWDER, LYOPHILIZED, FOR SOLUTION INTRAMUSCULAR; INTRAVENOUS ONCE
Status: COMPLETED | OUTPATIENT
Start: 2024-09-05 | End: 2024-09-05

## 2024-09-04 RX ORDER — PREDNISONE 20 MG/1
20 TABLET ORAL DAILY
Status: DISCONTINUED | OUTPATIENT
Start: 2024-09-10 | End: 2024-09-06 | Stop reason: HOSPADM

## 2024-09-04 RX ORDER — MEPERIDINE HYDROCHLORIDE 25 MG/ML
25 INJECTION INTRAMUSCULAR; INTRAVENOUS; SUBCUTANEOUS EVERY 30 MIN PRN
Status: CANCELLED | OUTPATIENT
Start: 2024-09-07

## 2024-09-04 RX ORDER — ALBUTEROL SULFATE 0.83 MG/ML
2.5 SOLUTION RESPIRATORY (INHALATION)
Status: CANCELLED | OUTPATIENT
Start: 2024-09-07

## 2024-09-04 RX ORDER — PREDNISONE 20 MG/1
40 TABLET ORAL DAILY
Status: DISCONTINUED | OUTPATIENT
Start: 2024-09-08 | End: 2024-09-06 | Stop reason: HOSPADM

## 2024-09-04 RX ORDER — DIPHENHYDRAMINE HYDROCHLORIDE 50 MG/ML
50 INJECTION INTRAMUSCULAR; INTRAVENOUS
Status: CANCELLED
Start: 2024-09-07

## 2024-09-04 RX ORDER — SODIUM CHLORIDE, SODIUM LACTATE, POTASSIUM CHLORIDE, CALCIUM CHLORIDE 600; 310; 30; 20 MG/100ML; MG/100ML; MG/100ML; MG/100ML
INJECTION, SOLUTION INTRAVENOUS CONTINUOUS
Status: DISCONTINUED | OUTPATIENT
Start: 2024-09-04 | End: 2024-09-05

## 2024-09-04 RX ORDER — CALCIUM CARBONATE 500(1250)
500 TABLET ORAL 2 TIMES DAILY
Status: DISCONTINUED | OUTPATIENT
Start: 2024-09-04 | End: 2024-09-06 | Stop reason: HOSPADM

## 2024-09-04 RX ORDER — MAGNESIUM OXIDE 400 MG/1
400 TABLET ORAL
Status: DISCONTINUED | OUTPATIENT
Start: 2024-09-04 | End: 2024-09-06 | Stop reason: HOSPADM

## 2024-09-04 RX ORDER — HEPARIN SODIUM (PORCINE) LOCK FLUSH IV SOLN 100 UNIT/ML 100 UNIT/ML
5 SOLUTION INTRAVENOUS
Status: CANCELLED | OUTPATIENT
Start: 2024-09-07

## 2024-09-04 RX ADMIN — FUROSEMIDE 80 MG: 10 INJECTION, SOLUTION INTRAMUSCULAR; INTRAVENOUS at 08:36

## 2024-09-04 RX ADMIN — POTASSIUM CHLORIDE 20 MEQ: 750 TABLET, EXTENDED RELEASE ORAL at 13:10

## 2024-09-04 RX ADMIN — SENNOSIDES AND DOCUSATE SODIUM 1 TABLET: 8.6; 5 TABLET ORAL at 08:36

## 2024-09-04 RX ADMIN — METOPROLOL TARTRATE 25 MG: 25 TABLET, FILM COATED ORAL at 20:23

## 2024-09-04 RX ADMIN — ACETAMINOPHEN 975 MG: 325 TABLET ORAL at 08:36

## 2024-09-04 RX ADMIN — METHOCARBAMOL 500 MG: 500 TABLET ORAL at 08:36

## 2024-09-04 RX ADMIN — METHOCARBAMOL 500 MG: 500 TABLET ORAL at 00:10

## 2024-09-04 RX ADMIN — SENNOSIDES AND DOCUSATE SODIUM 1 TABLET: 8.6; 5 TABLET ORAL at 20:23

## 2024-09-04 RX ADMIN — ACETAMINOPHEN 975 MG: 325 TABLET ORAL at 16:30

## 2024-09-04 RX ADMIN — ACETAMINOPHEN 975 MG: 325 TABLET ORAL at 00:10

## 2024-09-04 RX ADMIN — INSULIN ASPART 1 UNITS: 100 INJECTION, SOLUTION INTRAVENOUS; SUBCUTANEOUS at 16:35

## 2024-09-04 RX ADMIN — MYCOPHENOLATE MOFETIL 750 MG: 250 CAPSULE ORAL at 18:17

## 2024-09-04 RX ADMIN — OXYCODONE HYDROCHLORIDE 2.5 MG: 5 TABLET ORAL at 13:10

## 2024-09-04 RX ADMIN — INSULIN ASPART 1 UNITS: 100 INJECTION, SOLUTION INTRAVENOUS; SUBCUTANEOUS at 13:18

## 2024-09-04 RX ADMIN — METOPROLOL TARTRATE 5 MG: 5 INJECTION INTRAVENOUS at 04:05

## 2024-09-04 RX ADMIN — MAGNESIUM OXIDE TAB 400 MG (241.3 MG ELEMENTAL MG) 400 MG: 400 (241.3 MG) TAB at 13:10

## 2024-09-04 RX ADMIN — SODIUM CHLORIDE 1000 ML: 9 INJECTION, SOLUTION INTRAVENOUS at 01:07

## 2024-09-04 RX ADMIN — OXYCODONE HYDROCHLORIDE 5 MG: 5 TABLET ORAL at 22:34

## 2024-09-04 RX ADMIN — METHYLPREDNISOLONE SODIUM SUCCINATE 250 MG: 125 INJECTION, POWDER, LYOPHILIZED, FOR SOLUTION INTRAMUSCULAR; INTRAVENOUS at 11:08

## 2024-09-04 RX ADMIN — TACROLIMUS 2 MG: 0.5 CAPSULE ORAL at 08:35

## 2024-09-04 RX ADMIN — SODIUM CHLORIDE, SODIUM LACTATE, POTASSIUM CHLORIDE, CALCIUM CHLORIDE AND DEXTROSE MONOHYDRATE: 5; 600; 310; 30; 20 INJECTION, SOLUTION INTRAVENOUS at 00:18

## 2024-09-04 RX ADMIN — PANTOPRAZOLE SODIUM 40 MG: 40 TABLET, DELAYED RELEASE ORAL at 08:36

## 2024-09-04 RX ADMIN — SULFAMETHOXAZOLE AND TRIMETHOPRIM 1 TABLET: 400; 80 TABLET ORAL at 08:36

## 2024-09-04 RX ADMIN — POLYETHYLENE GLYCOL 3350 17 G: 17 POWDER, FOR SOLUTION ORAL at 08:36

## 2024-09-04 RX ADMIN — TACROLIMUS 2 MG: 0.5 CAPSULE ORAL at 18:17

## 2024-09-04 RX ADMIN — SODIUM CHLORIDE, POTASSIUM CHLORIDE, SODIUM LACTATE AND CALCIUM CHLORIDE: 600; 310; 30; 20 INJECTION, SOLUTION INTRAVENOUS at 13:11

## 2024-09-04 RX ADMIN — ATORVASTATIN CALCIUM 10 MG: 10 TABLET, FILM COATED ORAL at 08:36

## 2024-09-04 RX ADMIN — ACETAMINOPHEN 975 MG: 325 TABLET ORAL at 23:21

## 2024-09-04 RX ADMIN — CALCIUM 500 MG: 500 TABLET ORAL at 20:23

## 2024-09-04 RX ADMIN — MYCOPHENOLATE MOFETIL 750 MG: 250 CAPSULE ORAL at 08:36

## 2024-09-04 RX ADMIN — PANTOPRAZOLE SODIUM 40 MG: 40 TABLET, DELAYED RELEASE ORAL at 20:23

## 2024-09-04 RX ADMIN — VALGANCICLOVIR 450 MG: 450 TABLET, FILM COATED ORAL at 08:36

## 2024-09-04 ASSESSMENT — ACTIVITIES OF DAILY LIVING (ADL)
ADLS_ACUITY_SCORE: 25
ADLS_ACUITY_SCORE: 25
ADLS_ACUITY_SCORE: 22
ADLS_ACUITY_SCORE: 31
ADLS_ACUITY_SCORE: 25
ADLS_ACUITY_SCORE: 31
ADLS_ACUITY_SCORE: 31
ADLS_ACUITY_SCORE: 25
ADLS_ACUITY_SCORE: 31
ADLS_ACUITY_SCORE: 25
ADLS_ACUITY_SCORE: 25
ADLS_ACUITY_SCORE: 22
ADLS_ACUITY_SCORE: 31
ADLS_ACUITY_SCORE: 25
DEPENDENT_IADLS:: INDEPENDENT
ADLS_ACUITY_SCORE: 31
ADLS_ACUITY_SCORE: 25
ADLS_ACUITY_SCORE: 25

## 2024-09-04 NOTE — PHARMACY-TRANSPLANT NOTE
Adult Kidney Transplant Post Operative Note    79 year old female s/p DDKT on 9/3/2024 2/2 chronic renal failure 2/2 solitary kidney after native nephrectomy for RCC followed by hypertension, nsaid overuse, triamterene use   + Post op c/b Afib      Planned immunosuppression regimen per kidney transplant protocol:  INDUCTION:  Low intensity, PRA 0%  9/3 (POD 0): basiliximab 20 mg IV x1, methylprednisolone 500 mg x1  9/4 (POD 1): methylprednisolone 250 mg x1  9/5 (POD 2): methylprednisolone 100 mg x1  9/6 (POD 3): steroid taper  9/7 (POD 4): basiliximab 20 mg IV x1     MAINTENANCE:   - Mycophenolate 750 mg BID  - Tacrolimus with goal trough levels of 8-10 mcg/L for first 6 months post-transplant     Opportunistic pathogen prophylaxis includes:  - PJP: trimethoprim/sulfamethoxazole  - CMV D?/R-: Valganciclovir for 3 to 6 months duration tentatively       Atrial fibrillation:  Post-op Afib. Only had 1 episode prior during stress test. Zio 5/2024 with runs of NSVT and PSVT. Rate controlled. 25 mg metoprolol BID. EKG unchanged from prior. Hold PTA Procardia.       Patient is not enrolled in medication study.    Pharmacy will monitor for medication interactions and immunosuppression levels in conjunction with the team. Medication therapy needs for discharge planning will continue to be addressed throughout the current admission via multidisciplinary rounds and order review.  Pharmacy will make recommendations as appropriate.   Garfield Moore, VivD

## 2024-09-04 NOTE — TELEPHONE ENCOUNTER
A pharmacist spent 30 minutes providing medication teaching with Lexy Cobb for discharge with a focus on new medications/dose changes.  The patient was very alert and engaged and asked great questions.The discharge medication list was reviewed with the patient/family and the following points were discussed, as applicable: Name, description, purpose, dose/strength, duration of medications, special storage requirements, common side effects, food/medications to avoid, action to be taken if dose is missed, when to call MD, safe disposal of unused medications, and how to obtain refills.  The patient will be responsible for managing medications. Additionally, the following transplant related education was covered: Purpose of medication card, Medication videos, Timing of medications and day of lab draw considerations , Emesis, Prescription Insurance , and Discharge process for receiving meds   Patient will  transplant supplies including 7 day pill organizer, thermometer, and BP monitor at the discharge pharmacy along with medications.  Patient chooses to receive medications from [facility] pharmacy.   Clinical Pharmacy Consult:                                                      Transplant Specific:   Date of Transplant: 9/3/2024  Type of Transplant: kidney  First Transplant: yes  History of rejection: no    Immunosuppression Regimen   TAC 2 mg qAM & 2 mg qPM, Prednisone taper being determined, and  mg qAM & 750 mg qPM  Patient specific goal: 8 to 10  Most recent level: pending, date: pending  Immunosuppressant Levels:  pending  Pt adherent to lab draws: yes  Scr:   Lab Results   Component Value Date    CR 2.21 09/04/2024    CR 3.68 10/10/2019     Side effects: no side effects    Prophylactic Medications  PJP Prophylactic: Bactrim SS three times per week  Scheduled Discontinue Date: Lifelong Anticipated date N/A    Antifungal: Not needed thus far  Scheduled Discontinue Date: N/A Anticipated date  N/A    CMV Prophylactic: CrCl 10 to 24 mL/minute: Valcyte 450 mg twice weekly   Scheduled Discontinue Date:  ?? (3 to 6 months - donor CMV status still pending at time of this note).  Anticipated date TBD    Acid Reducer: Protonix (pantoprazole)  Scheduled Reviewed Date:  TBD    Vascular Prophylactic: TBD  Scheduled Discontinue Date:  TBD        Reminders:  Bring to first clinic appt: med box, med card, bp monitor, all medications being taken, and lab book.  2.   MTM pharmacist visit on first clinic appt and if ok, again in 3 to 4 months during follow up appt.  3.   Avoid Grapefruit and Grapefruit juice.   4.   Avoid herbal supplements. If wish to take other medications or supplements, call your coordinator.   5.   Keep lab appts.   6.   Can use apps on phone like Spindle to help manage medication lists and reminders.   7.   Make sure you are protecting your skin by wearing long sleeves and applying sunscreen to exposed skin, for any significant time in the sun.     Transplant Coordinator is Gina Huizar.      Julian [facility]

## 2024-09-04 NOTE — PROGRESS NOTES
Handoff information     Type of transplant: DDKT   Date of transplant: 9/3/24  Direct/non-direct/PEP- n/a  Transplant history: Naive  (Why they lost previous tx graft)  Outstanding items for patient: none  Pertinent history: CKD from HTN, NSAID, prior triamterene use reduced nephron mass, potentially secondary FSGS, solitary kidney (s/p unilateral nephrectomy for RCC) not yet on HD. RCC s/p right native nephrectomy. S/p Gastric bypass. Following with GI for multiple colon polyps (seen by outside genetics and colorectal) due for colonoscopy with Dr. Baker through [facility] in December.   Barriers to post transplant care: none  Patient Status Form Completed: done  A2 to B transplant?: no

## 2024-09-04 NOTE — PHARMACY-ADMISSION MEDICATION HISTORY
Pharmacist Admission Medication History    Admission medication history is complete. The information provided in this note is only as accurate as the sources available at the time of the update.    Information Source(s): Patient via in-person, dispense record    Pertinent Information:   - Patient knew all medications, doses and last doses.  - Home pharmacy is [facility]    Changes made to PTA medication list:  Added:   Missing instructions to cholecalciferol  Furosemide 40 mg tablets, scheduled and PRN  Hydralazine 100 mg tablets  Metoprolol XL 50 mg tablets  Deleted:   Furosemide 20 mg tablets  Hydralazine 25 mg tablets  Metoprolol  mg tablets  Clonidine 0.1 mg tabs (patient reports not taking and no recent fills)  Triamcinolone ointment (patient reports she no longers takes this and no recent fills)  Changed:   Tylenol PM from [facility] PRN to [facility]      Allergies reviewed with patient and updates made in EHR: yes    Medication History Completed By:   Janna Toney, PharmD      PTA Med List   Medication Sig Last Dose    calcitRIOL (ROCALTROL) 0.25 MCG capsule Take 0.25 mcg by mouth daily 9/2/2024 at AM    cholecalciferol 125 MCG (5000 UT) CAPS Take 5,000 Units by mouth daily. 9/2/2024 at AM    furosemide (LASIX) 40 MG tablet Take 80 mg by mouth daily. 9/2/2024 at AM    furosemide (LASIX) 40 MG tablet Take 40 mg by mouth daily as needed (leg swelling). PRN in the afternoon, in addition to scheduled AM dose Past Week at unknown    hydrALAZINE (APRESOLINE) 100 MG tablet Take 100 mg by mouth 3 times daily. 9/2/2024 at PM    magnesium oxide (MAG-) 400 MG tablet Take 1 tablet by mouth daily Past Week at AM    metoprolol succinate ER (TOPROL XL) 50 MG 24 hr tablet Take 50 mg by mouth daily. 9/2/2024 at AM    Multiple Vitamins-Minerals (PRESERVISION AREDS 2 PO) Take 1 capsule by mouth 2 times daily. 9/2/2024 at AM    NIFEdipine osmotic (PROCARDIA XL) 90 MG 24 hr tablet Take 90 mg by mouth daily 9/2/2024 at  2000    omeprazole (PRILOSEC) 20 MG capsule Take 1 capsule by mouth daily 9/2/2024 at 0800    oxyBUTYnin ER (DITROPAN XL) 5 MG 24 hr tablet Take 5 mg by mouth daily 9/2/2024 at AM    sodium bicarbonate 650 MG tablet TAKE ONE TABLET BY MOUTH TWICE DAILY 9/2/2024 at AM

## 2024-09-04 NOTE — PROGRESS NOTES
Transplant Surgery  Inpatient Daily Progress Note  09/04/2024    Assessment & Plan: Lexy Cobb is a 79 year old female who presents with CKD 5 from likely from HTN, NSAID use, prior triamterene use, reduced nephron mass, potentially secondary FSGS, solitary kidney (s/p unilateral nephrectomy for RCC) not yet on HD most recent GFR 8 8/2024 who is s/p DBD kidney transplant on 9/3/2024.     Graft function: POD#1 DBDKT  Kidney:   -Cr: 5.18-->4.24 --> 2.21, good urine output, Post-op US normal    Immunosuppressed status secondary to medications:   -cPRA= 0  -Basiliximab 20mg intra-op, next dose POD#4  -Steroid taper per protocol  -MMF: 750 BID  -Tacro: Goal level 8-10. Check level POD#3    Neuro:   Acute post-op pain: Controlled. Continue scheduled acetaminophen and oxycodone PRN.     Hematology:   Anemia of chronic disease: Hgb 8.8 from 10.2 pre-op. No signs of bleeding. Continue to monitor.     Cardiorespiratory:   A fib: Post-op Afib. Only had 1 episode prior during stress test. Zio 5/2024 with runs of NSVT and PSVT. Rate controlled. 25 mg metoprolol BID. EKG unchanged from prior. Hold PTA Procardia.   Hx of HTN: Metoprolol 25 mg BID      GI/Nutrition:   Diet: Clear liquid diet.   GERD: Tums   Bowel Regimen: Senna     Endocrine:   Steroid induced hyperglycemia: sliding scale insulin available   Hgb A1c= 4.4     Fluid/Electrolytes:   CVP=1-5  MIV: Straight rate today    :   Gutierrez x3 days due to new surgical anastomosis   Mixed urinary incontinence: Hold home Ditropan.     Infectious disease: Afebrile. No acute issues     Prophylaxis: DVT, pneumocystis (Bactrim), CMV (Valcyte)    Disposition: 7A, discharge pending duration of post-op recovery - potentially discharge Friday to home    OLEGARIO/Fellow/Resident Provider:   YOLETTE Mckenna, CNP  Adult Solid Organ Transplant   Contact: Vocera Web Console    Kidney Transplant Surgery  NP/PA 4801 (Mon-Fri 6a-5p)  Fellow 2423   Overnight: Surg Resident. [facility]  Transplant Surgery Abdominal    Faculty: Dr. House  _________________________________________________________________    Interval History: History is obtained from the patient  Doing well overall. Doing well this AM, she offers no specific complaints.     ROS:   A 10-point review of systems was negative except as noted above.    Meds:  Current Facility-Administered Medications   Medication Dose Route Frequency Provider Last Rate Last Admin    acetaminophen (TYLENOL) tablet 975 mg  975 mg Oral Q8H Micha Nieto MD   975 mg at 09/04/24 0836    atorvastatin (LIPITOR) tablet 10 mg  10 mg Oral Daily Micha Nieto MD   10 mg at 09/04/24 0836    insulin aspart (NovoLOG) injection (RAPID ACTING)  1-7 Units Subcutaneous TID AC Kym Cheung PA-C        insulin aspart (NovoLOG) injection (RAPID ACTING)  1-5 Units Subcutaneous At Bedtime Kym Cheung PA-C        magnesium oxide (MAG-OX) tablet 400 mg  400 mg Oral Daily with lunch Kym Cheung PA-C        mycophenolate (GENERIC EQUIVALENT) capsule 750 mg  750 mg Oral BID IS Micha Nieto MD   750 mg at 09/04/24 0836    pantoprazole (PROTONIX) EC tablet 40 mg  40 mg Oral BID Ciera Fregoso MD        polyethylene glycol (MIRALAX) Packet 17 g  17 g Oral Daily Micha Nieto MD   17 g at 09/04/24 0836    senna-docusate (SENOKOT-S/PERICOLACE) 8.6-50 MG per tablet 1 tablet  1 tablet Oral BID Micha Nieto MD   1 tablet at 09/04/24 0836    sodium chloride (PF) 0.9% PF flush 10 mL  10 mL Intracatheter Q8H Micha Nieto MD   10 mL at 09/03/24 1633    sulfamethoxazole-trimethoprim (BACTRIM) 400-80 MG per tablet 1 tablet  1 tablet Oral Once per day on Monday Wednesday Friday Micha Nieto MD   1 tablet at 09/04/24 0836    tacrolimus (GENERIC EQUIVALENT) capsule 2 mg  2 mg Oral BID IS Micha Nieto MD   2 mg at 09/04/24 0835    valGANciclovir (VALCYTE) tablet 450 mg  450 mg Oral Once per day on Wednesday Saturday Micha Nieto MD   450 mg at  09/04/24 0836       Physical Exam:     Admit Weight: 55.7 kg (122 lb 12.8 oz)    Current vitals:   BP (!) 150/82 (BP Location: Right arm)   Pulse 99   Temp 98.1  F (36.7  C) (Oral)   Resp 16   Wt 55.7 kg (122 lb 12.8 oz)   SpO2 100%   BMI 20.75 kg/m      Vital sign ranges:    Temp:  [97.8  F (36.6  C)-99.1  F (37.3  C)] 98.1  F (36.7  C)  Pulse:  [] 99  Resp:  [8-21] 16  BP: ()/(45-96) 150/82  SpO2:  [96 %-100 %] 100 %    General Appearance: in no apparent distress.   Skin: Warm, perfused  Heart: Irregularly irregular. She appears adequately perfused   Lungs: Non-labored on room air   Abdomen: The abdomen is soft, non-tender, and incision is c/d/i  : Gutierrez is present.  Urine is clear.  Extremities: edema: mild  Neurologic: awake, alert, and oriented. Tremor absent..     Data:   CMP  Recent Labs   Lab 09/04/24  1107 09/04/24  0934 09/04/24  0606 09/03/24  1718 09/03/24  1136 09/03/24  0959 09/02/24  2157   NA  --   --  143  --  135 134* 143   POTASSIUM  --  3.2* 3.5  3.5   < > 4.9 4.7 4.3   CHLORIDE  --   --  112*  --  107  --  109*   CO2  --   --  19*  --  14*  --  14*   *  --  147*  --  132* 117* 91   BUN  --   --  46.1*  --  75.0*  --  84.5*   CR  --   --  2.21*  --  4.24*  --  5.18*   GFRESTIMATED  --   --  22*  --  10*  --  8*   CIARA  --   --  7.7*  --  8.2*  --  7.9*   ICAW  --   --   --   --   --  4.3*  --    MAG  --   --  1.4*  --  1.5*  --   --    PHOS  --   --  5.0*  --  6.0*  --   --    ALBUMIN  --   --   --   --   --   --  4.0   BILITOTAL  --   --   --   --   --   --  0.3   ALKPHOS  --   --   --   --   --   --  50   AST  --   --   --   --   --   --  13   ALT  --   --   --   --   --   --  <5    < > = values in this interval not displayed.     CBC  Recent Labs   Lab 09/04/24  0934 09/04/24  0606 09/03/24  1718 09/03/24  1136 09/03/24  0959 09/02/24  2157   HGB 8.4* 7.8*   < > 8.8*   < > 10.2*   WBC  --  10.1  --  10.3  --  6.0   PLT  --  218  --  223  --  233   A1C  --   --   --    --   --  4.4    < > = values in this interval not displayed.

## 2024-09-04 NOTE — PLAN OF CARE
Vitals: cvp 0-3. Provider aware.   Tele : afib team aware, metoprolol restarted. Room air. Capno stable.   Blood glucose: POD 1 .  Pain/nausea: refused tylenol. Refused pain meds as of now. Emesis x 1. Zofran x 1. Compazine x 1.   Diet: clears took oral pills fine.   Lines: PIVR, L saline locked. TLIJ infusing cvp, mivf and replacements.   : Gutierrez OP good. 200-650 ML. Yellow.   GI: no bm, no flatus.   Drains: R RASHEEDA OP good OP 69 ml, dark red. CDI.   Skin: Admitted/transferred from:   Time of arrival on unit 1726  2 RN full  skin assessment completed by Eli Recio.   Skin assessment finding: issues found abdomen incision bloody, cleaned, applied ABD pad and will CTM. Has a R RASHEEDA CDI.     Interventions/actions: skin interventions monitor OP incision.   Mobility: noob, resting.

## 2024-09-04 NOTE — PROGRESS NOTES
BP (!) 131/92 (BP Location: Right arm)   Pulse 99   Temp 98.1  F (36.7  C) (Oral)   Resp 16   Wt 59 kg (130 lb 1.6 oz)   SpO2 98%   BMI 21.99 kg/m      Shift: [id number]  Isolation Status: none  VS: stable on room air, afebrile  Neuro: Aox4  Behaviors: calm, cooperative  BG: achs  Labs: K= 3.2, Hgb=9  Respiratory: WDL  Cardiac: a-fib  Pain/Nausea: abdominal pain/denies nausea  PRN: robaxin x1, oxycodone 2.5 mg x1  Diet: Regular  IV Access: Right PIV, right triple lumen IJ  Infusion(s): LR@75  Lines/Drains: PIV, internal jugular, right RASHEEDA  GI/: spicer/no flatus  Skin: RLQ hockey stick, JENNIFER, right RASHEEDA  Mobility: ax1  Events/Education: pharm ed done  Plan: Continue POC

## 2024-09-04 NOTE — CONSULTS
Care Management Initial Consult    General Information  Assessment completed with: Patient,    Type of CM/SW Visit: Initial Assessment    Primary Care Provider verified and updated as needed: No   Readmission within the last 30 days: no previous admission in last 30 days      Reason for Consult: discharge planning, other (see comments) (Post Tx Support)  Advance Care Planning: Advance Care Planning Reviewed: other (see comments), no concerns identified (Pt verbalized plan for  to bring in copy of HCD)  Declined having a HCD or wanting a blank one at this time       Communication Assessment  Patient's communication style: spoken language (English or Bilingual)    Hearing Difficulty or Deaf: yes   Wear Glasses or Blind: yes    Cognitive  Cognitive/Neuro/Behavioral: WDL  Level of Consciousness: alert  Arousal Level: opens eyes spontaneously  Orientation: oriented x 4             Living Environment:   People in home: spouse   (Black)  Current living Arrangements: house, other (see comments) (single level home)      Able to return to prior arrangements: yes       Family/Social Support:  Care provided by: self  Provides care for: no one  Marital Status:   Support system: , Children   (Black)       Description of Support System: Supportive, Involved    Support Assessment: Adequate family and caregiver support    Current Resources:   Patient receiving home care services: No        Community Resources: None  Equipment currently used at home: none  Supplies currently used at home: None    Employment/Financial:  Employment Status: retired        Financial Concerns: none   Referral to Financial Worker: No     SOT* LIAISON Mathew) IS A (Kidney) TRANSPLANT PATIENT  (DATE OF TRANSPLANT: (09/03/24) )    HEALTH BENEFIT: ([facility]) MEDICARE PART B & PART D ADVANTAGE PLAN    ID# [id number] [facility]# [id number] (EFFECTIVE (01/01/2021) )    PROCESSING INFO: ID# [id number] [facility]# MNU PCN# [id number] BIN# [id number] (01/01/2021)  )    (DO NOT BILL TO ORIGINAL MEDICARE ID# [id number]  (PART A EFFECTIVE (02/01/2012) , PART B EFFECTIVE (02/01/2012) )    PT WILL PAY %20 COST OF MEDICATION AT TIME OF SERVICE         PHARMACY BENEFIT: ([facility]) PART D    PROCESSING INFO: ID# [id number] GRP# MNU PCN# [id number] BIN# [id number] (EFFECTIVE (01/01/2021) )    DEDUCTIBLE ($232)     COPAY STRUCTURE    INITIAL PHASE:    $ (0) FOR TIER 1    $ (10) FOR TIER 2    $ (47) FOR TIER 3    $ (100) COINSURANCE FOR TIER 4    % (29) COINSURANCE FOR TIER 5 MEDICATIONS UNTIL TOTAL YEAR DRUG COSTS REACH $5030    COVERAGE GAP (DONUT HOLE):    %25 COINSURANCE FOR GENERIC    %25 COINSURANCE FOR BRAND UNTIL TOTAL YEARLY PATIENT OUT-OF-POCKET COSTS REACH $8000    CATASTROPHIC PHASE:    PATIENT PAYS 0%         TEST CLAIM SPECIALTY #28    MYCOPHENOLATE 250mg (#240/30DS) $10    PROGRAF 1mg (#120/30DS) Product/Service not covered Plan/Benefit Exclusion    TACROLIMUS 1mg (#120/30DS)$10    CYCLOSPORINE 100mg (#60/30DS) $153.05    VALGANCICLOVIR 450mg (#60/30DS) $10    VALACYCLOVIR 1gm (#90/30DS) $10              TEST CLAIM DISCHARGE #27 (18 YEARS AND OLDER):    MYCOPHENOLATE 250mg (#240/30DS) $10    PROGRAF 1mg (#120/30DS) Product/Service not covered Plan/Benefit Exclusion    TACROLIMUS 1mg (#120/30DS) $10    CYCLOSPORINE 100mg (#60/30DS) $153.05    VALGANCICLOVIR 450mg (#60/30DS) $10    VALACYCLOVIR 1gm (#90/30DS) $10         **CAN PATIENT FILL AT [facility] FOR MEDICATIONS LISTED? YES         **IMMUNOS ARE CURRENTLY PAYING THROUGH WITH PART D BENEFITS. ONCE ([facility]) HAS THE REPORT FROM MEDICARE THAT IT IS A MEDICARE COVERED TRANSPLANT THEN CLAIMS WILL SWITCH OVER AND PAY WITH PART B BENEFITS WITH %20 COINSURANCE/PATIENT RESPONSIBILITY    Does the patient's insurance plan have a 3 day qualifying hospital stay waiver?  No    Lifestyle & Psychosocial Needs:  Social Determinants of Health     Food Insecurity: Not on file   Depression: Not at risk (5/24/2024)    [facility]    PHQ-2     PHQ-2 Score: 2   Housing Stability: Not on file   Tobacco Use: Low Risk  (8/21/2024)    Received from [facility]    Patient History     Smoking Tobacco Use: Never     Smokeless Tobacco Use: Never     Passive Exposure: Not on file   Financial Resource Strain: Not on file   Alcohol Use: Not on file   Transportation Needs: Not on file   Physical Activity: Not on file   Interpersonal Safety: Unknown (8/7/2024)    Received from [facility]    Humiliation, Afraid, Rape, and Kick questionnaire     Fear of Current or Ex-Partner: Not on file     Emotionally Abused: Patient unable to answer     Physically Abused: Patient unable to answer     Sexually Abused: Patient unable to answer   Stress: Not on file   Social Connections: Not on file   Health Literacy: Not on file       Functional Status:  Prior to admission patient needed assistance:   Dependent ADLs:: Independent  Dependent IADLs:: Independent  Assesssment of Functional Status: At functional baseline    Mental Health Status:  Mental Health Status: No Current Concerns       Chemical Dependency Status:  Chemical Dependency Status: No Current Concerns             Values/Beliefs:  Spiritual, Cultural Beliefs, Congregational Practices, Values that affect care: no       Cultural/Congregational Practices Patient Routinely Participates In: prayer  Values/Beliefs Comment: consult placed to spiritual care; RNCC consulted to assist with \"adjustment to disability\"    Discussed  Partnership in Safe Discharge Planning  document with patient/family: No    Additional Information:  Patient 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 MSW met with patient at bedside  to update psychosocial assessment and provide brief education about SW role while inpatient, as well as expectations/requirements and follow up needs post-transplant. SW also provided education about need for compliance with transplant medications, and explained ESRD Medicare benefits and medication coverage under " Medicare part B.  Medicare 2728 forms completed and signed by patient.    Lexy actively engaged in conversation specific to psychosocial well being post tx. She reported no concerns or needs at this time. Plan is for Lexy to return home to [place] at time of discharge. Here  (Black) and son (Rm) will fill care giving roll and drive her to appointments. She reports her mental health is at typical baseline with minimal symptoms of anxiety, depression, etc. She asked questions relating to insurance coverage of medications and verbalized some anxiety. MSW provided education regarding ESRD Medicare and coverage at this point. Lexy verbalized understanding. MSW reassured Lexy she could reach out to social work if further concern persists and/or larger financial strains become apparent. Lexy appeared comforted by this.     Next Steps: SOT SW team to continue to follow to monitor post tx psychosocial needs.    JULIAN Spear, [facility]  Clinical       [facility]  Kidney, Pancreas, Auto-Islet   [address] [place] [id number]  [email or web address]  Office: [phone]  Fax: [phone]  Gender pronouns: she/her  Employed by [employer]

## 2024-09-04 NOTE — CONSULTS
SPIRITUAL HEALTH SERVICES - Consult Note  [facility]A  Referral Source/Reason for Visit: Consult    Summary and Recommendations -  Lexy was sitting in her chair waiting for her  and son to visit. She says she does not have any immediate needs as she is coming to terms with what the doctors are telling her. She does have greg. She declined having a return visit at this time.    Plan[facility] to [facility] to support upon request.     Katelyn Grey  Staff    Pager     * [facility] available 24/7 for emergent requests/referrals, either by paging the on-call  or by entering an ASAP/STAT consult in [facility], which will also page the on-call .*

## 2024-09-04 NOTE — PLAN OF CARE
Goal Outcome Evaluation:      Plan of Care Reviewed With: patient    Overall Patient Progress: improving    Outcome Evaluation: Pain managed, VSS on RA  /64 (BP Location: Left arm)   Pulse 87   Temp 98.3  F (36.8  C) (Oral)   Resp 16   Wt 55.7 kg (122 lb 12.8 oz)   SpO2 98%   BMI 20.75 kg/m      Shift: 4461-3781  Isolation Status: Standard  VS: Stable on RA, afebrile. CVP 1-5  Neuro: Aox4  Behaviors: Calm and cooperative  BG: POD 1:  Labs/Imaging: K: 3.5, Hgb: 8.2  Respiratory: WDL  Cardiac: On tele, a fib, team aware.  Pain/Nausea: Pain 0-5/10, denies nausea  PRN: Robaxin  Diet: Clear liquid  LDA: PIV x2, R internal jugular, R RASHEEDA with 180mL dark red output  Infusion(s): D5LR maintenance, NS, 1/2 NS  GI/: Gutierrez in place with 150-350mL/hr out. Passing gas, no post-op BM.  Skin: Abd incision, sutured & dermabonded, covered with ABD  Mobility: Assist of 1-2 with gb and walker. Not OOB  Plan: Continue with plan of care, will notify MD with any changes.

## 2024-09-04 NOTE — PROGRESS NOTES
[facility]  Transplant Nephrology Progress Note  Date of Admission:  9/2/2024  Today's Date: 09/04/2024    Recommendations:   - No acute indications for dialysis.  - Will follow up oxalate levels due to history of gastric bypass (pending).  - Would make no changes in immunosuppression.  - Recommend potassium and magnesium replacement.    Assessment & Plan   # DDKT: Decreased creatinine.  Very good urine output.  No acute indications for dialysis.   - Baseline Creatinine: TBD   - Proteinuria: Not checked post transplant   - DSA Hx: No DSA   - Last cPRA: 0%   - BK Viremia: Not checked post transplant   - Kidney Tx Biopsy Hx: No biopsy history.    # Immunosuppression: Tacrolimus immediate release (goal 8-10), Mycophenolate mofetil (dose 750 mg every 12 hours), and Methylprednisolone (dose taper)   - Induction with Recent Transplant:  Low Intensity Protocol   - Continue with intensive monitoring of immunosuppression for efficacy and toxicity.   - Historical Changes in Immunosuppression: None   - Changes: Not at this time    # Infection Prevention:      - PJP: Sulfa/TMP (Bactrim)  - CMV: Valganciclovir (Valcyte); Patient is CMV IgG Ab discordant (D+/R-) and will continue on Valcyte x 6 months, then check CMV PCR monthly until 12 months post transplant.  - Thrush: None  - CMV IgG Ab High Risk Discordance (D+/R-): Yes  - EBV IgG Ab High Risk Discordance (D+/R-): No    # Hypertension: Controlled;  Goal BP: < 140/90 (Hospitalization goal)   - Changes: Not at this time    # Elevated Blood Glucose: Glucose generally running ~ 110-180s, secondary to high dose steroids    # Anemia in Chronic Renal Disease: Hgb: Stable      HIEU: No   - Iron studies: High iron saturation    # Mineral Bone Disorder:    - Secondary renal hyperparathyroidism; PTH level: Not checked recently        On treatment: Calcitriol  - Vitamin D; level: Normal        On supplement: Yes  - Calcium; level: Low         On supplement: No  - Phosphorus; level: High        On supplement: No    # Electrolytes:   - Potassium; level: Normal        On supplement: No  - Magnesium; level: Low        On supplement: Yes  - Bicarbonate; level: Low        On supplement: No    # Paroxysmal Atrial Fibrillation: Stable, ongoing atrial fibrillation with rate controlled on beta blocker.  Patient was reportedly in AF during her stress test in May 2024. Subsequent Zio patch was negative for AF.    # Other Significant PMH:   - GERD: Asymptomatic on PPI.    # Transplant History:  Etiology of Kidney Failure: Unknown etiology. CKD likely from hypertension, NSAID use, prior triamterene use, reduced nephron mass, potentially secondary FSGS, and solitary kidney (s/p unilateral nephrectomy for RCC).  Tx: DDKT  Transplant: 9/3/2024 (Kidney)  Significant transplant-related complications: None    Recommendations were communicated to the primary team via this note.    Andres Higginbotham MD  Transplant Nephrology  Contact information via Vocera Web Console or via [employer] Paging/Directory      Interval History  Ms. Cobb's creatinine is 2.21 (09/04 0606); Decreased.  Very good urine output.  Other significant labs/tests/vitals: Decreased serum potassium and magnesium.  Otherwise, stable electrolytes.  Stable hemoglobin.  No new events overnight.  No chest pain or shortness of breath.  No leg swelling.  No nausea and vomiting.  Bowel movements are none, but passing gas.  No fever, sweats or chills.    Review of Systems   4 point ROS was obtained and negative except as noted in the Interval History.    MEDICATIONS:  Current Facility-Administered Medications   Medication Dose Route Frequency Provider Last Rate Last Admin    acetaminophen (TYLENOL) tablet 975 mg  975 mg Oral Q8H Micha Nieto MD   975 mg at 09/04/24 0836    atorvastatin (LIPITOR) tablet 10 mg  10 mg Oral Daily Micha Nieto MD   10 mg at 09/04/24 0836    [START ON 9/7/2024] basiliximab  (SIMULECT) 20 mg in sodium chloride 0.9 % 50 mL infusion  20 mg Intravenous Once Kym Cheung PA-C        calcium carbonate 500 mg (elemental) (OSCAL) tablet 500 mg  500 mg Oral BID Kym Cheung PA-C        insulin aspart (NovoLOG) injection (RAPID ACTING)  1-7 Units Subcutaneous TID AC Kym Cheung PA-C   1 Units at 09/04/24 1318    insulin aspart (NovoLOG) injection (RAPID ACTING)  1-5 Units Subcutaneous At Bedtime Kym Cheung PA-C        magnesium oxide (MAG-OX) tablet 400 mg  400 mg Oral Daily with lunch Kym Cheung PA-C   400 mg at 09/04/24 1310    [START ON 9/5/2024] methylPREDNISolone Na Suc (solu-MEDROL) injection 100 mg  100 mg Intravenous Once Kym Cheung PA-C        metoprolol tartrate (LOPRESSOR) tablet 25 mg  25 mg Oral BID Kym Cheung PA-C        mycophenolate (GENERIC EQUIVALENT) capsule 750 mg  750 mg Oral BID IS Micha Nieto MD   750 mg at 09/04/24 0836    pantoprazole (PROTONIX) EC tablet 40 mg  40 mg Oral BID Ciera Fregoso MD        polyethylene glycol (MIRALAX) Packet 17 g  17 g Oral Daily Micha Nieto MD   17 g at 09/04/24 0836    [START ON 9/6/2024] predniSONE (DELTASONE) tablet 60 mg  60 mg Oral Daily Kym Cheung PA-C        Followed by    [START ON 9/8/2024] predniSONE (DELTASONE) tablet 40 mg  40 mg Oral Daily Kym Cheung PA-C        Followed by    [START ON 9/10/2024] predniSONE (DELTASONE) tablet 20 mg  20 mg Oral Daily Kym Cheung PA-C        Followed by    [START ON 9/17/2024] predniSONE (DELTASONE) tablet 15 mg  15 mg Oral Daily Kym Cheung PA-C        Followed by    [START ON 9/24/2024] predniSONE (DELTASONE) tablet 10 mg  10 mg Oral Daily Kym Cheung PA-C        Followed by    [START ON 10/1/2024] predniSONE (DELTASONE) tablet 5 mg  5 mg Oral Daily Kym Cheung PA-C        senna-docusate (SENOKOT-S/PERICOLACE) 8.6-50 MG per tablet 1 tablet  1 tablet Oral BID  Micha Nieto MD   1 tablet at 24 0836    sodium chloride (PF) 0.9% PF flush 10 mL  10 mL Intracatheter Q8H Micha Nieto MD   10 mL at 24 1633    sulfamethoxazole-trimethoprim (BACTRIM) 400-80 MG per tablet 1 tablet  1 tablet Oral Once per day on  Micha Nieto MD   1 tablet at 24 0836    tacrolimus (GENERIC EQUIVALENT) capsule 2 mg  2 mg Oral BID IS Micha Nieto MD   2 mg at 24 0835    valGANciclovir (VALCYTE) tablet 450 mg  450 mg Oral Once per day on  Micha Nieto MD   450 mg at 24 0836     Current Facility-Administered Medications   Medication Dose Route Frequency Provider Last Rate Last Admin    lactated ringers infusion   Intravenous Continuous Kym Cheung PA-C 75 mL/hr at 24 1311 New Bag at 24 1311       Physical Exam   Temp  Av.2  F (36.8  C)  Min: 97.8  F (36.6  C)  Max: 98.8  F (37.1  C)  Arterial Line BP  Min: 99/44  Max: 126/56  Arterial Line MAP (mmHg)  Av.5 mmHg  Min: 65 mmHg  Max: 84 mmHg      Pulse  Av.4  Min: 63  Max: 84 Resp  Av.9  Min: 8  Max: 21  SpO2  Av.8 %  Min: 98 %  Max: 100 %    CVP (mmHg): 4 mmHgBP (!) 131/92 (BP Location: Right arm)   Pulse 99   Temp 98.1  F (36.7  C) (Oral)   Resp 16   Wt 59 kg (130 lb 1.6 oz)   SpO2 98%   BMI 21.99 kg/m     Date 24 07 - 2459   Shift 1911-0798 6312-5980 9403-6466 24 Hour Total   INTAKE   I.V. 2500   2500   Shift Total(mL/kg) 2500(44.88)   2500(44.88)   OUTPUT   Urine 705 250  955   Drains 180   180   Blood 200   200   Shift Total(mL/kg) 1085(19.48) 250(4.49)  1335(23.97)   Weight (kg) 55.7 55.7 55.7 55.7      Admit Weight: 55.7 kg (122 lb 12.8 oz)     GENERAL APPEARANCE: alert and no distress  HENT: mouth without ulcers or lesions  RESP: lungs clear to auscultation - no rales, rhonchi or wheezes  CV: irregular rhythm and rate, no rub, no murmur  EDEMA: trace LE edema bilaterally  ABDOMEN: soft,  nondistended, nontender, bowel sounds normal  MS: extremities normal - no gross deformities noted, no evidence of inflammation in joints, no muscle tenderness  SKIN: no rash  TX KIDNEY: mild TTP  DIALYSIS ACCESS:  None    Data   All labs reviewed by me.  CMP  Recent Labs   Lab 09/04/24  1317 09/04/24  1107 09/04/24  0934 09/04/24  0606 09/04/24  0116 09/03/24  2150 09/03/24  1718 09/03/24  1136 09/03/24  0959 09/02/24  2157   NA  --   --   --  143  --   --   --  135 134* 143   POTASSIUM  --   --  3.2* 3.5  3.5 3.5 3.5   < > 4.9 4.7 4.3   CHLORIDE  --   --   --  112*  --   --   --  107  --  109*   CO2  --   --   --  19*  --   --   --  14*  --  14*   ANIONGAP  --   --   --  12  --   --   --  14  --  20*   * 129*  --  147*  --   --   --  132* 117* 91   BUN  --   --   --  46.1*  --   --   --  75.0*  --  84.5*   CR  --   --   --  2.21*  --   --   --  4.24*  --  5.18*   GFRESTIMATED  --   --   --  22*  --   --   --  10*  --  8*   CIARA  --   --   --  7.7*  --   --   --  8.2*  --  7.9*   MAG  --   --   --  1.4*  --   --   --  1.5*  --   --    PHOS  --   --   --  5.0*  --   --   --  6.0*  --   --    PROTTOTAL  --   --   --   --   --   --   --   --   --  6.7   ALBUMIN  --   --   --   --   --   --   --   --   --  4.0   BILITOTAL  --   --   --   --   --   --   --   --   --  0.3   ALKPHOS  --   --   --   --   --   --   --   --   --  50   AST  --   --   --   --   --   --   --   --   --  13   ALT  --   --   --   --   --   --   --   --   --  <5    < > = values in this interval not displayed.     CBC  Recent Labs   Lab 09/04/24  1406 09/04/24  0934 09/04/24  0606 09/04/24  0116 09/03/24  1718 09/03/24  1136 09/03/24  0959 09/02/24  3867   HGB 9.0* 8.4* 7.8* 8.2*   < > 8.8*   < > 10.2*   WBC  --   --  10.1  --   --  10.3  --  6.0   RBC  --   --  2.36*  --   --  2.61*  --  3.07*   HCT  --   --  25.4*  --   --  27.8*  --  32.7*   MCV  --   --  108*  --   --  107*  --  107*   MCH  --   --  33.1*  --   --  33.7*  --  33.2*   MCHC   --   --  30.7*  --   --  31.7  --  31.2*   RDW  --   --  15.2*  --   --  15.0  --  15.0   PLT  --   --  218  --   --  223  --  233    < > = values in this interval not displayed.     INR  Recent Labs   Lab 09/02/24  2157   INR 0.93   PTT 28     ABG  Recent Labs   Lab 09/03/24  0959   PH 7.25*   PCO2 35   PO2 155*   HCO3 15*   O2PER 40.0      Urine Studies  Recent Labs   Lab Test 09/02/24  2345 10/10/19  1415   COLOR Light Yellow Yellow   APPEARANCE Slightly Cloudy* Clear   URINEGLC Negative Negative   URINEBILI Negative Negative   URINEKETONE Negative Negative   SG 1.011 1.011   UBLD Negative Negative   URINEPH 6.0 5.0   PROTEIN 100* Negative   NITRITE Negative Negative   LEUKEST Large* Negative   RBCU 1 1   WBCU >182* 1     No lab results found.    No lab results found.    No lab results found.    IMAGING:  All imaging studies reviewed by me.

## 2024-09-05 ENCOUNTER — APPOINTMENT (OUTPATIENT)
Dept: PHYSICAL THERAPY | Facility: CLINIC | Age: 79
DRG: 652 | End: 2024-09-05
Attending: STUDENT IN AN ORGANIZED HEALTH CARE EDUCATION/TRAINING PROGRAM
Payer: COMMERCIAL

## 2024-09-05 LAB
ANION GAP SERPL CALCULATED.3IONS-SCNC: 9 MMOL/L (ref 7–15)
BACTERIA UR CULT: NO GROWTH
BACTERIA UR CULT: NO GROWTH
BASOPHILS # BLD AUTO: 0 10E3/UL (ref 0–0.2)
BASOPHILS NFR BLD AUTO: 0 %
BUN SERPL-MCNC: 30.3 MG/DL (ref 8–23)
CALCIUM SERPL-MCNC: 7.7 MG/DL (ref 8.8–10.4)
CELL TYPE ALLO: NORMAL
CELL TYPE ALLO: NORMAL
CELL TYPE AUTO: NORMAL
CHANNELSHIFTALLOB1: -64
CHANNELSHIFTALLOB1: -73
CHANNELSHIFTALLOB2: -68
CHANNELSHIFTALLOB2: -77
CHANNELSHIFTALLOT1: -52
CHANNELSHIFTALLOT1: -71
CHANNELSHIFTALLOT2: -47
CHANNELSHIFTALLOT2: -68
CHANNELSHIFTAUTOB1: -68
CHANNELSHIFTAUTOB2: -75
CHANNELSHIFTAUTOT1: -60
CHANNELSHIFTAUTOT2: -56
CHLORIDE SERPL-SCNC: 112 MMOL/L (ref 98–107)
COMMENT ALLOB2: NORMAL
COMMENT ALLOB2: NORMAL
CREAT SERPL-MCNC: 1.3 MG/DL (ref 0.51–0.95)
CROSSMATCHDATEALLO: NORMAL
CROSSMATCHDATEALLO: NORMAL
CROSSMATCHDATEAUTO: NORMAL
DONOR ALLO: NORMAL
DONOR ALLO: NORMAL
DONOR AUTO: NORMAL
DONORCELLDATE ALLO: NORMAL
DONORCELLDATE ALLO: NORMAL
DONORCELLDATE AUTO: NORMAL
EGFRCR SERPLBLD CKD-EPI 2021: 42 ML/MIN/1.73M2
EOSINOPHIL # BLD AUTO: 0 10E3/UL (ref 0–0.7)
EOSINOPHIL NFR BLD AUTO: 0 %
ERYTHROCYTE [DISTWIDTH] IN BLOOD BY AUTOMATED COUNT: 15.1 % (ref 10–15)
GLUCOSE BLDC GLUCOMTR-MCNC: 110 MG/DL (ref 70–99)
GLUCOSE BLDC GLUCOMTR-MCNC: 123 MG/DL (ref 70–99)
GLUCOSE BLDC GLUCOMTR-MCNC: 152 MG/DL (ref 70–99)
GLUCOSE BLDC GLUCOMTR-MCNC: 85 MG/DL (ref 70–99)
GLUCOSE BLDC GLUCOMTR-MCNC: 86 MG/DL (ref 70–99)
GLUCOSE SERPL-MCNC: 94 MG/DL (ref 70–99)
HCO3 SERPL-SCNC: 22 MMOL/L (ref 22–29)
HCT VFR BLD AUTO: 23.5 % (ref 35–47)
HGB BLD-MCNC: 7.3 G/DL (ref 11.7–15.7)
HGB BLD-MCNC: 7.4 G/DL (ref 11.7–15.7)
IMM GRANULOCYTES # BLD: 0.1 10E3/UL
IMM GRANULOCYTES NFR BLD: 1 %
LYMPHOCYTES # BLD AUTO: 1.1 10E3/UL (ref 0.8–5.3)
LYMPHOCYTES NFR BLD AUTO: 11 %
MAGNESIUM SERPL-MCNC: 1.4 MG/DL (ref 1.7–2.3)
MCH RBC QN AUTO: 33.6 PG (ref 26.5–33)
MCHC RBC AUTO-ENTMCNC: 31.1 G/DL (ref 31.5–36.5)
MCV RBC AUTO: 108 FL (ref 78–100)
MONOCYTES # BLD AUTO: 0.9 10E3/UL (ref 0–1.3)
MONOCYTES NFR BLD AUTO: 10 %
NEUTROPHILS # BLD AUTO: 7.7 10E3/UL (ref 1.6–8.3)
NEUTROPHILS NFR BLD AUTO: 78 %
NRBC # BLD AUTO: 0 10E3/UL
NRBC BLD AUTO-RTO: 0 /100
OXALATE SERPL-SCNC: <2 UMOL/L
PHOSPHATE SERPL-MCNC: 3 MG/DL (ref 2.5–4.5)
PLATELET # BLD AUTO: 161 10E3/UL (ref 150–450)
POS CUT OFF ALLO B: >69
POS CUT OFF ALLO B: >69
POS CUT OFF ALLO T: >53
POS CUT OFF ALLO T: >53
POS CUT OFF AUTO B: >69
POS CUT OFF AUTO T: >53
POTASSIUM SERPL-SCNC: 3.8 MMOL/L (ref 3.4–5.3)
RBC # BLD AUTO: 2.17 10E6/UL (ref 3.8–5.2)
RESULT ALLO B1: NORMAL
RESULT ALLO B1: NORMAL
RESULT ALLO B2: NORMAL
RESULT ALLO B2: NORMAL
RESULT ALLO T1: NORMAL
RESULT ALLO T1: NORMAL
RESULT ALLO T2: NORMAL
RESULT ALLO T2: NORMAL
RESULT AUTO B1: NORMAL
RESULT AUTO B2: NORMAL
RESULT AUTO T1: NORMAL
RESULT AUTO T2: NORMAL
SERUM DATE ALLO B1: NORMAL
SERUM DATE ALLO B1: NORMAL
SERUM DATE ALLO B2: NORMAL
SERUM DATE ALLO B2: NORMAL
SERUM DATE ALLO T1: NORMAL
SERUM DATE ALLO T1: NORMAL
SERUM DATE ALLO T2: NORMAL
SERUM DATE ALLO T2: NORMAL
SERUM DATE AUTO B1: NORMAL
SERUM DATE AUTO B2: NORMAL
SERUM DATE AUTO T1: NORMAL
SERUM DATE AUTO T2: NORMAL
SODIUM SERPL-SCNC: 143 MMOL/L (ref 135–145)
TESTMETHODALLO: NORMAL
TESTMETHODALLO: NORMAL
TESTMETHODAUTO: NORMAL
TREATMENT ALLO B1: NORMAL
TREATMENT ALLO B1: NORMAL
TREATMENT ALLO B2: NORMAL
TREATMENT ALLO B2: NORMAL
TREATMENT ALLO T1: NORMAL
TREATMENT ALLO T1: NORMAL
TREATMENT ALLO T2: NORMAL
TREATMENT ALLO T2: NORMAL
TREATMENT AUTO B1: NORMAL
TREATMENT AUTO B2: NORMAL
TREATMENT AUTO T1: NORMAL
TREATMENT AUTO T2: NORMAL
WBC # BLD AUTO: 9.7 10E3/UL (ref 4–11)

## 2024-09-05 PROCEDURE — 250N000013 HC RX MED GY IP 250 OP 250 PS 637: Performed by: SURGERY

## 2024-09-05 PROCEDURE — 84100 ASSAY OF PHOSPHORUS: CPT | Performed by: STUDENT IN AN ORGANIZED HEALTH CARE EDUCATION/TRAINING PROGRAM

## 2024-09-05 PROCEDURE — 250N000013 HC RX MED GY IP 250 OP 250 PS 637: Performed by: STUDENT IN AN ORGANIZED HEALTH CARE EDUCATION/TRAINING PROGRAM

## 2024-09-05 PROCEDURE — 97530 THERAPEUTIC ACTIVITIES: CPT | Mod: GP

## 2024-09-05 PROCEDURE — 36592 COLLECT BLOOD FROM PICC: CPT | Performed by: STUDENT IN AN ORGANIZED HEALTH CARE EDUCATION/TRAINING PROGRAM

## 2024-09-05 PROCEDURE — 85004 AUTOMATED DIFF WBC COUNT: CPT | Performed by: STUDENT IN AN ORGANIZED HEALTH CARE EDUCATION/TRAINING PROGRAM

## 2024-09-05 PROCEDURE — 97116 GAIT TRAINING THERAPY: CPT | Mod: GP

## 2024-09-05 PROCEDURE — 97161 PT EVAL LOW COMPLEX 20 MIN: CPT | Mod: GP

## 2024-09-05 PROCEDURE — 120N000003 HC R&B IMCU UMMC

## 2024-09-05 PROCEDURE — 258N000003 HC RX IP 258 OP 636: Performed by: PHYSICIAN ASSISTANT

## 2024-09-05 PROCEDURE — 250N000012 HC RX MED GY IP 250 OP 636 PS 637: Performed by: STUDENT IN AN ORGANIZED HEALTH CARE EDUCATION/TRAINING PROGRAM

## 2024-09-05 PROCEDURE — 36415 COLL VENOUS BLD VENIPUNCTURE: CPT | Performed by: NURSE PRACTITIONER

## 2024-09-05 PROCEDURE — 85018 HEMOGLOBIN: CPT | Performed by: NURSE PRACTITIONER

## 2024-09-05 PROCEDURE — 250N000011 HC RX IP 250 OP 636: Performed by: PHYSICIAN ASSISTANT

## 2024-09-05 PROCEDURE — 99233 SBSQ HOSP IP/OBS HIGH 50: CPT | Performed by: INTERNAL MEDICINE

## 2024-09-05 PROCEDURE — 83735 ASSAY OF MAGNESIUM: CPT | Performed by: STUDENT IN AN ORGANIZED HEALTH CARE EDUCATION/TRAINING PROGRAM

## 2024-09-05 PROCEDURE — 250N000013 HC RX MED GY IP 250 OP 250 PS 637: Performed by: PHYSICIAN ASSISTANT

## 2024-09-05 PROCEDURE — 80048 BASIC METABOLIC PNL TOTAL CA: CPT | Performed by: STUDENT IN AN ORGANIZED HEALTH CARE EDUCATION/TRAINING PROGRAM

## 2024-09-05 RX ORDER — VALGANCICLOVIR 450 MG/1
450 TABLET, FILM COATED ORAL EVERY OTHER DAY
Status: DISCONTINUED | OUTPATIENT
Start: 2024-09-06 | End: 2024-09-06 | Stop reason: HOSPADM

## 2024-09-05 RX ORDER — SULFAMETHOXAZOLE AND TRIMETHOPRIM 400; 80 MG/1; MG/1
1 TABLET ORAL DAILY
Status: DISCONTINUED | OUTPATIENT
Start: 2024-09-06 | End: 2024-09-06 | Stop reason: HOSPADM

## 2024-09-05 RX ADMIN — PANTOPRAZOLE SODIUM 40 MG: 40 TABLET, DELAYED RELEASE ORAL at 08:17

## 2024-09-05 RX ADMIN — ATORVASTATIN CALCIUM 10 MG: 10 TABLET, FILM COATED ORAL at 08:16

## 2024-09-05 RX ADMIN — TACROLIMUS 2 MG: 0.5 CAPSULE ORAL at 17:37

## 2024-09-05 RX ADMIN — OXYCODONE HYDROCHLORIDE 2.5 MG: 5 TABLET ORAL at 09:54

## 2024-09-05 RX ADMIN — METHOCARBAMOL 500 MG: 500 TABLET ORAL at 08:16

## 2024-09-05 RX ADMIN — METHOCARBAMOL 500 MG: 500 TABLET ORAL at 22:05

## 2024-09-05 RX ADMIN — SENNOSIDES AND DOCUSATE SODIUM 1 TABLET: 8.6; 5 TABLET ORAL at 19:50

## 2024-09-05 RX ADMIN — OXYCODONE HYDROCHLORIDE 5 MG: 5 TABLET ORAL at 22:05

## 2024-09-05 RX ADMIN — SODIUM CHLORIDE, POTASSIUM CHLORIDE, SODIUM LACTATE AND CALCIUM CHLORIDE: 600; 310; 30; 20 INJECTION, SOLUTION INTRAVENOUS at 02:14

## 2024-09-05 RX ADMIN — MYCOPHENOLATE MOFETIL 750 MG: 250 CAPSULE ORAL at 17:37

## 2024-09-05 RX ADMIN — METHOCARBAMOL 500 MG: 500 TABLET ORAL at 02:14

## 2024-09-05 RX ADMIN — MYCOPHENOLATE MOFETIL 750 MG: 250 CAPSULE ORAL at 08:16

## 2024-09-05 RX ADMIN — METHYLPREDNISOLONE SODIUM SUCCINATE 100 MG: 125 INJECTION, POWDER, FOR SOLUTION INTRAMUSCULAR; INTRAVENOUS at 11:11

## 2024-09-05 RX ADMIN — SENNOSIDES AND DOCUSATE SODIUM 1 TABLET: 8.6; 5 TABLET ORAL at 08:17

## 2024-09-05 RX ADMIN — MAGNESIUM OXIDE TAB 400 MG (241.3 MG ELEMENTAL MG) 400 MG: 400 (241.3 MG) TAB at 11:11

## 2024-09-05 RX ADMIN — CALCIUM 500 MG: 500 TABLET ORAL at 19:50

## 2024-09-05 RX ADMIN — OXYCODONE HYDROCHLORIDE 5 MG: 5 TABLET ORAL at 13:49

## 2024-09-05 RX ADMIN — METOPROLOL TARTRATE 25 MG: 25 TABLET, FILM COATED ORAL at 19:50

## 2024-09-05 RX ADMIN — METOPROLOL TARTRATE 25 MG: 25 TABLET, FILM COATED ORAL at 08:17

## 2024-09-05 RX ADMIN — POLYETHYLENE GLYCOL 3350 17 G: 17 POWDER, FOR SOLUTION ORAL at 08:16

## 2024-09-05 RX ADMIN — ACETAMINOPHEN 975 MG: 325 TABLET ORAL at 16:11

## 2024-09-05 RX ADMIN — TACROLIMUS 2 MG: 0.5 CAPSULE ORAL at 08:16

## 2024-09-05 RX ADMIN — ACETAMINOPHEN 975 MG: 325 TABLET ORAL at 08:16

## 2024-09-05 RX ADMIN — PANTOPRAZOLE SODIUM 40 MG: 40 TABLET, DELAYED RELEASE ORAL at 19:50

## 2024-09-05 RX ADMIN — CALCIUM 500 MG: 500 TABLET ORAL at 08:17

## 2024-09-05 ASSESSMENT — ACTIVITIES OF DAILY LIVING (ADL)
ADLS_ACUITY_SCORE: 32
ADLS_ACUITY_SCORE: 32
ADLS_ACUITY_SCORE: 31
ADLS_ACUITY_SCORE: 32
ADLS_ACUITY_SCORE: 31
ADLS_ACUITY_SCORE: 32
ADLS_ACUITY_SCORE: 31
ADLS_ACUITY_SCORE: 32
ADLS_ACUITY_SCORE: 31
ADLS_ACUITY_SCORE: 31
ADLS_ACUITY_SCORE: 32
ADLS_ACUITY_SCORE: 31
ADLS_ACUITY_SCORE: 32
ADLS_ACUITY_SCORE: 31
ADLS_ACUITY_SCORE: 32
ADLS_ACUITY_SCORE: 32

## 2024-09-05 NOTE — PROGRESS NOTES
[facility]  Transplant Nephrology Progress Note  Date of Admission:  9/2/2024  Today's Date: 09/05/2024    Recommendations:   - No acute indications for dialysis.  - Would make no changes in immunosuppression.  - Would hold IV fluids.    Assessment & Plan   # DDKT: Decreased creatinine.  Very good urine output.  No acute indications for dialysis.   - Baseline Creatinine: TBD   - Proteinuria: Not checked post transplant   - DSA Hx: No DSA   - Last cPRA: 0%   - BK Viremia: Not checked post transplant   - Kidney Tx Biopsy Hx: No biopsy history.    # Immunosuppression: Tacrolimus immediate release (goal 8-10), Mycophenolate mofetil (dose 750 mg every 12 hours), and Methylprednisolone (dose taper)   - Induction with Recent Transplant:  Low Intensity Protocol   - Continue with intensive monitoring of immunosuppression for efficacy and toxicity.   - Historical Changes in Immunosuppression: None   - Changes: Not at this time    # Infection Prevention:      - PJP: Sulfa/TMP (Bactrim)  - CMV: Valganciclovir (Valcyte); Patient is CMV IgG Ab discordant (D+/R-) and will continue on Valcyte x 6 months, then check CMV PCR monthly until 12 months post transplant.  - Thrush: None  - CMV IgG Ab High Risk Discordance (D+/R-): Yes  - EBV IgG Ab High Risk Discordance (D+/R-): No    # Hypertension: Borderline control;  Goal BP: < 150/90   - Changes: Not at this time;  Would stop IV fluids and consider amlodipine if BP remains elevated.    # Elevated Blood Glucose: Glucose generally running ~ 110-180s, secondary to high dose steroids    # Anemia in Chronic Renal Disease: Hgb: Stable      HIEU: No   - Iron studies: High iron saturation    # Mineral Bone Disorder:    - Secondary renal hyperparathyroidism; PTH level: Not checked recently        On treatment: None  - Vitamin D; level: Normal        On supplement: Yes  - Calcium; level: Low        On supplement: Yes  - Phosphorus; level: Normal         On supplement: No    # Electrolytes:   - Potassium; level: Normal        On supplement: No  - Magnesium; level: Low        On supplement: Yes  - Bicarbonate; level: Normal        On supplement: No    # Paroxysmal Atrial Fibrillation: Stable, ongoing atrial fibrillation with rate controlled on beta blocker.  Patient was reportedly in AF during her stress test in May 2024. Subsequent Zio patch was negative for AF.    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  At risk if secondary hyperoxaluria.  Oxalate level ~ 11 prior to transplant with CKD.  Now oxalate level <2 with improved kidney function following transplant.   - Would check oxalate level monthly x 3 months and if remains low, no need to follow.    # Other Significant PMH:   - GERD: Asymptomatic on PPI.    # Transplant History:  Etiology of Kidney Failure: Unknown etiology. CKD likely from hypertension, NSAID use, prior triamterene use, reduced nephron mass, potentially secondary FSGS, and solitary kidney (s/p unilateral nephrectomy for RCC).  Tx: DDKT  Transplant: 9/3/2024 (Kidney)  Significant transplant-related complications: None    Recommendations were communicated to the primary team via this note.    Andres Higginbotham MD  Transplant Nephrology  Contact information via Vocera Web Console or via [facility] Paging/Directory      Interval History  Ms. Cobb's creatinine is 1.30 (09/05 0656); Decreased.  Good urine output.  Other significant labs/tests/vitals: Decreased phosphorus level.  Otherwise, stable electrolytes.  Decreased hemoglobin.  No new events overnight.  No chest pain or shortness of breath.  No leg swelling.  No nausea and vomiting.  Bowel movements are none, but passing gas.  No fever, sweats or chills.    Review of Systems   4 point ROS was obtained and negative except as noted in the Interval History.    MEDICATIONS:  Current Facility-Administered Medications   Medication Dose Route Frequency  Provider Last Rate Last Admin    acetaminophen (TYLENOL) tablet 975 mg  975 mg Oral Q8H Micha Nieto MD   975 mg at 09/05/24 0816    atorvastatin (LIPITOR) tablet 10 mg  10 mg Oral Daily Micha Nieto MD   10 mg at 09/05/24 0816    [START ON 9/7/2024] basiliximab (SIMULECT) 20 mg in sodium chloride 0.9 % 50 mL infusion  20 mg Intravenous Once Kym Cheung PA-C        calcium carbonate 500 mg (elemental) (OSCAL) tablet 500 mg  500 mg Oral BID Kym Cheung PA-C   500 mg at 09/05/24 0817    insulin aspart (NovoLOG) injection (RAPID ACTING)  1-7 Units Subcutaneous TID AC Kym Cheung PA-C   1 Units at 09/04/24 1635    insulin aspart (NovoLOG) injection (RAPID ACTING)  1-5 Units Subcutaneous At Bedtime Kym Cheung PA-C        magnesium oxide (MAG-OX) tablet 400 mg  400 mg Oral Daily with lunch Kym Cheung PA-C   400 mg at 09/04/24 1310    methylPREDNISolone Na Suc (solu-MEDROL) injection 100 mg  100 mg Intravenous Once Kym Cheung PA-C        metoprolol tartrate (LOPRESSOR) tablet 25 mg  25 mg Oral BID Kym Cheung PA-C   25 mg at 09/05/24 0817    mycophenolate (GENERIC EQUIVALENT) capsule 750 mg  750 mg Oral BID IS Micha Nieto MD   750 mg at 09/05/24 0816    pantoprazole (PROTONIX) EC tablet 40 mg  40 mg Oral BID Ciera Fregoso MD   40 mg at 09/05/24 0817    polyethylene glycol (MIRALAX) Packet 17 g  17 g Oral Daily Micha Nieto MD   17 g at 09/05/24 0816    [START ON 9/6/2024] predniSONE (DELTASONE) tablet 60 mg  60 mg Oral Daily Kym Cheung PA-C        Followed by    [START ON 9/8/2024] predniSONE (DELTASONE) tablet 40 mg  40 mg Oral Daily Kym Cheung PA-C        Followed by    [START ON 9/10/2024] predniSONE (DELTASONE) tablet 20 mg  20 mg Oral Daily Kym Cheung PA-C        Followed by    [START ON 9/17/2024] predniSONE (DELTASONE) tablet 15 mg  15 mg Oral Daily Kym Cheung PA-C        Followed by    [START  ON 2024] predniSONE (DELTASONE) tablet 10 mg  10 mg Oral Daily Kym Cheung PA-C        Followed by    [START ON 10/1/2024] predniSONE (DELTASONE) tablet 5 mg  5 mg Oral Daily Kym Cheung PA-C        senna-docusate (SENOKOT-S/PERICOLACE) 8.6-50 MG per tablet 1 tablet  1 tablet Oral BID Micha Nieto MD   1 tablet at 24 0817    sodium chloride (PF) 0.9% PF flush 10 mL  10 mL Intracatheter Q8H Micha Nieto MD   10 mL at 24 2321    [START ON 2024] sulfamethoxazole-trimethoprim (BACTRIM) 400-80 MG per tablet 1 tablet  1 tablet Oral Daily Ciera Fregoso MD        tacrolimus (GENERIC EQUIVALENT) capsule 2 mg  2 mg Oral BID IS Micha Nieto MD   2 mg at 24 0816    [START ON 2024] valGANciclovir (VALCYTE) tablet 450 mg  450 mg Oral Every Other Day Ciera Fregoso MD         Current Facility-Administered Medications   Medication Dose Route Frequency Provider Last Rate Last Admin       Physical Exam   Temp  Av.2  F (36.8  C)  Min: 97.8  F (36.6  C)  Max: 98.8  F (37.1  C)  Arterial Line BP  Min: 99/44  Max: 126/56  Arterial Line MAP (mmHg)  Av.5 mmHg  Min: 65 mmHg  Max: 84 mmHg      Pulse  Av.4  Min: 63  Max: 84 Resp  Av.9  Min: 8  Max: 21  SpO2  Av.8 %  Min: 98 %  Max: 100 %    CVP (mmHg): 4 mmHgBP (!) 151/70 (BP Location: Left arm)   Pulse 60   Temp 98  F (36.7  C) (Oral)   Resp 16   Wt 59.1 kg (130 lb 3.2 oz)   SpO2 100%   BMI 22.00 kg/m     Date 24 0700 - 24 0659   Shift 5938-7942 0419-5210 3886-6246 24 Hour Total   INTAKE   I.V. 2500   2500   Shift Total(mL/kg) 2500(44.88)   2500(44.88)   OUTPUT   Urine 705 250  955   Drains 180   180   Blood 200   200   Shift Total(mL/kg) 1085(19.48) 250(4.49)  1335(23.97)   Weight (kg) 55.7 55.7 55.7 55.7      Admit Weight: 55.7 kg (122 lb 12.8 oz)     GENERAL APPEARANCE: alert and no distress  HENT: mouth without ulcers or lesions  RESP: lungs clear to auscultation - no rales,  rhonchi or wheezes  CV: irregular rhythm and rate, no rub, no murmur  EDEMA: trace LE edema bilaterally  ABDOMEN: soft, nondistended, nontender, bowel sounds normal  MS: extremities normal - no gross deformities noted, no evidence of inflammation in joints, no muscle tenderness  SKIN: no rash  TX KIDNEY: mild TTP  DIALYSIS ACCESS:  None    Data   All labs reviewed by me.  CMP  Recent Labs   Lab 09/05/24  0826 09/05/24  0656 09/05/24  0216 09/04/24  2153 09/04/24  1845 09/04/24  1634 09/04/24  1406 09/04/24  1107 09/04/24  0934 09/04/24  0606 09/03/24  1718 09/03/24  1136 09/03/24  0959 09/02/24  2157   NA  --  143  --   --   --   --   --   --   --  143  --  135 134* 143   POTASSIUM  --  3.8  --   --  3.5  --  3.2*  --  3.2* 3.5  3.5   < > 4.9 4.7 4.3   CHLORIDE  --  112*  --   --   --   --   --   --   --  112*  --  107  --  109*   CO2  --  22  --   --   --   --   --   --   --  19*  --  14*  --  14*   ANIONGAP  --  9  --   --   --   --   --   --   --  12  --  14  --  20*   GLC 86 94 110* 149*  --    < >  --    < >  --  147*  --  132* 117* 91   BUN  --  30.3*  --   --   --   --   --   --   --  46.1*  --  75.0*  --  84.5*   CR  --  1.30*  --   --   --   --   --   --   --  2.21*  --  4.24*  --  5.18*   GFRESTIMATED  --  42*  --   --   --   --   --   --   --  22*  --  10*  --  8*   CIARA  --  7.7*  --   --   --   --   --   --   --  7.7*  --  8.2*  --  7.9*   MAG  --  1.4*  --   --   --   --   --   --   --  1.4*  --  1.5*  --   --    PHOS  --  3.0  --   --   --   --   --   --   --  5.0*  --  6.0*  --   --    PROTTOTAL  --   --   --   --   --   --   --   --   --   --   --   --   --  6.7   ALBUMIN  --   --   --   --   --   --   --   --   --   --   --   --   --  4.0   BILITOTAL  --   --   --   --   --   --   --   --   --   --   --   --   --  0.3   ALKPHOS  --   --   --   --   --   --   --   --   --   --   --   --   --  50   AST  --   --   --   --   --   --   --   --   --   --   --   --   --  13   ALT  --   --   --   --   --    --   --   --   --   --   --   --   --  <5    < > = values in this interval not displayed.     CBC  Recent Labs   Lab 09/05/24  0656 09/04/24  1406 09/04/24  0934 09/04/24  0606 09/03/24  1718 09/03/24  1136 09/03/24  0959 09/02/24  2157   HGB 7.3* 9.0* 8.4* 7.8*   < > 8.8*   < > 10.2*   WBC 9.7  --   --  10.1  --  10.3  --  6.0   RBC 2.17*  --   --  2.36*  --  2.61*  --  3.07*   HCT 23.5*  --   --  25.4*  --  27.8*  --  32.7*   *  --   --  108*  --  107*  --  107*   MCH 33.6*  --   --  33.1*  --  33.7*  --  33.2*   MCHC 31.1*  --   --  30.7*  --  31.7  --  31.2*   RDW 15.1*  --   --  15.2*  --  15.0  --  15.0     --   --  218  --  223  --  233    < > = values in this interval not displayed.     INR  Recent Labs   Lab 09/02/24 2157   INR 0.93   PTT 28     ABG  Recent Labs   Lab 09/03/24  0959   PH 7.25*   PCO2 35   PO2 155*   HCO3 15*   O2PER 40.0      Urine Studies  Recent Labs   Lab Test 09/02/24  2345 10/10/19  1415   COLOR Light Yellow Yellow   APPEARANCE Slightly Cloudy* Clear   URINEGLC Negative Negative   URINEBILI Negative Negative   URINEKETONE Negative Negative   SG 1.011 1.011   UBLD Negative Negative   URINEPH 6.0 5.0   PROTEIN 100* Negative   NITRITE Negative Negative   LEUKEST Large* Negative   RBCU 1 1   WBCU >182* 1     No lab results found.    No lab results found.    No lab results found.    IMAGING:  All imaging studies reviewed by me.

## 2024-09-05 NOTE — PLAN OF CARE
Vitals: afebrile. 152/72. Room air.   Blood glucose: achs: 2 am: 110.   Pain/nausea: robaxin x 1.   Diet: regular.  Lines: PIVR, L saline locked. TLIJ infusing LR-75.   : spicer   ML.   GI: passing gas, no bm.   Drains: RASHEEDA 50 ML. Serosang.,cdi  Skin: incision abdomen dermabonded nate.   Mobility: a x 1 x walker.   Education : med/lab book updated.

## 2024-09-05 NOTE — PROGRESS NOTES
Transplant Surgery  Inpatient Daily Progress Note  09/05/2024    Assessment & Plan: Lexy Cobb is a 79 year old female who presents with CKD 5 from likely from HTN, NSAID use, prior triamterene use, reduced nephron mass, potentially secondary FSGS, solitary kidney (s/p unilateral nephrectomy for RCC) not yet on HD most recent GFR 8 8/2024 who is s/p DBD kidney transplant on 9/3/2024.     Graft function: POD# 2    DBDKT, 9/3   Kidney:   -Cr: 5.18-->4.24 --> 2.21 --> 1.30, good urine output, Post-op US normal  -DSA: No   -Last cPRA: 0%  -oxalate checked in the setting of history of gastric bypass, < 2.0     Immunosuppressed status secondary to medications:   -cPRA= 0  -Basiliximab 20mg intra-op, next dose POD #4  -Steroid taper per protocol  -MMF: 750 BID  -Tacro: Goal level 8-10. Check level POD#3    Neuro:   Acute post-op pain: Controlled. Continue scheduled acetaminophen and oxycodone PRN.     Hematology:   Anemia of chronic disease: Hgb 7.4 from 10.2 pre-op. RASHEEDA with thin, red drainage. Continue to monitor.     Cardiorespiratory:   A fib: Post-op Afib. Only had 1 episode prior during stress test. Zio 5/2024 with runs of NSVT and PSVT. Rate controlled. 25 mg metoprolol BID. EKG unchanged from prior. Hold PTA Procardia.   Hx of HTN: Metoprolol 25 mg BID    GI/Nutrition:   Severe protein calorie malnutrition:  Diet: ADAT.   GERD: Tums   Bowel Regimen: Senna     Endocrine:   Steroid induced hyperglycemia: sliding scale insulin available   Hgb A1c= 4.4     Fluid/Electrolytes:   CVP=1-5  MIV: saline lock.     :   Gutierrez x3 days due to new surgical anastomosis   Mixed urinary incontinence: Hold home Ditropan.     Infectious disease: Afebrile. No acute issues     Prophylaxis: DVT, pneumocystis (Bactrim), CMV (Valcyte - CMV discordant, continue x 6 months and check PCR monthly until 12 months post- transplant)    Disposition: 7A, discharge pending duration of post-op recovery - potentially discharge Friday to  home    OLEGARIO/Fellow/Resident Provider:   YOLETTE Mckenna, CNP  Adult Solid Organ Transplant   Contact: Vocera Web Console    Kidney Transplant Surgery  NP/PA 2573 (Mon-Fri 6a-5p)  Fellow 1020   Overnight: Surg Resident. [facility] Transplant Surgery Abdominal    Faculty: Dr. House  _________________________________________________________________    Interval History: History is obtained from the patient  Doing well overall. Doing well this AM, she offers no specific complaints.     ROS:   A 10-point review of systems was negative except as noted above.    Meds:  Current Facility-Administered Medications   Medication Dose Route Frequency Provider Last Rate Last Admin    acetaminophen (TYLENOL) tablet 975 mg  975 mg Oral Q8H Micha Nieto MD   975 mg at 09/05/24 0816    atorvastatin (LIPITOR) tablet 10 mg  10 mg Oral Daily Micha Nieto MD   10 mg at 09/05/24 0816    [START ON 9/7/2024] basiliximab (SIMULECT) 20 mg in sodium chloride 0.9 % 50 mL infusion  20 mg Intravenous Once Kym Cheung PA-C        calcium carbonate 500 mg (elemental) (OSCAL) tablet 500 mg  500 mg Oral BID Kym Cheung PA-C   500 mg at 09/05/24 0817    insulin aspart (NovoLOG) injection (RAPID ACTING)  1-7 Units Subcutaneous TID AC Kym Cheung PA-C   1 Units at 09/04/24 1635    insulin aspart (NovoLOG) injection (RAPID ACTING)  1-5 Units Subcutaneous At Bedtime Kym Cheung PA-C        magnesium oxide (MAG-OX) tablet 400 mg  400 mg Oral Daily with lunch Kym Cheung PA-C   400 mg at 09/05/24 1111    metoprolol tartrate (LOPRESSOR) tablet 25 mg  25 mg Oral BID Kym Cheung PA-C   25 mg at 09/05/24 0817    mycophenolate (GENERIC EQUIVALENT) capsule 750 mg  750 mg Oral BID IS Micha Nieto MD   750 mg at 09/05/24 0816    pantoprazole (PROTONIX) EC tablet 40 mg  40 mg Oral BID Ciera Fregoso MD   40 mg at 09/05/24 0817    polyethylene glycol (MIRALAX) Packet 17 g  17 g Oral Daily Gerson  MD Micha   17 g at 09/05/24 0816    [START ON 9/6/2024] predniSONE (DELTASONE) tablet 60 mg  60 mg Oral Daily Kym Cheung PA-C        Followed by    [START ON 9/8/2024] predniSONE (DELTASONE) tablet 40 mg  40 mg Oral Daily Kym Cheung PA-C        Followed by    [START ON 9/10/2024] predniSONE (DELTASONE) tablet 20 mg  20 mg Oral Daily Kym Cheung PA-C        Followed by    [START ON 9/17/2024] predniSONE (DELTASONE) tablet 15 mg  15 mg Oral Daily Kym Cheung PA-C        Followed by    [START ON 9/24/2024] predniSONE (DELTASONE) tablet 10 mg  10 mg Oral Daily Kym Cheung PA-C        Followed by    [START ON 10/1/2024] predniSONE (DELTASONE) tablet 5 mg  5 mg Oral Daily Kym Cheung PA-C        senna-docusate (SENOKOT-S/PERICOLACE) 8.6-50 MG per tablet 1 tablet  1 tablet Oral BID Micha Nieto MD   1 tablet at 09/05/24 0817    sodium chloride (PF) 0.9% PF flush 10 mL  10 mL Intracatheter Q8H Micha Nieto MD   10 mL at 09/04/24 2321    [START ON 9/6/2024] sulfamethoxazole-trimethoprim (BACTRIM) 400-80 MG per tablet 1 tablet  1 tablet Oral Daily Ciera Fregoso MD        tacrolimus (GENERIC EQUIVALENT) capsule 2 mg  2 mg Oral BID IS Micha Nieto MD   2 mg at 09/05/24 0816    [START ON 9/6/2024] valGANciclovir (VALCYTE) tablet 450 mg  450 mg Oral Every Other Day Ciera Fregoso MD           Physical Exam:     Admit Weight: 55.7 kg (122 lb 12.8 oz)    Current vitals:   BP (!) 151/70 (BP Location: Left arm)   Pulse 60   Temp 98  F (36.7  C) (Oral)   Resp 16   Wt 59.1 kg (130 lb 3.2 oz)   SpO2 100%   BMI 22.00 kg/m      Vital sign ranges:    Temp:  [97  F (36.1  C)-98.8  F (37.1  C)] 98  F (36.7  C)  Pulse:  [] 60  Resp:  [16-18] 16  BP: (131-161)/(70-92) 151/70  SpO2:  [96 %-100 %] 100 %    General Appearance: in no apparent distress.   Skin: Warm, perfused  Heart: She appears adequately perfused   Lungs: Non-labored on room air   Abdomen: The  abdomen is soft, non-tender, and incision is c/d/I. RASHEEDA with thin red drainage.   : Gutierrez is present.  Urine is clear.  Extremities: edema: mild  Neurologic: awake, alert, and oriented. Tremor absent..     Data:   CMP  Recent Labs   Lab 09/05/24  1126 09/05/24  0826 09/05/24  0656 09/04/24  2153 09/04/24  1845 09/04/24  0934 09/04/24  0606 09/03/24  1136 09/03/24  0959 09/02/24  2157   NA  --   --  143  --   --   --  143   < > 134* 143   POTASSIUM  --   --  3.8  --  3.5   < > 3.5  3.5   < > 4.7 4.3   CHLORIDE  --   --  112*  --   --   --  112*   < >  --  109*   CO2  --   --  22  --   --   --  19*   < >  --  14*   GLC 85 86 94   < >  --    < > 147*   < > 117* 91   BUN  --   --  30.3*  --   --   --  46.1*   < >  --  84.5*   CR  --   --  1.30*  --   --   --  2.21*   < >  --  5.18*   GFRESTIMATED  --   --  42*  --   --   --  22*   < >  --  8*   CIARA  --   --  7.7*  --   --   --  7.7*   < >  --  7.9*   ICAW  --   --   --   --   --   --   --   --  4.3*  --    MAG  --   --  1.4*  --   --   --  1.4*   < >  --   --    PHOS  --   --  3.0  --   --   --  5.0*   < >  --   --    ALBUMIN  --   --   --   --   --   --   --   --   --  4.0   BILITOTAL  --   --   --   --   --   --   --   --   --  0.3   ALKPHOS  --   --   --   --   --   --   --   --   --  50   AST  --   --   --   --   --   --   --   --   --  13   ALT  --   --   --   --   --   --   --   --   --  <5    < > = values in this interval not displayed.     CBC  Recent Labs   Lab 09/05/24  1031 09/05/24  0656 09/04/24  0934 09/04/24  0606 09/03/24  0959 09/02/24  2157   HGB 7.4* 7.3*   < > 7.8*   < > 10.2*   WBC  --  9.7  --  10.1   < > 6.0   PLT  --  161  --  218   < > 233   A1C  --   --   --   --   --  4.4    < > = values in this interval not displayed.

## 2024-09-05 NOTE — DISCHARGE SUMMARY
[facility]    Discharge Summary  Transplant Surgery    Date of Admission:  9/2/2024  Date of Discharge:  9/6/2024  Discharging Provider: YOLETTE Mckenna, CNP    Discharge Diagnoses   Patient Active Problem List   Diagnosis    Hypertension, renal    CKD (chronic kidney disease) stage 4, GFR 15-29 ml/min (H)    Renal cell cancer (H)    Anemia in chronic renal disease    Secondary renal hyperparathyroidism (H24)    Vitamin D deficiency    Obesity    Vitamin B12 deficiency    Raynaud's syndrome    ABDULLAHI positive    Chronic lower back pain    Irritable bowel syndrome    GERD (gastroesophageal reflux disease)    Trauma to right eye    Kidney transplant candidate    Immunosuppression (H24)    Status post kidney transplant    Hypophosphatemia     History of Present Illness   Lexy Cobb is a 79 year old female who presents with CKD 5 from likely from HTN, NSAID use, prior triamterene use, reduced nephron mass, potentially secondary FSGS, solitary kidney (s/p unilateral nephrectomy for RCC) not yet on HD most recent GFR 8 8/2024 was admitted to [facility] on 9/2/2024 as a candidate for kidney transplant. On 9/3/2024 she underwent DBD kidney transplant without vascular reconstruction and without stent placement.  Surgery was well tolerated and she was admitted to  post-operatively.     Hospital Course   Status post DBDKT, 9/3    -Cr: 5.18-->4.24 --> 2.21 --> 1.30 --> 0.94, good urine output, Post-op US normal  -DSA: No   -Last cPRA: 0%  -oxalate checked in the setting of history of gastric bypass, < 2.0     Hypophosphatemia  -Neutra phos 500 mg TID     Immunosuppressed status secondary to medications:   -cPRA= 0  -Basiliximab 20mg intra-op, next dose POD #4  -Steroid taper per protocol  -MMF: 750 BID  -Tacro: Goal level 8-10. Check level POD#3 3.2; discharging on 3.5 mg BID      Neuro:   Acute post-op pain:   Controlled. Continue scheduled acetaminophen and oxycodone PRN,  robaxin PRN.     Hematology:   Anemia of chronic disease:   Hgb 7.1 from 10.2 pre-op. RASHEEDA with thin, red drainage; will discharge with RASHEEDA. Continue to monitor.      Cardiorespiratory:   Paroxysmal A fib:   Post-op Afib - Per Cardiology only evidence of paired PACs during Zio. Rate controlled. 25 mg metoprolol BID. EKG unchanged from prior. Hold PTA Procardia.   Metoprolol 25 mg BID, hold home Toprol 50 mg daily  Cardiology consulted for anticoagulation recommendation - no anticoagulation at this time     Hx of HTN:   Metoprolol 25 mg BID  Initiate amlodipine 5 mg at HS  Hold home hydralazine  Hold home procardia      GI/Nutrition:   Diet: Regular   GERD: Tums   Bowel Regimen: Senna      Endocrine:   Steroid induced hyperglycemia:   Hgb A1c= 4.4      : Gutierrez discontinued on POD #3; she was passing urine spontaneously at the time of discharge. PVR 0.     Mixed urinary incontinence:   May resume home Ditropan on hospital discharge      Infectious disease: Afebrile. No acute issues      Prophylaxis:   pneumocystis (Bactrim)  CMV (Valcyte - CMV discordant, continue x 6 months and check PCR monthly until 12 months post- transplant)    YOLETTE Mckenna, CNP  Adult Solid Organ Transplant   Contact: Vocera Web Console    Code Status   Full    Primary Care Physician   Khurram Rm    Physical Exam   Temp: 98.6  F (37  C) Temp src: Oral BP: (!) 142/79 Pulse: 66   Resp: 18 SpO2: 98 % O2 Device: None (Room air)    Vitals:    09/02/24 2223 09/04/24 1255 09/05/24 0751   Weight: 55.7 kg (122 lb 12.8 oz) 59 kg (130 lb 1.6 oz) 59.1 kg (130 lb 3.2 oz)     Vital Signs with Ranges  Temp:  [97.9  F (36.6  C)-99  F (37.2  C)] 98.6  F (37  C)  Pulse:  [53-77] 66  Resp:  [15-18] 18  BP: (142-172)/(60-83) 142/79  SpO2:  [95 %-98 %] 98 %  I/O last 3 completed shifts:  In: 890 [P.O.:890]  Out: 1675 [Urine:1500; Drains:175]    General Appearance: In no apparent distress.   Skin: Warm, perfused  Heart: She appears adequately perfused    Lungs: Non-labored on room air   Abdomen: The abdomen is soft, non-tender, and incision is c/d/I. RASHEEDA with thin red drainage.   : Gutierrez is present.  Urine is clear.  Extremities: Edema: mild  Neurologic: Awake, alert, and oriented. Tremor absent..     Time Spent on this Encounter   IGabriela NP, personally saw the patient today and spent greater than 30 minutes discharging this patient.    Discharge Disposition   Discharged to home  Condition at discharge: Stable    Consultations This Hospital Stay   NURSING TO CONSULT FOR VASCULAR ACCESS CARE IP CONSULT  NEPHROLOGY KIDNEY/PANCREAS TRANSPLANT ADULT IP CONSULT  SOT MEDICATION HISTORY IP PHARMACY CONSULT  CARE MANAGEMENT / SOCIAL WORK IP CONSULT  PHARMACY IP CONSULT  NUTRITION SERVICES ADULT IP CONSULT  NEPHROLOGY KIDNEY/PANCREAS TRANSPLANT ADULT IP CONSULT  PHYSICAL THERAPY ADULT IP CONSULT  SPIRITUAL HEALTH SERVICES IP CONSULT  CARE MANAGEMENT / SOCIAL WORK IP CONSULT  CARDIOLOGY GENERAL ADULT IP CONSULT    Discharge Orders      Reason for your hospital stay    You were in the hospital for kidney transplant on 9/3/2024 by Dr. Fregoso.     Activity    Walk at least four times a day, lift no greater than 10 pounds for 6-8 weeks from the time of surgery.  No driving while taking narcotics or 3 weeks after surgery.     Adult [facility] Follow-up and recommended labs and tests    [facility] FOLLOW UP:     1. [facility]: Over the next 2 days you will be seen in the [facility] (ph. [phone], option 3). Your labs will be drawn at the beginning of your appointment. DO NOT take your medications prior to having labs drawn. Please bring all your medications with you from home to take after labs are drawn.     2. Follow up with Dr. Fregoso in Transplant Clinic in 1-2 weeks.     3. Follow up with Transplant Nephrologist as scheduled.     4. Follow up with your primary care provider in ~8 weeks. Patient to  schedule.     Remember to always bring an updated medication list to all appointments.        Call your Transplant Coordinator, Gina Huizar, ([phone]) with questions about Transplant Center appointment scheduling.     LABS:     CBC, BMP, magnesium, phosphorus, tacrolimus level to be drawn daily while in ATC, then every Monday and Thursday by home health care nurse if arranged, or at an outpatient lab.     Monitor and record    Monitor blood pressure and weight daily.     When to contact your care team    WHEN TO CONTACT YOUR  COORDINATOR:     Transplant Coordinator [phone]     Notify your coordinator if you have pain over your kidney, increased redness or drainage from your incision, fever greater than 100F, decreased urine output or new or increased amount of blood in urine.     Notify your coordinator immediately if you are ever unable to take your immunosuppressive medications for any reason.     If you have URGENT concerns after office hours, please call the hospital switchboard at [phone] and ask to have the organ transplant nurse on-call paged. If you have a life-threatening emergency, go to the nearest emergency room.     Wound care and dressings    Wash incision daily with soap and water. Do not soak or scrub.     Tubes and drains    RASHEEDA drain plan: Please monitor and write down color and amount of output. Drain will be removed at discretion of transplant surgeon.     Diet    Follow this diet upon discharge: Regular Diet Adult    Diet recommendations post-transplant: Heart healthy dietary habits long term (low saturated/trans fat, low sodium). High protein diet x 8 weeks. Practice food safety precautions.     Discharge Medications   Current Discharge Medication List        START taking these medications    Details   acetaminophen (TYLENOL) 325 MG tablet Take 2 tablets (650 mg) by mouth every 4 hours as needed for other (For optimal non-opioid multimodal pain management to improve pain  control.).    Associated Diagnoses: Status post kidney transplant      amLODIPine (NORVASC) 5 MG tablet Take 1 tablet (5 mg) by mouth at bedtime.  Qty: 30 tablet, Refills: 1    Associated Diagnoses: Hypertension, renal      atorvastatin (LIPITOR) 10 MG tablet Take 1 tablet (10 mg) by mouth daily.  Qty: 30 tablet, Refills: 2    Associated Diagnoses: Status post kidney transplant      calcium carbonate 500 mg, elemental, (OSCAL) 500 MG tablet Take 1 tablet (500 mg) by mouth 2 times daily.  Qty: 60 tablet, Refills: 2    Associated Diagnoses: Status post kidney transplant      methocarbamol (ROBAXIN) 500 MG tablet Take 1 tablet (500 mg) by mouth every 6 hours as needed for muscle spasms.  Qty: 12 tablet, Refills: 0    Associated Diagnoses: Status post kidney transplant      metoprolol tartrate (LOPRESSOR) 25 MG tablet Take 1 tablet (25 mg) by mouth 2 times daily.  Qty: 60 tablet, Refills: 1    Associated Diagnoses: Hypertension, renal      mycophenolate (GENERIC EQUIVALENT) 250 MG capsule Take 3 capsules (750 mg) by mouth 2 times daily.  Qty: 180 capsule, Refills: 11    Associated Diagnoses: Status post kidney transplant      oxyCODONE (ROXICODONE) 5 MG tablet Take 1 tablet (5 mg) by mouth every 4 hours as needed for severe pain.  Qty: 10 tablet, Refills: 0    Associated Diagnoses: Status post kidney transplant      phosphorus tablet 250 mg (PHOSPHA 250 NEUTRAL) 250 MG per tablet Take 2 tablets (500 mg) by mouth 3 times daily.  Qty: 180 tablet, Refills: 0    Associated Diagnoses: Status post kidney transplant      predniSONE (DELTASONE) 5 MG tablet Take 12 tablets (60 mg) by mouth daily for 1 day, THEN 8 tablets (40 mg) daily for 2 days, THEN 4 tablets (20 mg) daily for 7 days, THEN 3 tablets (15 mg) daily for 7 days, THEN 2 tablets (10 mg) daily for 7 days, THEN 1 tablet (5 mg) daily for 7 days.  Qty: 98 tablet, Refills: 0    Associated Diagnoses: Status post kidney transplant      senna-docusate (SENOKOT-S/PERICOLACE)  8.6-50 MG tablet Take 1 tablet by mouth 2 times daily.    Associated Diagnoses: Status post kidney transplant      sulfamethoxazole-trimethoprim (BACTRIM) 400-80 MG tablet Take 1 tablet by mouth daily.  Qty: 30 tablet, Refills: 11    Associated Diagnoses: Status post kidney transplant      !! tacrolimus (GENERIC EQUIVALENT) 0.5 MG capsule Take 1 capsule (0.5 mg) by mouth 2 times daily. Take with three 1 mg capsules  for total of 3.5 mg twice daily.  Qty: 60 capsule, Refills: 1    Associated Diagnoses: Status post kidney transplant      !! tacrolimus (GENERIC EQUIVALENT) 1 MG capsule Take 3 capsules (3 mg) by mouth 2 times daily. Take with the 0.5 mg capsules for a total dose of 3.5 mg twice daily  Qty: 180 capsule, Refills: 11    Associated Diagnoses: Status post kidney transplant      valGANciclovir (VALCYTE) 450 MG tablet Take 2 tablets (900 mg) by mouth daily.  Qty: 60 tablet, Refills: 5    Associated Diagnoses: Status post kidney transplant       !! - Potential duplicate medications found. Please discuss with provider.        CONTINUE these medications which have NOT CHANGED    Details   calcitRIOL (ROCALTROL) 0.25 MCG capsule Take 0.25 mcg by mouth daily      cholecalciferol 125 MCG (5000 UT) CAPS Take 5,000 Units by mouth daily.      magnesium oxide (MAG-) 400 MG tablet Take 1 tablet by mouth daily      Multiple Vitamins-Minerals (PRESERVISION AREDS 2 PO) Take 1 capsule by mouth 2 times daily.      omeprazole (PRILOSEC) 20 MG capsule Take 1 capsule by mouth daily      oxyBUTYnin ER (DITROPAN XL) 5 MG 24 hr tablet Take 5 mg by mouth daily      cyanocobalamin 1000 MCG/ML injection Inject 1 mL as directed every 30 days      diphenhydrAMINE-APAP, sleep, (TYLENOL PM EXTRA STRENGTH)  MG/30ML LIQD Take 1 capful. by mouth at bedtime.      traZODone (DESYREL) 50 MG tablet 100 mg at bedtime           STOP taking these medications       darbepoetin braulio-polysorbate (ARANESP, ALBUMIN FREE,) 60 MCG/ML  injection Comments:   Reason for Stopping:         furosemide (LASIX) 40 MG tablet Comments:   Reason for Stopping:         furosemide (LASIX) 40 MG tablet Comments:   Reason for Stopping:         hydrALAZINE (APRESOLINE) 100 MG tablet Comments:   Reason for Stopping:         metoprolol succinate ER (TOPROL XL) 50 MG 24 hr tablet Comments:   Reason for Stopping:         NIFEdipine osmotic (PROCARDIA XL) 90 MG 24 hr tablet Comments:   Reason for Stopping:         sodium bicarbonate 650 MG tablet Comments:   Reason for Stopping:             Allergies   Allergies   Allergen Reactions    Dapsone     Fluoxetine     Iron Diarrhea    Latex Rash     Data   Most Recent 3 CBC's:  Recent Labs   Lab Test 09/06/24  0532 09/05/24  1031 09/05/24  0656 09/04/24  0934 09/04/24  0606   WBC 7.8  --  9.7  --  10.1   HGB 7.1* 7.4* 7.3*   < > 7.8*   *  --  108*  --  108*   *  --  161  --  218    < > = values in this interval not displayed.      Most Recent 3 BMP's:  Recent Labs   Lab Test 09/06/24  0532 09/06/24  0102 09/05/24  2140 09/05/24  0826 09/05/24  0656 09/04/24  2153 09/04/24  1845 09/04/24  0934 09/04/24  0606     --   --   --  143  --   --   --  143   POTASSIUM 3.8  --   --   --  3.8  --  3.5   < > 3.5  3.5   CHLORIDE 112*  --   --   --  112*  --   --   --  112*   CO2 22  --   --   --  22  --   --   --  19*   BUN 24.0*  --   --   --  30.3*  --   --   --  46.1*   CR 0.94  --   --   --  1.30*  --   --   --  2.21*   ANIONGAP 9  --   --   --  9  --   --   --  12   CIARA 8.0*  --   --   --  7.7*  --   --   --  7.7*   GLC 88 106* 152*   < > 94   < >  --    < > 147*    < > = values in this interval not displayed.     Most Recent 2 LFT's:  Recent Labs   Lab Test 09/02/24  2157 10/10/19  1431   AST 13 12   ALT <5 16   ALKPHOS 50 38*   BILITOTAL 0.3 0.4     Most Recent INR's and Anticoagulation Dosing History:  Anticoagulation Dose History          Latest Ref Rng & Units 10/28/2013 10/10/2019 9/2/2024   Recent Dosing  and Labs   INR 0.85 - 1.15 0.87  0.93  0.93       Details                 Recent Labs   Lab Test 09/02/24 2157   CHOL 134   LDL 33   HDL 81   TRIG 101     Most Recent TSH, T4 and A1c Labs:  Recent Labs   Lab Test 09/02/24 2157   A1C 4.4        Statement Selected

## 2024-09-05 NOTE — PROGRESS NOTES
"Reason for Assessment:  Nutrition education regarding post transplant diet.   Diet History:  Pt reports she followed a low sodium diet prior to admission.  She reports she her appetite started to decline ~4 years ago and notes that she has been eating ~50% of her usual intake since that time. She reports a 60# weight loss during this time and notes muscle loss with this. She typically skips breakfast and eats a \"hot\" lunch and supper.  She eats meat, seafood, peanut butter, cheese for protein sources.   MALNUTRITION  % Intake: < 75% for >/= 3 months (moderate) - reduced for several years  % Weight Loss: > 7.5% in 3 months (severe)  Subcutaneous Fat Loss: Upper arm: severe - some expected with aging but not to this extent  Muscle Loss: Upper arm (bicep, tricep):  severe - some expected with aging but not to this extent  Fluid Accumulation/Edema: Does not meet criteria  Malnutrition Diagnosis: Severe malnutrition in the context of chronic illness    Nutrition Diagnosis:  Food- and nutrition-related knowledge deficit r/t no previous knowledge of post transplant diet guidelines AEB patient verbal report.   Interventions:   Provided instruction on post-transplant diet with discussion regarding protein sources and high protein needs in acute post-tx phase.   -- Reviewed recommendations to follow low fat/low sodium diet long term and discussed heart healthy diet tips.    -- Reviewed need for food safety precautions to prevent food borne illness.  Provided handouts: Post Transplant Diet Guidelines and USDA food safety booklet  Pt verbalized understanding of diet following education and denied further nutritional questions.    Goals:   Patient will verbalize understanding of education provided.  Follow-up:    Patient to ask any further nutrition-related questions before discharge.  In addition, pt may request outpatient RD appointment.    Nora Anders, MS, RD, LD, CCTD, [facility]  7A + 7B (beds [id number])  Available on [employer] [7A " or 7B Clinical Dietitian]   Weekend/Holiday: Nigel [Weekend Clinical Dietitian]

## 2024-09-05 NOTE — PROGRESS NOTES
BP (!) 158/76 (BP Location: Left arm)   Pulse 108   Temp 98.1  F (36.7  C) (Oral)   Resp 16   Wt 59.1 kg (130 lb 3.2 oz)   SpO2 97%   BMI 22.00 kg/m      Shift: 7325-2627  Isolation Status: none  VS: stable on room air, afebrile  Neuro: Aox4  Behaviors: calm, cooperative  BG: achs  Labs:  Hgb=7.4 (drop from 9 yesterday- team aware)  Respiratory: WDL  Cardiac: a-fib  Pain/Nausea: abdominal pain/denies nausea  PRN: robaxin x1, oxycodone 2.5 mg x1, Oxy 5mg x1  Diet: Regular  IV Access: Right PIV, right triple lumen IJ  Infusion(s): None  Lines/Drains: PIV, internal jugular, right RASHEEDA with 140mL out  GI/: spicer/no flatus  Skin: RLQ hockey stick, JENNIFER, right RASHEEDA  Mobility: ax1  Plan: Continue POC

## 2024-09-05 NOTE — PROGRESS NOTES
"   09/05/24 1017   Appointment Info   Signing Clinician's Name / Credentials (PT) Charo Chester, PT, DPT   Rehab Comments (PT) abd. kim       Present no   Living Environment   People in Home spouse   Current Living Arrangements other (see comments)  (townhouse)   Home Accessibility no concerns   Transportation Anticipated car, drives self;family or friend will provide   Living Environment Comments Pt reports she lives in a 1-level [facility] with her spouse, who will be present/available to assist. No GILL. Walk-in shower and tub/shower available; grabbars. (+) driving but spouse can provide.   Self-Care   Usual Activity Tolerance good   Current Activity Tolerance moderate   Regular Exercise No   Equipment Currently Used at Home none   Fall history within last six months no   Activity/Exercise/Self-Care Comment IND with ADL's and mobility. No falls. No regular exercise.   General Information   Onset of Illness/Injury or Date of Surgery 09/03/24   Referring Physician Micha Nieto MD   Patient/Family Therapy Goals Statement (PT) return home   Pertinent History of Current Problem (include personal factors and/or comorbidities that impact the POC) per EMR: \"Lexy Cobb is a 79 year old female who presents with CKD 5 from likely from HTN, NSAID use, prior triamterene use, reduced nephron mass, potentially secondary FSGS, solitary kidney (s/p unilateral nephrectomy for RCC) not yet on HD most recent GFR 8 8/2024 who is s/p DBD kidney transplant on 9/3/2024.\"   Existing Precautions/Restrictions abdominal   Weight-Bearing Status - LUE partial weight-bearing (% in comments)  (no lifting >10lbs)   Weight-Bearing Status - RUE partial weight-bearing (% in comments)  (no lifting >10lbs)   Weight-Bearing Status - LLE full weight-bearing   Weight-Bearing Status - RLE full weight-bearing   General Observations Activity: ambulate   Cognition   Orientation Status (Cognition) oriented x 4   Pain " Assessment   Patient Currently in Pain Yes, see Vital Sign flowsheet   Integumentary/Edema   Integumentary/Edema other (describe)   Integumentary/Edema Comments slight edema to BLE   Posture    Posture Forward head position;Protracted shoulders   Range of Motion (ROM)   Range of Motion ROM deficits secondary to surgical procedure;ROM deficits secondary to pain   Strength (Manual Muscle Testing)   Strength Comments grossly 3/5 in BLE; limited by pain   Bed Mobility   Comment, (Bed Mobility) sit > supine with min A for BLE onto bed   Transfers   Comment, (Transfers) sit > stand with CGA and no AD   Gait/Stairs (Locomotion)   Distance in Feet (Gait) 10ft   Comment, (Gait/Stairs) Ambulated 10ft with CGA and no AD   Balance   Balance other (describe)   Balance Comments fair(+) sitting balance; fair standing balance   Sensory Examination   Sensory Perception patient reports no sensory changes   Coordination   Coordination no deficits were identified   Muscle Tone   Muscle Tone no deficits were identified   Clinical Impression   Criteria for Skilled Therapeutic Intervention Yes, treatment indicated   PT Diagnosis (PT) impaired functional mobility   Influenced by the following impairments decreased balance, strength, and endurance; increased pain   Functional limitations due to impairments difficulty with functional mobility   Clinical Presentation (PT Evaluation Complexity) stable   Clinical Presentation Rationale per clinical judgment   Clinical Decision Making (Complexity) low complexity   Planned Therapy Interventions (PT) balance training;bed mobility training;gait training;home exercise program;motor coordination training;neuromuscular re-education;patient/family education;postural re-education;ROM (range of motion);strengthening;stretching;transfer training;progressive activity/exercise;home program guidelines;risk factor education   Risk & Benefits of therapy have been explained evaluation/treatment results  reviewed;care plan/treatment goals reviewed;risks/benefits reviewed;current/potential barriers reviewed;participants voiced agreement with care plan;participants included;patient   PT Total Evaluation Time   PT Eval, Low Complexity Minutes (81924) 10   Physical Therapy Goals   PT Frequency 6x/week   PT Predicted Duration/Target Date for Goal Attainment 09/12/24   PT: Bed Mobility Independent;Supine to/from sit;Rolling;Bridging;Within precautions  (with HOB flat)   PT: Transfers Independent;Sit to/from stand;Bed to/from chair;Within precautions  (tub/shower)   PT: Gait Independent;Within precautions;Greater than 200 feet   PT Discharge Planning   PT Plan review precs, flat bed mobility, safety with gait, tub/shower transfer, any home setup concerns   PT Discharge Recommendation (DC Rec) home with assist   PT Rationale for DC Rec Pt is below baseline for functional mobility. Limited by significant pain which is limiting her activity tolerance. Once medically appropriate, anticipated to d/c home with assist from spouse for ADL's especially chores requiring lifting >10lbs.   PT Brief overview of current status Ax1 with IV pole; up to chair and hallway walking 3-4x/day   PT Equipment Needed at Discharge   (tbd)

## 2024-09-05 NOTE — PLAN OF CARE
Goal Outcome Evaluation:    Hypertensive (140-60s/70s), other vital signs within normal limits. PRN oxycodone given x1 for abdominal pain. Up in chair for dinner, good appetite on regular diet. Blood sugars 142, 149. Up with assist of one to bathroom, passing flatus, no BM this shift. 375 mL urine output via Gutierrez. 180 mL dark red output via RASHEEDA. Evening potassium re-check was 3.5

## 2024-09-06 ENCOUNTER — APPOINTMENT (OUTPATIENT)
Dept: PHYSICAL THERAPY | Facility: CLINIC | Age: 79
DRG: 652 | End: 2024-09-06
Attending: SURGERY
Payer: COMMERCIAL

## 2024-09-06 VITALS
HEART RATE: 66 BPM | OXYGEN SATURATION: 98 % | DIASTOLIC BLOOD PRESSURE: 79 MMHG | SYSTOLIC BLOOD PRESSURE: 142 MMHG | RESPIRATION RATE: 18 BRPM | WEIGHT: 130.2 LBS | TEMPERATURE: 98.6 F | BODY MASS INDEX: 22 KG/M2

## 2024-09-06 PROBLEM — Z94.0 STATUS POST KIDNEY TRANSPLANT: Status: ACTIVE | Noted: 2024-09-03

## 2024-09-06 PROBLEM — E83.39 HYPOPHOSPHATEMIA: Status: ACTIVE | Noted: 2024-09-06

## 2024-09-06 LAB
ANION GAP SERPL CALCULATED.3IONS-SCNC: 9 MMOL/L (ref 7–15)
BASOPHILS # BLD AUTO: 0 10E3/UL (ref 0–0.2)
BASOPHILS NFR BLD AUTO: 0 %
BK VIRUS IGG ANTIBODY: NORMAL TITER
BUN SERPL-MCNC: 24 MG/DL (ref 8–23)
CALCIUM SERPL-MCNC: 8 MG/DL (ref 8.8–10.4)
CHLORIDE SERPL-SCNC: 112 MMOL/L (ref 98–107)
CREAT SERPL-MCNC: 0.94 MG/DL (ref 0.51–0.95)
EGFRCR SERPLBLD CKD-EPI 2021: 62 ML/MIN/1.73M2
EOSINOPHIL # BLD AUTO: 0.1 10E3/UL (ref 0–0.7)
EOSINOPHIL NFR BLD AUTO: 1 %
ERYTHROCYTE [DISTWIDTH] IN BLOOD BY AUTOMATED COUNT: 14.6 % (ref 10–15)
GLUCOSE BLDC GLUCOMTR-MCNC: 106 MG/DL (ref 70–99)
GLUCOSE SERPL-MCNC: 88 MG/DL (ref 70–99)
HCO3 SERPL-SCNC: 22 MMOL/L (ref 22–29)
HCT VFR BLD AUTO: 23.2 % (ref 35–47)
HGB BLD-MCNC: 7.1 G/DL (ref 11.7–15.7)
IMM GRANULOCYTES # BLD: 0 10E3/UL
IMM GRANULOCYTES NFR BLD: 0 %
LYMPHOCYTES # BLD AUTO: 1.2 10E3/UL (ref 0.8–5.3)
LYMPHOCYTES NFR BLD AUTO: 16 %
MAGNESIUM SERPL-MCNC: 1.7 MG/DL (ref 1.7–2.3)
MCH RBC QN AUTO: 33.3 PG (ref 26.5–33)
MCHC RBC AUTO-ENTMCNC: 30.6 G/DL (ref 31.5–36.5)
MCV RBC AUTO: 109 FL (ref 78–100)
MONOCYTES # BLD AUTO: 0.7 10E3/UL (ref 0–1.3)
MONOCYTES NFR BLD AUTO: 8 %
NEUTROPHILS # BLD AUTO: 5.8 10E3/UL (ref 1.6–8.3)
NEUTROPHILS NFR BLD AUTO: 75 %
NRBC # BLD AUTO: 0 10E3/UL
NRBC BLD AUTO-RTO: 0 /100
PHOSPHATE SERPL-MCNC: 1.7 MG/DL (ref 2.5–4.5)
PLATELET # BLD AUTO: 147 10E3/UL (ref 150–450)
POTASSIUM SERPL-SCNC: 3.8 MMOL/L (ref 3.4–5.3)
RBC # BLD AUTO: 2.13 10E6/UL (ref 3.8–5.2)
SODIUM SERPL-SCNC: 143 MMOL/L (ref 135–145)
TACROLIMUS BLD-MCNC: 3.2 UG/L (ref 5–15)
TME LAST DOSE: ABNORMAL H
TME LAST DOSE: ABNORMAL H
WBC # BLD AUTO: 7.8 10E3/UL (ref 4–11)

## 2024-09-06 PROCEDURE — 36592 COLLECT BLOOD FROM PICC: CPT | Performed by: STUDENT IN AN ORGANIZED HEALTH CARE EDUCATION/TRAINING PROGRAM

## 2024-09-06 PROCEDURE — 93005 ELECTROCARDIOGRAM TRACING: CPT

## 2024-09-06 PROCEDURE — 250N000012 HC RX MED GY IP 250 OP 636 PS 637: Performed by: PHYSICIAN ASSISTANT

## 2024-09-06 PROCEDURE — 250N000012 HC RX MED GY IP 250 OP 636 PS 637: Performed by: STUDENT IN AN ORGANIZED HEALTH CARE EDUCATION/TRAINING PROGRAM

## 2024-09-06 PROCEDURE — 80048 BASIC METABOLIC PNL TOTAL CA: CPT | Performed by: STUDENT IN AN ORGANIZED HEALTH CARE EDUCATION/TRAINING PROGRAM

## 2024-09-06 PROCEDURE — 250N000013 HC RX MED GY IP 250 OP 250 PS 637

## 2024-09-06 PROCEDURE — 83735 ASSAY OF MAGNESIUM: CPT | Performed by: STUDENT IN AN ORGANIZED HEALTH CARE EDUCATION/TRAINING PROGRAM

## 2024-09-06 PROCEDURE — 250N000013 HC RX MED GY IP 250 OP 250 PS 637: Performed by: STUDENT IN AN ORGANIZED HEALTH CARE EDUCATION/TRAINING PROGRAM

## 2024-09-06 PROCEDURE — 93010 ELECTROCARDIOGRAM REPORT: CPT | Performed by: INTERNAL MEDICINE

## 2024-09-06 PROCEDURE — 99233 SBSQ HOSP IP/OBS HIGH 50: CPT | Performed by: INTERNAL MEDICINE

## 2024-09-06 PROCEDURE — 85004 AUTOMATED DIFF WBC COUNT: CPT | Performed by: STUDENT IN AN ORGANIZED HEALTH CARE EDUCATION/TRAINING PROGRAM

## 2024-09-06 PROCEDURE — 250N000013 HC RX MED GY IP 250 OP 250 PS 637: Performed by: PHYSICIAN ASSISTANT

## 2024-09-06 PROCEDURE — 99254 IP/OBS CNSLTJ NEW/EST MOD 60: CPT | Mod: GC | Performed by: STUDENT IN AN ORGANIZED HEALTH CARE EDUCATION/TRAINING PROGRAM

## 2024-09-06 PROCEDURE — 80197 ASSAY OF TACROLIMUS: CPT | Performed by: STUDENT IN AN ORGANIZED HEALTH CARE EDUCATION/TRAINING PROGRAM

## 2024-09-06 PROCEDURE — 250N000013 HC RX MED GY IP 250 OP 250 PS 637: Performed by: SURGERY

## 2024-09-06 PROCEDURE — 84100 ASSAY OF PHOSPHORUS: CPT | Performed by: STUDENT IN AN ORGANIZED HEALTH CARE EDUCATION/TRAINING PROGRAM

## 2024-09-06 PROCEDURE — 97530 THERAPEUTIC ACTIVITIES: CPT | Mod: GP

## 2024-09-06 PROCEDURE — 250N000013 HC RX MED GY IP 250 OP 250 PS 637: Performed by: NURSE PRACTITIONER

## 2024-09-06 RX ORDER — VALGANCICLOVIR 450 MG/1
900 TABLET, FILM COATED ORAL DAILY
Qty: 60 TABLET | Refills: 5 | Status: ACTIVE | OUTPATIENT
Start: 2024-09-06 | End: 2024-09-09

## 2024-09-06 RX ORDER — METOPROLOL TARTRATE 25 MG/1
25 TABLET, FILM COATED ORAL 2 TIMES DAILY
Qty: 60 TABLET | Refills: 1 | Status: SHIPPED | OUTPATIENT
Start: 2024-09-06 | End: 2024-09-09

## 2024-09-06 RX ORDER — MYCOPHENOLATE MOFETIL 250 MG/1
750 CAPSULE ORAL 2 TIMES DAILY
Qty: 180 CAPSULE | Refills: 11 | Status: ACTIVE | OUTPATIENT
Start: 2024-09-06 | End: 2024-09-09

## 2024-09-06 RX ORDER — TACROLIMUS 0.5 MG/1
0.5 CAPSULE ORAL 2 TIMES DAILY PRN
Qty: 60 CAPSULE | Refills: 1 | Status: ACTIVE | OUTPATIENT
Start: 2024-09-06 | End: 2024-09-06

## 2024-09-06 RX ORDER — AMOXICILLIN 250 MG
1 CAPSULE ORAL 2 TIMES DAILY
COMMUNITY
Start: 2024-09-06 | End: 2024-09-27

## 2024-09-06 RX ORDER — ACETAMINOPHEN 325 MG/1
650 TABLET ORAL EVERY 4 HOURS PRN
COMMUNITY
Start: 2024-09-06

## 2024-09-06 RX ORDER — TACROLIMUS 1 MG/1
2 CAPSULE ORAL 2 TIMES DAILY
Qty: 120 CAPSULE | Refills: 11 | Status: ACTIVE | OUTPATIENT
Start: 2024-09-06 | End: 2024-09-06

## 2024-09-06 RX ORDER — SULFAMETHOXAZOLE AND TRIMETHOPRIM 400; 80 MG/1; MG/1
1 TABLET ORAL DAILY
Qty: 30 TABLET | Refills: 11 | Status: ACTIVE | OUTPATIENT
Start: 2024-09-07 | End: 2024-09-09

## 2024-09-06 RX ORDER — PREDNISONE 5 MG/1
TABLET ORAL
Qty: 98 TABLET | Refills: 0 | Status: SHIPPED | OUTPATIENT
Start: 2024-09-07 | End: 2024-09-30

## 2024-09-06 RX ORDER — TACROLIMUS 0.5 MG/1
0.5 CAPSULE ORAL 2 TIMES DAILY
Qty: 60 CAPSULE | Refills: 1 | Status: ACTIVE | OUTPATIENT
Start: 2024-09-06 | End: 2024-09-07

## 2024-09-06 RX ORDER — TRAZODONE HYDROCHLORIDE 50 MG/1
100 TABLET, FILM COATED ORAL AT BEDTIME
Status: DISCONTINUED | OUTPATIENT
Start: 2024-09-06 | End: 2024-09-06 | Stop reason: HOSPADM

## 2024-09-06 RX ORDER — TACROLIMUS 1 MG/1
3 CAPSULE ORAL 2 TIMES DAILY
Qty: 180 CAPSULE | Refills: 11 | Status: ACTIVE | OUTPATIENT
Start: 2024-09-06 | End: 2024-09-07

## 2024-09-06 RX ORDER — OXYCODONE HYDROCHLORIDE 5 MG/1
5 TABLET ORAL EVERY 4 HOURS PRN
Qty: 10 TABLET | Refills: 0 | Status: SHIPPED | OUTPATIENT
Start: 2024-09-06 | End: 2024-09-16

## 2024-09-06 RX ORDER — AMLODIPINE BESYLATE 5 MG/1
5 TABLET ORAL AT BEDTIME
Qty: 30 TABLET | Refills: 1 | Status: SHIPPED | OUTPATIENT
Start: 2024-09-06 | End: 2024-09-09

## 2024-09-06 RX ORDER — ATORVASTATIN CALCIUM 10 MG/1
10 TABLET, FILM COATED ORAL DAILY
Qty: 30 TABLET | Refills: 2 | Status: SHIPPED | OUTPATIENT
Start: 2024-09-07 | End: 2024-09-09

## 2024-09-06 RX ORDER — METHOCARBAMOL 500 MG/1
500 TABLET, FILM COATED ORAL EVERY 6 HOURS PRN
Qty: 12 TABLET | Refills: 0 | Status: SHIPPED | OUTPATIENT
Start: 2024-09-06 | End: 2024-09-27

## 2024-09-06 RX ORDER — CALCIUM CARBONATE 500(1250)
500 TABLET ORAL 2 TIMES DAILY
Qty: 60 TABLET | Refills: 2 | Status: SHIPPED | OUTPATIENT
Start: 2024-09-06 | End: 2024-09-30

## 2024-09-06 RX ADMIN — TRAZODONE HYDROCHLORIDE 100 MG: 50 TABLET ORAL at 01:36

## 2024-09-06 RX ADMIN — PANTOPRAZOLE SODIUM 40 MG: 40 TABLET, DELAYED RELEASE ORAL at 08:42

## 2024-09-06 RX ADMIN — SULFAMETHOXAZOLE AND TRIMETHOPRIM 1 TABLET: 400; 80 TABLET ORAL at 08:39

## 2024-09-06 RX ADMIN — ATORVASTATIN CALCIUM 10 MG: 10 TABLET, FILM COATED ORAL at 08:44

## 2024-09-06 RX ADMIN — MYCOPHENOLATE MOFETIL 750 MG: 250 CAPSULE ORAL at 08:33

## 2024-09-06 RX ADMIN — SODIUM PHOSPHATE, DIBASIC, ANHYDROUS, POTASSIUM PHOSPHATE, MONOBASIC, AND SODIUM PHOSPHATE, MONOBASIC, MONOHYDRATE 500 MG: 852; 155; 130 TABLET, COATED ORAL at 08:58

## 2024-09-06 RX ADMIN — OXYCODONE HYDROCHLORIDE 5 MG: 5 TABLET ORAL at 08:30

## 2024-09-06 RX ADMIN — MAGNESIUM OXIDE TAB 400 MG (241.3 MG ELEMENTAL MG) 400 MG: 400 (241.3 MG) TAB at 13:04

## 2024-09-06 RX ADMIN — METHOCARBAMOL 500 MG: 500 TABLET ORAL at 13:04

## 2024-09-06 RX ADMIN — TACROLIMUS 2 MG: 0.5 CAPSULE ORAL at 08:36

## 2024-09-06 RX ADMIN — PREDNISONE 60 MG: 10 TABLET ORAL at 08:38

## 2024-09-06 RX ADMIN — CALCIUM 500 MG: 500 TABLET ORAL at 08:44

## 2024-09-06 RX ADMIN — SODIUM PHOSPHATE, DIBASIC, ANHYDROUS, POTASSIUM PHOSPHATE, MONOBASIC, AND SODIUM PHOSPHATE, MONOBASIC, MONOHYDRATE 500 MG: 852; 155; 130 TABLET, COATED ORAL at 13:03

## 2024-09-06 RX ADMIN — VALGANCICLOVIR 450 MG: 450 TABLET, FILM COATED ORAL at 08:41

## 2024-09-06 RX ADMIN — METOPROLOL TARTRATE 25 MG: 25 TABLET, FILM COATED ORAL at 08:43

## 2024-09-06 ASSESSMENT — ACTIVITIES OF DAILY LIVING (ADL)
ADLS_ACUITY_SCORE: 32
ADLS_ACUITY_SCORE: 31
ADLS_ACUITY_SCORE: 32
ADLS_ACUITY_SCORE: 31
ADLS_ACUITY_SCORE: 31
ADLS_ACUITY_SCORE: 32
ADLS_ACUITY_SCORE: 31
ADLS_ACUITY_SCORE: 32
ADLS_ACUITY_SCORE: 31
ADLS_ACUITY_SCORE: 32
ADLS_ACUITY_SCORE: 31
ADLS_ACUITY_SCORE: 32
ADLS_ACUITY_SCORE: 32

## 2024-09-06 NOTE — PLAN OF CARE
Goal Outcome Evaluation:    Hypertensive (150s-70s/70), other vital signs within normal limits. PRN oxycodone and Robaxin each given x1 for abdominal pain. Good appetite on regular diet. Up and walking in halls with walker and standby assist. 450 mL clear yellow urine output via Gutierrez. 65 mL dark red output via RASHEEDA.

## 2024-09-06 NOTE — CONSULTS
Cardiology Inpatient Consultation  September 6, 2024    Reason for Consult:  A cardiology consult was requested by Kiera Ochoa NP from the transplant service to provide clinical guidance regarding anticoagulation in the setting of presumed Afib.    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 There has been concern for intermittent atrial fibrillation; cardiology consulted regarding anticoagulation therapy.     On chart review, there is not evidence of atrial fibrillation. I reviewed EKGs dating back several years, including strips during stress test and Zio earlier this year, as well as inpatient telemetry. The patient has frequent supraventricular ectopy leading to an irregular rhythm; however, P-waves are consistently visible which is inconsistent with atrial fibrillation. As such, there is not an indication for anticoagulation. Patient reports minimal symptoms during these episodes and would not be interested in taking a medication specifically for this.    Recommendations:  Ok to continue metoprolol tartrate, though this can be discontinued if PACs is the only indication  2.   No anticoagulation recommended at this time    Patient seen and discussed with Dr. Ly, who agrees with above plan.    Thank you for consulting the cardiovascular services at the [facility]. Please do not hesitate to call us with any questions.     Pallavi Chong, MS4  [facility] Cardiology Consult Team    I saw this patient together with medical student, Pallavi Chong, and performed an independent exam at the same time which confirmed findings. I have edited this note as appropriate  to reflect our joint assessment/plan. Patient was also seen and discussed with attending physician, Dr. Iglesias, who is in agreement with above.     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 There was prior concern for atrial fibrillation and cardiology consulted regarding anticoagulation therapy. Per chart review, there was prior concern for AF on stress test (no Afib found on strips to correlate this, only PAC's in all her EKG's including during her recent stress test), Zio patch (05/02/24) was notable for NSVT and PSVT. Patient denies any symptoms of dizziness, fatigue, weakness, palpitations, chest pain, headache, or N/V.     Review of Systems:    Complete review of systems was performed and negative except per HPI.    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    Vasculitis of skin     Vitamin B12 deficiency     Vitamin D deficiency      Active Problems:  Patient Active Problem List    Diagnosis Date Noted    Hypophosphatemia 09/06/2024     Priority: Medium    Immunosuppression (H24) 09/04/2024     Priority: Medium     Kidney transplant candidate 09/03/2024     Priority: Medium    Status post kidney transplant 09/03/2024     Priority: Medium    CKD (chronic kidney disease) stage 4, GFR 15-29 ml/min (H)      Priority: Medium    Renal cell cancer (H)      Priority: Medium     s/p right native nephrectomy      Anemia in chronic renal disease      Priority: Medium     Problem list name updated by automated process. Provider to review      Secondary renal hyperparathyroidism (H24)      Priority: Medium     Problem list name updated by automated process. Provider to review      Vitamin D deficiency      Priority: Medium    Obesity      Priority: Medium     s/p gastric bypass      Vitamin B12 deficiency      Priority: Medium    Raynaud's syndrome      Priority: Medium    ABDULLAHI positive      Priority: Medium    Chronic lower back pain      Priority: Medium    Irritable bowel syndrome      Priority: Medium    GERD (gastroesophageal reflux disease)      Priority: Medium    Trauma to right eye      Priority: Medium     optic nerve injury      Hypertension, renal      Priority: Medium     Social History:  Social History     Tobacco Use    Smoking status: Never    Smokeless tobacco: Never   Substance Use Topics    Alcohol use: No    Drug use: No     Family History:  Family History   Problem Relation Age of Onset    C.A.D. Mother     Hypertension Mother     Diabetes Father     Cancer Father         Lung CA       Medications:  Current Facility-Administered Medications   Medication Dose Route Frequency Provider Last Rate Last Admin    atorvastatin (LIPITOR) tablet 10 mg  10 mg Oral Daily Micha Nieto MD   10 mg at 09/06/24 0844    [START ON 9/7/2024] basiliximab (SIMULECT) 20 mg in sodium chloride 0.9 % 50 mL infusion  20 mg Intravenous Once Kym Cheung PA-C        calcium carbonate 500 mg (elemental) (OSCAL) tablet 500 mg  500 mg Oral BID Kym Cheung PA-C   500 mg at 09/06/24 0844    insulin aspart (NovoLOG) injection (RAPID  ACTING)  1-7 Units Subcutaneous TID AC Kym Cheung PA-C   1 Units at 09/04/24 1635    insulin aspart (NovoLOG) injection (RAPID ACTING)  1-5 Units Subcutaneous At Bedtime Kym Cheung PA-C        magnesium oxide (MAG-OX) tablet 400 mg  400 mg Oral Daily with lunch Kym Cheung PA-C   400 mg at 09/05/24 1111    metoprolol tartrate (LOPRESSOR) tablet 25 mg  25 mg Oral BID Kym Cheung PA-C   25 mg at 09/06/24 0843    mycophenolate (GENERIC EQUIVALENT) capsule 750 mg  750 mg Oral BID IS Micha Nieto MD   750 mg at 09/06/24 0833    pantoprazole (PROTONIX) EC tablet 40 mg  40 mg Oral BID Ciera Fregoso MD   40 mg at 09/06/24 0842    phosphorus tablet 250 mg (PHOSPHA 250 NEUTRAL) per tablet 500 mg  500 mg Oral TID Gabriela Qureshi NP   500 mg at 09/06/24 0858    polyethylene glycol (MIRALAX) Packet 17 g  17 g Oral Daily Micha Nieto MD   17 g at 09/05/24 0816    predniSONE (DELTASONE) tablet 60 mg  60 mg Oral Daily Kym Cheung PA-C   60 mg at 09/06/24 0838    Followed by    [START ON 9/8/2024] predniSONE (DELTASONE) tablet 40 mg  40 mg Oral Daily Kym Cheung PA-C        Followed by    [START ON 9/10/2024] predniSONE (DELTASONE) tablet 20 mg  20 mg Oral Daily Kym Cheung PA-C        Followed by    [START ON 9/17/2024] predniSONE (DELTASONE) tablet 15 mg  15 mg Oral Daily Kym Cheung PA-C        Followed by    [START ON 9/24/2024] predniSONE (DELTASONE) tablet 10 mg  10 mg Oral Daily Kym Cheung PA-C        Followed by    [START ON 10/1/2024] predniSONE (DELTASONE) tablet 5 mg  5 mg Oral Daily Kym Cheung PA-C        senna-docusate (SENOKOT-S/PERICOLACE) 8.6-50 MG per tablet 1 tablet  1 tablet Oral BID Micha Nieto MD   1 tablet at 09/05/24 1950    sodium chloride (PF) 0.9% PF flush 10 mL  10 mL Intracatheter Q8H Micha Nieto MD   10 mL at 09/06/24 0858    sulfamethoxazole-trimethoprim (BACTRIM) 400-80 MG per tablet 1  tablet  1 tablet Oral Daily Ciera Fregoso MD   1 tablet at 09/06/24 0839    tacrolimus (GENERIC EQUIVALENT) capsule 3.5 mg  3.5 mg Oral BID IS Gabriela Qureshi NP        traZODone (DESYREL) tablet 100 mg  100 mg Oral At Bedtime Oleg Keith MD   100 mg at 09/06/24 0136    valGANciclovir (VALCYTE) tablet 450 mg  450 mg Oral Every Other Day Ciera Fregoso MD   450 mg at 09/06/24 0841       Current Facility-Administered Medications   Medication Dose Route Frequency Provider Last Rate Last Admin       Physical Exam:  Temp:  [97.9  F (36.6  C)-99  F (37.2  C)] 97.9  F (36.6  C)  Pulse:  [] 63  Resp:  [15-16] 16  BP: (153-172)/(60-83) 169/83  SpO2:  [95 %-98 %] 95 %    Intake/Output Summary (Last 24 hours) at 9/6/2024 1131  Last data filed at 9/6/2024 1000  Gross per 24 hour   Intake 731.25 ml   Output 1750 ml   Net -1018.75 ml     GEN: pleasant, no acute distress  HEENT: no icterus  CV: Irregular rhythm, normal s1/s2, no murmurs/rubs/s3/s4.   CHEST: clear to ausculation bilaterally, no rales or wheezing  ABD: soft, non-tender  EXTR: radial pulses irregular. No clubbing, cyanosis or edema.   NEURO: alert oriented, speech fluent/appropriate      Diagnostics:  All labs and imaging were reviewed, of note:    CMP  Recent Labs   Lab 09/06/24  0532 09/06/24  0102 09/05/24  2140 09/05/24  1631 09/05/24  0826 09/05/24  0656 09/04/24  2153 09/04/24  1845 09/04/24  1634 09/04/24  1406 09/04/24  0934 09/04/24  0606 09/03/24  1718 09/03/24  1136 09/03/24  0959 09/02/24  2157     --   --   --   --  143  --   --   --   --   --  143  --  135   < > 143   POTASSIUM 3.8  --   --   --   --  3.8  --  3.5  --  3.2*   < > 3.5  3.5   < > 4.9   < > 4.3   CHLORIDE 112*  --   --   --   --  112*  --   --   --   --   --  112*  --  107  --  109*   CO2 22  --   --   --   --  22  --   --   --   --   --  19*  --  14*  --  14*   ANIONGAP 9  --   --   --   --  9  --   --   --   --   --  12  --  14  --  20*   GLC 88 106* 152*  123*   < > 94   < >  --    < >  --    < > 147*  --  132*   < > 91   BUN 24.0*  --   --   --   --  30.3*  --   --   --   --   --  46.1*  --  75.0*  --  84.5*   CR 0.94  --   --   --   --  1.30*  --   --   --   --   --  2.21*  --  4.24*  --  5.18*   GFRESTIMATED 62  --   --   --   --  42*  --   --   --   --   --  22*  --  10*  --  8*   CIARA 8.0*  --   --   --   --  7.7*  --   --   --   --   --  7.7*  --  8.2*  --  7.9*   MAG 1.7  --   --   --   --  1.4*  --   --   --   --   --  1.4*  --  1.5*  --   --    PHOS 1.7*  --   --   --   --  3.0  --   --   --   --   --  5.0*  --  6.0*  --   --    PROTTOTAL  --   --   --   --   --   --   --   --   --   --   --   --   --   --   --  6.7   ALBUMIN  --   --   --   --   --   --   --   --   --   --   --   --   --   --   --  4.0   BILITOTAL  --   --   --   --   --   --   --   --   --   --   --   --   --   --   --  0.3   ALKPHOS  --   --   --   --   --   --   --   --   --   --   --   --   --   --   --  50   AST  --   --   --   --   --   --   --   --   --   --   --   --   --   --   --  13   ALT  --   --   --   --   --   --   --   --   --   --   --   --   --   --   --  <5    < > = values in this interval not displayed.     CBC  Recent Labs   Lab 09/06/24  0532 09/05/24  1031 09/05/24  0656 09/04/24  1406 09/04/24  0934 09/04/24  0606 09/03/24  1718 09/03/24  1136   WBC 7.8  --  9.7  --   --  10.1  --  10.3   RBC 2.13*  --  2.17*  --   --  2.36*  --  2.61*   HGB 7.1* 7.4* 7.3* 9.0*   < > 7.8*   < > 8.8*   HCT 23.2*  --  23.5*  --   --  25.4*  --  27.8*   *  --  108*  --   --  108*  --  107*   MCH 33.3*  --  33.6*  --   --  33.1*  --  33.7*   MCHC 30.6*  --  31.1*  --   --  30.7*  --  31.7   RDW 14.6  --  15.1*  --   --  15.2*  --  15.0   *  --  161  --   --  218  --  223    < > = values in this interval not displayed.     INR  Recent Labs   Lab 09/02/24  2157   INR 0.93     Arterial Blood Gas  Recent Labs   Lab 09/03/24  0959   PH 7.25*   PCO2 35   PO2 155*   HCO3 15*  "  O2PER 40.0       No results found for: \"TROPI\", \"TROPONIN\", \"TROPR\", \"TROPN\"    "

## 2024-09-06 NOTE — PROGRESS NOTES
[facility]  Transplant Nephrology Progress Note  Date of Admission:  9/2/2024  Today's Date: 09/06/2024    Recommendations:   - No acute indications for dialysis.  - Would make no changes in immunosuppression.  - Recommend starting amlodipine 5 mg nightly.  - With ongoing atrial fibrillation, would consult Cardiology to determine need for anticoagulation.    Assessment & Plan   # DDKT: Decreased creatinine.  Very good urine output.  No acute indications for dialysis.   - Baseline Creatinine: TBD   - Proteinuria: Not checked post transplant   - DSA Hx: No DSA   - Last cPRA: 0%   - BK Viremia: Not checked post transplant   - Kidney Tx Biopsy Hx: No biopsy history.    # Immunosuppression: Tacrolimus immediate release (goal 8-10), Mycophenolate mofetil (dose 750 mg every 12 hours), and Methylprednisolone (dose taper)   - Induction with Recent Transplant:  Low Intensity Protocol   - Continue with intensive monitoring of immunosuppression for efficacy and toxicity.   - Historical Changes in Immunosuppression: None   - Changes: Not at this time    # Infection Prevention:      - PJP: Sulfa/TMP (Bactrim)  - CMV: Valganciclovir (Valcyte); Patient is CMV IgG Ab discordant (D+/R-) and will continue on Valcyte x 6 months, then check CMV PCR monthly until 12 months post transplant.  - Thrush: None  - CMV IgG Ab High Risk Discordance (D+/R-): Yes  - EBV IgG Ab High Risk Discordance (D+/R-): No    # Hypertension: Borderline control;  Goal BP: < 150/90   - Changes: Yes - Recommend starting amlodipine 5 mg nightly    # Elevated Blood Glucose: Glucose generally running ~ 80-150s    # Anemia in Chronic Renal Disease: Hgb: Trend down      HIEU: No   - Iron studies: High iron saturation    # Mineral Bone Disorder:    - Secondary renal hyperparathyroidism; PTH level: Not checked recently        On treatment: None  - Vitamin D; level: Normal        On supplement: Yes  - Calcium; level: Low         On supplement: Yes  - Phosphorus; level: Low        On supplement: No; Agree with starting phosphorus supplement.    # Electrolytes:   - Potassium; level: Normal        On supplement: No  - Magnesium; level: Low        On supplement: Yes  - Bicarbonate; level: Normal        On supplement: No    # Paroxysmal Atrial Fibrillation: Stable, ongoing atrial fibrillation with rate controlled on beta blocker.  Patient was reportedly in AF during her stress test in May 2024. Subsequent Zio patch did show SVT.   - Recommend Cardiology consult to determine need for anticoagulation.    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  At risk if secondary hyperoxaluria.  Oxalate level ~ 11 prior to transplant with CKD.  Now oxalate level <2 with improved kidney function following transplant.   - Would check oxalate level monthly x 3 months and if remains low, no need to follow.    # Other Significant PMH:   - GERD: Asymptomatic on PPI.    # Transplant History:  Etiology of Kidney Failure: Unknown etiology. CKD likely from hypertension, NSAID use, prior triamterene use, reduced nephron mass, potentially secondary FSGS, and solitary kidney (s/p unilateral nephrectomy for RCC).  Tx: DDKT  Transplant: 9/3/2024 (Kidney)  Significant transplant-related complications: None    Recommendations were communicated to the primary team via this note.    Andres Higginbotham MD  Transplant Nephrology  Contact information via Vocera Web Console or via [facility] Paging/Directory      Interval History  Ms. Cobb's creatinine is 0.94 (09/06 0532); Trend down.  Good urine output.  Other significant labs/tests/vitals: Trend down phosphorus level.  Otherwise, stable electrolytes.  Trend down hemoglobin.  No new events overnight.  No chest pain or shortness of breath.  No leg swelling.  No nausea and vomiting.  Bowel movements are loose.  No fever, sweats or chills.    Review of Systems   4 point ROS was obtained  and negative except as noted in the Interval History.    MEDICATIONS:  Current Facility-Administered Medications   Medication Dose Route Frequency Provider Last Rate Last Admin    atorvastatin (LIPITOR) tablet 10 mg  10 mg Oral Daily Micha Nieto MD   10 mg at 09/06/24 0844    [START ON 9/7/2024] basiliximab (SIMULECT) 20 mg in sodium chloride 0.9 % 50 mL infusion  20 mg Intravenous Once Kym Cheung PA-C        calcium carbonate 500 mg (elemental) (OSCAL) tablet 500 mg  500 mg Oral BID Kym Cheung PA-C   500 mg at 09/06/24 0844    insulin aspart (NovoLOG) injection (RAPID ACTING)  1-7 Units Subcutaneous TID AC Kym Cheung PA-C   1 Units at 09/04/24 1635    insulin aspart (NovoLOG) injection (RAPID ACTING)  1-5 Units Subcutaneous At Bedtime Kym Cheung PA-C        magnesium oxide (MAG-OX) tablet 400 mg  400 mg Oral Daily with lunch Kym Cheung PA-C   400 mg at 09/05/24 1111    metoprolol tartrate (LOPRESSOR) tablet 25 mg  25 mg Oral BID Kym Cheung PA-C   25 mg at 09/06/24 0843    mycophenolate (GENERIC EQUIVALENT) capsule 750 mg  750 mg Oral BID IS Micha Nieto MD   750 mg at 09/06/24 0833    pantoprazole (PROTONIX) EC tablet 40 mg  40 mg Oral BID Ciera Fregoso MD   40 mg at 09/06/24 0842    phosphorus tablet 250 mg (PHOSPHA 250 NEUTRAL) per tablet 500 mg  500 mg Oral TID Gabriela Qureshi NP   500 mg at 09/06/24 0858    polyethylene glycol (MIRALAX) Packet 17 g  17 g Oral Daily Micha Nieto MD   17 g at 09/05/24 0816    predniSONE (DELTASONE) tablet 60 mg  60 mg Oral Daily Kym Cheung PA-C   60 mg at 09/06/24 0838    Followed by    [START ON 9/8/2024] predniSONE (DELTASONE) tablet 40 mg  40 mg Oral Daily Kym Cheung PA-C        Followed by    [START ON 9/10/2024] predniSONE (DELTASONE) tablet 20 mg  20 mg Oral Daily Kym Cheung PA-C        Followed by    [START ON 9/17/2024] predniSONE (DELTASONE) tablet 15 mg  15 mg Oral  Daily Kym Cheung PA-C        Followed by    [START ON 2024] predniSONE (DELTASONE) tablet 10 mg  10 mg Oral Daily Kym Cheung PA-C        Followed by    [START ON 10/1/2024] predniSONE (DELTASONE) tablet 5 mg  5 mg Oral Daily Kym Cheung PA-C        senna-docusate (SENOKOT-S/PERICOLACE) 8.6-50 MG per tablet 1 tablet  1 tablet Oral BID Micha Nieto MD   1 tablet at 24 1950    sodium chloride (PF) 0.9% PF flush 10 mL  10 mL Intracatheter Q8H Micha Nieto MD   10 mL at 24 0858    sulfamethoxazole-trimethoprim (BACTRIM) 400-80 MG per tablet 1 tablet  1 tablet Oral Daily Ciera Fregoso MD   1 tablet at 24 0839    tacrolimus (GENERIC EQUIVALENT) capsule 2 mg  2 mg Oral BID IS Micha Nieto MD   2 mg at 24 0836    traZODone (DESYREL) tablet 100 mg  100 mg Oral At Bedtime Oleg Keith MD   100 mg at 24 0136    valGANciclovir (VALCYTE) tablet 450 mg  450 mg Oral Every Other Day Ciera Fregoso MD   450 mg at 24 0841     Current Facility-Administered Medications   Medication Dose Route Frequency Provider Last Rate Last Admin       Physical Exam   Temp  Av.2  F (36.8  C)  Min: 97.8  F (36.6  C)  Max: 98.8  F (37.1  C)  Arterial Line BP  Min: 99/44  Max: 126/56  Arterial Line MAP (mmHg)  Av.5 mmHg  Min: 65 mmHg  Max: 84 mmHg      Pulse  Av.4  Min: 63  Max: 84 Resp  Av.9  Min: 8  Max: 21  SpO2  Av.8 %  Min: 98 %  Max: 100 %    CVP (mmHg): 4 mmHgBP (!) 153/72 (BP Location: Left arm)   Pulse 77   Temp 98  F (36.7  C) (Oral)   Resp 15   Wt 59.1 kg (130 lb 3.2 oz)   SpO2 98%   BMI 22.00 kg/m     Date 24 0700 - 24 0659   Shift 3638-4539 7112-5512 2421-3943 24 Hour Total   INTAKE   I.V. 2500   2500   Shift Total(mL/kg) 2500(44.88)   2500(44.88)   OUTPUT   Urine 705 250  955   Drains 180   180   Blood 200   200   Shift Total(mL/kg) 1085(19.48) 250(4.49)  1335(23.97)   Weight (kg) 55.7 55.7 55.7 55.7       Admit Weight: 55.7 kg (122 lb 12.8 oz)     GENERAL APPEARANCE: alert and no distress  HENT: mouth without ulcers or lesions  RESP: lungs clear to auscultation - no rales, rhonchi or wheezes  CV: irregular rhythm and rate, no rub, no murmur  EDEMA: none to trace LE edema bilaterally  ABDOMEN: soft, nondistended, nontender, bowel sounds normal  MS: extremities normal - no gross deformities noted, no evidence of inflammation in joints, no muscle tenderness  SKIN: no rash  TX KIDNEY: mild TTP  DIALYSIS ACCESS:  None    Data   All labs reviewed by me.  CMP  Recent Labs   Lab 09/06/24  0532 09/06/24  0102 09/05/24  2140 09/05/24  1631 09/05/24  0826 09/05/24  0656 09/04/24  2153 09/04/24  1845 09/04/24  1634 09/04/24  1406 09/04/24  0934 09/04/24  0606 09/03/24  1718 09/03/24  1136 09/03/24  0959 09/02/24  2157     --   --   --   --  143  --   --   --   --   --  143  --  135   < > 143   POTASSIUM 3.8  --   --   --   --  3.8  --  3.5  --  3.2*   < > 3.5  3.5   < > 4.9   < > 4.3   CHLORIDE 112*  --   --   --   --  112*  --   --   --   --   --  112*  --  107  --  109*   CO2 22  --   --   --   --  22  --   --   --   --   --  19*  --  14*  --  14*   ANIONGAP 9  --   --   --   --  9  --   --   --   --   --  12  --  14  --  20*   GLC 88 106* 152* 123*   < > 94   < >  --    < >  --    < > 147*  --  132*   < > 91   BUN 24.0*  --   --   --   --  30.3*  --   --   --   --   --  46.1*  --  75.0*  --  84.5*   CR 0.94  --   --   --   --  1.30*  --   --   --   --   --  2.21*  --  4.24*  --  5.18*   GFRESTIMATED 62  --   --   --   --  42*  --   --   --   --   --  22*  --  10*  --  8*   CIARA 8.0*  --   --   --   --  7.7*  --   --   --   --   --  7.7*  --  8.2*  --  7.9*   MAG 1.7  --   --   --   --  1.4*  --   --   --   --   --  1.4*  --  1.5*  --   --    PHOS 1.7*  --   --   --   --  3.0  --   --   --   --   --  5.0*  --  6.0*  --   --    PROTTOTAL  --   --   --   --   --   --   --   --   --   --   --   --   --   --   --  6.7    ALBUMIN  --   --   --   --   --   --   --   --   --   --   --   --   --   --   --  4.0   BILITOTAL  --   --   --   --   --   --   --   --   --   --   --   --   --   --   --  0.3   ALKPHOS  --   --   --   --   --   --   --   --   --   --   --   --   --   --   --  50   AST  --   --   --   --   --   --   --   --   --   --   --   --   --   --   --  13   ALT  --   --   --   --   --   --   --   --   --   --   --   --   --   --   --  <5    < > = values in this interval not displayed.     CBC  Recent Labs   Lab 09/06/24  0532 09/05/24  1031 09/05/24  0656 09/04/24  1406 09/04/24  0934 09/04/24  0606 09/03/24  1718 09/03/24  1136   HGB 7.1* 7.4* 7.3* 9.0*   < > 7.8*   < > 8.8*   WBC 7.8  --  9.7  --   --  10.1  --  10.3   RBC 2.13*  --  2.17*  --   --  2.36*  --  2.61*   HCT 23.2*  --  23.5*  --   --  25.4*  --  27.8*   *  --  108*  --   --  108*  --  107*   MCH 33.3*  --  33.6*  --   --  33.1*  --  33.7*   MCHC 30.6*  --  31.1*  --   --  30.7*  --  31.7   RDW 14.6  --  15.1*  --   --  15.2*  --  15.0   *  --  161  --   --  218  --  223    < > = values in this interval not displayed.     INR  Recent Labs   Lab 09/02/24 2157   INR 0.93   PTT 28     ABG  Recent Labs   Lab 09/03/24  0959   PH 7.25*   PCO2 35   PO2 155*   HCO3 15*   O2PER 40.0      Urine Studies  Recent Labs   Lab Test 09/02/24  2345 10/10/19  1415   COLOR Light Yellow Yellow   APPEARANCE Slightly Cloudy* Clear   URINEGLC Negative Negative   URINEBILI Negative Negative   URINEKETONE Negative Negative   SG 1.011 1.011   UBLD Negative Negative   URINEPH 6.0 5.0   PROTEIN 100* Negative   NITRITE Negative Negative   LEUKEST Large* Negative   RBCU 1 1   WBCU >182* 1     No lab results found.    No lab results found.    No lab results found.    IMAGING:  All imaging studies reviewed by me.

## 2024-09-06 NOTE — PLAN OF CARE
Vitals: BP (!) 169/83 (BP Location: Left arm)   Pulse 63   Temp 97.9  F (36.6  C) (Oral)   Resp 16   Wt 59.1 kg (130 lb 3.2 oz)   SpO2 95%   BMI 22.00 kg/m    Hypertensive.   Endocrine: Glucose 88.  Labs: Stable labs.  Pain: Mild abdominal incisional pain.  PRN's: Oxycodone 5 mg, Robaxin.   Diet: Regular diet, eating well.  LDA: R TL internal jugular DC'd. RASHEEDA, cares, instructions and supplies given  GI: Loose BM's, baseline.  : Gutierrez DC'd, 200cc void, PVR 0cc.  Skin: Abdominal incision with liquid bandage, bruising throughout her body.  Neuro: Alert and oriented.  Mobility: Walker, stand by assist.  Education: Updated medication card  Plan: Discharge home this afternoon, [facility], report given. Prescriptions ready in Pharmacy, discharge pending Cardiology recommendations.

## 2024-09-06 NOTE — PROGRESS NOTES
CARE MANAGEMENT FOLLOW UP    Additional Information:   09/06:CHW delegated by RNCC, has scheduled ATC Appts for this Pt. For tomorrow Saturday 09/07/2024 and Sunday 09/08/2024.    Brie Malave   Community Health Worker, 7A&7B [facility].  [place], [place]   P [phone]   Fax: [phone]  Email: [email or web address]

## 2024-09-06 NOTE — PROGRESS NOTES
DISCHARGE:  Patient with orders to discharge to home.     Education Provided:   Med Card up to date  Lab Book up to date  Handouts given  Specialty Pharmacy met with pharmacy      Discharge instructions, medications & follow ups reviewed with Patient. Copy of discharge summary given to Patient. IJ removed.  [facility] notified, report given to sipc.    Patient in stable condition. AVSS. patient had no further questions regarding discharge instructions and medications. Patient transferred out by wheelchair & left with family.  [facility]

## 2024-09-06 NOTE — PHARMACY-TRANSPLANT NOTE
Solid Organ Transplant Recipient Prior to Discharge Note    79 year old female s/p DDKT transplant on 9/3/24 2/2 chronic renal failure 2/2 solitary kidney after native nephrectomy for RCC followed by hypertension, nsaid overuse, triamterene use.    Immunosuppression plan at time of discharge:  Mycophenolate 750 mg by mouth twice daily about 12 hours apart  Prednisone 60mg by mouth daily for two days 9/6-9/7 THEN 40mg by mouth daily for two days 9/8-9/9 THEN 20mg by mouth daily for 7 days 9/10-16 THEN 15mg by mouth daily for 7 days 9/17-9/23 THEN 10mg by mouth daily for 7 days 9/24-30 THEN 5mg by mouth daily for 7 days 10/1-10/7 THEN STOP  Tacrolimus twice daily about 12 hours apart, titrated to goal trough of 8-10                    Opportunistic prophylaxis:  Sulfamethoxazole-trimethoprim renally adjusted for PJP prophylaxis indefinitely  Valganciclovir renally adjust for CMV prophylaxis (CMV IgG: Donor +  / Recipient - ) for 6 months      Afib:  Metoprolol 25 mg BID (rate controlled)      Pharmacy has monitored for medication interactions and immunosuppression levels in conjunction with the multidisciplinary team. In anticipation for discharge, medication therapy needs have been addressed daily throughout the current admission via multidisciplinary rounds and/or discussions, order verification, daily clinical pharmacy review, and communication with prescribers.    Garfield Moore, VivD

## 2024-09-06 NOTE — PLAN OF CARE
Vitals: 153/92 afebrile. Room air. HR 77.   Blood glucose: achs: 2 am: 106.  Pain/nausea: robaxin x 1.   Diet: regular.  Lines:TLIJ saline locked.   : spicer  ML. Yellow.   GI: passing gas, no bm.   Drains: RASHEEDA 60 ML. Serosang.,cdi  Skin: incision abdomen dermabonded nate.   Mobility: a x 1 x walker.   Education : med/lab book updated.

## 2024-09-07 ENCOUNTER — TELEPHONE (OUTPATIENT)
Dept: TRANSPLANT | Facility: CLINIC | Age: 79
End: 2024-09-07

## 2024-09-07 ENCOUNTER — INFUSION THERAPY VISIT (OUTPATIENT)
Dept: INFUSION THERAPY | Facility: CLINIC | Age: 79
End: 2024-09-07
Attending: NURSE PRACTITIONER
Payer: COMMERCIAL

## 2024-09-07 VITALS
DIASTOLIC BLOOD PRESSURE: 79 MMHG | SYSTOLIC BLOOD PRESSURE: 142 MMHG | OXYGEN SATURATION: 99 % | TEMPERATURE: 98.3 F | RESPIRATION RATE: 16 BRPM | HEART RATE: 116 BPM

## 2024-09-07 DIAGNOSIS — D84.9 IMMUNOSUPPRESSION (H): Primary | ICD-10-CM

## 2024-09-07 DIAGNOSIS — Z94.0 STATUS POST KIDNEY TRANSPLANT: ICD-10-CM

## 2024-09-07 DIAGNOSIS — E83.39 OTHER DISORDERS OF PHOSPHORUS METABOLISM: ICD-10-CM

## 2024-09-07 DIAGNOSIS — R79.9 ABNORMAL FINDING OF BLOOD CHEMISTRY, UNSPECIFIED: ICD-10-CM

## 2024-09-07 LAB
ANION GAP SERPL CALCULATED.3IONS-SCNC: 11 MMOL/L (ref 7–15)
BASOPHILS # BLD AUTO: 0 10E3/UL (ref 0–0.2)
BASOPHILS NFR BLD AUTO: 0 %
BUN SERPL-MCNC: 20.3 MG/DL (ref 8–23)
CALCIUM SERPL-MCNC: 8.5 MG/DL (ref 8.8–10.4)
CHLORIDE SERPL-SCNC: 109 MMOL/L (ref 98–107)
CREAT SERPL-MCNC: 0.82 MG/DL (ref 0.51–0.95)
EGFRCR SERPLBLD CKD-EPI 2021: 73 ML/MIN/1.73M2
EOSINOPHIL # BLD AUTO: 0 10E3/UL (ref 0–0.7)
EOSINOPHIL NFR BLD AUTO: 0 %
ERYTHROCYTE [DISTWIDTH] IN BLOOD BY AUTOMATED COUNT: 14.5 % (ref 10–15)
FERRITIN SERPL-MCNC: 674 NG/ML (ref 11–328)
GLUCOSE SERPL-MCNC: 154 MG/DL (ref 70–99)
HCO3 SERPL-SCNC: 24 MMOL/L (ref 22–29)
HCT VFR BLD AUTO: 27 % (ref 35–47)
HGB BLD-MCNC: 8.5 G/DL (ref 11.7–15.7)
IMM GRANULOCYTES # BLD: 0.1 10E3/UL
IMM GRANULOCYTES NFR BLD: 1 %
IRON BINDING CAPACITY (ROCHE): 180 UG/DL (ref 240–430)
IRON SATN MFR SERPL: 57 % (ref 15–46)
IRON SERPL-MCNC: 103 UG/DL (ref 37–145)
LYMPHOCYTES # BLD AUTO: 0.5 10E3/UL (ref 0.8–5.3)
LYMPHOCYTES NFR BLD AUTO: 7 %
MAGNESIUM SERPL-MCNC: 1.7 MG/DL (ref 1.7–2.3)
MCH RBC QN AUTO: 33.2 PG (ref 26.5–33)
MCHC RBC AUTO-ENTMCNC: 31.5 G/DL (ref 31.5–36.5)
MCV RBC AUTO: 106 FL (ref 78–100)
MONOCYTES # BLD AUTO: 0.3 10E3/UL (ref 0–1.3)
MONOCYTES NFR BLD AUTO: 4 %
NEUTROPHILS # BLD AUTO: 6.5 10E3/UL (ref 1.6–8.3)
NEUTROPHILS NFR BLD AUTO: 88 %
NRBC # BLD AUTO: 0 10E3/UL
NRBC BLD AUTO-RTO: 0 /100
PHOSPHATE SERPL-MCNC: 2.3 MG/DL (ref 2.5–4.5)
PLATELET # BLD AUTO: 228 10E3/UL (ref 150–450)
POTASSIUM SERPL-SCNC: 3.8 MMOL/L (ref 3.4–5.3)
PTH-INTACT SERPL-MCNC: 144 PG/ML (ref 15–65)
RBC # BLD AUTO: 2.56 10E6/UL (ref 3.8–5.2)
SODIUM SERPL-SCNC: 144 MMOL/L (ref 135–145)
TACROLIMUS BLD-MCNC: 4.9 UG/L (ref 5–15)
TME LAST DOSE: ABNORMAL H
TME LAST DOSE: ABNORMAL H
VIT D+METAB SERPL-MCNC: 55 NG/ML (ref 20–50)
WBC # BLD AUTO: 7.4 10E3/UL (ref 4–11)

## 2024-09-07 PROCEDURE — 96365 THER/PROPH/DIAG IV INF INIT: CPT

## 2024-09-07 PROCEDURE — 258N000003 HC RX IP 258 OP 636: Performed by: PHYSICIAN ASSISTANT

## 2024-09-07 PROCEDURE — 83550 IRON BINDING TEST: CPT

## 2024-09-07 PROCEDURE — 80197 ASSAY OF TACROLIMUS: CPT

## 2024-09-07 PROCEDURE — 82728 ASSAY OF FERRITIN: CPT

## 2024-09-07 PROCEDURE — 82306 VITAMIN D 25 HYDROXY: CPT

## 2024-09-07 PROCEDURE — 80048 BASIC METABOLIC PNL TOTAL CA: CPT

## 2024-09-07 PROCEDURE — 83735 ASSAY OF MAGNESIUM: CPT

## 2024-09-07 PROCEDURE — 250N000011 HC RX IP 250 OP 636: Performed by: PHYSICIAN ASSISTANT

## 2024-09-07 PROCEDURE — 85025 COMPLETE CBC W/AUTO DIFF WBC: CPT

## 2024-09-07 PROCEDURE — 36415 COLL VENOUS BLD VENIPUNCTURE: CPT

## 2024-09-07 PROCEDURE — 84100 ASSAY OF PHOSPHORUS: CPT

## 2024-09-07 PROCEDURE — 83970 ASSAY OF PARATHORMONE: CPT

## 2024-09-07 RX ORDER — TACROLIMUS 0.5 MG/1
0.5 CAPSULE ORAL 2 TIMES DAILY
Qty: 60 CAPSULE | Refills: 1 | Status: SHIPPED | OUTPATIENT
Start: 2024-09-07 | End: 2024-09-08

## 2024-09-07 RX ORDER — ALBUTEROL SULFATE 0.83 MG/ML
2.5 SOLUTION RESPIRATORY (INHALATION)
Status: CANCELLED | OUTPATIENT
Start: 2024-09-07

## 2024-09-07 RX ORDER — MEPERIDINE HYDROCHLORIDE 25 MG/ML
25 INJECTION INTRAMUSCULAR; INTRAVENOUS; SUBCUTANEOUS EVERY 30 MIN PRN
Status: CANCELLED | OUTPATIENT
Start: 2024-09-07

## 2024-09-07 RX ORDER — ALBUTEROL SULFATE 90 UG/1
1-2 AEROSOL, METERED RESPIRATORY (INHALATION)
Status: CANCELLED
Start: 2024-09-07

## 2024-09-07 RX ORDER — DIPHENHYDRAMINE HYDROCHLORIDE 50 MG/ML
50 INJECTION INTRAMUSCULAR; INTRAVENOUS
Status: CANCELLED
Start: 2024-09-07

## 2024-09-07 RX ORDER — HEPARIN SODIUM,PORCINE 10 UNIT/ML
5-20 VIAL (ML) INTRAVENOUS DAILY PRN
Status: CANCELLED | OUTPATIENT
Start: 2024-09-07

## 2024-09-07 RX ORDER — HEPARIN SODIUM (PORCINE) LOCK FLUSH IV SOLN 100 UNIT/ML 100 UNIT/ML
5 SOLUTION INTRAVENOUS
Status: CANCELLED | OUTPATIENT
Start: 2024-09-07

## 2024-09-07 RX ORDER — METHYLPREDNISOLONE SODIUM SUCCINATE 125 MG/2ML
125 INJECTION, POWDER, LYOPHILIZED, FOR SOLUTION INTRAMUSCULAR; INTRAVENOUS
Status: CANCELLED
Start: 2024-09-07

## 2024-09-07 RX ORDER — TACROLIMUS 1 MG/1
5 CAPSULE ORAL 2 TIMES DAILY
Qty: 300 CAPSULE | Refills: 11 | Status: SHIPPED | OUTPATIENT
Start: 2024-09-07 | End: 2024-09-08

## 2024-09-07 RX ORDER — EPINEPHRINE 1 MG/ML
0.3 INJECTION, SOLUTION INTRAMUSCULAR; SUBCUTANEOUS EVERY 5 MIN PRN
Status: CANCELLED | OUTPATIENT
Start: 2024-09-07

## 2024-09-07 RX ADMIN — SODIUM CHLORIDE 20 MG: 9 INJECTION, SOLUTION INTRAVENOUS at 08:05

## 2024-09-07 ASSESSMENT — PAIN SCALES - GENERAL: PAINLEVEL: NO PAIN (1)

## 2024-09-07 NOTE — PROGRESS NOTES
"Lexy Cobb came to [facility] today for a lab and assess following a  donor kidney transplant on 9/3/24.      Discharge date: 24, ~1600  Transplant coordinator: Gina Huizar RN  Phone number patient can be reached at: [phone]      Physical Assessment:  See physical assessment located under \"Document Flowsheets\".  -Incision site: clean/dry/intact, surgical glue intact, no redness/warmth  -Lines: n/a, dressing removed  -RASHEEDA drain: 90ml emptied from bulb last night, 40ml cherry colored fluid emptied from bulb during appt this morning. Small amount serosanguinous drainage on dressing, changed for patient while here. Insertion site slightly red and painful per patient - encouraged her to keep tubing pinned so that tubing remains pointed in direction it comes out of skin to avoid tension.   -Gutierrez: n/a  -Urine clarity: yellow/clear, no blood, denies pain/burning  -Hydration: drank 32oz at home after discharge, has had ~16oz while in [facility] this morning. Reviewed goal of 2-3oz per day.  -Nutrition: appetite ok, denies nausea/vomiting  -Last BM: last eveing, loose/diarrhea - patient reports this is her normal, she is not currently taking any senna/miralax  -Pain: currently 1/10, at drain site. Has been taking Tylenol regularly. Took Oxy 3x yesterday and once this morning at 5:30 when pain was 9/10 (everywhere hurt).   -My transplant place videos watched: not yet; gave patient website and she will watch today.    Labs drawn by [facility] staff: Yes  Vascular access: PIV placed in Rt forearm by RN.    Plan of care for today:   -Labs, VS, assessment reviewed with Veronica Ozuna NP.  -Med card compared to MAR and pill box set up. Med names, doses, times, and purposes reviewed with patient - she knew these and felt comfortable with all meds. Pill  obtained from pharmacy and given to patient; verified that calcium, phosphorus, magnesium, and bactrim could be crushed. Valcyte cannot - Veronica will work on PA to " get liquid form.  -20mg Simulect IV over 30min.    Medication changes: none    Medications administered:    Administrations This Visit       basiliximab (SIMULECT) 20 mg in sodium chloride 0.9 % 50 mL infusion       Admin Date  09/07/2024 Action  $New Bag Dose  20 mg Rate  100 mL/hr Route  Intravenous Documented By  Rosa Paz RN                  Patient education:    The following teaching topics were addressed: Importance of drinking 2L of non-caffeinated fluids daily, Incisional care, Signs/symptoms of infection, Good handwashing, Medications (purposes, doses and times of administration), Phone numbers to call with concenrs (Transplant coordinator, [address] and [facility]), 7A discharge check list, Plan of care, and Drain care    Patient verbalized understanding and all questions answered.    Drug level:  Patient took 3.5mg of Tacrolimus last evening at 1900.  Care coordinator to follow up with the result.    Face to face time: 2.5hrs.    Discharge Plan  Pt will follow up with labs and assessment at [facility] appt 9/8/24  Discharge instructions reviewed with patient: YES  Patient/Representative verbalized understanding, all questions answered: YES    Discharged from unit at 0940 with her son, Rm, to home (lives in [place]).      Rosa Paz RN    BP (!) 142/79 (Patient Position: Standing)   Pulse 116   Temp 98.3  F (36.8  C)   Resp 16   SpO2 99%

## 2024-09-07 NOTE — PATIENT INSTRUCTIONS
*We will work on getting your Valcyte changed to liquid form.    Please make sure your phone is available today because your coordinator will call to update you with your anti-rejection drug levels and possibly make changes to your anti-rejection dosages.    Please refer to your hospital discharge instructions for details on home care services, future appointments, phone numbers, and diet/activity levels.    Ensure you are recording the following:    BP - 2x daily    Temperature - daily    Weight - daily    Your coordinator is Gina Huizar RN.   Transplant phone # is [phone]. If the need is urgent, any care coordinator will be able to assist you.    THIS PHONE NUMBER IS ANSWERED 24/7. Coordinators are available 8am-5pm M-F, otherwise there will be an on-call RN available. If the emergency room is necessary, please present to the [facility] ER (not a local emergency room).    When to contact the Transplant Center:    Fever > 100.5F    If BP drops (symptoms = dizziness, lightheadedness)    If your daily weight drops >5lbs    If you are unable to take your immunosuppression medications.     ALWAYS BRING YOUR MED CARD TO APPOINTMENTS.      If you are a transplant patient and require transplant education, please click on this link: [email or web address].

## 2024-09-07 NOTE — TELEPHONE ENCOUNTER
ISSUE:   Tacrolimus IR level 4.9 on 9.7, goal 8-10, dose 3.5 mg BID.    PLAN:   Call Patient and confirm this was an accurate 12-hour trough.   Verify Tacrolimus IR dose 3.5 mg BID.   Confirm no new medications or or missed doses.   Confirm no new illness / infection / diarrhea.   If accurate trough and accurate dose, increase Tacrolimus IR dose to 5.5 mg BID     Is this more than a 50% increase or decrease in current IS dose: Yes  If YES, justification: below goal    Repeat labs in 1 days.  *If > 50% change in immunosuppression dose, repeat labs in 1 week.     OUTCOME:   Spoke with Patient, they confirm accurate trough level and current dose 3.5 mg BID.   Patient confirmed dose change to 5.5 mg BID.  Patient agreed to repeat labs in 1 days.   Orders sent to preferred pharmacy for dose change and lab for repeat labs.   Patient voiced understanding of plan.

## 2024-09-08 ENCOUNTER — INFUSION THERAPY VISIT (OUTPATIENT)
Dept: INFUSION THERAPY | Facility: CLINIC | Age: 79
End: 2024-09-08
Payer: COMMERCIAL

## 2024-09-08 ENCOUNTER — TELEPHONE (OUTPATIENT)
Dept: TRANSPLANT | Facility: CLINIC | Age: 79
End: 2024-09-08

## 2024-09-08 VITALS
WEIGHT: 133.2 LBS | OXYGEN SATURATION: 100 % | BODY MASS INDEX: 22.51 KG/M2 | DIASTOLIC BLOOD PRESSURE: 73 MMHG | HEART RATE: 67 BPM | SYSTOLIC BLOOD PRESSURE: 155 MMHG | RESPIRATION RATE: 16 BRPM

## 2024-09-08 DIAGNOSIS — Z94.0 STATUS POST KIDNEY TRANSPLANT: ICD-10-CM

## 2024-09-08 DIAGNOSIS — E83.42 HYPOMAGNESEMIA: Primary | ICD-10-CM

## 2024-09-08 DIAGNOSIS — Z94.0 STATUS POST KIDNEY TRANSPLANT: Primary | ICD-10-CM

## 2024-09-08 LAB
ANION GAP SERPL CALCULATED.3IONS-SCNC: 10 MMOL/L (ref 7–15)
ATRIAL RATE - MUSE: 77 BPM
ATRIAL RATE - MUSE: 82 BPM
BASOPHILS # BLD AUTO: 0 10E3/UL (ref 0–0.2)
BASOPHILS NFR BLD AUTO: 0 %
BUN SERPL-MCNC: 19.1 MG/DL (ref 8–23)
CALCIUM SERPL-MCNC: 8.2 MG/DL (ref 8.8–10.4)
CHLORIDE SERPL-SCNC: 108 MMOL/L (ref 98–107)
CREAT SERPL-MCNC: 0.87 MG/DL (ref 0.51–0.95)
DIASTOLIC BLOOD PRESSURE - MUSE: NORMAL MMHG
DIASTOLIC BLOOD PRESSURE - MUSE: NORMAL MMHG
EGFRCR SERPLBLD CKD-EPI 2021: 68 ML/MIN/1.73M2
EOSINOPHIL # BLD AUTO: 0.3 10E3/UL (ref 0–0.7)
EOSINOPHIL NFR BLD AUTO: 4 %
ERYTHROCYTE [DISTWIDTH] IN BLOOD BY AUTOMATED COUNT: 14.6 % (ref 10–15)
GLUCOSE SERPL-MCNC: 92 MG/DL (ref 70–99)
HCO3 SERPL-SCNC: 23 MMOL/L (ref 22–29)
HCT VFR BLD AUTO: 25.1 % (ref 35–47)
HGB BLD-MCNC: 8 G/DL (ref 11.7–15.7)
IMM GRANULOCYTES # BLD: 0 10E3/UL
IMM GRANULOCYTES NFR BLD: 1 %
INTERPRETATION ECG - MUSE: NORMAL
INTERPRETATION ECG - MUSE: NORMAL
LYMPHOCYTES # BLD AUTO: 1.3 10E3/UL (ref 0.8–5.3)
LYMPHOCYTES NFR BLD AUTO: 14 %
MAGNESIUM SERPL-MCNC: 1.6 MG/DL (ref 1.7–2.3)
MCH RBC QN AUTO: 33.8 PG (ref 26.5–33)
MCHC RBC AUTO-ENTMCNC: 31.9 G/DL (ref 31.5–36.5)
MCV RBC AUTO: 106 FL (ref 78–100)
MONOCYTES # BLD AUTO: 0.4 10E3/UL (ref 0–1.3)
MONOCYTES NFR BLD AUTO: 5 %
NEUTROPHILS # BLD AUTO: 6.7 10E3/UL (ref 1.6–8.3)
NEUTROPHILS NFR BLD AUTO: 76 %
NRBC # BLD AUTO: 0 10E3/UL
NRBC BLD AUTO-RTO: 0 /100
P AXIS - MUSE: 86 DEGREES
P AXIS - MUSE: NORMAL DEGREES
PHOSPHATE SERPL-MCNC: 2 MG/DL (ref 2.5–4.5)
PLATELET # BLD AUTO: 218 10E3/UL (ref 150–450)
POTASSIUM SERPL-SCNC: 3.4 MMOL/L (ref 3.4–5.3)
PR INTERVAL - MUSE: 144 MS
PR INTERVAL - MUSE: 160 MS
QRS DURATION - MUSE: 86 MS
QRS DURATION - MUSE: 90 MS
QT - MUSE: 400 MS
QT - MUSE: 420 MS
QTC - MUSE: 467 MS
QTC - MUSE: 496 MS
R AXIS - MUSE: 28 DEGREES
R AXIS - MUSE: 37 DEGREES
RBC # BLD AUTO: 2.37 10E6/UL (ref 3.8–5.2)
SODIUM SERPL-SCNC: 141 MMOL/L (ref 135–145)
SYSTOLIC BLOOD PRESSURE - MUSE: NORMAL MMHG
SYSTOLIC BLOOD PRESSURE - MUSE: NORMAL MMHG
T AXIS - MUSE: 55 DEGREES
T AXIS - MUSE: 61 DEGREES
TACROLIMUS BLD-MCNC: 7.2 UG/L (ref 5–15)
TME LAST DOSE: NORMAL H
TME LAST DOSE: NORMAL H
VENTRICULAR RATE- MUSE: 82 BPM
VENTRICULAR RATE- MUSE: 84 BPM
WBC # BLD AUTO: 8.8 10E3/UL (ref 4–11)

## 2024-09-08 PROCEDURE — 80048 BASIC METABOLIC PNL TOTAL CA: CPT

## 2024-09-08 PROCEDURE — 83735 ASSAY OF MAGNESIUM: CPT

## 2024-09-08 PROCEDURE — 80197 ASSAY OF TACROLIMUS: CPT

## 2024-09-08 PROCEDURE — 36415 COLL VENOUS BLD VENIPUNCTURE: CPT

## 2024-09-08 PROCEDURE — 85025 COMPLETE CBC W/AUTO DIFF WBC: CPT

## 2024-09-08 PROCEDURE — 258N000003 HC RX IP 258 OP 636: Performed by: NURSE PRACTITIONER

## 2024-09-08 PROCEDURE — 84100 ASSAY OF PHOSPHORUS: CPT

## 2024-09-08 PROCEDURE — 96360 HYDRATION IV INFUSION INIT: CPT

## 2024-09-08 RX ORDER — MAGNESIUM OXIDE 400 MG/1
400 TABLET ORAL DAILY
Qty: 90 TABLET | Refills: 1 | Status: SHIPPED | OUTPATIENT
Start: 2024-09-08 | End: 2024-09-16

## 2024-09-08 RX ORDER — TACROLIMUS 1 MG/1
6 CAPSULE ORAL 2 TIMES DAILY
Qty: 360 CAPSULE | Refills: 11 | Status: SHIPPED | OUTPATIENT
Start: 2024-09-08 | End: 2024-09-09

## 2024-09-08 RX ADMIN — SODIUM CHLORIDE, POTASSIUM CHLORIDE, SODIUM LACTATE AND CALCIUM CHLORIDE 1000 ML: 600; 310; 30; 20 INJECTION, SOLUTION INTRAVENOUS at 08:18

## 2024-09-08 ASSESSMENT — PAIN SCALES - GENERAL: PAINLEVEL: SEVERE PAIN (7)

## 2024-09-08 NOTE — TELEPHONE ENCOUNTER
Lexy paged that she feels she made a med error. Contacted patient.     Took magnesium 400 mg at noon. Pt picked up script today and took another magnesium as she thought there was a dose change.   Pt has taken 800 mg magnesium total today. Patient's magnesium 1.6 today.     Pt aware to read labels and compare with med list and set magnesium bottle from today aside.     Pt's bp 139/87.   Pt will ensure hydrates.     Set patient up with lab appt for Monday. Pt stated that she is to be seen in clinic with NP. Appt request made.      ISSUE:   Tacrolimus IR level 7.2 on 09/08/24, goal 8, dose 5.5 mg BID.    PLAN:   Call Patient and confirm this was an accurate 12-hour trough.   Verify Tacrolimus IR dose 5.5 mg BID.   Confirm no new medications or illness.   Confirm no missed doses.     If accurate trough and accurate dose, increase Tacrolimus IR dose to 6 mg BID  *Recommended dose rounded from calculated dose 6.11 mg  BID.      Repeat labs in 1 days.   For any dose change <50%, recheck labs per guideline or within 1 month.  For any dose change of more than 50%, recheck drug level based on timing to reach steady state:   Immediate release tacrolimus: 2-3 days  Extended-release tacrolimus: 7 days  Cyclosporine: 2 days  Sirolimus: 12 days  Everolimus: 8 days      OUTCOME:   Spoke with Patient, they confirm accurate trough level and current dose 5.5 mg BID.   Patient confirmed dose change to 6 mg BID.  Patient agreed to repeat labs in 1 days.   Orders sent to preferred pharmacy for dose change and lab for repeat labs.   Patient voiced understanding of plan.

## 2024-09-08 NOTE — PATIENT INSTRUCTIONS
Please make sure your phone is available today because your coordinator will call to update you with your anti-rejection drug levels and possibly make changes to your anti-rejection dosages.    Please refer to your hospital discharge instructions for details on home care services, future appointments, phone numbers, and diet/activity levels.    Ensure you are recording the following:    BP - 2x daily    Temperature - daily    Weight - daily     Your coordinator is Gina Huizar RN  Transplant phone # is [phone]. If the need is urgent, any care coordinator will be able to assist you.    THIS PHONE NUMBER IS ANSWERED 24/7. Coordinators are available 8am-5pm M-F, otherwise there will be an on-call RN available. If the emergency room is necessary, please present to the [facility] ER (not a local emergency room).    When to contact the Transplant Center:    Fever > 100.5F    If BP drops (symptoms = dizziness, lightheadedness)    If your daily weight drops >5lbs    If you are unable to take your immunosuppression medications.     ALWAYS BRING YOUR MED CARD TO APPOINTMENTS.      If you are a transplant patient and require transplant education, please click on this link: [email or web address].

## 2024-09-08 NOTE — PROGRESS NOTES
"Lexy Cobb came to [facility] today for a lab and assess following a  donor kidney transplant on 9/3/24.      Discharge date: 24  Transplant coordinator: Gina Huizar RN  Phone number patient can be reached at: [phone]    Physical Assessment:  See physical assessment located under \"Document Flowsheets\".  -Incision site: clean/dry/intact, surgical glue intact, no redness/warmth/bruising  -RASHEEDA drain: redness around insertion site, slight excoriation noted at 0300 position. Moderate amount serosanguinous drainage on dressing and patient's underwear. Site cleansed and cavilon applied, new dressing placed. Patient given hydrogel dressing, extra cavilon swab, and instructions for use. Site will be addressed at clinic appt 24. ~70ml cherry colored drainage emptied from bulb by patient this morning (24hr volume).  -Lines: n/a  -Gutierrez: n/a  -Urine clarity: clear/yellow, denies pain/burning with urination  -Hydration: 64oz yesterday, has had 12 so far this morning. Reviewed goal of 2-3L per day.  -Nutrition: Initially reported appetite good, but on further discussion reported she hasn't been eating much. She did say she is making a pot roast for dinner today. Denies nausea/vomiting. Encouraged her to eat small meals and have frequent snacks throughout the day.  -Last BM: 24. She is going to take senna and miralax when she gets home today.  -Pain: 7/10, abdominal - mainly around drain site. Has been taking Tylenol regularly and took Oxy before bed last night.     My transplant place videos watched: did not watch videos yesterday but did read transplant handbook yesterday afternoon and we discussed what she read; she had a good grasp on the topics.    Labs drawn by [facility] staff: Yes  Vascular access: PIV placed in Rt forearm by RN.    Plan of care for today:   -Labs, VS, and assessment reviewed with Veronica Ozuna RN.  -1L LR IV over 60min for symptomatic orthostatic hypotension. BPs at start of " appt 165/99 sitting and 148/96 standing (had also not taken her morning meds yet; BPs after IV fluids 153/82 sitting and 155/73 standing.  -Reviewed education topics and medications as discussed yesterday and patient's questions answered.    Medication changes:   -Verified patient taking 400mg Magnesium daily at lunch.    Medications administered:    Administrations This Visit       lactated ringers BOLUS 1,000 mL       Admin Date  09/08/2024 Action  $New Bag Dose  1,000 mL Rate  1,000 mL/hr Route  Intravenous Documented By  Rosa Paz RN                  Patient education:  The following teaching topics were addressed: Importance of drinking 2L of non-caffeinated fluids daily, Incisional care, Signs/symptoms of infection, Good handwashing, Medications (purposes, doses and times of administration), Phone numbers to call with concenrs (Transplant coordinator, [facility] and [facility]), 7A discharge check list, Plan of care, and Drain care    Patient verbalized understanding and all questions answered.    Drug level:  Patient took 5.5mg of Tacrolimus last evening at 1930.  Care coordinator to follow up with the result.    Face to face time: 2hrs    Discharge Plan  Pt will follow up with labs and clinic appt 9/9/24.  Discharge instructions reviewed with patient: YES  Patient/Representative verbalized understanding, all questions answered: YES    Discharged from unit at 0925, with  and son, to home (lives in [place]).    Rosa Paz RN    BP (!) 155/73 (Patient Position: Standing)   Pulse 67   Resp 16   Wt 60.4 kg (133 lb 3.2 oz)   SpO2 100%   BMI 22.51 kg/m

## 2024-09-09 ENCOUNTER — LAB (OUTPATIENT)
Dept: LAB | Facility: CLINIC | Age: 79
End: 2024-09-09
Payer: COMMERCIAL

## 2024-09-09 ENCOUNTER — TELEPHONE (OUTPATIENT)
Dept: PHARMACY | Facility: CLINIC | Age: 79
End: 2024-09-09

## 2024-09-09 ENCOUNTER — OFFICE VISIT (OUTPATIENT)
Dept: TRANSPLANT | Facility: CLINIC | Age: 79
End: 2024-09-09
Attending: NURSE PRACTITIONER
Payer: COMMERCIAL

## 2024-09-09 ENCOUNTER — TELEPHONE (OUTPATIENT)
Dept: TRANSPLANT | Facility: CLINIC | Age: 79
End: 2024-09-09

## 2024-09-09 VITALS — DIASTOLIC BLOOD PRESSURE: 73 MMHG | OXYGEN SATURATION: 100 % | SYSTOLIC BLOOD PRESSURE: 106 MMHG | HEART RATE: 56 BPM

## 2024-09-09 DIAGNOSIS — Z76.82 ORGAN TRANSPLANT CANDIDATE: ICD-10-CM

## 2024-09-09 DIAGNOSIS — Z48.298 AFTERCARE FOLLOWING ORGAN TRANSPLANT: ICD-10-CM

## 2024-09-09 DIAGNOSIS — N18.6 ESRD (END STAGE RENAL DISEASE) (H): ICD-10-CM

## 2024-09-09 DIAGNOSIS — Z94.0 STATUS POST KIDNEY TRANSPLANT: ICD-10-CM

## 2024-09-09 DIAGNOSIS — I12.9 HYPERTENSION, RENAL: ICD-10-CM

## 2024-09-09 DIAGNOSIS — Z94.0 STATUS POST KIDNEY TRANSPLANT: Primary | ICD-10-CM

## 2024-09-09 LAB
APPEARANCE FLD: ABNORMAL
CELL COUNT BODY FLUID SOURCE: ABNORMAL
COLOR FLD: ABNORMAL
CREAT FLD-MCNC: 1.1 MG/DL
CREATININE BODY FLUID SOURCE: NORMAL
HOLD SPECIMEN: NORMAL
LD BODY BODY FLUID SOURCE: NORMAL
LDH FLD L TO P-CCNC: 242 U/L
LYMPHOCYTES NFR FLD MANUAL: 51 %
MONOS+MACROS NFR FLD MANUAL: 0 %
NEUTS BAND NFR FLD MANUAL: 49 %
WBC # FLD AUTO: 140 /UL

## 2024-09-09 PROCEDURE — 99000 SPECIMEN HANDLING OFFICE-LAB: CPT | Performed by: PATHOLOGY

## 2024-09-09 PROCEDURE — 86832 HLA CLASS I HIGH DEFIN QUAL: CPT | Performed by: SURGERY

## 2024-09-09 PROCEDURE — 36415 COLL VENOUS BLD VENIPUNCTURE: CPT | Performed by: PATHOLOGY

## 2024-09-09 PROCEDURE — 86833 HLA CLASS II HIGH DEFIN QUAL: CPT | Performed by: SURGERY

## 2024-09-09 PROCEDURE — 82570 ASSAY OF URINE CREATININE: CPT | Performed by: NURSE PRACTITIONER

## 2024-09-09 PROCEDURE — 89050 BODY FLUID CELL COUNT: CPT | Performed by: NURSE PRACTITIONER

## 2024-09-09 PROCEDURE — G0463 HOSPITAL OUTPT CLINIC VISIT: HCPCS | Performed by: NURSE PRACTITIONER

## 2024-09-09 PROCEDURE — 83615 LACTATE (LD) (LDH) ENZYME: CPT | Performed by: NURSE PRACTITIONER

## 2024-09-09 PROCEDURE — 99213 OFFICE O/P EST LOW 20 MIN: CPT | Mod: 24 | Performed by: NURSE PRACTITIONER

## 2024-09-09 RX ORDER — VALGANCICLOVIR 450 MG/1
900 TABLET, FILM COATED ORAL DAILY
Qty: 60 TABLET | Refills: 5 | Status: SHIPPED | OUTPATIENT
Start: 2024-09-09

## 2024-09-09 RX ORDER — MYCOPHENOLATE MOFETIL 250 MG/1
750 CAPSULE ORAL 2 TIMES DAILY
Qty: 180 CAPSULE | Refills: 11 | Status: SHIPPED | OUTPATIENT
Start: 2024-09-09 | End: 2024-09-30

## 2024-09-09 RX ORDER — METOPROLOL TARTRATE 25 MG/1
25 TABLET, FILM COATED ORAL 2 TIMES DAILY
Qty: 60 TABLET | Refills: 11 | Status: SHIPPED | OUTPATIENT
Start: 2024-09-09 | End: 2024-09-30

## 2024-09-09 RX ORDER — AMLODIPINE BESYLATE 5 MG/1
5 TABLET ORAL AT BEDTIME
Qty: 30 TABLET | Refills: 11 | Status: SHIPPED | OUTPATIENT
Start: 2024-09-09 | End: 2024-09-11

## 2024-09-09 RX ORDER — SULFAMETHOXAZOLE AND TRIMETHOPRIM 400; 80 MG/1; MG/1
1 TABLET ORAL DAILY
Qty: 30 TABLET | Refills: 11 | Status: SHIPPED | OUTPATIENT
Start: 2024-09-09

## 2024-09-09 RX ORDER — TACROLIMUS 1 MG/1
6 CAPSULE ORAL 2 TIMES DAILY
Qty: 360 CAPSULE | Refills: 11 | Status: SHIPPED | OUTPATIENT
Start: 2024-09-09 | End: 2024-09-27

## 2024-09-09 RX ORDER — MAGNESIUM OXIDE 400 MG/1
400 TABLET ORAL DAILY
Qty: 30 TABLET | Refills: 3 | Status: SHIPPED | OUTPATIENT
Start: 2024-09-09 | End: 2024-09-11

## 2024-09-09 RX ORDER — ATORVASTATIN CALCIUM 10 MG/1
10 TABLET, FILM COATED ORAL DAILY
Qty: 30 TABLET | Refills: 11 | Status: SHIPPED | OUTPATIENT
Start: 2024-09-09 | End: 2024-09-30

## 2024-09-09 NOTE — PLAN OF CARE
Physical Therapy Discharge Summary    Reason for therapy discharge:    Discharged to home.    Progress towards therapy goal(s). See goals on Care Plan in [facility] electronic health record for goal details.  Goals partially met.  Barriers to achieving goals:   discharge from facility.    Therapy recommendation(s):    Continue home exercise program.

## 2024-09-09 NOTE — PROGRESS NOTES
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 On 9/3/2024 she underwent DBD kidney transplant without vascular reconstruction and without stent placement.     Doing well.  No fevers or chills.  No nausea or vomiting.      No urinary symptoms.    No chest pain or shortness of breath.       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   Transplant Date:  9/3/2024 (Kidney)   Ureteral Stent:  No   Crossmatch:  negative   DSA at Tx:  No  Baseline Cr: TBD   DeNovo DSA: No    Acute Rejection Hx:  No    Present Maintenance Immunosuppression:  Tacrolimus and Mycophenolate mofetil    CMV IgG Ab Discordance:  Yes  EBV IgG Ab Discordance:  No    BK Viremia:  No  EBV Viremia:  No    Transplant Coordinator: Gina Huizar     Transplant Office Phone Number: [phone]     Immunosuppressant Medications       Immunosuppressive Agents Disp Start End     mycophenolate (GENERIC EQUIVALENT) 250 MG capsule 180 capsule 9/6/2024 --    Sig - Route: Take 3 capsules (750 mg) by mouth 2 times daily. - Oral    Class: E-Prescribe     tacrolimus (GENERIC EQUIVALENT) 1 MG capsule 360 capsule 9/8/2024 --    Sig - Route: Take 6 capsules (6 mg) by mouth 2 times daily. - Oral    Class: E-Prescribe    Notes to Pharmacy: TXP DT 9/3/2024 (Kidney) TXP Dischg DT 9/6/2024 DX Kidney replaced by transplant Z94.0 [facility] ([place] [place])    No prior authorization was found for this prescription.    Found prior authorization for another " prescription for the same medication: Closed            Possible Immunosuppression-related side effects:   []             headache  []             vivid dreams  []             irritability  []             cognitive difficuties  []             fine tremor  []             nausea  []             diarrhea  []             neuropathy      []             edema  []             renal calcineurin toxicity  []             hyperkalemia  []             post-transplant diabetes  []             decreased appetite  []             increased appetite  []             other:  []             none    Prescription Medications as of 9/9/2024         Rx Number Disp Refills Start End Last Dispensed Date Next Fill Date Owning Pharmacy    acetaminophen (TYLENOL) 325 MG tablet  -- -- 9/6/2024 --       Sig: Take 2 tablets (650 mg) by mouth every 4 hours as needed for other (For optimal non-opioid multimodal pain management to improve pain control.).    Class: OTC    Route: Oral    amLODIPine (NORVASC) 5 MG tablet  30 tablet 1 9/6/2024 --   [address]    Sig: Take 1 tablet (5 mg) by mouth at bedtime.    Class: E-Prescribe    Route: Oral    atorvastatin (LIPITOR) 10 MG tablet  30 tablet 2 9/7/2024 --   [address]    Sig: Take 1 tablet (10 mg) by mouth daily.    Class: E-Prescribe    Route: Oral    calcitRIOL (ROCALTROL) 0.25 MCG capsule  -- --  --       Sig: Take 0.25 mcg by mouth daily    Class: Historical    Route: Oral    calcium carbonate 500 mg, elemental, (OSCAL) 500 MG tablet  60 tablet 2 9/6/2024 --   [address]    Sig: Take 1 tablet (500 mg) by mouth 2 times daily.    Class: E-Prescribe    Route: Oral    cholecalciferol 125 MCG (5000 UT) CAPS  -- --  --       Sig: Take 5,000 Units by mouth daily.    Class: Historical    Route: Oral    cyanocobalamin 1000 MCG/ML  injection  -- --  --       Sig: Inject 1 mL as directed every 30 days    Class: Historical    Route: Injection    diphenhydrAMINE-APAP, sleep, (TYLENOL PM EXTRA STRENGTH)  MG/30ML LIQD  -- --  --       Sig: Take 1 capful. by mouth at bedtime.    Class: Historical    Route: Oral    magnesium oxide (MAG-) 400 MG tablet  -- --  --       Sig: Take 1 tablet by mouth daily    Class: Historical    Route: Oral    magnesium oxide (MAG-OX) 400 MG tablet  90 tablet 1 9/8/2024 --   [id number] [[address]    Sig: Take 1 tablet (400 mg) by mouth daily.    Class: E-Prescribe    Route: Oral    methocarbamol (ROBAXIN) 500 MG tablet  12 tablet 0 9/6/2024 --   [address]    Sig: Take 1 tablet (500 mg) by mouth every 6 hours as needed for muscle spasms.    Class: E-Prescribe    Route: Oral    metoprolol tartrate (LOPRESSOR) 25 MG tablet  60 tablet 1 9/6/2024 --   [address]    Sig: Take 1 tablet (25 mg) by mouth 2 times daily.    Class: E-Prescribe    Route: Oral    Renewals       Renewal requests to authorizing provider (Gabriela Qureshi, SHAKA) <b>prohibited</b>            Multiple Vitamins-Minerals (PRESERVISION AREDS 2 PO)  -- --  --       Sig: Take 1 capsule by mouth 2 times daily.    Class: Historical    Route: Oral    mycophenolate (GENERIC EQUIVALENT) 250 MG capsule  180 capsule 11 9/6/2024 --   [address]    Sig: Take 3 capsules (750 mg) by mouth 2 times daily.    Class: E-Prescribe    Route: Oral    omeprazole (PRILOSEC) 20 MG capsule  -- --  --       Sig: Take 1 capsule by mouth daily    Class: Historical    Route: Oral    oxyBUTYnin ER (DITROPAN XL) 5 MG 24 hr tablet  -- -- 1/10/2024 1/9/2025       Sig: Take 5 mg by mouth daily    Class: Historical    Route: Oral    oxyCODONE (ROXICODONE) 5  MG tablet  10 tablet 0 9/6/2024 --   [facility] - [place], [place] - [address] [place] [address]    Sig: Take 1 tablet (5 mg) by mouth every 4 hours as needed for severe pain.    Class: E-Prescribe    Earliest Fill Date: 9/6/2024    Route: Oral    phosphorus tablet 250 mg (PHOSPHA 250 NEUTRAL) 250 MG per tablet  180 tablet 0 9/6/2024 --   [facility] - [address]    Sig: Take 2 tablets (500 mg) by mouth 3 times daily.    Class: E-Prescribe    Route: Oral    predniSONE (DELTASONE) 5 MG tablet  98 tablet 0 9/7/2024 10/8/2024   [facility] - [place], [place] - [address] [place] [address]    Sig: Take 12 tablets (60 mg) by mouth daily for 1 day, THEN 8 tablets (40 mg) daily for 2 days, THEN 4 tablets (20 mg) daily for 7 days, THEN 3 tablets (15 mg) daily for 7 days, THEN 2 tablets (10 mg) daily for 7 days, THEN 1 tablet (5 mg) daily for 7 days.    Class: E-Prescribe    Route: Oral    Renewals       Renewal requests to authorizing provider (Gabriela Qureshi NP) <b>prohibited</b>            senna-docusate (SENOKOT-S/PERICOLACE) 8.6-50 MG tablet  -- -- 9/6/2024 --       Sig: Take 1 tablet by mouth 2 times daily.    Class: OTC    Route: Oral    sulfamethoxazole-trimethoprim (BACTRIM) 400-80 MG tablet  30 tablet 11 9/7/2024 --   [facility] - [place], [place] - [address] [place] [address]    Sig: Take 1 tablet by mouth daily.    Class: E-Prescribe    Route: Oral    tacrolimus (GENERIC EQUIVALENT) 1 MG capsule  360 capsule 11 9/8/2024 --   [facility] - [place], [place] - [address]     Sig: Take 6 capsules (6 mg) by mouth 2 times daily.    Class: E-Prescribe    Notes to Pharmacy: TXP DT 9/3/2024 (Kidney) TXP Dischg DT 9/6/2024 DX Kidney replaced by transplant Z94.0 [facility] ([place], [place])    Route: Oral    No prior authorization was found for this prescription.    Found  prior authorization for another prescription for the same medication: Closed    traZODone (DESYREL) 50 MG tablet  -- -- 2019 --       Si mg at bedtime    Class: Historical    valGANciclovir (VALCYTE) 450 MG tablet  60 tablet 5 2024 --   [facility] - [place], [place] - [address] [facility]    Sig: Take 2 tablets (900 mg) by mouth daily.    Class: E-Prescribe    Route: Oral            O:   Pulse:  [56] 56  BP: (106)/(73) 106/73  SpO2:  [100 %] 100 %  General Appearance: in no apparent distress.   Skin: Normal, no rashes or jaundice  Heart: regular rate and rhythm  Lungs: easy respirations, no audible wheezing.  Abdomen: rounded, The wound is dry and intact, without hernia. The abdomen is non-tender. The kidney graft is not tender.  There is no ascites.  Extremities: Tremor absent.   Edema: present bilaterally. 1+        Latest Ref Rng & Units 2024     7:51 AM 2024     7:14 AM 2024     5:32 AM 2024     6:56 AM 2024     6:06 AM   Transplant Immunosuppression Labs   Creat 0.51 - 0.95 mg/dL 0.87  0.82  0.94  1.30  2.21    Urea Nitrogen 8.0 - 23.0 mg/dL 19.1  20.3  24.0  30.3  46.1    WBC 4.0 - 11.0 10e3/uL 8.8  7.4  7.8  9.7  10.1    Neutrophil % 76  88  75  78  80        Chemistries:   Recent Labs   Lab Test 24  0751   BUN 19.1   CR 0.87   GFRESTIMATED 68   GLC 92     Lab Results   Component Value Date    A1C 4.4 2024     Recent Labs   Lab Test 24  2157   ALBUMIN 4.0   BILITOTAL 0.3   ALKPHOS 50   AST 13   ALT <5     Urine Studies:  Recent Labs   Lab Test 24  2345   COLOR Light Yellow   APPEARANCE Slightly Cloudy*   URINEGLC Negative   URINEBILI Negative   URINEKETONE Negative   SG 1.011   UBLD Negative   URINEPH 6.0   PROTEIN 100*   NITRITE Negative   LEUKEST Large*   RBCU 1   WBCU >182*     No lab results found.  Hematology:   Recent Labs   Lab Test 24  0751 24  0714 24  0532   HGB 8.0* 8.5* 7.1*    228 147*   WBC 8.8  7.4 7.8     Coags:   Recent Labs   Lab Test 09/02/24  2157 10/10/19  1431   INR 0.93 0.93     HLA antibodies:   SA1 Hi Risk [facility]   Date Value Ref Range Status   04/22/2020 None  Final     SA1 HI RISK [facility]   Date Value Ref Range Status   09/01/2024 None  Final     SA1 Mod Risk [facility]   Date Value Ref Range Status   04/22/2020 None  Final     SA1 MOD RISK [facility]   Date Value Ref Range Status   09/01/2024 None  Final     SA2 Hi Risk [facility]   Date Value Ref Range Status   04/22/2020 None  Final     SA2 HI RISK [facility]   Date Value Ref Range Status   09/01/2024 None  Final     SA2 Mod Risk [facility]   Date Value Ref Range Status   04/22/2020 None  Final     SA2 MOD RISK [facility]   Date Value Ref Range Status   09/01/2024 None  Final       Assessment: Lexy Cobb is doing well s/p DDKT:  Issues we addressed during her visit include:    Plan:    1. Graft function: stable graft function   2. Immunosuppression Management: No change continue same IS  .  Complexity of management:Medium.  Contributing factors:  recent txp  3. RASHEEDA:  around 150-200ml per day.  Drain cr is 1.1, LDH and cell count pending  Should be stripping drain as directed.  Empty and record amount.    Incision:  healing well   Followup: as directed    Total Time: 25 min,   Counselling Time: 15 min.

## 2024-09-09 NOTE — TELEPHONE ENCOUNTER
Lexy Cobb is a post-kidney transplant patient who discharged on 9/6/24. Patient chooses to receive medications from [place] specialty pharmacy. The patient will be responsible for managing medications.    HEALTH BENEFIT: ([facility]) MEDICARE PART B & PART D ADVANTAGE PLAN  ID# [id number] GRP# [id number] (EFFECTIVE (01/01/2021) )   PROCESSING INFO: ID# [id number] GRP# MNU PCN# [id number] BIN# [id number] (01/01/2021) )  (DO NOT BILL TO ORIGINAL MEDICARE ID# [id number]  (PART A EFFECTIVE (02/01/2012) , PART B EFFECTIVE (02/01/2012) )  PT WILL PAY %20 COST OF MEDICATION AT TIME OF SERVICE     PHARMACY BENEFIT: ([facility]) PART D  PROCESSING INFO: ID# [id number] GRP# MNU PCN# [id number] BIN# [id number] (EFFECTIVE (01/01/2021) )   DEDUCTIBLE ($232)    COPAY STRUCTURE  INITIAL PHASE:  $ (0) FOR TIER 1  $ (10) FOR TIER 2  $ (47) FOR TIER 3  $ (100) COINSURANCE FOR TIER 4  % (29) COINSURANCE FOR TIER 5 MEDICATIONS UNTIL TOTAL YEAR DRUG COSTS REACH $5030  COVERAGE GAP (DONUT HOLE):   %25 COINSURANCE FOR GENERIC  %25 COINSURANCE FOR BRAND UNTIL TOTAL YEARLY PATIENT OUT-OF-POCKET COSTS REACH $8000  CATASTROPHIC PHASE:  PATIENT PAYS 0%          TEST CLAIM SPECIALTY #28   MYCOPHENOLATE 250MG (#240/30DS) $10  PROGRAF 1MG (#120/30DS) PRODUCT/SERVICE NOT COVERED PLAN/BENEFIT EXCLUSION  TACROLIMUS 1MG (#120/30DS)$10  CYCLOSPORINE 100MG (#60/30DS) $153.05  VALGANCICLOVIR 450MG (#60/30DS) $10  VALACYCLOVIR 1GM (#90/30DS) $10    Svetlana Jones [facility]

## 2024-09-09 NOTE — TELEPHONE ENCOUNTER
Kidney and Pancreas Transplant Coordinator Transition to Outpatient Discharge Note    Surgeon (updated in Transplant Information tab): DAVION  Nephrologist (updated in Transplant Information tab): Mario  Transplant Coordinator: Gina Huizar RN    Lines / drains in place at time of discharge:  Stent: No.  Other:   If yes, review of parameters to track and when to contact RNCC: No..    Immunosuppression:   CNI: tac  Antimetabolite: mmf  Prednisone taper: Yes.    Induction: low    Prophylaxis:   CMV: D-/R+ : Valcyte 6 months; renal dosing  EBV: D+/R+  Antibiotic: bactrim    High Risk DSA: No.    Pharmacy after discharge: [facility]  Lab after discharge: [facility]  Lab letter faxed to outside lab, if needed: No.    Education:  Writer spoke with Lexy Cobb.   We discussed immunosuppression medications, indications, side effects, dose adjustments, and lab monitoring.   Lab draw schedule was provided via MyChart/mail to the patient and fully explained.    DSA  kits needed:  No.    If so, request submitted to centralized team for send-out.  Living Donor:  No.    If yes, discussed with patient data collection through [facility] for K[facility] in effort  to improve eplet matching. This will include additional, non-clinical lab   draw organized by an external organization, [email or web address] mobile phlebotomy.   Consent to Communicate on File: Yes.    If no, discussed inability for team to communicate with any person other than the patient regarding PHI, including scheduling.   If no, consent to communicate was offered: No.     Further education was provided on typical post transplant course, labs examined with thresholds, biopsy, infection prevention, office contact numbers, and when to call.     Discharge Transition Plan:   Pt will transition home, with assistance from family. Pt will not have home care.   Pt states having working BP monitor, scale, and thermometer at home.    Follow Up:  Orders for post-transplant follow-up appointments placed:  Yes.  Patient Status Checklist Task updated: Yes.    Special/Additional needs: Yes.  Pt questions for follow up:     Gina Huizar RN  Post-Kidney Transplant Coordinator  (ph) [phone]

## 2024-09-09 NOTE — LETTER
"9/9/2024      Lexy Cobb  [address]  [facility] [address]      Dear Colleague,    Thank you for referring your patient, Lexy Cobb, to the [facility] TRANSPLANT [facility]. Please see a copy of my visit note below.    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 On 9/3/2024 she underwent DBD kidney transplant without vascular reconstruction and without stent placement.     Doing well.  No fevers or chills.  No nausea or vomiting.      No urinary symptoms.    No chest pain or shortness of breath.       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   Transplant Date:  9/3/2024 (Kidney)   Ureteral Stent:  No   Crossmatch:  negative   DSA at Tx:  No  Baseline Cr: TBD   DeNovo DSA: No    Acute Rejection Hx:  No    Present Maintenance Immunosuppression:  Tacrolimus and Mycophenolate mofetil    CMV IgG Ab Discordance:  Yes  EBV IgG Ab Discordance:  No    BK Viremia:  No  EBV Viremia:  No    Transplant Coordinator: Gina Huizar     Transplant Office Phone Number: [phone]     Immunosuppressant Medications       Immunosuppressive Agents Disp Start End     mycophenolate (GENERIC EQUIVALENT) 250 MG capsule 180 capsule 9/6/2024 --    Sig - Route: Take 3 capsules (750 mg) by mouth 2 times daily. - Oral    Class: E-Prescribe     tacrolimus (GENERIC EQUIVALENT) 1 MG capsule 360 capsule 9/8/2024 --    Sig - Route: Take 6 capsules (6 mg) by mouth 2 times daily. - Oral    Class: E-Prescribe    Notes to Pharmacy: TXP DT 9/3/2024 " (Kidney) TXP Dischg DT 9/6/2024 DX Kidney replaced by transplant Z94.0 [facility] ([place], [place])    No prior authorization was found for this prescription.    Found prior authorization for another prescription for the same medication: Closed            Possible Immunosuppression-related side effects:   []             headache  []             vivid dreams  []             irritability  []             cognitive difficuties  []             fine tremor  []             nausea  []             diarrhea  []             neuropathy      []             edema  []             renal calcineurin toxicity  []             hyperkalemia  []             post-transplant diabetes  []             decreased appetite  []             increased appetite  []             other:  []             none    Prescription Medications as of 9/9/2024         Rx Number Disp Refills Start End Last Dispensed Date Next Fill Date Owning Pharmacy    acetaminophen (TYLENOL) 325 MG tablet  -- -- 9/6/2024 --       Sig: Take 2 tablets (650 mg) by mouth every 4 hours as needed for other (For optimal non-opioid multimodal pain management to improve pain control.).    Class: OTC    Route: Oral    amLODIPine (NORVASC) 5 MG tablet  30 tablet 1 9/6/2024 --   [address]    Sig: Take 1 tablet (5 mg) by mouth at bedtime.    Class: E-Prescribe    Route: Oral    atorvastatin (LIPITOR) 10 MG tablet  30 tablet 2 9/7/2024 --   [address]    Sig: Take 1 tablet (10 mg) by mouth daily.    Class: E-Prescribe    Route: Oral    calcitRIOL (ROCALTROL) 0.25 MCG capsule  -- --  --       Sig: Take 0.25 mcg by mouth daily    Class: Historical    Route: Oral    calcium carbonate 500 mg, elemental, (OSCAL) 500 MG tablet  60 tablet 2 9/6/2024 --   [address]    Sig:  Take 1 tablet (500 mg) by mouth 2 times daily.    Class: E-Prescribe    Route: Oral    cholecalciferol 125 MCG (5000 UT) CAPS  -- --  --       Sig: Take 5,000 Units by mouth daily.    Class: Historical    Route: Oral    cyanocobalamin 1000 MCG/ML injection  -- --  --       Sig: Inject 1 mL as directed every 30 days    Class: Historical    Route: Injection    diphenhydrAMINE-APAP, sleep, (TYLENOL PM EXTRA STRENGTH)  MG/30ML LIQD  -- --  --       Sig: Take 1 capful. by mouth at bedtime.    Class: Historical    Route: Oral    magnesium oxide (MAG-) 400 MG tablet  -- --  --       Sig: Take 1 tablet by mouth daily    Class: Historical    Route: Oral    magnesium oxide (MAG-OX) 400 MG tablet  90 tablet 1 9/8/2024 --   [id number] [[address]    Sig: Take 1 tablet (400 mg) by mouth daily.    Class: E-Prescribe    Route: Oral    methocarbamol (ROBAXIN) 500 MG tablet  12 tablet 0 9/6/2024 --   [address]    Sig: Take 1 tablet (500 mg) by mouth every 6 hours as needed for muscle spasms.    Class: E-Prescribe    Route: Oral    metoprolol tartrate (LOPRESSOR) 25 MG tablet  60 tablet 1 9/6/2024 --   [address]    Sig: Take 1 tablet (25 mg) by mouth 2 times daily.    Class: E-Prescribe    Route: Oral    Renewals       Renewal requests to authorizing provider (Gabriela Qureshi NP) <b>prohibited</b>            Multiple Vitamins-Minerals (PRESERVISION AREDS 2 PO)  -- --  --       Sig: Take 1 capsule by mouth 2 times daily.    Class: Historical    Route: Oral    mycophenolate (GENERIC EQUIVALENT) 250 MG capsule  180 capsule 11 9/6/2024 --   [address]    Sig: Take 3 capsules (750 mg) by mouth 2 times daily.    Class: E-Prescribe    Route: Oral    omeprazole (PRILOSEC) 20 MG capsule  -- --  --        Sig: Take 1 capsule by mouth daily    Class: Historical    Route: Oral    oxyBUTYnin ER (DITROPAN XL) 5 MG 24 hr tablet  -- -- 1/10/2024 1/9/2025       Sig: Take 5 mg by mouth daily    Class: Historical    Route: Oral    oxyCODONE (ROXICODONE) 5 MG tablet  10 tablet 0 9/6/2024 --   [address]    Sig: Take 1 tablet (5 mg) by mouth every 4 hours as needed for severe pain.    Class: E-Prescribe    Earliest Fill Date: 9/6/2024    Route: Oral    phosphorus tablet 250 mg (PHOSPHA 250 NEUTRAL) 250 MG per tablet  180 tablet 0 9/6/2024 --   [address]    Sig: Take 2 tablets (500 mg) by mouth 3 times daily.    Class: E-Prescribe    Route: Oral    predniSONE (DELTASONE) 5 MG tablet  98 tablet 0 9/7/2024 10/8/2024   [address]    Sig: Take 12 tablets (60 mg) by mouth daily for 1 day, THEN 8 tablets (40 mg) daily for 2 days, THEN 4 tablets (20 mg) daily for 7 days, THEN 3 tablets (15 mg) daily for 7 days, THEN 2 tablets (10 mg) daily for 7 days, THEN 1 tablet (5 mg) daily for 7 days.    Class: E-Prescribe    Route: Oral    Renewals       Renewal requests to authorizing provider (Gabriela Qureshi NP) <b>prohibited</b>            senna-docusate (SENOKOT-S/PERICOLACE) 8.6-50 MG tablet  -- -- 9/6/2024 --       Sig: Take 1 tablet by mouth 2 times daily.    Class: OTC    Route: Oral    sulfamethoxazole-trimethoprim (BACTRIM) 400-80 MG tablet  30 tablet 11 9/7/2024 --   [address]    Sig: Take 1 tablet by mouth daily.    Class: E-Prescribe    Route: Oral    tacrolimus (GENERIC EQUIVALENT) 1 MG capsule  360 capsule 11 9/8/2024 --   [facility] [place], [place] - [address] [place] [address]    Sig: Take 6 capsules (6 mg) by mouth 2 times daily.    Class: E-Prescribe    Notes to  Pharmacy: TXP DT 9/3/2024 (Kidney) TXP Dischg DT 2024 DX Kidney replaced by transplant Z94.0 [facility] [place] ([place], [place])    Route: Oral    No prior authorization was found for this prescription.    Found prior authorization for another prescription for the same medication: Closed    traZODone (DESYREL) 50 MG tablet  -- -- 2019 --       Si mg at bedtime    Class: Historical    valGANciclovir (VALCYTE) 450 MG tablet  60 tablet 5 2024 --   [facility] - [place], [place] - [address] [facility]    Sig: Take 2 tablets (900 mg) by mouth daily.    Class: E-Prescribe    Route: Oral            O:   Pulse:  [56] 56  BP: (106)/(73) 106/73  SpO2:  [100 %] 100 %  General Appearance: in no apparent distress.   Skin: Normal, no rashes or jaundice  Heart: regular rate and rhythm  Lungs: easy respirations, no audible wheezing.  Abdomen: rounded, The wound is dry and intact, without hernia. The abdomen is non-tender. The kidney graft is not tender.  There is no ascites.  Extremities: Tremor absent.   Edema: present bilaterally. 1+        Latest Ref Rng & Units 2024     7:51 AM 2024     7:14 AM 2024     5:32 AM 2024     6:56 AM 2024     6:06 AM   Transplant Immunosuppression Labs   Creat 0.51 - 0.95 mg/dL 0.87  0.82  0.94  1.30  2.21    Urea Nitrogen 8.0 - 23.0 mg/dL 19.1  20.3  24.0  30.3  46.1    WBC 4.0 - 11.0 10e3/uL 8.8  7.4  7.8  9.7  10.1    Neutrophil % 76  88  75  78  80        Chemistries:   Recent Labs   Lab Test 24  0751   BUN 19.1   CR 0.87   GFRESTIMATED 68   GLC 92     Lab Results   Component Value Date    A1C 4.4 2024     Recent Labs   Lab Test 24  2157   ALBUMIN 4.0   BILITOTAL 0.3   ALKPHOS 50   AST 13   ALT <5     Urine Studies:  Recent Labs   Lab Test 24  2345   COLOR Light Yellow   APPEARANCE Slightly Cloudy*   URINEGLC Negative   URINEBILI Negative   URINEKETONE Negative   SG 1.011    UBLD Negative   URINEPH 6.0   PROTEIN 100*   NITRITE Negative   LEUKEST Large*   RBCU 1   WBCU >182*     No lab results found.  Hematology:   Recent Labs   Lab Test 09/08/24  0751 09/07/24  0714 09/06/24  0532   HGB 8.0* 8.5* 7.1*    228 147*   WBC 8.8 7.4 7.8     Coags:   Recent Labs   Lab Test 09/02/24  2157 10/10/19  1431   INR 0.93 0.93     HLA antibodies:   SA1 Hi Risk [facility]   Date Value Ref Range Status   04/22/2020 None  Final     SA1 HI RISK [facility]   Date Value Ref Range Status   09/01/2024 None  Final     SA1 Mod Risk [facility]   Date Value Ref Range Status   04/22/2020 None  Final     SA1 MOD RISK [facility]   Date Value Ref Range Status   09/01/2024 None  Final     SA2 Hi Risk [facility]   Date Value Ref Range Status   04/22/2020 None  Final     SA2 HI RISK [facility]   Date Value Ref Range Status   09/01/2024 None  Final     SA2 Mod Risk [facility]   Date Value Ref Range Status   04/22/2020 None  Final     SA2 MOD RISK [facility]   Date Value Ref Range Status   09/01/2024 None  Final       Assessment: Lexy Cobb is doing well s/p DDKT:  Issues we addressed during her visit include:    Plan:    1. Graft function: stable graft function   2. Immunosuppression Management: No change continue same IS  .  Complexity of management:Medium.  Contributing factors:  recent txp  3. RASHEEDA:  around 150-200ml per day.  Drain cr is 1.1, LDH and cell count pending  Should be stripping drain as directed.  Empty and record amount.    Incision:  healing well   Followup: as directed    Total Time: 25 min,   Counselling Time: 15 min.          Again, thank you for allowing me to participate in the care of your patient.        Sincerely,        Veronica Ozuna NP

## 2024-09-10 DIAGNOSIS — Z94.0 KIDNEY REPLACED BY TRANSPLANT: Primary | ICD-10-CM

## 2024-09-10 DIAGNOSIS — Z79.899 ENCOUNTER FOR LONG-TERM CURRENT USE OF MEDICATION: ICD-10-CM

## 2024-09-10 DIAGNOSIS — Z98.890 OTHER SPECIFIED POSTPROCEDURAL STATES: ICD-10-CM

## 2024-09-10 DIAGNOSIS — Z48.298 AFTERCARE FOLLOWING ORGAN TRANSPLANT: ICD-10-CM

## 2024-09-10 DIAGNOSIS — Z20.828 CONTACT WITH AND (SUSPECTED) EXPOSURE TO OTHER VIRAL COMMUNICABLE DISEASES: ICD-10-CM

## 2024-09-11 ENCOUNTER — TELEPHONE (OUTPATIENT)
Dept: TRANSPLANT | Facility: CLINIC | Age: 79
End: 2024-09-11

## 2024-09-11 ENCOUNTER — DOCUMENTATION ONLY (OUTPATIENT)
Dept: TRANSPLANT | Facility: CLINIC | Age: 79
End: 2024-09-11

## 2024-09-11 ENCOUNTER — LAB (OUTPATIENT)
Dept: LAB | Facility: CLINIC | Age: 79
End: 2024-09-11
Attending: STUDENT IN AN ORGANIZED HEALTH CARE EDUCATION/TRAINING PROGRAM
Payer: COMMERCIAL

## 2024-09-11 DIAGNOSIS — Z48.298 AFTERCARE FOLLOWING ORGAN TRANSPLANT: ICD-10-CM

## 2024-09-11 DIAGNOSIS — Z98.890 OTHER SPECIFIED POSTPROCEDURAL STATES: ICD-10-CM

## 2024-09-11 DIAGNOSIS — Z94.0 KIDNEY TRANSPLANTED: Primary | ICD-10-CM

## 2024-09-11 DIAGNOSIS — E83.42 HYPOMAGNESEMIA: ICD-10-CM

## 2024-09-11 DIAGNOSIS — Z94.0 KIDNEY REPLACED BY TRANSPLANT: ICD-10-CM

## 2024-09-11 DIAGNOSIS — Z94.0 STATUS POST KIDNEY TRANSPLANT: ICD-10-CM

## 2024-09-11 DIAGNOSIS — Z20.828 CONTACT WITH AND (SUSPECTED) EXPOSURE TO OTHER VIRAL COMMUNICABLE DISEASES: ICD-10-CM

## 2024-09-11 DIAGNOSIS — Z79.899 ENCOUNTER FOR LONG-TERM CURRENT USE OF MEDICATION: ICD-10-CM

## 2024-09-11 DIAGNOSIS — I12.9 HYPERTENSION, RENAL: ICD-10-CM

## 2024-09-11 LAB
ANION GAP SERPL CALCULATED.3IONS-SCNC: 11 MMOL/L (ref 7–15)
BUN SERPL-MCNC: 12.4 MG/DL (ref 8–23)
CALCIUM SERPL-MCNC: 8.9 MG/DL (ref 8.8–10.4)
CHLORIDE SERPL-SCNC: 106 MMOL/L (ref 98–107)
CREAT SERPL-MCNC: 0.76 MG/DL (ref 0.51–0.95)
EGFRCR SERPLBLD CKD-EPI 2021: 80 ML/MIN/1.73M2
ERYTHROCYTE [DISTWIDTH] IN BLOOD BY AUTOMATED COUNT: 14.7 % (ref 10–15)
GLUCOSE SERPL-MCNC: 100 MG/DL (ref 70–99)
HCO3 SERPL-SCNC: 25 MMOL/L (ref 22–29)
HCT VFR BLD AUTO: 24.6 % (ref 35–47)
HGB BLD-MCNC: 7.9 G/DL (ref 11.7–15.7)
MAGNESIUM SERPL-MCNC: 1.3 MG/DL (ref 1.7–2.3)
MCH RBC QN AUTO: 33.5 PG (ref 26.5–33)
MCHC RBC AUTO-ENTMCNC: 32.1 G/DL (ref 31.5–36.5)
MCV RBC AUTO: 104 FL (ref 78–100)
MYCOPHENOLATE SERPL LC/MS/MS-MCNC: 2.08 MG/L (ref 1–3.5)
MYCOPHENOLATE-G SERPL LC/MS/MS-MCNC: 51.8 MG/L (ref 30–95)
PHOSPHATE SERPL-MCNC: 2 MG/DL (ref 2.5–4.5)
PLATELET # BLD AUTO: 269 10E3/UL (ref 150–450)
POTASSIUM SERPL-SCNC: 3 MMOL/L (ref 3.4–5.3)
RBC # BLD AUTO: 2.36 10E6/UL (ref 3.8–5.2)
SODIUM SERPL-SCNC: 142 MMOL/L (ref 135–145)
TACROLIMUS BLD-MCNC: 9.4 UG/L (ref 5–15)
TME LAST DOSE: NORMAL H
WBC # BLD AUTO: 10.6 10E3/UL (ref 4–11)

## 2024-09-11 PROCEDURE — 80180 DRUG SCRN QUAN MYCOPHENOLATE: CPT | Performed by: STUDENT IN AN ORGANIZED HEALTH CARE EDUCATION/TRAINING PROGRAM

## 2024-09-11 PROCEDURE — 99000 SPECIMEN HANDLING OFFICE-LAB: CPT | Performed by: PATHOLOGY

## 2024-09-11 PROCEDURE — 83735 ASSAY OF MAGNESIUM: CPT | Performed by: PATHOLOGY

## 2024-09-11 PROCEDURE — 84100 ASSAY OF PHOSPHORUS: CPT | Performed by: PATHOLOGY

## 2024-09-11 PROCEDURE — 80197 ASSAY OF TACROLIMUS: CPT | Performed by: STUDENT IN AN ORGANIZED HEALTH CARE EDUCATION/TRAINING PROGRAM

## 2024-09-11 PROCEDURE — 85027 COMPLETE CBC AUTOMATED: CPT | Performed by: PATHOLOGY

## 2024-09-11 PROCEDURE — 36415 COLL VENOUS BLD VENIPUNCTURE: CPT | Performed by: PATHOLOGY

## 2024-09-11 PROCEDURE — 80048 BASIC METABOLIC PNL TOTAL CA: CPT | Performed by: PATHOLOGY

## 2024-09-11 RX ORDER — AMLODIPINE BESYLATE 5 MG/1
TABLET ORAL
Qty: 30 TABLET | Refills: 11 | Status: SHIPPED | OUTPATIENT
Start: 2024-09-11 | End: 2024-09-27

## 2024-09-11 RX ORDER — MAGNESIUM OXIDE 400 MG/1
800 TABLET ORAL
Qty: 60 TABLET | Refills: 2 | Status: SHIPPED | OUTPATIENT
Start: 2024-09-11 | End: 2024-09-27

## 2024-09-11 RX ORDER — POTASSIUM CHLORIDE 1500 MG/1
20 TABLET, EXTENDED RELEASE ORAL 2 TIMES DAILY
Qty: 60 TABLET | Refills: 1 | Status: SHIPPED | OUTPATIENT
Start: 2024-09-11 | End: 2024-09-16

## 2024-09-11 NOTE — TELEPHONE ENCOUNTER
Angel Gutierrez MD Lavelle Peterson, Meghan M, RN  Next Monday is okay as long as she start the potassium tablet.          Previous Messages       ----- Message -----  From: Heather Harman, RN  Sent: 9/11/2024   2:22 PM CDT  To: Angel Sandhu MD    She doesn't have labs scheduled until next week-- do you want a K repeat tomorrow or Fri?  ----- Message -----  From: Angel Gutierrez MD  Sent: 9/11/2024  12:28 PM CDT  To: Heather Carrasquillo RN    Agreed with holding Amlodipine. No other changes.  ----- Message -----  From: Heather Harman RN  Sent: 9/11/2024  11:55 AM CDT  To: Angel Sandhu MD    She c/o lightheadedness when standing. She reports systolic BP of 110-120's. I told her to hold amlodipine- is that ok? Any other changes?        Reviewed plan with Lexy Cobb. She will hold amlodipine, continue to monitor BP/pulse and home and update SOT if any more low or high BP's or worsening lightheadedness. Tac at goal. She started K supplement. Plan for repeat labs next week.

## 2024-09-11 NOTE — TELEPHONE ENCOUNTER
Issue:  K, Mag and Phos all low with labs on 9/11.    Plan:  Angel Gutierrez MD Lavelle Peterson, Meghan M, RN  Increase magnesium oxide from 400 mg every day to twice a day. Add potassium chloride 20 mEq twice a day. As usual, high phosphorous and potassium diet.    Call placed to Lexy Cobb, she verbalized understanding, will start KCL today. Education given regarding high K, phos and Mag foods, Lexy will incorporate in diet.

## 2024-09-16 ENCOUNTER — TELEPHONE (OUTPATIENT)
Dept: TRANSPLANT | Facility: CLINIC | Age: 79
End: 2024-09-16

## 2024-09-16 ENCOUNTER — LAB (OUTPATIENT)
Dept: LAB | Facility: CLINIC | Age: 79
End: 2024-09-16
Payer: COMMERCIAL

## 2024-09-16 ENCOUNTER — VIRTUAL VISIT (OUTPATIENT)
Dept: PHARMACY | Facility: CLINIC | Age: 79
End: 2024-09-16
Payer: COMMERCIAL

## 2024-09-16 DIAGNOSIS — E78.5 HYPERLIPIDEMIA LDL GOAL <70: ICD-10-CM

## 2024-09-16 DIAGNOSIS — Z20.828 CONTACT WITH AND (SUSPECTED) EXPOSURE TO OTHER VIRAL COMMUNICABLE DISEASES: ICD-10-CM

## 2024-09-16 DIAGNOSIS — Z48.298 AFTERCARE FOLLOWING ORGAN TRANSPLANT: ICD-10-CM

## 2024-09-16 DIAGNOSIS — G89.18 ACUTE POST-OPERATIVE PAIN: ICD-10-CM

## 2024-09-16 DIAGNOSIS — Z79.899 ENCOUNTER FOR LONG-TERM CURRENT USE OF MEDICATION: ICD-10-CM

## 2024-09-16 DIAGNOSIS — Z78.9 TAKES DIETARY SUPPLEMENTS: ICD-10-CM

## 2024-09-16 DIAGNOSIS — G47.00 INSOMNIA, UNSPECIFIED TYPE: ICD-10-CM

## 2024-09-16 DIAGNOSIS — Z48.298 AFTERCARE FOLLOWING ORGAN TRANSPLANT: Primary | ICD-10-CM

## 2024-09-16 DIAGNOSIS — K21.9 GASTROESOPHAGEAL REFLUX DISEASE WITHOUT ESOPHAGITIS: ICD-10-CM

## 2024-09-16 DIAGNOSIS — I15.1 HYPERTENSION DUE TO KIDNEY TRANSPLANT: ICD-10-CM

## 2024-09-16 DIAGNOSIS — Z94.0 KIDNEY REPLACED BY TRANSPLANT: ICD-10-CM

## 2024-09-16 DIAGNOSIS — Z98.890 OTHER SPECIFIED POSTPROCEDURAL STATES: ICD-10-CM

## 2024-09-16 DIAGNOSIS — Z94.0 HYPERTENSION DUE TO KIDNEY TRANSPLANT: ICD-10-CM

## 2024-09-16 DIAGNOSIS — E87.6 HYPOKALEMIA: ICD-10-CM

## 2024-09-16 DIAGNOSIS — Z94.0 STATUS POST KIDNEY TRANSPLANT: Primary | ICD-10-CM

## 2024-09-16 LAB
ALBUMIN MFR UR ELPH: <6 MG/DL
ANION GAP SERPL CALCULATED.3IONS-SCNC: 12 MMOL/L (ref 7–15)
BUN SERPL-MCNC: 15.7 MG/DL (ref 8–23)
CALCIUM SERPL-MCNC: 8.2 MG/DL (ref 8.8–10.4)
CHLORIDE SERPL-SCNC: 107 MMOL/L (ref 98–107)
CREAT SERPL-MCNC: 1.01 MG/DL (ref 0.51–0.95)
CREAT UR-MCNC: 27.3 MG/DL
DONOR IDENTIFICATION: NORMAL
DSA COMMENTS: NORMAL
DSA PRESENT: NO
DSA TEST METHOD: NORMAL
EGFRCR SERPLBLD CKD-EPI 2021: 57 ML/MIN/1.73M2
ERYTHROCYTE [DISTWIDTH] IN BLOOD BY AUTOMATED COUNT: 14.6 % (ref 10–15)
FERRITIN SERPL-MCNC: 664 NG/ML (ref 11–328)
GLUCOSE SERPL-MCNC: 92 MG/DL (ref 70–99)
HCO3 SERPL-SCNC: 19 MMOL/L (ref 22–29)
HCT VFR BLD AUTO: 25.7 % (ref 35–47)
HGB BLD-MCNC: 7.9 G/DL (ref 11.7–15.7)
IRON BINDING CAPACITY (ROCHE): 206 UG/DL (ref 240–430)
IRON SATN MFR SERPL: 22 % (ref 15–46)
IRON SERPL-MCNC: 46 UG/DL (ref 37–145)
MAGNESIUM SERPL-MCNC: 1.5 MG/DL (ref 1.7–2.3)
MCH RBC QN AUTO: 33.1 PG (ref 26.5–33)
MCHC RBC AUTO-ENTMCNC: 30.7 G/DL (ref 31.5–36.5)
MCV RBC AUTO: 108 FL (ref 78–100)
ORGAN: NORMAL
PHOSPHATE SERPL-MCNC: 2.3 MG/DL (ref 2.5–4.5)
PLATELET # BLD AUTO: 325 10E3/UL (ref 150–450)
POTASSIUM SERPL-SCNC: 4.5 MMOL/L (ref 3.4–5.3)
PROT/CREAT 24H UR: NORMAL MG/G{CREAT}
PTH-INTACT SERPL-MCNC: 83 PG/ML (ref 15–65)
RBC # BLD AUTO: 2.39 10E6/UL (ref 3.8–5.2)
SA 1  COMMENTS: NORMAL
SA 1 CELL: NORMAL
SA 1 TEST METHOD: NORMAL
SA 2 CELL: NORMAL
SA 2 COMMENTS: NORMAL
SA 2 TEST METHOD: NORMAL
SA1 HI RISK ABY: NORMAL
SA1 MOD RISK ABY: NORMAL
SA2 HI RISK ABY: NORMAL
SA2 MOD RISK ABY: NORMAL
SODIUM SERPL-SCNC: 138 MMOL/L (ref 135–145)
TACROLIMUS BLD-MCNC: 10.2 UG/L (ref 5–15)
TME LAST DOSE: NORMAL H
TME LAST DOSE: NORMAL H
UNACCEPTABLE ANTIGENS: NORMAL
UNOS CPRA: 0
VIT D+METAB SERPL-MCNC: 49 NG/ML (ref 20–50)
WBC # BLD AUTO: 11.6 10E3/UL (ref 4–11)

## 2024-09-16 PROCEDURE — 36415 COLL VENOUS BLD VENIPUNCTURE: CPT

## 2024-09-16 PROCEDURE — 82306 VITAMIN D 25 HYDROXY: CPT

## 2024-09-16 PROCEDURE — 80197 ASSAY OF TACROLIMUS: CPT

## 2024-09-16 PROCEDURE — 84156 ASSAY OF PROTEIN URINE: CPT

## 2024-09-16 PROCEDURE — 99605 MTMS BY PHARM NP 15 MIN: CPT | Mod: 93 | Performed by: PHARMACIST

## 2024-09-16 PROCEDURE — 99607 MTMS BY PHARM ADDL 15 MIN: CPT | Mod: 93 | Performed by: PHARMACIST

## 2024-09-16 PROCEDURE — 87799 DETECT AGENT NOS DNA QUANT: CPT

## 2024-09-16 PROCEDURE — 84100 ASSAY OF PHOSPHORUS: CPT

## 2024-09-16 PROCEDURE — 83540 ASSAY OF IRON: CPT

## 2024-09-16 PROCEDURE — 82728 ASSAY OF FERRITIN: CPT

## 2024-09-16 PROCEDURE — 83970 ASSAY OF PARATHORMONE: CPT

## 2024-09-16 PROCEDURE — 85027 COMPLETE CBC AUTOMATED: CPT

## 2024-09-16 PROCEDURE — 83735 ASSAY OF MAGNESIUM: CPT

## 2024-09-16 PROCEDURE — 83550 IRON BINDING TEST: CPT

## 2024-09-16 PROCEDURE — 80048 BASIC METABOLIC PNL TOTAL CA: CPT

## 2024-09-16 RX ORDER — DIPHENHYDRAMINE HCL 50 MG
100 CAPSULE ORAL
COMMUNITY

## 2024-09-16 RX ORDER — POTASSIUM CHLORIDE 1500 MG/1
20 TABLET, EXTENDED RELEASE ORAL 2 TIMES DAILY
Qty: 60 TABLET | Refills: 1 | Status: SHIPPED | OUTPATIENT
Start: 2024-09-16

## 2024-09-16 NOTE — PATIENT INSTRUCTIONS
"Recommendations from today's MTM visit:                                                      You may start a fiber supplement such as benefiber or metamucil if experiencing bothersome soft stools.  You may crush magnesium oxide and phosphorus tablets if needed.   I sent a new potassium chloride (Klor-Con M20) to the pharmacy that you can dissolve in a 4 ounce glass of water, let sit for 2 minutes, and then stir well and drink quickly.  Take diphenhydramine as needed for sleep until prednisone therapy is completed and then try to stop. May use another sleep aid such as melatonin.  Eat high phosphorus foods such as eggs, dairy, processed cheese, nuts, and lentils.    Follow-up: Two months     It was great speaking with you today.  I value your experience and would be very thankful for your time in providing feedback in our clinic survey. In the next few days, you may receive an email or text message from [employer] with a link to a survey related to your  clinical pharmacist.\"     To schedule another MTM appointment, please call the clinic directly or you may call the MTM scheduling line at [phone] or toll-free at [phone].     My Clinical Pharmacist's contact information:                                                      Please feel free to contact me with any questions or concerns you have.      Eddie Keene, PharmD  MTM Pharmacist    Phone: [phone]     "

## 2024-09-16 NOTE — TELEPHONE ENCOUNTER
DATE:  9/16/2024     TIME OF RECEIPT FROM LAB:  8:29 am    ORDERING PROVIDER: Mario    LAB TEST:  Hgb    LAB VALUE:  7.9

## 2024-09-16 NOTE — LETTER
"Recommended To-Do List      Prepared on: Sep 16, 2024       You can get the best results from your medications by completing the items on this \"To-Do List.\"      Bring your To-Do List when you go to your doctor. And, share it with your family or caregivers.    My To-Do List:  What we talked about: What I should do:   An issue with your medication    Change the medication you are taking from diphenhydrAMINE (BENADRYL) to melatonin          What we talked about: What I should do:   A new medication that may be of benefit to you    You may start a fiber supplement such as benefiber or metamucil if experiencing bothersome soft stools.           What we talked about: What I should do:   The importance of taking your medication as intended    You may crush magnesium oxide and phosphorus tablets if needed.           What we talked about: What I should do:   The importance of taking your medication as intended    I sent a new potassium chloride (Klor-Con M20) to the pharmacy that you can dissolve in a 4 ounce glass of water, let sit for 2 minutes, and then stir well and drink quickly.          What we talked about: What I should do:                       "

## 2024-09-16 NOTE — LETTER
_  Medication List        Prepared on: Sep 16, 2024     Bring your Medication List when you go to the doctor, hospital, or   emergency room. And, share it with your family or caregivers.     Note any changes to how you take your medications.  Cross out medications when you no longer use them.    Medication How I take it Why I use it Prescriber   acetaminophen (TYLENOL) 325 MG tablet Take 2 tablets (650 mg) by mouth every 4 hours as needed for other (For optimal non-opioid multimodal pain management to improve pain control.). pain Gabriela Qureshi NP   amLODIPine (NORVASC) 5 MG tablet HOLD Hypertension, Renal Angel Sandhu MD   atorvastatin (LIPITOR) 10 MG tablet Take 1 tablet (10 mg) by mouth daily. cholesterol Angel Sandhu MD   calcium carbonate 500 mg, elemental, (OSCAL) 500 MG tablet Take 1 tablet (500 mg) by mouth 2 times daily. Supplements Gabriela Qureshi NP   diphenhydrAMINE (BENADRYL) 50 MG capsule Take 100 mg by mouth nightly as needed for sleep.  sleep Patient Reported   magnesium oxide (MAG-OX) 400 MG tablet Take 2 tablets (800 mg) by mouth daily (with lunch). Take 4 hours apart from mycophenolate Aftercare Following Organ Transplant; Status post kidney transplant Angel Sandhu MD   methocarbamol (ROBAXIN) 500 MG tablet Take 1 tablet (500 mg) by mouth every 6 hours as needed for muscle spasms. Status post kidney transplant Gabriela Qureshi NP   metoprolol tartrate (LOPRESSOR) 25 MG tablet Take 1 tablet (25 mg) by mouth 2 times daily. Hypertension, Renal Angel Sandhu MD   mycophenolate (GENERIC EQUIVALENT) 250 MG capsule Take 3 capsules (750 mg) by mouth 2 times daily. Status post kidney transplant Angel Sandhu MD   omeprazole (PRILOSEC) 20 MG capsule Take 1 capsule by mouth daily  GERD Patient Reported   phosphorus tablet 250 mg (PHOSPHA 250 NEUTRAL) 250 MG per tablet Take 2 tablets (500 mg) by mouth 3 times daily. supplement Gabriela Qureshi NP    potassium chloride brielle ER (KLOR-CON M20) 20 MEQ CR tablet Take 1 tablet (20 mEq) by mouth 2 times daily. You can dissolve in 4 ounces of water, let sit for 2 minutes and then stir well and drink quickly. Hypokalemia; Status post kidney transplant Angel Sandhu MD   predniSONE (DELTASONE) 5 MG tablet Take 12 tablets (60 mg) by mouth daily for 1 day, THEN 8 tablets (40 mg) daily for 2 days, THEN 4 tablets (20 mg) daily for 7 days, THEN 3 tablets (15 mg) daily for 7 days, THEN 2 tablets (10 mg) daily for 7 days, THEN 1 tablet (5 mg) daily for 7 days. Status post kidney transplant Gabriela Qureshi NP   senna-docusate (SENOKOT-S/PERICOLACE) 8.6-50 MG tablet Take 1 tablet by mouth 2 times daily. constipation Gabriela Qureshi NP   sulfamethoxazole-trimethoprim (BACTRIM) 400-80 MG tablet Take 1 tablet by mouth daily. Status post kidney transplant Angel Sandhu MD   tacrolimus (GENERIC EQUIVALENT) 1 MG capsule Take 6 capsules (6 mg) by mouth 2 times daily. Status post kidney transplant Angel Sandhu MD   tacrolimus (GENERIC EQUIVALENT) 5 MG capsule Take 1 capsule (5 mg) by mouth 2 times daily. Aftercare Following Organ Transplant Veronica Ozuna NP   traZODone (DESYREL) 50 MG tablet Take 100 mg by mouth at bedtime.  insomnia Patient Reported   valGANciclovir (VALCYTE) 450 MG tablet Take 2 tablets (900 mg) by mouth daily. Status post kidney transplant Angel Sandhu MD         Add new medications, over-the-counter drugs, herbals, vitamins, or  minerals in the blank rows below.    Medication How I take it Why I use it Prescriber                                      Allergies:      - Dapsone  - Fluoxetine  - Iron - Diarrhea  - Latex - Rash        Side effects I have had:      Not on File        Other Information:              My notes and questions:

## 2024-09-16 NOTE — LETTER
September 17, 2024  Lexy Cobb  [address] [facility] [address]    Dear Ms. Cobb, [facility] SPECIAL[facility] MTM     Thank you for talking with me on Sep 16, 2024 about your health and medications. As a follow-up to our conversation, I have included two documents:      Your Recommended To-Do List has steps you should take to get the best results from your medications.  Your Medication List will help you keep track of your medications and how to take them.    If you want to talk about these documents, please call Eddie Keene RPH at phone: [phone], Monday-Friday 8-4:30pm.    I look forward to working with you and your doctors to make sure your medications work well for you.    Sincerely,  Eddie Keene RPH  [facility] Pharmacist, [facility]

## 2024-09-16 NOTE — TELEPHONE ENCOUNTER
Spoke to patient to discuss her incision. She states it feels puffy. Denies drainage or redness. Patient is afebrile. RNCC will make appointment with Veronica to look at site tomorrow at 0930.

## 2024-09-16 NOTE — PROGRESS NOTES
Medication Therapy Management (MTM) Encounter    ASSESSMENT:                            Medication Adherence/Access: No issues identified    Kidney Transplant:    Patient is currently experiencing around 3 soft stools a day and would benefit from using a fiber supplement such as benefiber or metamucil if she continues to experience bothersome soft stools.     Supplements:   Patient was advised that she may crush her magnesium oxide and phosphorus tablets if needed due to them being too large for her to swallow whole. Patient is currently taking potassium chloride ER (K-Tab) that cannot be crushed. Patient would benefit from potassium chloride (Klor-Con M) tablets that can be dissolved in water to help ease administration. Patient advised to eat a diet rich in phosphorus such as eggs, dairy, cheese, nuts, and lentils.    Hypertension   Stable. At goal of <150/90.    Hyperlipidemia   Stable.     GERD    Patient has attempted to trial off of omeprazole in the past but heartburn symptoms have recurred. No changes at this time.     Insomnia   Patient stated she has been taking diphenhydramine as a sleep aid for many years. Diphenhydramine is known to cause anticholinergic effects, which can impair cognitive function and increase the risk of falls in those over 64 yo. Patient would benefit from discontinuing diphenhydramine after her prednisone therapy is completed and initiating a different sleep aid such a melatonin.    Pain:  Stable per patient.    PLAN:                            You may start a fiber supplement such as benefiber or metamucil if experiencing bothersome soft stools.  You may crush magnesium oxide and phosphorus tablets if needed.   I sent a new potassium chloride (Klor-Con M20) to the pharmacy that you can dissolve in a 4 ounce glass of water, let sit for 2 minutes, and then stir well and drink quickly.  Take diphenhydramine as needed for sleep until prednisone therapy is completed and then try to  stop. May use another sleep aid such as melatonin.  Eat high phosphorus foods such as eggs, dairy, processed cheese, nuts, and lentils.    Follow-up: Two months    SUBJECTIVE/OBJECTIVE:                          Lexy Cobb is a 79 year old female seen for a transitions of care visit. She was discharged from [facility] on 9/6 for Kidney transplant.      Reason for visit: Transitions of care medication review.    Allergies/ADRs: Reviewed in chart  Past Medical History: Reviewed in chart  Tobacco: She reports that she has never smoked. She has never used smokeless tobacco.  Alcohol: 1-3 beverages / week    Medication Adherence/Access: no issues reported. Using a pill box. Taking medications at 8am, 12pm, and 8pm.    Kidney Transplant:    Tacrolimus 6 mg every morning, 6 mg every evening    Mycophenolate Mofetil 750 mg twice daily  Prednisone 15 mg daily  2 forms of Birth control if female is 48 yo or younger discussed: Over 49 years old.   Pt reports soft stools 3 times daily  Transplant date: 9/3/24  Vascular Prophylaxis: None  CMV prophylaxis: CrCl >/=60 mL/minute: Valcyte 900mg daily Donor (+), Recipient (-), treat 6 months post tx   Valcyte birth defects discussed: Over 49 years old   PJP prophylaxis: Bactrim SS daily  Antifungal Prophylaxis: Not needed thus far   Tx Coordinator: Ferdinand Crawford MD: Dr. Martin, Using Med Card: Yes  Immunizations: annual flu shot 2023; Pneumovax 23:  2013; Prevnar 13: 2015; TDaP:  2021; Shingrix: unknown, HBV: unknown, COVID: Primary x 3, Bivalent x 1, and 23-24 x1    Estimated Creatinine Clearance: 59.1 mL/min (based on SCr of 0.76 mg/dL).    Supplements:   Mag Oxide 800mg daily at lunch ( 2 hours from MMF)  Calcium carbonate 500mg twice daily  Potassium Chloride ER 20mEq twice daily  Phosphorus 500mg three times daily  Lab Results   Component Value Date    MAG 1.3 (L) 09/11/2024    MAG 1.6 (L) 09/08/2024    CIARA 8.9 09/11/2024    PHOS 2.0 (L) 09/11/2024    POTASSIUM 3.0  (L) 09/11/2024     Hypertension   Metoprolol Tartrate 25mg twice daily  Patient reports no current medication side effects  Patient self monitors blood pressure.  Home BP monitoring 112//88 .       Hyperlipidemia   Atorvastatin 10mg daily  Patient reports no significant myalgias or other side effects.  The 10-year ASCVD risk score (Pedro TORRES, et al., 2019) is: 18.9%    Values used to calculate the score:      Age: 79 years      Sex: Female      Is Non- : No      Diabetic: No      Tobacco smoker: No      Systolic Blood Pressure: 106 mmHg      Is BP treated: Yes      HDL Cholesterol: 81 mg/dL      Total Cholesterol: 134 mg/dL     GERD    Omeprazole 20 mg once daily   Patient has tried a trial off of therapy but heartburn symptoms returned when doing so.      Insomnia   Trazodone 100 mg at bedtime   Diphenhydramine 100 mg at bedtime- has been using for a long time   Patient reports trouble staying asleep and trouble falling asleep as she is trying to stay on her back because of her incision.     Pain:  Acetaminophen as needed for pain  Hasn't taken oxycodone or methocarbamol since 2 days post surgery      Today's Vitals: There were no vitals taken for this visit.  ----------------  Post Discharge Medication Reconciliation Status: discharge medications reconciled and changed, per note/orders.    I spent 30 minutes with this patient today. All changes were made via collaborative practice agreement with Dr. Martin. A copy of the visit note was provided to the patient's provider(s).    A summary of these recommendations was sent via [employer].    Alexandra Rojas, PharmDIV Student  Eddie Keene, VivD  MTM Pharmacist    Phone: [phone]     Telemedicine Visit Details  Type of service:  Telephone visit  Start Time: 10 AM  End Time: 10:28 AM     Medication Therapy Recommendations  Insomnia, unspecified type    Current Medication: diphenhydrAMINE (BENADRYL) 50 MG capsule   Rationale: Unsafe  medication for the patient - Adverse medication event - Safety   Recommendation: Change Medication - melatonin 5 MG tablet   Status: Accepted - no CPA Needed         Status post kidney transplant    Rationale: Untreated condition - Needs additional medication therapy - Indication   Recommendation: Continue to Monitor   Status: Accepted - no CPA Needed   Note: You may start a fiber supplement such as benefiber or metamucil if experiencing bothersome soft stools.         Takes dietary supplements    Current Medication: magnesium oxide (MAG-OX) 400 MG tablet   Rationale: Cannot swallow/administer medication - Adherence - Adherence   Recommendation: Provide Adherence Intervention   Status: Accepted - no CPA Needed   Note: You may crush magnesium oxide tablet          Current Medication: potassium chloride ER (K-TAB) 20 MEQ CR tablet (Discontinued)   Rationale: Cannot swallow/administer medication - Adherence - Adherence   Recommendation: Change Medication - KLOR-CON M20 PO   Status: Accepted per CPA

## 2024-09-17 ENCOUNTER — ANCILLARY PROCEDURE (OUTPATIENT)
Dept: ULTRASOUND IMAGING | Facility: CLINIC | Age: 79
End: 2024-09-17
Attending: NURSE PRACTITIONER
Payer: COMMERCIAL

## 2024-09-17 ENCOUNTER — OFFICE VISIT (OUTPATIENT)
Dept: TRANSPLANT | Facility: CLINIC | Age: 79
End: 2024-09-17
Attending: NURSE PRACTITIONER
Payer: COMMERCIAL

## 2024-09-17 VITALS
BODY MASS INDEX: 22.38 KG/M2 | DIASTOLIC BLOOD PRESSURE: 89 MMHG | OXYGEN SATURATION: 98 % | WEIGHT: 132.4 LBS | HEART RATE: 61 BPM | SYSTOLIC BLOOD PRESSURE: 156 MMHG

## 2024-09-17 DIAGNOSIS — R82.90 UNSPECIFIED ABNORMAL FINDINGS IN URINE: ICD-10-CM

## 2024-09-17 DIAGNOSIS — Z48.298 AFTERCARE FOLLOWING ORGAN TRANSPLANT: ICD-10-CM

## 2024-09-17 LAB
ALBUMIN UR-MCNC: NEGATIVE MG/DL
APPEARANCE UR: CLEAR
BILIRUB UR QL STRIP: NEGATIVE
BK VIRUS SPECIMEN TYPE: NORMAL
BKV DNA # SPEC NAA+PROBE: NOT DETECTED IU/ML
COLOR UR AUTO: NORMAL
GLUCOSE UR STRIP-MCNC: NEGATIVE MG/DL
HGB UR QL STRIP: NEGATIVE
KETONES UR STRIP-MCNC: NEGATIVE MG/DL
LEUKOCYTE ESTERASE UR QL STRIP: NEGATIVE
NITRATE UR QL: NEGATIVE
PH UR STRIP: 5 [PH] (ref 5–7)
SP GR UR STRIP: 1.01 (ref 1–1.03)
UROBILINOGEN UR STRIP-MCNC: NORMAL MG/DL

## 2024-09-17 PROCEDURE — 81003 URINALYSIS AUTO W/O SCOPE: CPT | Performed by: NURSE PRACTITIONER

## 2024-09-17 PROCEDURE — 76776 US EXAM K TRANSPL W/DOPPLER: CPT | Performed by: RADIOLOGY

## 2024-09-17 PROCEDURE — 99213 OFFICE O/P EST LOW 20 MIN: CPT | Mod: 24 | Performed by: NURSE PRACTITIONER

## 2024-09-17 PROCEDURE — 87086 URINE CULTURE/COLONY COUNT: CPT | Performed by: NURSE PRACTITIONER

## 2024-09-17 PROCEDURE — G0463 HOSPITAL OUTPT CLINIC VISIT: HCPCS | Performed by: NURSE PRACTITIONER

## 2024-09-17 RX ORDER — TACROLIMUS 5 MG/1
5 CAPSULE ORAL 2 TIMES DAILY
Qty: 180 CAPSULE | Refills: 0 | Status: SHIPPED | OUTPATIENT
Start: 2024-09-17 | End: 2024-09-27

## 2024-09-17 NOTE — PROGRESS NOTES
GENERAL SURGERY CLINIC NOTE   Pedro Ponce Jr. is a 83 y.o. male with history of BPH, HTN, HLD, DM who presents to clinic today for surgical consultation for left inguinal hernia recurrence. Patient has a history of laparoscopic bilateral inguinal repair and open repair of umbilical hernia (11/15/2011). He denies any groin or abdominal pain.   Of note, patient was seen in the ED yesterday (11/1/20) due to dizziness. His blood pressure was elevated and his medications were adjusted.   ROS:   Review of Systems   Constitutional: Negative for chills, fever, malaise/fatigue and weight loss.   HENT: Negative for congestion, sinus pain and sore throat.   Eyes: Negative for blurred vision and pain.   Respiratory: Negative for cough and shortness of breath.   Cardiovascular: Negative for chest pain, palpitations, claudication and leg swelling.   Gastrointestinal: Negative for abdominal pain, constipation, diarrhea, heartburn, nausea and vomiting.   Genitourinary: Positive for frequency and urgency. Negative for dysuria and hematuria.   Musculoskeletal: Negative for joint pain and myalgias.   Skin: Negative for rash.   Neurological: Positive for dizziness. Negative for sensory change, weakness and headaches.   Endo/Heme/Allergies: Does not bruise/bleed easily.   Psychiatric/Behavioral: Negative for depression, substance abuse and suicidal ideas.          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           Past Surgical History:   Procedure Laterality Date    COLONOSCOPY N/A 4/9/2018    Procedure: COLONOSCOPY; Surgeon: HAIM Mir MD; Location: [facility] ([address] [place] [address]); Service: Endoscopy;  Transplant Surgery Progress Note    Transplants:  9/3/2024 (Kidney); Postoperative day:  14  S: Lexy Cobb is a 79 year old female who presents with CKD 5 from likely from HTN, NSAID use, prior triamterene use, reduced nephron mass, potentially secondary FSGS, solitary kidney (s/p unilateral nephrectomy for RCC) not yet on HD most recent GFR 8 8/2024 was admitted to [facility] on 9/2/2024 as a candidate for kidney transplant. On 9/3/2024 she underwent DBD kidney transplant without vascular reconstruction and without stent placement.      Doing well.  No fevers or chills.  Some loose stools.  No nausea or vomiting.     No urinary symptoms.     No chest pain or shortness of breath.     RASHEEDA less than 30 ml since yesterday.      No chest pain or shortness of breath.        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   Transplant Date:  9/3/2024 (Kidney)   Crossmatch:  negative   DSA at Tx:  No  Baseline Cr: TBD   DeNovo DSA: No    Acute Rejection Hx:  No    Present Maintenance Immunosuppression:  Tacrolimus and Mycophenolate mofetil    CMV IgG Ab Discordance:  Yes  EBV IgG Ab Discordance:  No    BK Viremia:  No  EBV Viremia:  No    Transplant Coordinator: Gina Huizar     Transplant Office Phone Number: [phone]     Immunosuppressant Medications       Immunosuppressive Agents Disp Start End     tacrolimus (GENERIC EQUIVALENT) 5 MG capsule 180 capsule 9/17/2024 12/16/2024    Sig - Route: Take 1 capsule (5 mg) by mouth 2 times daily. - Oral    Class: E-Prescribe    Notes to Pharmacy: TXP DT 9/3/2024 (Kidney) TXP Dischg DT 9/6/2024 DX Kidney replaced by transplant Z94.0 [facility] ([place], [place])     mycophenolate (GENERIC EQUIVALENT) 250 MG capsule 180 capsule 9/9/2024 --    Sig - Route: Take 3 capsules (750 mg) by mouth 2 times daily. - Oral    Class: E-Prescribe    Notes to Pharmacy: TXP DT 9/3/2024 (Kidney) TXP Dischg DT  9/6/2024 DX Kidney replaced by transplant Z94.0 TX [facility] ([place], [place])     tacrolimus (GENERIC EQUIVALENT) 1 MG capsule 360 capsule 9/9/2024 --    Sig - Route: Take 6 capsules (6 mg) by mouth 2 times daily. - Oral    Class: E-Prescribe    Notes to Pharmacy: TXP DT 9/3/2024 (Kidney) TXP Dischg DT 9/6/2024 DX Kidney replaced by transplant Z94.0 [facility] ([place], [place])    No prior authorization was found for this prescription.    Found prior authorization for another prescription for the same medication: Closed            Possible Immunosuppression-related side effects:   []             headache  []             vivid dreams  []             irritability  []             cognitive difficuties  []             fine tremor  []             nausea  []             diarrhea  []             neuropathy      []             edema  []             renal calcineurin toxicity  []             hyperkalemia  []             post-transplant diabetes  []             decreased appetite  []             increased appetite  []             other:  []             none    Prescription Medications as of 9/17/2024         Rx Number Disp Refills Start End Last Dispensed Date Next Fill Date Owning Pharmacy    tacrolimus (GENERIC EQUIVALENT) 5 MG capsule  180 capsule 0 9/17/2024 12/16/2024   [facility] [address]    Sig: Take 1 capsule (5 mg) by mouth 2 times daily.    Class: E-Prescribe    Notes to Pharmacy: TXP DT 9/3/2024 (Kidney) TXP Dischg DT 9/6/2024 DX Kidney replaced by transplant Z94.0 [facility] ([place], [place])    Route: Oral    acetaminophen (TYLENOL) 325 MG tablet  -- -- 9/6/2024 --       Sig: Take 2 tablets (650 mg) by mouth every 4 hours as needed for other (For optimal non-opioid multimodal pain management to improve pain control.).     Laterality: N/A;    COLONOSCOPY N/A 1/14/2019    Procedure: COLONOSCOPY; Surgeon: HAIM Mir MD; Location: [facility] ([address] [place] [address]); Service: Endoscopy; Laterality: N/A; PM prep    CYST REMOVAL  2000    removed from left kidney    KIDNEY SURGERY      PROSTATE SURGERY       Social History     Socioeconomic History    Marital status:      Spouse name: Not on file    Number of children: Not on file    Years of education: Not on file    Highest education level: Not on file   Occupational History    Not on file   Social Needs    Financial resource strain: Not on file    Food insecurity     Worry: Not on file     Inability: Not on file    Transportation needs     Medical: Not on file     Non-medical: Not on file   Tobacco Use    Smoking status: Never Smoker    Smokeless tobacco: Never Used   Substance and Sexual Activity    Alcohol use: No    Drug use: No    Sexual activity: Not on file   Lifestyle    Physical activity     Days per week: Not on file     Minutes per session: Not on file    Stress: Not on file   Relationships    Social connections     Talks on phone: Not on file     Gets together: Not on file     Attends [employer]al service: Not on file     Active member of club or organization: Not on file     Attends meetings of clubs or organizations: Not on file     Relationship status: Not on file   Other Topics Concern    Not on file   Social History Narrative    Not on file            Review of patient's allergies indicates:   Allergen Reactions    Pcn [penicillins] Rash   PHYSICAL EXAM:       Vitals:    11/02/20 1523   BP: (!) 195/93   Pulse: 62   Physical Exam   Constitutional:   Appearance: Normal appearance. He is normal weight.   HENT:   Head: Normocephalic.   Right Ear: Tympanic membrane, ear canal and external ear normal.   Left Ear: Tympanic membrane, ear canal and external ear normal.   Nose: Nose normal.   Mouth/Throat:   Mouth: Mucous membranes are moist.   Pharynx: Oropharynx  Class: OTC    Route: Oral    amLODIPine (NORVASC) 5 MG tablet  30 tablet 11 9/11/2024 --   [address]    Sig: HOLD    Class: E-Prescribe    atorvastatin (LIPITOR) 10 MG tablet  30 tablet 11 9/9/2024 --   [address]    Sig: Take 1 tablet (10 mg) by mouth daily.    Class: E-Prescribe    Route: Oral    calcium carbonate 500 mg, elemental, (OSCAL) 500 MG tablet  60 tablet 2 9/6/2024 --   [address]    Sig: Take 1 tablet (500 mg) by mouth 2 times daily.    Class: E-Prescribe    Route: Oral    diphenhydrAMINE (BENADRYL) 50 MG capsule  -- --  --       Sig: Take 100 mg by mouth nightly as needed for sleep.    Class: Historical    Route: Oral    magnesium oxide (MAG-OX) 400 MG tablet  60 tablet 2 9/11/2024 --   [address]    Sig: Take 2 tablets (800 mg) by mouth daily (with lunch). Take 4 hours apart from mycophenolate    Class: E-Prescribe    Route: Oral    methocarbamol (ROBAXIN) 500 MG tablet  12 tablet 0 9/6/2024 --   [address]    Sig: Take 1 tablet (500 mg) by mouth every 6 hours as needed for muscle spasms.    Class: E-Prescribe    Route: Oral    metoprolol tartrate (LOPRESSOR) 25 MG tablet  60 tablet 11 9/9/2024 --   [address]    Sig: Take 1 tablet (25 mg) by mouth 2 times daily.    Class: E-Prescribe    Route: Oral    mycophenolate (GENERIC EQUIVALENT) 250 MG capsule  180 capsule 11 9/9/2024 --   [address]    Sig: Take 3 capsules (750 mg) by mouth 2 times daily.    Class: E-Prescribe    Notes to Pharmacy: TXP DT 9/3/2024 (Kidney) TXP Dischg DT 9/6/2024 DX Kidney replaced by transplant Z94.0 TX [facility]  is clear.   Eyes:   Extraocular Movements: Extraocular movements intact.   Conjunctiva/sclera: Conjunctivae normal.   Pupils: Pupils are equal, round, and reactive to light.   Neck:   Musculoskeletal: Normal range of motion.   Cardiovascular:   Rate and Rhythm: Normal rate and regular rhythm.   Pulses: Normal pulses.   Heart sounds: Normal heart sounds.   Pulmonary:   Effort: Pulmonary effort is normal.   Breath sounds: Normal breath sounds.   Abdominal:   General: Abdomen is flat. Bowel sounds are normal. There is no distension.   Palpations: Abdomen is soft.   Tenderness: There is no abdominal tenderness.   Hernia: A hernia (left inguinal) is present.   Genitourinary:   Penis: Normal.   Scrotum/Testes: Normal.   Musculoskeletal: Normal range of motion.   Skin:   General: Skin is warm and dry.   Capillary Refill: Capillary refill takes less than 2 seconds.   Neurological:   General: No focal deficit present.   Mental Status: He is alert and oriented to person, place, and time.   Psychiatric:   Mood and Affect: Mood normal.   Behavior: Behavior normal.   Thought Content: Thought content normal.   Judgment: Judgment normal.     PERTINENT LABS:   Reviewed. None.   PERTINENT IMAGING:   US abdomen (10/28/20):   Impression:   Bowel and fluid containing left inguinal hernia, not well characterized due to size.     ASSESSMENT/PLAN:   83 y.o. male with left inguinal hernia recurrence   - Schedule inguinal hernia repair for early December   - Will obtain consent am of surgery     [facility] ([place], [place])    Route: Oral    omeprazole (PRILOSEC) 20 MG capsule  -- --  --       Sig: Take 1 capsule by mouth daily    Class: Historical    Route: Oral    phosphorus tablet 250 mg (PHOSPHA 250 NEUTRAL) 250 MG per tablet  180 tablet 0 9/6/2024 --   [address]    Sig: Take 2 tablets (500 mg) by mouth 3 times daily.    Class: E-Prescribe    Route: Oral    potassium chloride brielle ER (KLOR-CON M20) 20 MEQ CR tablet  60 tablet 1 9/16/2024 --   [address]    Sig: Take 1 tablet (20 mEq) by mouth 2 times daily. You can dissolve in 4 ounces of water, let sit for 2 minutes and then stir well and drink quickly.    Class: E-Prescribe    Route: Oral    predniSONE (DELTASONE) 5 MG tablet  98 tablet 0 9/7/2024 10/8/2024   [facility] - [address]    Sig: Take 12 tablets (60 mg) by mouth daily for 1 day, THEN 8 tablets (40 mg) daily for 2 days, THEN 4 tablets (20 mg) daily for 7 days, THEN 3 tablets (15 mg) daily for 7 days, THEN 2 tablets (10 mg) daily for 7 days, THEN 1 tablet (5 mg) daily for 7 days.    Class: E-Prescribe    Route: Oral    Renewals       Renewal requests to authorizing provider (Gabriela Qureshi NP) <b>prohibited</b>            senna-docusate (SENOKOT-S/PERICOLACE) 8.6-50 MG tablet  -- -- 9/6/2024 --       Sig: Take 1 tablet by mouth 2 times daily.    Class: OTC    Route: Oral    sulfamethoxazole-trimethoprim (BACTRIM) 400-80 MG tablet  30 tablet 11 9/9/2024 --   [address]    Sig: Take 1 tablet by mouth daily.    Class: E-Prescribe    Route: Oral    tacrolimus (GENERIC EQUIVALENT) 1 MG capsule  360 capsule 11 9/9/2024 --   [address]    Sig: Take 6 capsules (6 mg) by mouth 2 times daily.     Class: E-Prescribe    Notes to Pharmacy: TXP DT 9/3/2024 (Kidney) TXP Dischg DT 9/6/2024 DX Kidney replaced by transplant Z94.0 TX [facility] ([place], [place])    Route: Oral    No prior authorization was found for this prescription.    Found prior authorization for another prescription for the same medication: Closed    traZODone (DESYREL) 50 MG tablet  -- -- 4/16/2019 --       Sig: Take 100 mg by mouth at bedtime.    Class: Historical    Route: Oral    valGANciclovir (VALCYTE) 450 MG tablet  60 tablet 5 9/9/2024 --   [facility] [place], [place] - [address] [place] [address]    Sig: Take 2 tablets (900 mg) by mouth daily.    Class: E-Prescribe    Route: Oral            O:   Pulse:  [61] 61  BP: (156)/(89) 156/89  SpO2:  [98 %] 98 %  General Appearance: in no apparent distress.   Skin: Normal, no rashes or jaundice  Heart: regular rate and rhythm, normal S1 and S2  Lungs: easy respirations, no audible wheezing.  Abdomen: flat, The wound is dry and intact, without hernia. The abdomen is non-tender. The kidney graft is not tender.  There is no ascites.  Extremities: Tremor absent.   Edema: absent.         Latest Ref Rng & Units 9/16/2024     7:57 AM 9/11/2024     8:32 AM 9/8/2024     7:51 AM 9/7/2024     7:14 AM 9/6/2024     5:32 AM   Transplant Immunosuppression Labs   Creat 0.51 - 0.95 mg/dL 1.01  0.76  0.87  0.82  0.94    Urea Nitrogen 8.0 - 23.0 mg/dL 15.7  12.4  19.1  20.3  24.0    WBC 4.0 - 11.0 10e3/uL 11.6  10.6  8.8  7.4  7.8    Neutrophil %   76  88  75        Chemistries:   Recent Labs   Lab Test 09/16/24  0757   BUN 15.7   CR 1.01*   GFRESTIMATED 57*   GLC 92     Lab Results   Component Value Date    A1C 4.4 09/02/2024     Recent Labs   Lab Test 09/02/24 2157   ALBUMIN 4.0   BILITOTAL 0.3   ALKPHOS 50   AST 13   ALT <5     Urine Studies:  Recent Labs   Lab Test 09/17/24  1051 09/02/24  2343   COLOR Light Yellow Light Yellow   APPEARANCE Clear Slightly  Cloudy*   URINEGLC Negative Negative   URINEBILI Negative Negative   URINEKETONE Negative Negative   SG 1.011 1.011   UBLD Negative Negative   URINEPH 5.0 6.0   PROTEIN Negative 100*   NITRITE Negative Negative   LEUKEST Negative Large*   RBCU  --  1   WBCU  --  >182*     No lab results found.  Hematology:   Recent Labs   Lab Test 09/16/24  0757 09/11/24  0832 09/08/24  0751   HGB 7.9* 7.9* 8.0*    269 218   WBC 11.6* 10.6 8.8     Coags:   Recent Labs   Lab Test 09/02/24  2157 10/10/19  1431   INR 0.93 0.93     HLA antibodies:   SA1 Hi Risk [facility]   Date Value Ref Range Status   04/22/2020 None  Final     SA1 HI RISK [facility]   Date Value Ref Range Status   09/09/2024 None  Final     SA1 Mod Risk [facility]   Date Value Ref Range Status   04/22/2020 None  Final     SA1 MOD RISK [facility]   Date Value Ref Range Status   09/09/2024 None  Final     SA2 Hi Risk [facility]   Date Value Ref Range Status   04/22/2020 None  Final     SA2 HI RISK [facility]   Date Value Ref Range Status   09/09/2024 None  Final     SA2 Mod Risk [facility]   Date Value Ref Range Status   04/22/2020 None  Final     SA2 MOD RISK [facility]   Date Value Ref Range Status   09/09/2024 None  Final       Assessment: Lexy Cobb is doing well s/p DDKT:  Issues we addressed during her visit include:    Plan:    1. Graft function: elevated cr.  Will push PO fluids at home.  Declined IV fluids today. UA/UC and kidney txp us  2. Immunosuppression Management: No change continue same IS  .  Complexity of management:Medium.  Contributing factors:  recent txp  3. Incision: slight swollen. Monitor  4. Leukocytosis:  UA/UC and kidney txp US  Low bicarb:  will await recheck Thursday and then can restart sodium bicarb.   If GI upset continues can switch to myfortic.  Track RASHEEDA output if less than 30 fo 3 days will remove   Followup: as directed    Total Time: 25 min,   Counselling Time: 15 min.

## 2024-09-17 NOTE — PATIENT INSTRUCTIONS
Get UA/UC today    Should push fluids.    Track drain output.  Less than 30 for 3 days and I can remove it    Kidney us today.    If bicarb is low Thursday we can start a supplement.

## 2024-09-17 NOTE — LETTER
9/17/2024      Lexy Cobb  [address]  [facility] [id number]      Dear Colleague,    Thank you for referring your patient, Lexy Cobb, to the [facility]. Please see a copy of my visit note below.    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 On 9/3/2024 she underwent DBD kidney transplant without vascular reconstruction and without stent placement.      Doing well.  No fevers or chills.  Some loose stools.  No nausea or vomiting.     No urinary symptoms.     No chest pain or shortness of breath.     RASHEEDA less than 30 ml since yesterday.      No chest pain or shortness of breath.        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   Transplant Date:  9/3/2024 (Kidney)   Crossmatch:  negative   DSA at Tx:  No  Baseline Cr: TBD   De[place]ovo DSA: No    Acute Rejection Hx:  No    Present Maintenance Immunosuppression:  Tacrolimus and Mycophenolate mofetil    CMV IgG Ab Discordance:  Yes  EBV IgG Ab Discordance:  No    BK Viremia:  No  EBV Viremia:  No    Transplant Coordinator: Gina Huizar     Transplant Office Phone Number: [phone]     Immunosuppressant Medications       Immunosuppressive Agents Disp Start End     tacrolimus (GENERIC EQUIVALENT) 5 MG capsule 180 capsule 9/17/2024 12/16/2024    Sig - Route: Take 1 capsule (5 mg) by mouth 2 times daily. - Oral    Class: E-Prescribe    Notes to Pharmacy: TXP DT 9/3/2024 (Kidney) TXP Dischg DT 9/6/2024 DX Kidney replaced by transplant Z94.0 [facility]  ([place], [place])     mycophenolate (GENERIC EQUIVALENT) 250 MG capsule 180 capsule 9/9/2024 --    Sig - Route: Take 3 capsules (750 mg) by mouth 2 times daily. - Oral    Class: E-Prescribe    Notes to Pharmacy: TXP DT 9/3/2024 (Kidney) TXP Dischg DT 9/6/2024 DX Kidney replaced by transplant Z94.0 [facility] ([place], [place])     tacrolimus (GENERIC EQUIVALENT) 1 MG capsule 360 capsule 9/9/2024 --    Sig - Route: Take 6 capsules (6 mg) by mouth 2 times daily. - Oral    Class: E-Prescribe    Notes to Pharmacy: TXP DT 9/3/2024 (Kidney) TXP Dischg DT 9/6/2024 DX Kidney replaced by transplant Z94.0 [facility] ([place], [place])    No prior authorization was found for this prescription.    Found prior authorization for another prescription for the same medication: Closed            Possible Immunosuppression-related side effects:   []             headache  []             vivid dreams  []             irritability  []             cognitive difficuties  []             fine tremor  []             nausea  []             diarrhea  []             neuropathy      []             edema  []             renal calcineurin toxicity  []             hyperkalemia  []             post-transplant diabetes  []             decreased appetite  []             increased appetite  []             other:  []             none    Prescription Medications as of 9/17/2024         Rx Number Disp Refills Start End Last Dispensed Date Next Fill Date Owning Pharmacy    tacrolimus (GENERIC EQUIVALENT) 5 MG capsule  180 capsule 0 9/17/2024 12/16/2024   [facility] [address]    Sig: Take 1 capsule (5 mg) by mouth 2 times daily.    Class: E-Prescribe    Notes to Pharmacy: TXP DT 9/3/2024 (Kidney) TXP Dischg DT 9/6/2024 DX Kidney replaced by transplant Z94.0 [facility]  ([place], [place])    Route: Oral    acetaminophen (TYLENOL) 325 MG tablet  -- -- 9/6/2024 --       Sig: Take 2 tablets (650 mg) by mouth every 4 hours as needed for other (For optimal non-opioid multimodal pain management to improve pain control.).    Class: OTC    Route: Oral    amLODIPine (NORVASC) 5 MG tablet  30 tablet 11 9/11/2024 --   [address]    Sig: HOLD    Class: E-Prescribe    atorvastatin (LIPITOR) 10 MG tablet  30 tablet 11 9/9/2024 --   [address]    Sig: Take 1 tablet (10 mg) by mouth daily.    Class: E-Prescribe    Route: Oral    calcium carbonate 500 mg, elemental, (OSCAL) 500 MG tablet  60 tablet 2 9/6/2024 --   [address]    Sig: Take 1 tablet (500 mg) by mouth 2 times daily.    Class: E-Prescribe    Route: Oral    diphenhydrAMINE (BENADRYL) 50 MG capsule  -- --  --       Sig: Take 100 mg by mouth nightly as needed for sleep.    Class: Historical    Route: Oral    magnesium oxide (MAG-OX) 400 MG tablet  60 tablet 2 9/11/2024 --   [address]    Sig: Take 2 tablets (800 mg) by mouth daily (with lunch). Take 4 hours apart from mycophenolate    Class: E-Prescribe    Route: Oral    methocarbamol (ROBAXIN) 500 MG tablet  12 tablet 0 9/6/2024 --   [address]    Sig: Take 1 tablet (500 mg) by mouth every 6 hours as needed for muscle spasms.    Class: E-Prescribe    Route: Oral    metoprolol tartrate (LOPRESSOR) 25 MG tablet  60 tablet 11 9/9/2024 --   [address]    Sig: Take 1 tablet (25 mg) by mouth 2 times daily.    Class: E-Prescribe    Route: Oral    mycophenolate (GENERIC EQUIVALENT) 250 MG capsule  180 capsule 11 9/9/2024 --   [facility]  [address]    Sig: Take 3 capsules (750 mg) by mouth 2 times daily.    Class: E-Prescribe    Notes to Pharmacy: TXP DT 9/3/2024 (Kidney) TXP Dischg DT 9/6/2024 DX Kidney replaced by transplant Z94.0 [facility] ([place], [place])    Route: Oral    omeprazole (PRILOSEC) 20 MG capsule  -- --  --       Sig: Take 1 capsule by mouth daily    Class: Historical    Route: Oral    phosphorus tablet 250 mg (PHOSPHA 250 NEUTRAL) 250 MG per tablet  180 tablet 0 9/6/2024 --   [address]    Sig: Take 2 tablets (500 mg) by mouth 3 times daily.    Class: E-Prescribe    Route: Oral    potassium chloride brielle ER (KLOR-CON M20) 20 MEQ CR tablet  60 tablet 1 9/16/2024 --   [address]    Sig: Take 1 tablet (20 mEq) by mouth 2 times daily. You can dissolve in 4 ounces of water, let sit for 2 minutes and then stir well and drink quickly.    Class: E-Prescribe    Route: Oral    predniSONE (DELTASONE) 5 MG tablet  98 tablet 0 9/7/2024 10/8/2024   [address]    Sig: Take 12 tablets (60 mg) by mouth daily for 1 day, THEN 8 tablets (40 mg) daily for 2 days, THEN 4 tablets (20 mg) daily for 7 days, THEN 3 tablets (15 mg) daily for 7 days, THEN 2 tablets (10 mg) daily for 7 days, THEN 1 tablet (5 mg) daily for 7 days.    Class: E-Prescribe    Route: Oral    Renewals       Renewal requests to authorizing provider (Gabriela Qureshi NP) <b>prohibited</b>            senna-docusate (SENOKOT-S/PERICOLACE) 8.6-50 MG tablet  -- -- 9/6/2024 --       Sig: Take 1 tablet by mouth 2 times daily.    Class: OTC    Route: Oral    sulfamethoxazole-trimethoprim (BACTRIM) 400-80 MG tablet  30 tablet 11 9/9/2024 --   [facility] [place], [place] - [address] [facility] [address]    Sig: Take 1 tablet by mouth  daily.    Class: E-Prescribe    Route: Oral    tacrolimus (GENERIC EQUIVALENT) 1 MG capsule  360 capsule 11 9/9/2024 --   [facility] [address]    Sig: Take 6 capsules (6 mg) by mouth 2 times daily.    Class: E-Prescribe    Notes to Pharmacy: TXP DT 9/3/2024 (Kidney) TXP Dischg DT 9/6/2024 DX Kidney replaced by transplant Z94.0 [facility] ([place], [place])    Route: Oral    No prior authorization was found for this prescription.    Found prior authorization for another prescription for the same medication: Closed    traZODone (DESYREL) 50 MG tablet  -- -- 4/16/2019 --       Sig: Take 100 mg by mouth at bedtime.    Class: Historical    Route: Oral    valGANciclovir (VALCYTE) 450 MG tablet  60 tablet 5 9/9/2024 --   [address]    Sig: Take 2 tablets (900 mg) by mouth daily.    Class: E-Prescribe    Route: Oral            O:   Pulse:  [61] 61  BP: (156)/(89) 156/89  SpO2:  [98 %] 98 %  General Appearance: in no apparent distress.   Skin: Normal, no rashes or jaundice  Heart: regular rate and rhythm, normal S1 and S2  Lungs: easy respirations, no audible wheezing.  Abdomen: flat, The wound is dry and intact, without hernia. The abdomen is non-tender. The kidney graft is not tender.  There is no ascites.  Extremities: Tremor absent.   Edema: absent.         Latest Ref Rng & Units 9/16/2024     7:57 AM 9/11/2024     8:32 AM 9/8/2024     7:51 AM 9/7/2024     7:14 AM 9/6/2024     5:32 AM   Transplant Immunosuppression Labs   Creat 0.51 - 0.95 mg/dL 1.01  0.76  0.87  0.82  0.94    Urea Nitrogen 8.0 - 23.0 mg/dL 15.7  12.4  19.1  20.3  24.0    WBC 4.0 - 11.0 10e3/uL 11.6  10.6  8.8  7.4  7.8    Neutrophil %   76  88  75        Chemistries:   Recent Labs   Lab Test 09/16/24  0757   BUN 15.7   CR 1.01*   GFRESTIMATED 57*   GLC 92     Lab Results   Component Value Date    A1C 4.4  09/02/2024     Recent Labs   Lab Test 09/02/24  2157   ALBUMIN 4.0   BILITOTAL 0.3   ALKPHOS 50   AST 13   ALT <5     Urine Studies:  Recent Labs   Lab Test 09/17/24  1051 09/02/24  2345   COLOR Light Yellow Light Yellow   APPEARANCE Clear Slightly Cloudy*   URINEGLC Negative Negative   URINEBILI Negative Negative   URINEKETONE Negative Negative   SG 1.011 1.011   UBLD Negative Negative   URINEPH 5.0 6.0   PROTEIN Negative 100*   NITRITE Negative Negative   LEUKEST Negative Large*   RBCU  --  1   WBCU  --  >182*     No lab results found.  Hematology:   Recent Labs   Lab Test 09/16/24  0757 09/11/24  0832 09/08/24  0751   HGB 7.9* 7.9* 8.0*    269 218   WBC 11.6* 10.6 8.8     Coags:   Recent Labs   Lab Test 09/02/24  2157 10/10/19  1431   INR 0.93 0.93     HLA antibodies:   SA1 Hi Risk [facility]   Date Value Ref Range Status   04/22/2020 None  Final     SA1 HI RISK [facility]   Date Value Ref Range Status   09/09/2024 None  Final     SA1 Mod Risk [facility]   Date Value Ref Range Status   04/22/2020 None  Final     SA1 MOD RISK [facility]   Date Value Ref Range Status   09/09/2024 None  Final     SA2 Hi Risk [facility]   Date Value Ref Range Status   04/22/2020 None  Final     SA2 HI RISK [facility]   Date Value Ref Range Status   09/09/2024 None  Final     SA2 Mod Risk [facility]   Date Value Ref Range Status   04/22/2020 None  Final     SA2 MOD RISK [facility]   Date Value Ref Range Status   09/09/2024 None  Final       Assessment: Lexy Cobb is doing well s/p DDKT:  Issues we addressed during her visit include:    Plan:    1. Graft function: elevated cr.  Will push PO fluids at home.  Declined IV fluids today. UA/UC and kidney txp us  2. Immunosuppression Management: No change continue same IS  .  Complexity of management:Medium.  Contributing factors:  recent txp  3. Incision: slight swollen. Monitor  4. Leukocytosis:  UA/UC and kidney txp US  Low bicarb:  will await recheck Thursday and then can restart sodium bicarb.   If GI upset continues can  switch to myfortic.  Track RASHEEDA output if less than 30 fo 3 days will remove   Followup: as directed    Total Time: 25 min,   Counselling Time: 15 min.           Again, thank you for allowing me to participate in the care of your patient.        Sincerely,        Veronica Ozuna NP

## 2024-09-18 LAB — BACTERIA UR CULT: NORMAL

## 2024-09-19 ENCOUNTER — TELEPHONE (OUTPATIENT)
Dept: TRANSPLANT | Facility: CLINIC | Age: 79
End: 2024-09-19

## 2024-09-19 ENCOUNTER — LAB (OUTPATIENT)
Dept: LAB | Facility: CLINIC | Age: 79
End: 2024-09-19
Payer: COMMERCIAL

## 2024-09-19 DIAGNOSIS — Z20.828 CONTACT WITH AND (SUSPECTED) EXPOSURE TO OTHER VIRAL COMMUNICABLE DISEASES: ICD-10-CM

## 2024-09-19 DIAGNOSIS — Z79.899 ENCOUNTER FOR LONG-TERM CURRENT USE OF MEDICATION: ICD-10-CM

## 2024-09-19 DIAGNOSIS — Z98.890 OTHER SPECIFIED POSTPROCEDURAL STATES: ICD-10-CM

## 2024-09-19 DIAGNOSIS — Z94.0 KIDNEY REPLACED BY TRANSPLANT: ICD-10-CM

## 2024-09-19 DIAGNOSIS — Z48.298 AFTERCARE FOLLOWING ORGAN TRANSPLANT: ICD-10-CM

## 2024-09-19 LAB
ANION GAP SERPL CALCULATED.3IONS-SCNC: 10 MMOL/L (ref 7–15)
BUN SERPL-MCNC: 14.1 MG/DL (ref 8–23)
CALCIUM SERPL-MCNC: 8.4 MG/DL (ref 8.8–10.4)
CHLORIDE SERPL-SCNC: 106 MMOL/L (ref 98–107)
CREAT SERPL-MCNC: 1.02 MG/DL (ref 0.51–0.95)
EGFRCR SERPLBLD CKD-EPI 2021: 56 ML/MIN/1.73M2
ERYTHROCYTE [DISTWIDTH] IN BLOOD BY AUTOMATED COUNT: 14.7 % (ref 10–15)
GLUCOSE SERPL-MCNC: 89 MG/DL (ref 70–99)
HCO3 SERPL-SCNC: 20 MMOL/L (ref 22–29)
HCT VFR BLD AUTO: 25.9 % (ref 35–47)
HGB BLD-MCNC: 8 G/DL (ref 11.7–15.7)
MCH RBC QN AUTO: 33.3 PG (ref 26.5–33)
MCHC RBC AUTO-ENTMCNC: 30.9 G/DL (ref 31.5–36.5)
MCV RBC AUTO: 108 FL (ref 78–100)
PLATELET # BLD AUTO: 275 10E3/UL (ref 150–450)
POTASSIUM SERPL-SCNC: 4.8 MMOL/L (ref 3.4–5.3)
RBC # BLD AUTO: 2.4 10E6/UL (ref 3.8–5.2)
SODIUM SERPL-SCNC: 136 MMOL/L (ref 135–145)
TACROLIMUS BLD-MCNC: 8.1 UG/L (ref 5–15)
TME LAST DOSE: NORMAL H
TME LAST DOSE: NORMAL H
WBC # BLD AUTO: 9.2 10E3/UL (ref 4–11)

## 2024-09-19 PROCEDURE — 80048 BASIC METABOLIC PNL TOTAL CA: CPT

## 2024-09-19 PROCEDURE — 36415 COLL VENOUS BLD VENIPUNCTURE: CPT

## 2024-09-19 PROCEDURE — 80197 ASSAY OF TACROLIMUS: CPT

## 2024-09-19 PROCEDURE — 85027 COMPLETE CBC AUTOMATED: CPT

## 2024-09-19 PROCEDURE — 80180 DRUG SCRN QUAN MYCOPHENOLATE: CPT

## 2024-09-19 NOTE — TELEPHONE ENCOUNTER
Spoke to patient who states she is feeling well and incision is looking better. She states her drain is still putting out more than 30ml so she believes Monday will be more of an appropriate day to remove the drain. Patient has appt with DAVION Monday and he will look at drain. Labs from today pending, no other follow up at this time.

## 2024-09-20 ENCOUNTER — TELEPHONE (OUTPATIENT)
Dept: TRANSPLANT | Facility: CLINIC | Age: 79
End: 2024-09-20
Payer: COMMERCIAL

## 2024-09-20 DIAGNOSIS — E87.8 LOW BICARBONATE LEVEL: Primary | ICD-10-CM

## 2024-09-20 LAB
MYCOPHENOLATE SERPL LC/MS/MS-MCNC: 1.56 MG/L (ref 1–3.5)
MYCOPHENOLATE-G SERPL LC/MS/MS-MCNC: 63.2 MG/L (ref 30–95)
TME LAST DOSE: NORMAL H
TME LAST DOSE: NORMAL H

## 2024-09-20 RX ORDER — SODIUM BICARBONATE 650 MG/1
650 TABLET ORAL 2 TIMES DAILY
Qty: 60 TABLET | Refills: 11 | Status: SHIPPED | OUTPATIENT
Start: 2024-09-20 | End: 2024-10-02

## 2024-09-20 NOTE — TELEPHONE ENCOUNTER
Angel Gutierrez MD Schuller, Laura M, NP; Cindi Barnes RN  Cc: Gina Huizar, RN  Agreed with bicarb. Cr changes track increase in Tac levels to therapeutic target. Nothing else to do for now.         ----- Message -----  From: Veronica Ozuna NP  Sent: 9/20/2024  12:16 PM CDT  To: Angel Sandhu MD; *  Subject: RE: Cr elevated                                  Yes please start sodium bicarb 650mg BID    No.  No changes for now,      OUTCOME:  Call placed to pt. LVM  Rx sent

## 2024-09-20 NOTE — TELEPHONE ENCOUNTER
ISSUE:  Cr remains elevated at 1.02  WBC trending down 9  Appt w/ Veronica earlier this week, U/S and UA/UC done for elevated Cr (1.01) and leukocytosis  Bicarb 20    PLAN:  Spoke w/ pt. Reports she is feeling well. Drinking ~2L/day. Good UOP.   Drain output lessening and lighter in color.   Has appt with Dr Fregoso Monday    OUTCOME:   Message sent to provider

## 2024-09-21 ENCOUNTER — TELEPHONE (OUTPATIENT)
Dept: TRANSPLANT | Facility: CLINIC | Age: 79
End: 2024-09-21
Payer: COMMERCIAL

## 2024-09-21 NOTE — TELEPHONE ENCOUNTER
Lexy, paged that her incision is weeping. It is clear and tinged and looks like the PD drainage as well.  No redness. No fevers. No concerns.   Patient will send photo.  Paged Dr Micha Nieto    Reviewed with Veronica Ozuna NP. Patient to ensure she is stripping drain. And continue to follow. If any changes call  back. Lexy repeated plan and has appt on Monday.

## 2024-09-23 ENCOUNTER — OFFICE VISIT (OUTPATIENT)
Dept: TRANSPLANT | Facility: CLINIC | Age: 79
End: 2024-09-23
Attending: STUDENT IN AN ORGANIZED HEALTH CARE EDUCATION/TRAINING PROGRAM
Payer: COMMERCIAL

## 2024-09-23 ENCOUNTER — TELEPHONE (OUTPATIENT)
Dept: TRANSPLANT | Facility: CLINIC | Age: 79
End: 2024-09-23

## 2024-09-23 ENCOUNTER — LAB (OUTPATIENT)
Dept: LAB | Facility: CLINIC | Age: 79
End: 2024-09-23
Payer: COMMERCIAL

## 2024-09-23 VITALS
DIASTOLIC BLOOD PRESSURE: 80 MMHG | BODY MASS INDEX: 22.9 KG/M2 | SYSTOLIC BLOOD PRESSURE: 157 MMHG | HEART RATE: 64 BPM | OXYGEN SATURATION: 98 % | WEIGHT: 135.5 LBS

## 2024-09-23 DIAGNOSIS — Z98.890 OTHER SPECIFIED POSTPROCEDURAL STATES: ICD-10-CM

## 2024-09-23 DIAGNOSIS — Z48.298 AFTERCARE FOLLOWING ORGAN TRANSPLANT: ICD-10-CM

## 2024-09-23 DIAGNOSIS — Z20.828 CONTACT WITH AND (SUSPECTED) EXPOSURE TO OTHER VIRAL COMMUNICABLE DISEASES: ICD-10-CM

## 2024-09-23 DIAGNOSIS — Z94.0 KIDNEY REPLACED BY TRANSPLANT: ICD-10-CM

## 2024-09-23 DIAGNOSIS — Z79.899 ENCOUNTER FOR LONG-TERM CURRENT USE OF MEDICATION: ICD-10-CM

## 2024-09-23 LAB
ANION GAP SERPL CALCULATED.3IONS-SCNC: 9 MMOL/L (ref 7–15)
BUN SERPL-MCNC: 16.9 MG/DL (ref 8–23)
CALCIUM SERPL-MCNC: 8.3 MG/DL (ref 8.8–10.4)
CHLORIDE SERPL-SCNC: 105 MMOL/L (ref 98–107)
CREAT SERPL-MCNC: 0.98 MG/DL (ref 0.51–0.95)
EGFRCR SERPLBLD CKD-EPI 2021: 59 ML/MIN/1.73M2
ERYTHROCYTE [DISTWIDTH] IN BLOOD BY AUTOMATED COUNT: 15 % (ref 10–15)
GLUCOSE SERPL-MCNC: 87 MG/DL (ref 70–99)
HCO3 SERPL-SCNC: 20 MMOL/L (ref 22–29)
HCT VFR BLD AUTO: 24.5 % (ref 35–47)
HGB BLD-MCNC: 7.5 G/DL (ref 11.7–15.7)
MCH RBC QN AUTO: 33.3 PG (ref 26.5–33)
MCHC RBC AUTO-ENTMCNC: 30.6 G/DL (ref 31.5–36.5)
MCV RBC AUTO: 109 FL (ref 78–100)
PLATELET # BLD AUTO: 274 10E3/UL (ref 150–450)
POTASSIUM SERPL-SCNC: 4.6 MMOL/L (ref 3.4–5.3)
RBC # BLD AUTO: 2.25 10E6/UL (ref 3.8–5.2)
SODIUM SERPL-SCNC: 134 MMOL/L (ref 135–145)
WBC # BLD AUTO: 8.6 10E3/UL (ref 4–11)

## 2024-09-23 PROCEDURE — 85027 COMPLETE CBC AUTOMATED: CPT

## 2024-09-23 PROCEDURE — 99213 OFFICE O/P EST LOW 20 MIN: CPT | Mod: 24 | Performed by: SURGERY

## 2024-09-23 PROCEDURE — 80048 BASIC METABOLIC PNL TOTAL CA: CPT

## 2024-09-23 PROCEDURE — G0463 HOSPITAL OUTPT CLINIC VISIT: HCPCS | Performed by: SURGERY

## 2024-09-23 PROCEDURE — 36415 COLL VENOUS BLD VENIPUNCTURE: CPT

## 2024-09-23 NOTE — TELEPHONE ENCOUNTER
DATE:  9/23/2024     TIME OF RECEIPT FROM LAB:  8:20 am    ORDERING PROVIDER: Mario    LAB TEST:  Hgb    LAB VALUE:  7.5

## 2024-09-23 NOTE — LETTER
9/23/2024      Lexy Cobb  [address] [facility] [address]      Dear Colleague,    Thank you for referring your patient, Lexy Cobb, to the [facility]. Please see a copy of my visit note below.    Transplant Surgery -OUTPATIENT IMMUNOSUPPRESSION PROGRESS NOTE    Date of Visit: 09/23/2024    Transplants:  9/3/2024 (Kidney); Postoperative day:  20  ASSESMENT AND PLAN:   1.Graft Function: good, cr downtrending  2.Immunosuppression Management:no change  3.Hypertension:yes - management per nephrology  4.Renal Function:good  5.Lab frequency:per protocol  6.Other: possibly developing lymphocele. Drain is present and output decreasing, keep for now and if increases then consult IR for sclerotherapy.    Kamar Ruiz MD    I have reviewed history, examined patient and discussed plan with the fellow/resident/OLEGARIO.  I concur with the findings in this note.    Immunosuppressive regimen, management and long term risks discussed in detail. Changes, when applicable made as per orders.    Total time: 20 min  Including pre-encounter, face to face and post-encounter time spent on this patient's visit.                 Date: September 23, 2024    Transplant:  [x]                             Liver []                              Kidney []                             Pancreas []                              Other:             Chief Complaint:Post-op Visit (20 days ago )    History of Present Illness:  Patient Active Problem List   Diagnosis     Hypertension, renal     CKD (chronic kidney disease) stage 4, GFR 15-29 ml/min (H)     Renal cell cancer (H)     Anemia in chronic renal disease     Secondary renal hyperparathyroidism (H24)     Vitamin D deficiency     Obesity     Vitamin B12 deficiency     Raynaud's syndrome     ABDULLAHI positive     Chronic lower back pain     Irritable bowel syndrome     GERD (gastroesophageal reflux disease)     Trauma to right eye     Kidney transplant candidate      Immunosuppression (H24)     Status post kidney transplant     Hypophosphatemia     SOCIAL /FAMILY HISTORY: [x]                  No recent change    Past Medical History:   Diagnosis Date     ABDULLAHI positive      Anemia in chronic kidney disease(285.21)      Chronic lower back pain      CKD (chronic kidney disease) stage 4, GFR 15-29 ml/min (H)      GERD (gastroesophageal reflux disease)      Hemorrhoids      Hypertension, renal      Hypomagnesemia      Irritable bowel syndrome      Obesity 1973    s/p gastric bypass     Raynaud's syndrome      Renal cell cancer (H)     s/p right native nephrectomy     Secondary hyperparathyroidism (of renal origin)      Small bowel obstruction (H)      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     Vasculitis of skin      Vitamin B12 deficiency      Vitamin D deficiency      Past Surgical History:   Procedure Laterality Date     APPENDECTOMY       BENCH KIDNEY  9/3/2024    Procedure: Bench kidney;  Surgeon: Ciera Fregoso MD;  Location:  OR     s/p exploratory laproscopic surgery       s/p eye surgery       s/p gastric bypass       s/p right native nephrectomy      RCC     TONSILLECTOMY       TRANSPLANT KIDNEY RECIPIENT  DONOR N/A 9/3/2024    Procedure: Transplant kidney recipient  donor;  Surgeon: Ciera Fregoso MD;  Location:  OR     Social History     Socioeconomic History     Marital status:      Spouse name: Not on file     Number of children: 1     Years of education: Not on file     Highest education level: Not on file   Occupational History     Employer: RETIRED   Tobacco Use     Smoking status: Never     Smokeless tobacco: Never   Substance and Sexual Activity     Alcohol use: No     Drug use: No     Sexual activity: Not on file   Other Topics Concern     Parent/sibling w/ CABG, MI or angioplasty before 65F 55M? Not Asked   Social History Narrative     Not on file     Social Determinants of Health     Financial Resource Strain: Not on file    Food Insecurity: Not on file   Transportation Needs: Not on file   Physical Activity: Not on file   Stress: Not on file   Social Connections: Not on file   Interpersonal Safety: Unknown (8/7/2024)    Received from [facility]    Humiliation, Afraid, Rape, and Kick questionnaire      Fear of Current or Ex-Partner: Not on file      Emotionally Abused: Patient unable to answer      Physically Abused: Patient unable to answer      Sexually Abused: Patient unable to answer   Housing Stability: Not on file     Prescription Medications as of 9/23/2024         Rx Number Disp Refills Start End Last Dispensed Date Next Fill Date Owning Pharmacy    acetaminophen (TYLENOL) 325 MG tablet  -- -- 9/6/2024 --       Sig: Take 2 tablets (650 mg) by mouth every 4 hours as needed for other (For optimal non-opioid multimodal pain management to improve pain control.).    Class: OTC    Route: Oral    amLODIPine (NORVASC) 5 MG tablet  30 tablet 11 9/11/2024 --   [address]    Sig: HOLD    Class: E-Prescribe    atorvastatin (LIPITOR) 10 MG tablet  30 tablet 11 9/9/2024 --   [address]    Sig: Take 1 tablet (10 mg) by mouth daily.    Class: E-Prescribe    Route: Oral    calcium carbonate 500 mg, elemental, (OSCAL) 500 MG tablet  60 tablet 2 9/6/2024 --   [address]    Sig: Take 1 tablet (500 mg) by mouth 2 times daily.    Class: E-Prescribe    Route: Oral    diphenhydrAMINE (BENADRYL) 50 MG capsule  -- --  --       Sig: Take 100 mg by mouth nightly as needed for sleep.    Class: Historical    Route: Oral    magnesium oxide (MAG-OX) 400 MG tablet  60 tablet 2 9/11/2024 --   [address]    Sig: Take 2 tablets (800 mg) by mouth daily (with lunch). Take 4 hours apart from mycophenolate    Class: E-Prescribe     Route: Oral    methocarbamol (ROBAXIN) 500 MG tablet  12 tablet 0 9/6/2024 --   [address]    Sig: Take 1 tablet (500 mg) by mouth every 6 hours as needed for muscle spasms.    Class: E-Prescribe    Route: Oral    metoprolol tartrate (LOPRESSOR) 25 MG tablet  60 tablet 11 9/9/2024 --   [address]    Sig: Take 1 tablet (25 mg) by mouth 2 times daily.    Class: E-Prescribe    Route: Oral    mycophenolate (GENERIC EQUIVALENT) 250 MG capsule  180 capsule 11 9/9/2024 --   [address]    Sig: Take 3 capsules (750 mg) by mouth 2 times daily.    Class: E-Prescribe    Notes to Pharmacy: TXP DT 9/3/2024 (Kidney) TXP Dischg DT 9/6/2024 DX Kidney replaced by transplant Z94.0 [facility] ([place], [place])    Route: Oral    omeprazole (PRILOSEC) 20 MG capsule  -- --  --       Sig: Take 1 capsule by mouth daily    Class: Historical    Route: Oral    phosphorus tablet 250 mg (PHOSPHA 250 NEUTRAL) 250 MG per tablet  180 tablet 0 9/6/2024 --   [address]    Sig: Take 2 tablets (500 mg) by mouth 3 times daily.    Class: E-Prescribe    Route: Oral    potassium chloride brielle ER (KLOR-CON M20) 20 MEQ CR tablet  60 tablet 1 9/16/2024 --   [address]    Sig: Take 1 tablet (20 mEq) by mouth 2 times daily. You can dissolve in 4 ounces of water, let sit for 2 minutes and then stir well and drink quickly.    Class: E-Prescribe    Route: Oral    predniSONE (DELTASONE) 5 MG tablet  98 tablet 0 9/7/2024 10/8/2024   [address]    Sig: Take 12 tablets (60 mg) by mouth daily for 1 day, THEN 8 tablets (40 mg) daily for 2 days, THEN 4 tablets (20 mg) daily for 7  days, THEN 3 tablets (15 mg) daily for 7 days, THEN 2 tablets (10 mg) daily for 7 days, THEN 1 tablet (5 mg) daily for 7 days.    Class: E-Prescribe    Route: Oral    Renewals       Renewal requests to authorizing provider (Gabriela Qureshi NP) <b>prohibited</b>            senna-docusate (SENOKOT-S/PERICOLACE) 8.6-50 MG tablet  -- -- 9/6/2024 --       Sig: Take 1 tablet by mouth 2 times daily.    Class: OTC    Route: Oral    sodium bicarbonate 650 MG tablet  60 tablet 11 9/20/2024 --   [facility] [address]    Sig: Take 1 tablet (650 mg) by mouth 2 times daily.    Class: E-Prescribe    Route: Oral    sulfamethoxazole-trimethoprim (BACTRIM) 400-80 MG tablet  30 tablet 11 9/9/2024 --   [address]    Sig: Take 1 tablet by mouth daily.    Class: E-Prescribe    Route: Oral    tacrolimus (GENERIC EQUIVALENT) 1 MG capsule  360 capsule 11 9/9/2024 --   [address]    Sig: Take 6 capsules (6 mg) by mouth 2 times daily.    Class: E-Prescribe    Notes to Pharmacy: TXP DT 9/3/2024 (Kidney) TXP Dischg DT 9/6/2024 DX Kidney replaced by transplant Z94.0 [facility] ([place], [place])    Route: Oral    No prior authorization was found for this prescription.    Found prior authorization for another prescription for the same medication: Closed    tacrolimus (GENERIC EQUIVALENT) 5 MG capsule  180 capsule 0 9/17/2024 12/16/2024   [address]    Sig: Take 1 capsule (5 mg) by mouth 2 times daily.    Class: E-Prescribe    Notes to Pharmacy: TXP DT 9/3/2024 (Kidney) TXP Dischg DT 9/6/2024 DX Kidney replaced by transplant Z94.0 [facility] ([place], [place])    Route: Oral    traZODone (DESYREL) 50 MG tablet  -- -- 4/16/2019 --        Sig: Take 100 mg by mouth at bedtime.    Class: Historical    Route: Oral    valGANciclovir (VALCYTE) 450 MG tablet  60 tablet 5 9/9/2024 --   [facility] [place], [place] - [address] [place] [address]    Sig: Take 2 tablets (900 mg) by mouth daily.    Class: E-Prescribe    Route: Oral          Dapsone, Fluoxetine, Iron, and Latex   REVIEW OF SYSTEMS (check box if normal)  [x]               GENERAL  [x]                 PULMONARY [x]                GENITOURINARY  [x]                CNS                 [x]                 CARDIAC  [x]                 ENDOCRINE  [x]                EARS,NOSE,THROAT [x]                 GASTROINTESTINAL [x]                 NEUROLOGIC    [x]                MUSCLOSKELTAL  [x]                  HEMATOLOGY      PHYSICAL EXAM (check box if normal)BP (!) 157/80   Pulse 64   Wt 61.5 kg (135 lb 8 oz)   SpO2 98%   BMI 22.90 kg/m          [x]            GENERAL:    [x]       EYES:  ICTERIC   []        YES  []                    NO  [x]           EXTREMITIES: Clubbing []       Y     [x]           N    [x]           EARS, NOSE, THROAT: Membranes Moist    YES   [x]                   NO []                  [x]           LUNGS:  CLEAR    YES       [x]                  NO    []                                [x]           SKIN: Jaundice           YES       []                  NO    [x]                   Rash: YES       []                  NO    [x]                                     [x]             HEART: Regular Rate          YES       [x]                  NO    []                   Incision Clean:  YES       [x]                  NO    []                                [x]                    ABDOMEN: Organomegaly YES       []                  NO    [x]                       [x]                    NEUROLOGICAL:  Nonfocal  YES       [x]                  NO    []                       [x]                    Hernia YES       []                  NO    [x]                   PSYCHIATRIC:   Appropriate  YES       [x]                  NO    []                       OTHER:                                                                                                   Incision healing well, has dermabond over.   Drain in place, output serous.       Again, thank you for allowing me to participate in the care of your patient.        Sincerely,        Ciera Fregoso MD

## 2024-09-23 NOTE — PROGRESS NOTES
Transplant Surgery -OUTPATIENT IMMUNOSUPPRESSION PROGRESS NOTE    Date of Visit: 09/23/2024    Transplants:  9/3/2024 (Kidney); Postoperative day:  20  ASSESMENT AND PLAN:   1.Graft Function: good, cr downtrending  2.Immunosuppression Management:no change  3.Hypertension:yes - management per nephrology  4.Renal Function:good  5.Lab frequency:per protocol  6.Other: possibly developing lymphocele. Drain is present and output decreasing, keep for now and if increases then consult IR for sclerotherapy.    Kamar Ruiz MD    I have reviewed history, examined patient and discussed plan with the fellow/resident/OLEGARIO.  I concur with the findings in this note.    Immunosuppressive regimen, management and long term risks discussed in detail. Changes, when applicable made as per orders.    Total time: 20 min  Including pre-encounter, face to face and post-encounter time spent on this patient's visit.                 Date: September 23, 2024    Transplant:  [x]                             Liver []                              Kidney []                             Pancreas []                              Other:             Chief Complaint:Post-op Visit (20 days ago )    History of Present Illness:  Patient Active Problem List   Diagnosis    Hypertension, renal    CKD (chronic kidney disease) stage 4, GFR 15-29 ml/min (H)    Renal cell cancer (H)    Anemia in chronic renal disease    Secondary renal hyperparathyroidism (H24)    Vitamin D deficiency    Obesity    Vitamin B12 deficiency    Raynaud's syndrome    ABDULLAHI positive    Chronic lower back pain    Irritable bowel syndrome    GERD (gastroesophageal reflux disease)    Trauma to right eye    Kidney transplant candidate    Immunosuppression (H24)    Status post kidney transplant    Hypophosphatemia     SOCIAL /FAMILY HISTORY: [x]                  No recent change    Past Medical History:   Diagnosis Date    ABDULLAHI positive     Anemia in chronic kidney disease(285.21)     Chronic lower  back pain     CKD (chronic kidney disease) stage 4, GFR 15-29 ml/min (H)     GERD (gastroesophageal reflux disease)     Hemorrhoids     Hypertension, renal     Hypomagnesemia     Irritable bowel syndrome     Obesity 1973    s/p gastric bypass    Raynaud's syndrome     Renal cell cancer (H)     s/p right native nephrectomy    Secondary hyperparathyroidism (of renal origin)     Small bowel obstruction (H)     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    Vasculitis of skin     Vitamin B12 deficiency     Vitamin D deficiency      Past Surgical History:   Procedure Laterality Date    APPENDECTOMY      BENCH KIDNEY  9/3/2024    Procedure: Bench kidney;  Surgeon: Ciera Fregoso MD;  Location: [facility]    s/p exploratory laproscopic surgery      s/p eye surgery      s/p gastric bypass      s/p right native nephrectomy      RCC    TONSILLECTOMY      TRANSPLANT KIDNEY RECIPIENT  DONOR N/A 9/3/2024    Procedure: Transplant kidney recipient  donor;  Surgeon: Ciera Fregoso MD;  Location:  OR     Social History     Socioeconomic History    Marital status:      Spouse name: Not on file    Number of children: 1    Years of education: Not on file    Highest education level: Not on file   Occupational History     Employer: RETIRED   Tobacco Use    Smoking status: Never    Smokeless tobacco: Never   Substance and Sexual Activity    Alcohol use: No    Drug use: No    Sexual activity: Not on file   Other Topics Concern    Parent/sibling w/ CABG, MI or angioplasty before 65F 55M? Not Asked   Social History Narrative    Not on file     Social Determinants of Health     Financial Resource Strain: Not on file   Food Insecurity: Not on file   Transportation Needs: Not on file   Physical Activity: Not on file   Stress: Not on file   Social Connections: Not on file   Interpersonal Safety: Unknown (2024)    Received from [facility]    Humiliation, Afraid, Rape, and Kick questionnaire     Fear of Current  or Ex-Partner: Not on file     Emotionally Abused: Patient unable to answer     Physically Abused: Patient unable to answer     Sexually Abused: Patient unable to answer   Housing Stability: Not on file     Prescription Medications as of 9/23/2024         Rx Number Disp Refills Start End Last Dispensed Date Next Fill Date Owning Pharmacy    acetaminophen (TYLENOL) 325 MG tablet  -- -- 9/6/2024 --       Sig: Take 2 tablets (650 mg) by mouth every 4 hours as needed for other (For optimal non-opioid multimodal pain management to improve pain control.).    Class: OTC    Route: Oral    amLODIPine (NORVASC) 5 MG tablet  30 tablet 11 9/11/2024 --   [address]    Sig: HOLD    Class: E-Prescribe    atorvastatin (LIPITOR) 10 MG tablet  30 tablet 11 9/9/2024 --   [address]    Sig: Take 1 tablet (10 mg) by mouth daily.    Class: E-Prescribe    Route: Oral    calcium carbonate 500 mg, elemental, (OSCAL) 500 MG tablet  60 tablet 2 9/6/2024 --   [address]    Sig: Take 1 tablet (500 mg) by mouth 2 times daily.    Class: E-Prescribe    Route: Oral    diphenhydrAMINE (BENADRYL) 50 MG capsule  -- --  --       Sig: Take 100 mg by mouth nightly as needed for sleep.    Class: Historical    Route: Oral    magnesium oxide (MAG-OX) 400 MG tablet  60 tablet 2 9/11/2024 --   [address]    Sig: Take 2 tablets (800 mg) by mouth daily (with lunch). Take 4 hours apart from mycophenolate    Class: E-Prescribe    Route: Oral    methocarbamol (ROBAXIN) 500 MG tablet  12 tablet 0 9/6/2024 --   [address]    Sig: Take 1 tablet (500 mg) by mouth every 6 hours as needed for muscle spasms.    Class: E-Prescribe    Route: Oral    metoprolol tartrate (LOPRESSOR) 25 MG  tablet  60 tablet 11 9/9/2024 --   [address]    Sig: Take 1 tablet (25 mg) by mouth 2 times daily.    Class: E-Prescribe    Route: Oral    mycophenolate (GENERIC EQUIVALENT) 250 MG capsule  180 capsule 11 9/9/2024 --   [address]    Sig: Take 3 capsules (750 mg) by mouth 2 times daily.    Class: E-Prescribe    Notes to Pharmacy: TXP DT 9/3/2024 (Kidney) TXP Dischg DT 9/6/2024 DX Kidney replaced by transplant Z94.0 TX [facility] ([place], [place])    Route: Oral    omeprazole (PRILOSEC) 20 MG capsule  -- --  --       Sig: Take 1 capsule by mouth daily    Class: Historical    Route: Oral    phosphorus tablet 250 mg (PHOSPHA 250 NEUTRAL) 250 MG per tablet  180 tablet 0 9/6/2024 --   [address]    Sig: Take 2 tablets (500 mg) by mouth 3 times daily.    Class: E-Prescribe    Route: Oral    potassium chloride brielle ER (KLOR-CON M20) 20 MEQ CR tablet  60 tablet 1 9/16/2024 --   [address]    Sig: Take 1 tablet (20 mEq) by mouth 2 times daily. You can dissolve in 4 ounces of water, let sit for 2 minutes and then stir well and drink quickly.    Class: E-Prescribe    Route: Oral    predniSONE (DELTASONE) 5 MG tablet  98 tablet 0 9/7/2024 10/8/2024   [address]    Sig: Take 12 tablets (60 mg) by mouth daily for 1 day, THEN 8 tablets (40 mg) daily for 2 days, THEN 4 tablets (20 mg) daily for 7 days, THEN 3 tablets (15 mg) daily for 7 days, THEN 2 tablets (10 mg) daily for 7 days, THEN 1 tablet (5 mg) daily for 7 days.    Class: E-Prescribe    Route: Oral    Renewals       Renewal requests to authorizing provider (Gabriela Qureshi NP) <b>prohibited</b>            senna-docusate  (SENOKOT-S/PERICOLACE) 8.6-50 MG tablet  -- -- 9/6/2024 --       Sig: Take 1 tablet by mouth 2 times daily.    Class: OTC    Route: Oral    sodium bicarbonate 650 MG tablet  60 tablet 11 9/20/2024 --   [address]    Sig: Take 1 tablet (650 mg) by mouth 2 times daily.    Class: E-Prescribe    Route: Oral    sulfamethoxazole-trimethoprim (BACTRIM) 400-80 MG tablet  30 tablet 11 9/9/2024 --   [address]    Sig: Take 1 tablet by mouth daily.    Class: E-Prescribe    Route: Oral    tacrolimus (GENERIC EQUIVALENT) 1 MG capsule  360 capsule 11 9/9/2024 --   [address]    Sig: Take 6 capsules (6 mg) by mouth 2 times daily.    Class: E-Prescribe    Notes to Pharmacy: TXP DT 9/3/2024 (Kidney) TXP Dischg DT 9/6/2024 DX Kidney replaced by transplant Z94.0 TX [facility] ([place], [place])    Route: Oral    No prior authorization was found for this prescription.    Found prior authorization for another prescription for the same medication: Closed    tacrolimus (GENERIC EQUIVALENT) 5 MG capsule  180 capsule 0 9/17/2024 12/16/2024   [address]    Sig: Take 1 capsule (5 mg) by mouth 2 times daily.    Class: E-Prescribe    Notes to Pharmacy: TXP DT 9/3/2024 (Kidney) TXP Dischg DT 9/6/2024 DX Kidney replaced by transplant Z94.0 [facility] ([place], [place])    Route: Oral    traZODone (DESYREL) 50 MG tablet  -- -- 4/16/2019 --       Sig: Take 100 mg by mouth at bedtime.    Class: Historical    Route: Oral    valGANciclovir (VALCYTE) 450 MG tablet  60 tablet 5 9/9/2024 --   [address]    Sig: Take 2 tablets (900 mg) by mouth daily.    Class: E-Prescribe    Route:  Oral          Dapsone, Fluoxetine, Iron, and Latex   REVIEW OF SYSTEMS (check box if normal)  [x]               GENERAL  [x]                 PULMONARY [x]                GENITOURINARY  [x]                CNS                 [x]                 CARDIAC  [x]                 ENDOCRINE  [x]                EARS,NOSE,THROAT [x]                 GASTROINTESTINAL [x]                 NEUROLOGIC    [x]                MUSCLOSKELTAL  [x]                  HEMATOLOGY      PHYSICAL EXAM (check box if normal)BP (!) 157/80   Pulse 64   Wt 61.5 kg (135 lb 8 oz)   SpO2 98%   BMI 22.90 kg/m          [x]            GENERAL:    [x]       EYES:  ICTERIC   []        YES  []                    NO  [x]           EXTREMITIES: Clubbing []       Y     [x]           N    [x]           EARS, NOSE, THROAT: Membranes Moist    YES   [x]                   NO []                  [x]           LUNGS:  CLEAR    YES       [x]                  NO    []                                [x]           SKIN: Jaundice           YES       []                  NO    [x]                   Rash: YES       []                  NO    [x]                                     [x]             HEART: Regular Rate          YES       [x]                  NO    []                   Incision Clean:  YES       [x]                  NO    []                                [x]                    ABDOMEN: Organomegaly YES       []                  NO    [x]                       [x]                    NEUROLOGICAL:  Nonfocal  YES       [x]                  NO    []                       [x]                    Hernia YES       []                  NO    [x]                   PSYCHIATRIC:  Appropriate  YES       [x]                  NO    []                       OTHER:                                                                                                   Incision healing well, has dermabond over.   Drain in place, output serous.

## 2024-09-26 ENCOUNTER — LAB (OUTPATIENT)
Dept: LAB | Facility: CLINIC | Age: 79
End: 2024-09-26
Payer: COMMERCIAL

## 2024-09-26 ENCOUNTER — TELEPHONE (OUTPATIENT)
Dept: TRANSPLANT | Facility: CLINIC | Age: 79
End: 2024-09-26

## 2024-09-26 DIAGNOSIS — Z98.890 OTHER SPECIFIED POSTPROCEDURAL STATES: ICD-10-CM

## 2024-09-26 DIAGNOSIS — Z94.0 KIDNEY REPLACED BY TRANSPLANT: ICD-10-CM

## 2024-09-26 DIAGNOSIS — Z20.828 CONTACT WITH AND (SUSPECTED) EXPOSURE TO OTHER VIRAL COMMUNICABLE DISEASES: ICD-10-CM

## 2024-09-26 DIAGNOSIS — Z79.899 ENCOUNTER FOR LONG-TERM CURRENT USE OF MEDICATION: ICD-10-CM

## 2024-09-26 DIAGNOSIS — Z48.298 AFTERCARE FOLLOWING ORGAN TRANSPLANT: ICD-10-CM

## 2024-09-26 LAB
ANION GAP SERPL CALCULATED.3IONS-SCNC: 9 MMOL/L (ref 7–15)
BUN SERPL-MCNC: 12.9 MG/DL (ref 8–23)
CALCIUM SERPL-MCNC: 8.3 MG/DL (ref 8.8–10.4)
CHLORIDE SERPL-SCNC: 103 MMOL/L (ref 98–107)
CREAT SERPL-MCNC: 0.85 MG/DL (ref 0.51–0.95)
EGFRCR SERPLBLD CKD-EPI 2021: 70 ML/MIN/1.73M2
ERYTHROCYTE [DISTWIDTH] IN BLOOD BY AUTOMATED COUNT: 15.2 % (ref 10–15)
GLUCOSE SERPL-MCNC: 84 MG/DL (ref 70–99)
HCO3 SERPL-SCNC: 21 MMOL/L (ref 22–29)
HCT VFR BLD AUTO: 24.3 % (ref 35–47)
HGB BLD-MCNC: 7.7 G/DL (ref 11.7–15.7)
MAGNESIUM SERPL-MCNC: 1.2 MG/DL (ref 1.7–2.3)
MCH RBC QN AUTO: 33.6 PG (ref 26.5–33)
MCHC RBC AUTO-ENTMCNC: 31.7 G/DL (ref 31.5–36.5)
MCV RBC AUTO: 106 FL (ref 78–100)
PHOSPHATE SERPL-MCNC: 2.4 MG/DL (ref 2.5–4.5)
PLATELET # BLD AUTO: 273 10E3/UL (ref 150–450)
POTASSIUM SERPL-SCNC: 4.5 MMOL/L (ref 3.4–5.3)
RBC # BLD AUTO: 2.29 10E6/UL (ref 3.8–5.2)
SODIUM SERPL-SCNC: 133 MMOL/L (ref 135–145)
TACROLIMUS BLD-MCNC: 7.2 UG/L (ref 5–15)
TME LAST DOSE: NORMAL H
TME LAST DOSE: NORMAL H
WBC # BLD AUTO: 7.3 10E3/UL (ref 4–11)

## 2024-09-26 PROCEDURE — 85027 COMPLETE CBC AUTOMATED: CPT

## 2024-09-26 PROCEDURE — 83735 ASSAY OF MAGNESIUM: CPT

## 2024-09-26 PROCEDURE — 84100 ASSAY OF PHOSPHORUS: CPT

## 2024-09-26 PROCEDURE — 36415 COLL VENOUS BLD VENIPUNCTURE: CPT

## 2024-09-26 PROCEDURE — 80197 ASSAY OF TACROLIMUS: CPT

## 2024-09-26 PROCEDURE — 80048 BASIC METABOLIC PNL TOTAL CA: CPT

## 2024-09-27 ENCOUNTER — TELEPHONE (OUTPATIENT)
Dept: TRANSPLANT | Facility: CLINIC | Age: 79
End: 2024-09-27
Payer: COMMERCIAL

## 2024-09-27 DIAGNOSIS — Z48.298 AFTERCARE FOLLOWING ORGAN TRANSPLANT: ICD-10-CM

## 2024-09-27 DIAGNOSIS — Z94.0 STATUS POST KIDNEY TRANSPLANT: Primary | ICD-10-CM

## 2024-09-27 RX ORDER — MAGNESIUM OXIDE 400 MG/1
800 TABLET ORAL 2 TIMES DAILY
Qty: 60 TABLET | Refills: 3 | Status: SHIPPED | OUTPATIENT
Start: 2024-09-27

## 2024-09-27 RX ORDER — TACROLIMUS 5 MG/1
5 CAPSULE ORAL 2 TIMES DAILY
Qty: 60 CAPSULE | Refills: 11 | Status: SHIPPED | OUTPATIENT
Start: 2024-09-27 | End: 2024-10-04

## 2024-09-27 RX ORDER — TACROLIMUS 0.5 MG/1
0.5 CAPSULE ORAL 2 TIMES DAILY
Qty: 60 CAPSULE | Refills: 11 | Status: SHIPPED | OUTPATIENT
Start: 2024-09-27 | End: 2024-10-04

## 2024-09-27 RX ORDER — TACROLIMUS 1 MG/1
6 CAPSULE ORAL 2 TIMES DAILY
Qty: 360 CAPSULE | Refills: 11 | Status: SHIPPED | OUTPATIENT
Start: 2024-09-27 | End: 2024-10-04

## 2024-09-27 NOTE — TELEPHONE ENCOUNTER
Issue: low mag, low tac    Outcome: spoke to patient regarding above agenda. We increased her tacro to 6,5mg twice daily. We increased her mag to 800mg BID. Patient is feeling really well overall and has no complaints at this time.

## 2024-09-30 ENCOUNTER — TELEPHONE (OUTPATIENT)
Dept: TRANSPLANT | Facility: CLINIC | Age: 79
End: 2024-09-30

## 2024-09-30 ENCOUNTER — LAB (OUTPATIENT)
Dept: LAB | Facility: CLINIC | Age: 79
End: 2024-09-30
Payer: COMMERCIAL

## 2024-09-30 DIAGNOSIS — Z94.0 STATUS POST KIDNEY TRANSPLANT: ICD-10-CM

## 2024-09-30 DIAGNOSIS — Z20.828 CONTACT WITH AND (SUSPECTED) EXPOSURE TO OTHER VIRAL COMMUNICABLE DISEASES: ICD-10-CM

## 2024-09-30 DIAGNOSIS — I12.9 HYPERTENSION, RENAL: ICD-10-CM

## 2024-09-30 DIAGNOSIS — Z94.0 KIDNEY REPLACED BY TRANSPLANT: ICD-10-CM

## 2024-09-30 DIAGNOSIS — Z48.298 AFTERCARE FOLLOWING ORGAN TRANSPLANT: Primary | ICD-10-CM

## 2024-09-30 DIAGNOSIS — Z48.298 AFTERCARE FOLLOWING ORGAN TRANSPLANT: ICD-10-CM

## 2024-09-30 DIAGNOSIS — Z98.890 OTHER SPECIFIED POSTPROCEDURAL STATES: ICD-10-CM

## 2024-09-30 DIAGNOSIS — Z79.899 ENCOUNTER FOR LONG-TERM CURRENT USE OF MEDICATION: ICD-10-CM

## 2024-09-30 LAB
ANION GAP SERPL CALCULATED.3IONS-SCNC: 13 MMOL/L (ref 7–15)
BUN SERPL-MCNC: 19.7 MG/DL (ref 8–23)
CALCIUM SERPL-MCNC: 8.5 MG/DL (ref 8.8–10.4)
CHLORIDE SERPL-SCNC: 103 MMOL/L (ref 98–107)
CREAT SERPL-MCNC: 0.94 MG/DL (ref 0.51–0.95)
EGFRCR SERPLBLD CKD-EPI 2021: 62 ML/MIN/1.73M2
ERYTHROCYTE [DISTWIDTH] IN BLOOD BY AUTOMATED COUNT: 15.5 % (ref 10–15)
GLUCOSE SERPL-MCNC: 86 MG/DL (ref 70–99)
HCO3 SERPL-SCNC: 20 MMOL/L (ref 22–29)
HCT VFR BLD AUTO: 24.9 % (ref 35–47)
HGB BLD-MCNC: 7.8 G/DL (ref 11.7–15.7)
MCH RBC QN AUTO: 33.6 PG (ref 26.5–33)
MCHC RBC AUTO-ENTMCNC: 31.3 G/DL (ref 31.5–36.5)
MCV RBC AUTO: 107 FL (ref 78–100)
PLATELET # BLD AUTO: 246 10E3/UL (ref 150–450)
POTASSIUM SERPL-SCNC: 4.5 MMOL/L (ref 3.4–5.3)
RBC # BLD AUTO: 2.32 10E6/UL (ref 3.8–5.2)
SODIUM SERPL-SCNC: 136 MMOL/L (ref 135–145)
TACROLIMUS BLD-MCNC: 7.8 UG/L (ref 5–15)
TME LAST DOSE: NORMAL H
TME LAST DOSE: NORMAL H
WBC # BLD AUTO: 7.3 10E3/UL (ref 4–11)

## 2024-09-30 PROCEDURE — 80048 BASIC METABOLIC PNL TOTAL CA: CPT

## 2024-09-30 PROCEDURE — 36415 COLL VENOUS BLD VENIPUNCTURE: CPT

## 2024-09-30 PROCEDURE — 85027 COMPLETE CBC AUTOMATED: CPT

## 2024-09-30 PROCEDURE — 80197 ASSAY OF TACROLIMUS: CPT

## 2024-09-30 RX ORDER — CALCIUM CARBONATE 500(1250)
500 TABLET ORAL 2 TIMES DAILY
Qty: 180 TABLET | Refills: 3 | Status: SHIPPED | OUTPATIENT
Start: 2024-09-30

## 2024-09-30 RX ORDER — MYCOPHENOLATE MOFETIL 250 MG/1
750 CAPSULE ORAL 2 TIMES DAILY
Qty: 540 CAPSULE | Refills: 3 | Status: SHIPPED | OUTPATIENT
Start: 2024-09-30 | End: 2024-10-04

## 2024-09-30 RX ORDER — PREDNISONE 5 MG/1
60 TABLET ORAL DAILY
Qty: 13 TABLET | Refills: 0 | Status: SHIPPED | OUTPATIENT
Start: 2024-09-30 | End: 2024-10-01

## 2024-09-30 RX ORDER — ATORVASTATIN CALCIUM 10 MG/1
10 TABLET, FILM COATED ORAL DAILY
Qty: 90 TABLET | Refills: 3 | Status: SHIPPED | OUTPATIENT
Start: 2024-09-30

## 2024-09-30 RX ORDER — METOPROLOL TARTRATE 25 MG/1
25 TABLET, FILM COATED ORAL 2 TIMES DAILY
Qty: 180 TABLET | Refills: 3 | Status: SHIPPED | OUTPATIENT
Start: 2024-09-30

## 2024-09-30 NOTE — TELEPHONE ENCOUNTER
Spoke to patient regarding drainage tube. Appointment request made for tomorrow or early this week.

## 2024-09-30 NOTE — TELEPHONE ENCOUNTER
atorvastatin (LIPITOR) 10 MG tablet   calcium carbonate 500 mg, elemental, (OSCAL) 500 MG tablet   metoprolol tartrate (LOPRESSOR) 25 MG table   mycophenolate (GENERIC EQUIVALENT) 250 MG capsule   [prednisone (DELTASONE) 5 MG tablet - Patient stated they need 13 tabs then she will done with the prednisone].    [facility] [address] Phone: [phone]   Fax: [phone]      Also:  Patient was hopping to get a 90 day refill if possible

## 2024-09-30 NOTE — TELEPHONE ENCOUNTER
DATE:  9/30/2024     TIME OF RECEIPT FROM LAB:  8:50 am    ORDERING PROVIDER: Mario    LAB TEST:  Hgb    LAB VALUE:  7.8

## 2024-10-02 ENCOUNTER — OFFICE VISIT (OUTPATIENT)
Dept: TRANSPLANT | Facility: CLINIC | Age: 79
End: 2024-10-02
Attending: NURSE PRACTITIONER
Payer: COMMERCIAL

## 2024-10-02 VITALS
OXYGEN SATURATION: 66 % | BODY MASS INDEX: 21.07 KG/M2 | HEART RATE: 66 BPM | SYSTOLIC BLOOD PRESSURE: 152 MMHG | DIASTOLIC BLOOD PRESSURE: 69 MMHG | WEIGHT: 124.7 LBS

## 2024-10-02 DIAGNOSIS — Z48.298 AFTERCARE FOLLOWING ORGAN TRANSPLANT: ICD-10-CM

## 2024-10-02 DIAGNOSIS — E87.8 LOW BICARBONATE LEVEL: ICD-10-CM

## 2024-10-02 PROCEDURE — 99213 OFFICE O/P EST LOW 20 MIN: CPT | Mod: 24 | Performed by: NURSE PRACTITIONER

## 2024-10-02 PROCEDURE — G0463 HOSPITAL OUTPT CLINIC VISIT: HCPCS | Performed by: NURSE PRACTITIONER

## 2024-10-02 RX ORDER — SODIUM BICARBONATE 650 MG/1
1300 TABLET ORAL 2 TIMES DAILY
Qty: 60 TABLET | Refills: 11 | Status: SHIPPED | OUTPATIENT
Start: 2024-10-02 | End: 2024-10-28

## 2024-10-02 NOTE — LETTER
10/2/2024      Lexy Cobb  [address]  [facility] [address]      Dear Colleague,    Thank you for referring your patient, Lexy Cobb, to the [facility]. Please see a copy of my visit note below.    Transplant Surgery Progress Note    Transplants:  9/3/2024 (Kidney); Postoperative day:  31  S: Lexy Cobb is a 79 year old female who presents with CKD 5 from likely from HTN, NSAID use, prior triamterene use, reduced nephron mass, potentially secondary FSGS, solitary kidney (s/p unilateral nephrectomy for RCC) not yet on HD most recent GFR 8 8/2024 was admitted to [facility] on 9/2/2024 as a candidate for kidney transplant. On 9/3/2024 she underwent DBD kidney transplant without vascular reconstruction and without stent placement.      Doing well.  No fevers or chills.  Some loose stools.  No nausea or vomiting.     No urinary symptoms.     No chest pain or shortness of breath.     RASHEEDA less than 30 ml from 3 days.       No chest pain or shortness of breath.        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   Transplant Date:  9/3/2024 (Kidney)   Crossmatch:  negative   DSA at Tx:  No  Baseline Cr: TBD   DeNovo DSA: No    Acute Rejection Hx:  No    Present Maintenance Immunosuppression:  Tacrolimus and Mycophenolate mofetil    CMV IgG Ab Discordance:  Yes  EBV IgG Ab Discordance:  No    BK Viremia:  No  EBV Viremia:  No    Transplant Coordinator: Gina Huizar     Transplant Office Phone Number: [phone]     Immunosuppressant Medications       Immunosuppressive Agents Disp Start End     tacrolimus (GENERIC EQUIVALENT) 5 MG capsule 180 capsule 9/17/2024 12/16/2024    Sig - Route: Take 1 capsule (5 mg) by mouth 2 times daily. - Oral    Class: E-Prescribe    Notes to Pharmacy: TXP DT 9/3/2024 (Kidney) TXP Dischg DT 9/6/2024 DX Kidney replaced by transplant Z94.0 [facility]  ([place], [place])     mycophenolate (GENERIC EQUIVALENT) 250 MG capsule 180 capsule 9/9/2024 --    Sig - Route: Take 3 capsules (750 mg) by mouth 2 times daily. - Oral    Class: E-Prescribe    Notes to Pharmacy: TXP DT 9/3/2024 (Kidney) TXP Dischg DT 9/6/2024 DX Kidney replaced by transplant Z94.0 [facility] ([place], [place])     tacrolimus (GENERIC EQUIVALENT) 1 MG capsule 360 capsule 9/9/2024 --    Sig - Route: Take 6 capsules (6 mg) by mouth 2 times daily. - Oral    Class: E-Prescribe    Notes to Pharmacy: TXP DT 9/3/2024 (Kidney) TXP Dischg DT 9/6/2024 DX Kidney replaced by transplant Z94.0 [facility] ([place], [place])    No prior authorization was found for this prescription.    Found prior authorization for another prescription for the same medication: Closed            Possible Immunosuppression-related side effects:   []             headache  []             vivid dreams  []             irritability  []             cognitive difficuties  []             fine tremor  []             nausea  []             diarrhea  []             neuropathy      []             edema  []             renal calcineurin toxicity  []             hyperkalemia  []             post-transplant diabetes  []             decreased appetite  []             increased appetite  []             other:  []             none    Prescription Medications as of 10/4/2024         Rx Number Disp Refills Start End Last Dispensed Date Next Fill Date Owning Pharmacy    acetaminophen (TYLENOL) 325 MG tablet  -- -- 9/6/2024 --       Sig: Take 2 tablets (650 mg) by mouth every 4 hours as needed for other (For optimal non-opioid multimodal pain management to improve pain control.).    Class: OTC    Route: Oral    atorvastatin (LIPITOR) 10 MG tablet  90 tablet 3 9/30/2024 --   [facility] [place], [place] - [address] [place] [address]    Sig: Take 1 tablet  (10 mg) by mouth daily.    Class: E-Prescribe    Route: Oral    calcium carbonate 500 mg, elemental, (OSCAL) 500 MG tablet  180 tablet 3 9/30/2024 --   [address]    Sig: Take 1 tablet (500 mg) by mouth 2 times daily.    Class: E-Prescribe    Route: Oral    diphenhydrAMINE (BENADRYL) 50 MG capsule  -- --  --       Sig: Take 100 mg by mouth nightly as needed for sleep.    Class: Historical    Route: Oral    magnesium oxide (MAG-OX) 400 MG tablet  60 tablet 3 9/27/2024 --   [address]    Sig: Take 2 tablets (800 mg) by mouth 2 times daily. Take 4 hours apart from mycophenolate    Class: E-Prescribe    Route: Oral    metoprolol tartrate (LOPRESSOR) 25 MG tablet  180 tablet 3 9/30/2024 --   [address]    Sig: Take 1 tablet (25 mg) by mouth 2 times daily.    Class: E-Prescribe    Route: Oral    mycophenolate (GENERIC EQUIVALENT) 250 MG capsule  540 capsule 3 9/30/2024 --   [address]    Sig: Take 3 capsules (750 mg) by mouth 2 times daily.    Class: E-Prescribe    Notes to Pharmacy: TXP DT 9/3/2024 (Kidney) TXP Dischg DT 9/6/2024 DX Kidney replaced by transplant Z94.0 TX [facility] ([place], [place])    Route: Oral    omeprazole (PRILOSEC) 20 MG capsule  -- --  --       Sig: Take 1 capsule by mouth daily    Class: Historical    Route: Oral    phosphorus tablet 250 mg (PHOSPHA 250 NEUTRAL) 250 MG per tablet  180 tablet 0 9/6/2024 --   [facility] - [address]    Sig: Take 2 tablets (500 mg) by mouth 3 times daily.    Class: E-Prescribe    Route: Oral    potassium chloride brielle ER (KLOR-CON M20) 20 MEQ CR tablet  60 tablet 1 9/16/2024 --   [address]     Sig: Take 1 tablet (20 mEq) by mouth 2 times daily. You can dissolve in 4 ounces of water, let sit for 2 minutes and then stir well and drink quickly.    Class: E-Prescribe    Route: Oral    sodium bicarbonate 650 MG tablet  60 tablet 11 10/2/2024 --   [address]    Sig: Take 2 tablets (1,300 mg) by mouth 2 times daily.    Class: E-Prescribe    Route: Oral    sulfamethoxazole-trimethoprim (BACTRIM) 400-80 MG tablet  30 tablet 11 9/9/2024 --   [address]    Sig: Take 1 tablet by mouth daily.    Class: E-Prescribe    Route: Oral    tacrolimus (GENERIC EQUIVALENT) 0.5 MG capsule  60 capsule 11 9/27/2024 --   [address]    Sig: Take 1 capsule (0.5 mg) by mouth 2 times daily. Total dose: 6.5mg twice daily    Class: E-Prescribe    Notes to Pharmacy: TXP DT 9/3/2024 (Kidney) TXP Dischg DT 9/6/2024 DX Kidney replaced by transplant Z94.0 [facility] ([place], [place])    Route: Oral    No prior authorization was found for this prescription.    Found prior authorization for another prescription for the same medication: Canceled - Other (The medication order is discontinued.)    tacrolimus (GENERIC EQUIVALENT) 1 MG capsule  360 capsule 11 9/27/2024 --   [address]    Sig: Take 6 capsules (6 mg) by mouth 2 times daily. Total dose: 6.5mg twice daily    Class: E-Prescribe    Notes to Pharmacy: TXP DT 9/3/2024 (Kidney) TXP Dischg DT 9/6/2024 DX Kidney replaced by transplant Z94.0 [place], [place])    Route: Oral    No prior authorization was found for this prescription.    Found prior authorization for another prescription for the same medication: Closed    tacrolimus (GENERIC EQUIVALENT) 5 MG capsule   60 capsule 11 9/27/2024 --   [facility] [address]    Sig: Take 1 capsule (5 mg) by mouth 2 times daily. Total dose: 6.5mg twice daily    Class: E-Prescribe    Notes to Pharmacy: TXP DT 9/3/2024 (Kidney) TXP Dischg DT 9/6/2024 DX Kidney replaced by transplant Z94.0 [facility] ([place], [place])    Route: Oral    traZODone (DESYREL) 50 MG tablet  -- -- 4/16/2019 --       Sig: Take 100 mg by mouth at bedtime.    Class: Historical    Route: Oral    valGANciclovir (VALCYTE) 450 MG tablet  60 tablet 5 9/9/2024 --   [facility] [address]    Sig: Take 2 tablets (900 mg) by mouth daily.    Class: E-Prescribe    Route: Oral            O:      General Appearance: in no apparent distress.   Skin: Normal, no rashes or jaundice  Heart: regular rate and rhythm, normal S1 and S2  Lungs: easy respirations, no audible wheezing.  Abdomen: flat, The wound is dry and intact, without hernia. The abdomen is non-tender. The kidney graft is not tender.  There is no ascites. RASHEEDA removed  Extremities: Tremor absent.   Edema: absent.         Latest Ref Rng & Units 10/3/2024     7:45 AM 9/30/2024     8:30 AM 9/26/2024     8:05 AM 9/23/2024     8:07 AM 9/19/2024     8:06 AM   Transplant Immunosuppression Labs   Creat 0.51 - 0.95 mg/dL 0.78  0.94  0.85  0.98  1.02    Urea Nitrogen 8.0 - 23.0 mg/dL 13.9  19.7  12.9  16.9  14.1    WBC 4.0 - 11.0 10e3/uL 6.7  7.3  7.3  8.6  9.2        Chemistries:   Recent Labs   Lab Test 10/03/24  0745   BUN 13.9   CR 0.78   GFRESTIMATED 78   GLC 82     Lab Results   Component Value Date    A1C 4.4 09/02/2024     Recent Labs   Lab Test 09/02/24 2157   ALBUMIN 4.0   BILITOTAL 0.3   ALKPHOS 50   AST 13   ALT <5     Urine Studies:  Recent Labs   Lab Test 09/17/24  1051 09/02/24  2345   COLOR Light Yellow Light Yellow   APPEARANCE Clear Slightly Cloudy*   URINEGLC Negative Negative    URINEBILI Negative Negative   URINEKETONE Negative Negative   SG 1.011 1.011   UBLD Negative Negative   URINEPH 5.0 6.0   PROTEIN Negative 100*   NITRITE Negative Negative   LEUKEST Negative Large*   RBCU  --  1   WBCU  --  >182*     No lab results found.  Hematology:   Recent Labs   Lab Test 10/03/24  0745 09/30/24  0830 09/26/24  0805   HGB 8.0* 7.8* 7.7*    246 273   WBC 6.7 7.3 7.3     Coags:   Recent Labs   Lab Test 09/02/24  2157 10/10/19  1431   INR 0.93 0.93     HLA antibodies:   SA1 Hi Risk [facility]   Date Value Ref Range Status   04/22/2020 None  Final     SA1 HI RISK [facility]   Date Value Ref Range Status   09/09/2024 None  Final     SA1 Mod Risk [facility]   Date Value Ref Range Status   04/22/2020 None  Final     SA1 MOD RISK [facility]   Date Value Ref Range Status   09/09/2024 None  Final     SA2 Hi Risk [facility]   Date Value Ref Range Status   04/22/2020 None  Final     SA2 HI RISK [facility]   Date Value Ref Range Status   09/09/2024 None  Final     SA2 Mod Risk [facility]   Date Value Ref Range Status   04/22/2020 None  Final     SA2 MOD RISK [facility]   Date Value Ref Range Status   09/09/2024 None  Final       Assessment: Lexy Cobb is doing well s/p DDKT:  Issues we addressed during her visit include:    Plan:    1. Graft function: stable function  2. Immunosuppression Management: No change continue same IS  .  Complexity of management:Medium.  Contributing factors:  recent txp  3. RASHEEDA removed.  Tolerated well.  Covered site with dressing.  Informed patient will close by secondary intention.  No creams or lotions  Followup: as directed  Increase sodium bicarb 1300mg BID    Total Time: 25 min,   Counselling Time: 15 min.         Again, thank you for allowing me to participate in the care of your patient.        Sincerely,        Veronica Ozuna NP

## 2024-10-03 ENCOUNTER — LAB (OUTPATIENT)
Dept: LAB | Facility: CLINIC | Age: 79
End: 2024-10-03
Payer: COMMERCIAL

## 2024-10-03 DIAGNOSIS — Z76.82 ORGAN TRANSPLANT CANDIDATE: ICD-10-CM

## 2024-10-03 DIAGNOSIS — Z94.0 KIDNEY REPLACED BY TRANSPLANT: ICD-10-CM

## 2024-10-03 DIAGNOSIS — Z48.298 AFTERCARE FOLLOWING ORGAN TRANSPLANT: ICD-10-CM

## 2024-10-03 DIAGNOSIS — Z20.828 CONTACT WITH AND (SUSPECTED) EXPOSURE TO OTHER VIRAL COMMUNICABLE DISEASES: ICD-10-CM

## 2024-10-03 DIAGNOSIS — N18.6 ESRD (END STAGE RENAL DISEASE) (H): ICD-10-CM

## 2024-10-03 DIAGNOSIS — Z79.899 ENCOUNTER FOR LONG-TERM CURRENT USE OF MEDICATION: ICD-10-CM

## 2024-10-03 DIAGNOSIS — Z98.890 OTHER SPECIFIED POSTPROCEDURAL STATES: ICD-10-CM

## 2024-10-03 LAB
ANION GAP SERPL CALCULATED.3IONS-SCNC: 9 MMOL/L (ref 7–15)
BUN SERPL-MCNC: 13.9 MG/DL (ref 8–23)
CALCIUM SERPL-MCNC: 8.1 MG/DL (ref 8.8–10.4)
CHLORIDE SERPL-SCNC: 106 MMOL/L (ref 98–107)
CREAT SERPL-MCNC: 0.78 MG/DL (ref 0.51–0.95)
EGFRCR SERPLBLD CKD-EPI 2021: 78 ML/MIN/1.73M2
ERYTHROCYTE [DISTWIDTH] IN BLOOD BY AUTOMATED COUNT: 15.7 % (ref 10–15)
GLUCOSE SERPL-MCNC: 82 MG/DL (ref 70–99)
HCO3 SERPL-SCNC: 24 MMOL/L (ref 22–29)
HCT VFR BLD AUTO: 25.7 % (ref 35–47)
HGB BLD-MCNC: 8 G/DL (ref 11.7–15.7)
MAGNESIUM SERPL-MCNC: 1.5 MG/DL (ref 1.7–2.3)
MCH RBC QN AUTO: 33.6 PG (ref 26.5–33)
MCHC RBC AUTO-ENTMCNC: 31.1 G/DL (ref 31.5–36.5)
MCV RBC AUTO: 108 FL (ref 78–100)
MYCOPHENOLATE SERPL LC/MS/MS-MCNC: 0.57 MG/L (ref 1–3.5)
MYCOPHENOLATE-G SERPL LC/MS/MS-MCNC: 33.7 MG/L (ref 30–95)
PHOSPHATE SERPL-MCNC: 2.6 MG/DL (ref 2.5–4.5)
PLATELET # BLD AUTO: 249 10E3/UL (ref 150–450)
POTASSIUM SERPL-SCNC: 4.3 MMOL/L (ref 3.4–5.3)
RBC # BLD AUTO: 2.38 10E6/UL (ref 3.8–5.2)
SODIUM SERPL-SCNC: 139 MMOL/L (ref 135–145)
TACROLIMUS BLD-MCNC: 6.7 UG/L (ref 5–15)
TME LAST DOSE: ABNORMAL H
TME LAST DOSE: ABNORMAL H
TME LAST DOSE: NORMAL H
TME LAST DOSE: NORMAL H
WBC # BLD AUTO: 6.7 10E3/UL (ref 4–11)

## 2024-10-03 PROCEDURE — 84100 ASSAY OF PHOSPHORUS: CPT

## 2024-10-03 PROCEDURE — 80048 BASIC METABOLIC PNL TOTAL CA: CPT

## 2024-10-03 PROCEDURE — 80180 DRUG SCRN QUAN MYCOPHENOLATE: CPT

## 2024-10-03 PROCEDURE — 99000 SPECIMEN HANDLING OFFICE-LAB: CPT

## 2024-10-03 PROCEDURE — 83735 ASSAY OF MAGNESIUM: CPT

## 2024-10-03 PROCEDURE — 86833 HLA CLASS II HIGH DEFIN QUAL: CPT

## 2024-10-03 PROCEDURE — 87535 HIV-1 PROBE&REVERSE TRNSCRPJ: CPT | Mod: 90

## 2024-10-03 PROCEDURE — 86832 HLA CLASS I HIGH DEFIN QUAL: CPT

## 2024-10-03 PROCEDURE — 87799 DETECT AGENT NOS DNA QUANT: CPT

## 2024-10-03 PROCEDURE — 36415 COLL VENOUS BLD VENIPUNCTURE: CPT

## 2024-10-03 PROCEDURE — 80197 ASSAY OF TACROLIMUS: CPT

## 2024-10-03 PROCEDURE — 87521 HEPATITIS C PROBE&RVRS TRNSC: CPT | Mod: 90

## 2024-10-03 PROCEDURE — 85027 COMPLETE CBC AUTOMATED: CPT

## 2024-10-03 PROCEDURE — 87516 HEPATITIS B DNA AMP PROBE: CPT | Mod: 90

## 2024-10-04 ENCOUNTER — TELEPHONE (OUTPATIENT)
Dept: TRANSPLANT | Facility: CLINIC | Age: 79
End: 2024-10-04
Payer: COMMERCIAL

## 2024-10-04 DIAGNOSIS — Z94.0 STATUS POST KIDNEY TRANSPLANT: ICD-10-CM

## 2024-10-04 DIAGNOSIS — Z48.298 AFTERCARE FOLLOWING ORGAN TRANSPLANT: ICD-10-CM

## 2024-10-04 LAB
BK VIRUS SPECIMEN TYPE: NORMAL
BKV DNA # SPEC NAA+PROBE: NOT DETECTED IU/ML
HBV DNA SERPL QL NAA+PROBE: NORMAL
HCV RNA SERPL QL NAA+PROBE: NORMAL
HIV1+2 RNA SERPL QL NAA+PROBE: NORMAL

## 2024-10-04 RX ORDER — MYCOPHENOLATE MOFETIL 250 MG/1
1000 CAPSULE ORAL 2 TIMES DAILY
Qty: 720 CAPSULE | Refills: 3 | Status: SHIPPED | OUTPATIENT
Start: 2024-10-04 | End: 2024-10-09

## 2024-10-04 RX ORDER — TACROLIMUS 1 MG/1
2 CAPSULE ORAL 2 TIMES DAILY
Qty: 120 CAPSULE | Refills: 11 | Status: SHIPPED | OUTPATIENT
Start: 2024-10-04 | End: 2024-10-25

## 2024-10-04 RX ORDER — TACROLIMUS 5 MG/1
5 CAPSULE ORAL 2 TIMES DAILY
Qty: 60 CAPSULE | Refills: 11 | Status: SHIPPED | OUTPATIENT
Start: 2024-10-04 | End: 2024-10-25

## 2024-10-04 NOTE — TELEPHONE ENCOUNTER
----- Message from Angel Sandhu sent at 10/4/2024 10:35 AM CDT -----  Yes, if no GI side effects, we can increase to 1000 mg twice a day.    Angel  ----- Message -----  From: Gina Huizar RN  Sent: 10/4/2024  10:19 AM CDT  To: Angel Sandhu MD    Patient currently taking 750mg BID of mmf. Do you want to increase?

## 2024-10-04 NOTE — PROGRESS NOTES
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 On 9/3/2024 she underwent DBD kidney transplant without vascular reconstruction and without stent placement.      Doing well.  No fevers or chills.  Some loose stools.  No nausea or vomiting.     No urinary symptoms.     No chest pain or shortness of breath.     RASHEEDA less than 30 ml from 3 days.       No chest pain or shortness of breath.        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   Transplant Date:  9/3/2024 (Kidney)   Crossmatch:  negative   DSA at Tx:  No  Baseline Cr: TBD   DeNovo DSA: No    Acute Rejection Hx:  No    Present Maintenance Immunosuppression:  Tacrolimus and Mycophenolate mofetil    CMV IgG Ab Discordance:  Yes  EBV IgG Ab Discordance:  No    BK Viremia:  No  EBV Viremia:  No    Transplant Coordinator: Gina Huizar     Transplant Office Phone Number: [phone]     Immunosuppressant Medications       Immunosuppressive Agents Disp Start End     tacrolimus (GENERIC EQUIVALENT) 5 MG capsule 180 capsule 9/17/2024 12/16/2024    Sig - Route: Take 1 capsule (5 mg) by mouth 2 times daily. - Oral    Class: E-Prescribe    Notes to Pharmacy: TXP DT 9/3/2024 (Kidney) TXP Dischg DT 9/6/2024 DX Kidney replaced by transplant Z94.0 TX [facility] ([place], [place])     mycophenolate (GENERIC EQUIVALENT) 250 MG capsule 180 capsule 9/9/2024 --    Sig - Route: Take 3 capsules (750 mg) by mouth 2 times daily. - Oral    Class: E-Prescribe    Notes to Pharmacy: TXP DT 9/3/2024 (Kidney) TXP Dischg DT  9/6/2024 DX Kidney replaced by transplant Z94[facility]0 [facility] ([place], [place])     tacrolimus (GENERIC EQUIVALENT) 1 MG capsule 360 capsule 9/9/2024 --    Sig - Route: Take 6 capsules (6 mg) by mouth 2 times daily. - Oral    Class: E-Prescribe    Notes to Pharmacy: TXP DT 9/3/2024 (Kidney) TXP Dischg DT 9/6/2024 DX Kidney replaced by transplant Z94.0 [facility] ([place], [place])    No prior authorization was found for this prescription.    Found prior authorization for another prescription for the same medication: Closed            Possible Immunosuppression-related side effects:   []             headache  []             vivid dreams  []             irritability  []             cognitive difficuties  []             fine tremor  []             nausea  []             diarrhea  []             neuropathy      []             edema  []             renal calcineurin toxicity  []             hyperkalemia  []             post-transplant diabetes  []             decreased appetite  []             increased appetite  []             other:  []             none    Prescription Medications as of 10/4/2024         Rx Number Disp Refills Start End Last Dispensed Date Next Fill Date Owning Pharmacy    acetaminophen (TYLENOL) 325 MG tablet  -- -- 9/6/2024 --       Sig: Take 2 tablets (650 mg) by mouth every 4 hours as needed for other (For optimal non-opioid multimodal pain management to improve pain control.).    Class: OTC    Route: Oral    atorvastatin (LIPITOR) 10 MG tablet  90 tablet 3 9/30/2024 --   [address]    Sig: Take 1 tablet (10 mg) by mouth daily.    Class: E-Prescribe    Route: Oral    calcium carbonate 500 mg, elemental, (OSCAL) 500 MG tablet  180 tablet 3 9/30/2024 --   [facility] [address]    Sig: Take 1 tablet (500 mg)  by mouth 2 times daily.    Class: E-Prescribe    Route: Oral    diphenhydrAMINE (BENADRYL) 50 MG capsule  -- --  --       Sig: Take 100 mg by mouth nightly as needed for sleep.    Class: Historical    Route: Oral    magnesium oxide (MAG-OX) 400 MG tablet  60 tablet 3 9/27/2024 --   [address]    Sig: Take 2 tablets (800 mg) by mouth 2 times daily. Take 4 hours apart from mycophenolate    Class: E-Prescribe    Route: Oral    metoprolol tartrate (LOPRESSOR) 25 MG tablet  180 tablet 3 9/30/2024 --   [address]    Sig: Take 1 tablet (25 mg) by mouth 2 times daily.    Class: E-Prescribe    Route: Oral    mycophenolate (GENERIC EQUIVALENT) 250 MG capsule  540 capsule 3 9/30/2024 --   [address]    Sig: Take 3 capsules (750 mg) by mouth 2 times daily.    Class: E-Prescribe    Notes to Pharmacy: TXP DT 9/3/2024 (Kidney) TXP Dischg DT 9/6/2024 DX Kidney replaced by transplant Z94.0 TX [facility] ([place], [place])    Route: Oral    omeprazole (PRILOSEC) 20 MG capsule  -- --  --       Sig: Take 1 capsule by mouth daily    Class: Historical    Route: Oral    phosphorus tablet 250 mg (PHOSPHA 250 NEUTRAL) 250 MG per tablet  180 tablet 0 9/6/2024 --   [facility] - [place], [place] - [address] [facility]    Sig: Take 2 tablets (500 mg) by mouth 3 times daily.    Class: E-Prescribe    Route: Oral    potassium chloride brielle ER (KLOR-CON M20) 20 MEQ CR tablet  60 tablet 1 9/16/2024 --   [address]    Sig: Take 1 tablet (20 mEq) by mouth 2 times daily. You can dissolve in 4 ounces of water, let sit for 2 minutes and then stir well and drink quickly.    Class: E-Prescribe    Route: Oral    sodium bicarbonate 650 MG tablet  60 tablet 11 10/2/2024 --    [address]    Sig: Take 2 tablets (1,300 mg) by mouth 2 times daily.    Class: E-Prescribe    Route: Oral    sulfamethoxazole-trimethoprim (BACTRIM) 400-80 MG tablet  30 tablet 11 9/9/2024 --   [address]    Sig: Take 1 tablet by mouth daily.    Class: E-Prescribe    Route: Oral    tacrolimus (GENERIC EQUIVALENT) 0.5 MG capsule  60 capsule 11 9/27/2024 --   [address]    Sig: Take 1 capsule (0.5 mg) by mouth 2 times daily. Total dose: 6.5mg twice daily    Class: E-Prescribe    Notes to Pharmacy: TXP DT 9/3/2024 (Kidney) TXP Dischg DT 9/6/2024 DX Kidney replaced by transplant Z94.0 [facility] ([place], [place])    Route: Oral    No prior authorization was found for this prescription.    Found prior authorization for another prescription for the same medication: Canceled - Other (The medication order is discontinued.)    tacrolimus (GENERIC EQUIVALENT) 1 MG capsule  360 capsule 11 9/27/2024 --   [address]    Sig: Take 6 capsules (6 mg) by mouth 2 times daily. Total dose: 6.5mg twice daily    Class: E-Prescribe    Notes to Pharmacy: TXP DT 9/3/2024 (Kidney) TXP Dischg DT 9/6/2024 DX Kidney replaced by transplant Z94.0 [facility] ([place], [place])    Route: Oral    No prior authorization was found for this prescription.    Found prior authorization for another prescription for the same medication: Closed    tacrolimus (GENERIC EQUIVALENT) 5 MG capsule  60 capsule 11 9/27/2024 --   [address]    Sig: Take 1 capsule (5 mg) by mouth 2 times daily. Total dose: 6.5mg twice daily    Class: E-Prescribe    Notes to Pharmacy: TXP DT 9/3/2024 (Kidney) TXP  Dischg DT 9/6/2024 DX Kidney replaced by transplant Z94.0 [facility] ([place], [place])    Route: Oral    traZODone (DESYREL) 50 MG tablet  -- -- 4/16/2019 --       Sig: Take 100 mg by mouth at bedtime.    Class: Historical    Route: Oral    valGANciclovir (VALCYTE) 450 MG tablet  60 tablet 5 9/9/2024 --   [facility] [address]    Sig: Take 2 tablets (900 mg) by mouth daily.    Class: E-Prescribe    Route: Oral            O:      General Appearance: in no apparent distress.   Skin: Normal, no rashes or jaundice  Heart: regular rate and rhythm, normal S1 and S2  Lungs: easy respirations, no audible wheezing.  Abdomen: flat, The wound is dry and intact, without hernia. The abdomen is non-tender. The kidney graft is not tender.  There is no ascites. RASHEEDA removed  Extremities: Tremor absent.   Edema: absent.         Latest Ref Rng & Units 10/3/2024     7:45 AM 9/30/2024     8:30 AM 9/26/2024     8:05 AM 9/23/2024     8:07 AM 9/19/2024     8:06 AM   Transplant Immunosuppression Labs   Creat 0.51 - 0.95 mg/dL 0.78  0.94  0.85  0.98  1.02    Urea Nitrogen 8.0 - 23.0 mg/dL 13.9  19.7  12.9  16.9  14.1    WBC 4.0 - 11.0 10e3/uL 6.7  7.3  7.3  8.6  9.2        Chemistries:   Recent Labs   Lab Test 10/03/24  0745   BUN 13.9   CR 0.78   GFRESTIMATED 78   GLC 82     Lab Results   Component Value Date    A1C 4.4 09/02/2024     Recent Labs   Lab Test 09/02/24  2157   ALBUMIN 4.0   BILITOTAL 0.3   ALKPHOS 50   AST 13   ALT <5     Urine Studies:  Recent Labs   Lab Test 09/17/24  1051 09/02/24  2345   COLOR Light Yellow Light Yellow   APPEARANCE Clear Slightly Cloudy*   URINEGLC Negative Negative   URINEBILI Negative Negative   URINEKETONE Negative Negative   SG 1.011 1.011   UBLD Negative Negative   URINEPH 5.0 6.0   PROTEIN Negative 100*   NITRITE Negative Negative   LEUKEST Negative Large*   RBCU  --  1   WBCU  --  >182*     No lab results  found.  Hematology:   Recent Labs   Lab Test 10/03/24  0745 09/30/24  0830 09/26/24  0805   HGB 8.0* 7.8* 7.7*    246 273   WBC 6.7 7.3 7.3     Coags:   Recent Labs   Lab Test 09/02/24  2157 10/10/19  1431   INR 0.93 0.93     HLA antibodies:   SA1 Hi Risk [facility]   Date Value Ref Range Status   04/22/2020 None  Final     SA1 HI RISK [facility]   Date Value Ref Range Status   09/09/2024 None  Final     SA1 Mod Risk [facility]   Date Value Ref Range Status   04/22/2020 None  Final     SA1 MOD RISK [facility]   Date Value Ref Range Status   09/09/2024 None  Final     SA2 Hi Risk [facility]   Date Value Ref Range Status   04/22/2020 None  Final     SA2 HI RISK [facility]   Date Value Ref Range Status   09/09/2024 None  Final     SA2 Mod Risk [facility]   Date Value Ref Range Status   04/22/2020 None  Final     SA2 MOD RISK [facility]   Date Value Ref Range Status   09/09/2024 None  Final       Assessment: Lexy Cobb is doing well s/p DDKT:  Issues we addressed during her visit include:    Plan:    1. Graft function: stable function  2. Immunosuppression Management: No change continue same IS  .  Complexity of management:Medium.  Contributing factors:  recent txp  3. RASHEEDA removed.  Tolerated well.  Covered site with dressing.  Informed patient will close by secondary intention.  No creams or lotions  Followup: as directed  Increase sodium bicarb 1300mg BID    Total Time: 25 min,   Counselling Time: 15 min.

## 2024-10-07 ENCOUNTER — LAB (OUTPATIENT)
Dept: LAB | Facility: CLINIC | Age: 79
End: 2024-10-07
Payer: COMMERCIAL

## 2024-10-07 DIAGNOSIS — Z48.298 AFTERCARE FOLLOWING ORGAN TRANSPLANT: ICD-10-CM

## 2024-10-07 DIAGNOSIS — Z94.0 KIDNEY REPLACED BY TRANSPLANT: ICD-10-CM

## 2024-10-07 DIAGNOSIS — Z20.828 CONTACT WITH AND (SUSPECTED) EXPOSURE TO OTHER VIRAL COMMUNICABLE DISEASES: ICD-10-CM

## 2024-10-07 DIAGNOSIS — Z79.899 ENCOUNTER FOR LONG-TERM CURRENT USE OF MEDICATION: ICD-10-CM

## 2024-10-07 DIAGNOSIS — Z98.890 OTHER SPECIFIED POSTPROCEDURAL STATES: ICD-10-CM

## 2024-10-07 LAB
ANION GAP SERPL CALCULATED.3IONS-SCNC: 12 MMOL/L (ref 7–15)
BUN SERPL-MCNC: 14.1 MG/DL (ref 8–23)
CALCIUM SERPL-MCNC: 8.2 MG/DL (ref 8.8–10.4)
CHLORIDE SERPL-SCNC: 104 MMOL/L (ref 98–107)
CREAT SERPL-MCNC: 0.91 MG/DL (ref 0.51–0.95)
EGFRCR SERPLBLD CKD-EPI 2021: 65 ML/MIN/1.73M2
ERYTHROCYTE [DISTWIDTH] IN BLOOD BY AUTOMATED COUNT: 15 % (ref 10–15)
GLUCOSE SERPL-MCNC: 85 MG/DL (ref 70–99)
HCO3 SERPL-SCNC: 24 MMOL/L (ref 22–29)
HCT VFR BLD AUTO: 25.8 % (ref 35–47)
HGB BLD-MCNC: 8.2 G/DL (ref 11.7–15.7)
MAGNESIUM SERPL-MCNC: 1.5 MG/DL (ref 1.7–2.3)
MCH RBC QN AUTO: 34.3 PG (ref 26.5–33)
MCHC RBC AUTO-ENTMCNC: 31.8 G/DL (ref 31.5–36.5)
MCV RBC AUTO: 108 FL (ref 78–100)
PHOSPHATE SERPL-MCNC: 3.4 MG/DL (ref 2.5–4.5)
PLATELET # BLD AUTO: 217 10E3/UL (ref 150–450)
POTASSIUM SERPL-SCNC: 4.6 MMOL/L (ref 3.4–5.3)
RBC # BLD AUTO: 2.39 10E6/UL (ref 3.8–5.2)
SODIUM SERPL-SCNC: 140 MMOL/L (ref 135–145)
TACROLIMUS BLD-MCNC: 8.7 UG/L (ref 5–15)
TME LAST DOSE: NORMAL H
TME LAST DOSE: NORMAL H
WBC # BLD AUTO: 7.8 10E3/UL (ref 4–11)

## 2024-10-07 PROCEDURE — 80048 BASIC METABOLIC PNL TOTAL CA: CPT

## 2024-10-07 PROCEDURE — 85027 COMPLETE CBC AUTOMATED: CPT

## 2024-10-07 PROCEDURE — 80197 ASSAY OF TACROLIMUS: CPT

## 2024-10-07 PROCEDURE — 83735 ASSAY OF MAGNESIUM: CPT

## 2024-10-07 PROCEDURE — 84100 ASSAY OF PHOSPHORUS: CPT

## 2024-10-07 PROCEDURE — 36415 COLL VENOUS BLD VENIPUNCTURE: CPT

## 2024-10-09 ENCOUNTER — OFFICE VISIT (OUTPATIENT)
Dept: TRANSPLANT | Facility: CLINIC | Age: 79
End: 2024-10-09
Attending: STUDENT IN AN ORGANIZED HEALTH CARE EDUCATION/TRAINING PROGRAM
Payer: COMMERCIAL

## 2024-10-09 ENCOUNTER — TELEPHONE (OUTPATIENT)
Dept: PHARMACY | Facility: CLINIC | Age: 79
End: 2024-10-09

## 2024-10-09 ENCOUNTER — ANCILLARY PROCEDURE (OUTPATIENT)
Dept: GENERAL RADIOLOGY | Facility: CLINIC | Age: 79
End: 2024-10-09
Attending: STUDENT IN AN ORGANIZED HEALTH CARE EDUCATION/TRAINING PROGRAM
Payer: COMMERCIAL

## 2024-10-09 ENCOUNTER — LAB (OUTPATIENT)
Dept: LAB | Facility: CLINIC | Age: 79
End: 2024-10-09
Attending: STUDENT IN AN ORGANIZED HEALTH CARE EDUCATION/TRAINING PROGRAM
Payer: COMMERCIAL

## 2024-10-09 VITALS
DIASTOLIC BLOOD PRESSURE: 67 MMHG | OXYGEN SATURATION: 100 % | WEIGHT: 133.2 LBS | HEART RATE: 64 BPM | BODY MASS INDEX: 22.51 KG/M2 | TEMPERATURE: 98.4 F | SYSTOLIC BLOOD PRESSURE: 167 MMHG

## 2024-10-09 DIAGNOSIS — Z20.828 CONTACT WITH AND (SUSPECTED) EXPOSURE TO OTHER VIRAL COMMUNICABLE DISEASES: ICD-10-CM

## 2024-10-09 DIAGNOSIS — Z48.298 AFTERCARE FOLLOWING ORGAN TRANSPLANT: ICD-10-CM

## 2024-10-09 DIAGNOSIS — Z94.0 STATUS POST KIDNEY TRANSPLANT: ICD-10-CM

## 2024-10-09 DIAGNOSIS — R19.7 DIARRHEA, UNSPECIFIED TYPE: ICD-10-CM

## 2024-10-09 DIAGNOSIS — Z79.899 ENCOUNTER FOR LONG-TERM CURRENT USE OF MEDICATION: ICD-10-CM

## 2024-10-09 DIAGNOSIS — E87.70 HYPERVOLEMIA, UNSPECIFIED HYPERVOLEMIA TYPE: Primary | ICD-10-CM

## 2024-10-09 DIAGNOSIS — Z94.0 KIDNEY REPLACED BY TRANSPLANT: ICD-10-CM

## 2024-10-09 DIAGNOSIS — Z98.890 OTHER SPECIFIED POSTPROCEDURAL STATES: ICD-10-CM

## 2024-10-09 DIAGNOSIS — E83.42 HYPOMAGNESEMIA: ICD-10-CM

## 2024-10-09 LAB
ALBUMIN SERPL BCG-MCNC: 3.8 G/DL (ref 3.5–5.2)
ANION GAP SERPL CALCULATED.3IONS-SCNC: 10 MMOL/L (ref 7–15)
BUN SERPL-MCNC: 11.4 MG/DL (ref 8–23)
CALCIUM SERPL-MCNC: 8.4 MG/DL (ref 8.8–10.4)
CHLORIDE SERPL-SCNC: 106 MMOL/L (ref 98–107)
CREAT SERPL-MCNC: 0.9 MG/DL (ref 0.51–0.95)
EGFRCR SERPLBLD CKD-EPI 2021: 66 ML/MIN/1.73M2
ERYTHROCYTE [DISTWIDTH] IN BLOOD BY AUTOMATED COUNT: 14.9 % (ref 10–15)
GLUCOSE SERPL-MCNC: 90 MG/DL (ref 70–99)
HCO3 SERPL-SCNC: 24 MMOL/L (ref 22–29)
HCT VFR BLD AUTO: 27 % (ref 35–47)
HGB BLD-MCNC: 8.3 G/DL (ref 11.7–15.7)
MAGNESIUM SERPL-MCNC: 1.4 MG/DL (ref 1.7–2.3)
MCH RBC QN AUTO: 33.2 PG (ref 26.5–33)
MCHC RBC AUTO-ENTMCNC: 30.7 G/DL (ref 31.5–36.5)
MCV RBC AUTO: 108 FL (ref 78–100)
MYCOPHENOLATE SERPL LC/MS/MS-MCNC: 1.75 MG/L (ref 1–3.5)
MYCOPHENOLATE-G SERPL LC/MS/MS-MCNC: 60.5 MG/L (ref 30–95)
PHOSPHATE SERPL-MCNC: 2.9 MG/DL (ref 2.5–4.5)
PLATELET # BLD AUTO: 201 10E3/UL (ref 150–450)
POTASSIUM SERPL-SCNC: 4.3 MMOL/L (ref 3.4–5.3)
RBC # BLD AUTO: 2.5 10E6/UL (ref 3.8–5.2)
SODIUM SERPL-SCNC: 140 MMOL/L (ref 135–145)
TACROLIMUS BLD-MCNC: 8.6 UG/L (ref 5–15)
TME LAST DOSE: NORMAL H
WBC # BLD AUTO: 6.3 10E3/UL (ref 4–11)

## 2024-10-09 PROCEDURE — 83735 ASSAY OF MAGNESIUM: CPT | Performed by: PATHOLOGY

## 2024-10-09 PROCEDURE — 74018 RADEX ABDOMEN 1 VIEW: CPT | Performed by: RADIOLOGY

## 2024-10-09 PROCEDURE — 85027 COMPLETE CBC AUTOMATED: CPT | Performed by: PATHOLOGY

## 2024-10-09 PROCEDURE — G0463 HOSPITAL OUTPT CLINIC VISIT: HCPCS | Performed by: STUDENT IN AN ORGANIZED HEALTH CARE EDUCATION/TRAINING PROGRAM

## 2024-10-09 PROCEDURE — 80180 DRUG SCRN QUAN MYCOPHENOLATE: CPT | Performed by: STUDENT IN AN ORGANIZED HEALTH CARE EDUCATION/TRAINING PROGRAM

## 2024-10-09 PROCEDURE — 99215 OFFICE O/P EST HI 40 MIN: CPT | Mod: 24 | Performed by: STUDENT IN AN ORGANIZED HEALTH CARE EDUCATION/TRAINING PROGRAM

## 2024-10-09 PROCEDURE — 36415 COLL VENOUS BLD VENIPUNCTURE: CPT | Performed by: PATHOLOGY

## 2024-10-09 PROCEDURE — 80069 RENAL FUNCTION PANEL: CPT | Performed by: PATHOLOGY

## 2024-10-09 PROCEDURE — 80197 ASSAY OF TACROLIMUS: CPT | Performed by: STUDENT IN AN ORGANIZED HEALTH CARE EDUCATION/TRAINING PROGRAM

## 2024-10-09 PROCEDURE — 99000 SPECIMEN HANDLING OFFICE-LAB: CPT | Performed by: PATHOLOGY

## 2024-10-09 PROCEDURE — G2211 COMPLEX E/M VISIT ADD ON: HCPCS | Performed by: STUDENT IN AN ORGANIZED HEALTH CARE EDUCATION/TRAINING PROGRAM

## 2024-10-09 RX ORDER — OXYBUTYNIN CHLORIDE 5 MG/1
5 TABLET, EXTENDED RELEASE ORAL DAILY
COMMUNITY
Start: 2024-10-04

## 2024-10-09 RX ORDER — MYCOPHENOLATE MOFETIL 250 MG/1
750 CAPSULE ORAL 2 TIMES DAILY
Qty: 720 CAPSULE | Refills: 3 | Status: SHIPPED | OUTPATIENT
Start: 2024-10-09 | End: 2024-10-28

## 2024-10-09 RX ORDER — LOPERAMIDE HYDROCHLORIDE 2 MG/1
2 TABLET ORAL 4 TIMES DAILY PRN
Qty: 30 TABLET | Refills: 0 | Status: SHIPPED | OUTPATIENT
Start: 2024-10-09 | End: 2024-10-28

## 2024-10-09 RX ORDER — CALCITRIOL 0.25 UG/1
1 CAPSULE, LIQUID FILLED ORAL DAILY
COMMUNITY
Start: 2024-10-04 | End: 2024-11-13

## 2024-10-09 RX ORDER — MAGNESIUM GLYCINATE 100 MG
200 CAPSULE ORAL 2 TIMES DAILY
Qty: 360 CAPSULE | Refills: 1 | Status: SHIPPED | OUTPATIENT
Start: 2024-10-09 | End: 2024-11-12

## 2024-10-09 RX ORDER — FUROSEMIDE 20 MG/1
20 TABLET ORAL 2 TIMES DAILY
Qty: 60 TABLET | Refills: 1 | Status: SHIPPED | OUTPATIENT
Start: 2024-10-09 | End: 2024-10-23

## 2024-10-09 ASSESSMENT — PAIN SCALES - GENERAL: PAINLEVEL: NO PAIN (0)

## 2024-10-09 NOTE — PROGRESS NOTES
TRANSPLANT NEPHROLOGY CLINIC VISIT     Assessment & Plan   # DDKT: CKD Stage 2 - Stable, near normal   - Baseline Creatinine: ~ 0.8-1 mg/dL.   - Proteinuria: Normal (<0.2 grams)   - DSA Hx: No DSA   - Last cPRA: 0%   - BK Viremia: No   - Kidney Tx Biopsy Hx: No biopsy history.   - Stent :no stent placed at time of transplant     # Immunosuppression: Tacrolimus immediate release (goal 8-10) and Mycophenolate mofetil (dose 1000 mg every 12 hours)   - Induction with Recent Transplant:  Low Intensity Protocol   - Continue with intensive monitoring of immunosuppression for efficacy and toxicity.   - Historical Changes in Immunosuppression: None   - Changes: Yes - will decrease her MMF to 750mg BID for diarrhea     # Diarrhea: Recommend fiber, sent imodium as needed, reduced MMF to 750 mg bid. I've also changed magnesium oxide to magnesium glycinate.     # Infection Prevention:      - PJP: Sulfa/TMP (Bactrim) 400-80 mg every day.   - CMV: Valganciclovir (Valcyte) 900 mg every day for 6 months, then check CMV PCR monthly until 12 months post transplant.      - CMV IgG Ab High Risk Discordance (D+/R-): Yes  CMV Serostatus: Negative  - EBV IgG Ab High Risk Discordance (D+/R-): No  EBV Serostatus: Positive    # Hypertension / Volume: Inadequate control;  Goal BP: < 140/90 early post transplant.   - Increased BP and weight from 124 to 136 lbs after stopping Lasix.  - On metoprolol 25 mg twice a day.   - Daily weights and BP.    - If BP still high after diuresis, we can restart Amlodipine.   - Changes: Yes - we started lasix 20mg BID for BP and LLE edema    # Elevated Blood Glucose:    - HBA1c of 4.4% 09/2024.    # Anemia in Chronic Renal Disease: Hgb: Trend up      HIEU: No   - Iron studies: Replete    # Mineral Bone Disorder:    - Secondary renal hyperparathyroidism; PTH level: Minimally elevated ( pg/ml)        On treatment: None  - Vitamin D; level: Normal        On supplement: No  - Calcium; level: Normal        On  supplement: Yes calcium carbonate 500 mg twice a day.  - Phosphorus; level: Normal        On supplement: Yes phosphorous 2 tablets three times a day.    # Electrolytes:   - Potassium; level: Normal        On supplement: No  - Magnesium; level: Low        On supplement: Yes We switched magnesium oxide to Mg glycinate as its better absorbed   - Bicarbonate; level: Normal        On supplement: Yes sodium bicarbonate 1300 mg twice a day.    # Paroxysmal Atrial Fibrillation: Stable, ongoing atrial fibrillation with rate controlled on beta blocker.  Patient was reportedly in AF during her stress test in May 2024. Subsequent Zio patch was negative for AF.     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  At risk if secondary hyperoxaluria.  Oxalate level ~ 11 prior to transplant with CKD.  Now oxalate level <2 with improved kidney function following transplant.              - Would check oxalate level monthly x 3 months and if remains low, no need to follow.    # Other Significant PMH:  - GERD: Asymptomatic on PPI.  - RCC (2001): s/p right native nephrectomy with mass totally encapsulated. She did not require any chemotherapy or radiation. CT today pending to reassess recurrence.      # Skin Cancer Risk:    - Discussed sun protection and recommend regular follow up with Dermatology.    # Transplant History:  Etiology of Kidney Failure: Unknown etiology. CKD likely from hypertension, NSAID use, prior triamterene use, reduced nephron mass, potentially secondary FSGS, and solitary kidney (s/p unilateral nephrectomy for RCC)   Tx: DDKT  Transplant: 9/3/2024 (Kidney)  Significant transplant-related complications: None    Transplant Office Phone Number: [phone]    Assessment and plan was discussed with the patient and she voiced her understanding and agreement.    Timoteo Trujillo MD  Nephrology Fellow    Attestation:  This patient has been seen and evaluated by me, Angel Martin  MD Edson.  I have reviewed the note and agree with plan of care as documented by the fellow.     Return visit: Return for appointment scheduled for 11/13/2024.    Angel Sandhu MD    The longitudinal plan of care for the diagnosis(es)/condition(s) as documented were addressed during this visit. Due to the added complexity in care, I will continue to support Lexy in the subsequent management and with ongoing continuity of care.      Chief Complaint   Ms. Cobb is a 79 year old here for kidney transplant and immunosuppression management.     History of Present Illness    Ms. Cobb reports feeling well overall.  Since last clinic visit:   Hospitalizations: No   New Medical Issues: No  Chest pain or shortness of breath: No  Lower extremity swelling: Yes  Weight change: gained 8 ibs since transplant   Nausea and vomiting: No  Diarrhea: Yes, diarrhea 3-4 times per day, non bloody, semi solid, increased to current freq after the transplant   Heartburn symptoms: No  Fever, sweats or chills: No  Urinary complaints: No    Home BP: sbp 140s-150s/80-90s range  On metoprolol, amlodipine was held 4 weeks ago.    Problem List   Patient Active Problem List   Diagnosis    Hypertension, renal    CKD (chronic kidney disease) stage 4, GFR 15-29 ml/min (H)    Renal cell cancer (H)    Anemia in chronic renal disease    Secondary renal hyperparathyroidism (H)    Vitamin D deficiency    Obesity    Vitamin B12 deficiency    Raynaud's syndrome    ABDULLAHI positive    Chronic lower back pain    Irritable bowel syndrome    GERD (gastroesophageal reflux disease)    Trauma to right eye    Kidney transplant candidate    Immunosuppression (H)    Status post kidney transplant    Hypophosphatemia       Allergies   Allergies   Allergen Reactions    Dapsone     Fluoxetine     Iron Diarrhea    Latex Rash       Medications   Current Outpatient Medications   Medication Sig Dispense Refill    acetaminophen (TYLENOL) 325 MG tablet Take 2  tablets (650 mg) by mouth every 4 hours as needed for other (For optimal non-opioid multimodal pain management to improve pain control.).      atorvastatin (LIPITOR) 10 MG tablet Take 1 tablet (10 mg) by mouth daily. 90 tablet 3    calcitRIOL (ROCALTROL) 0.25 MCG capsule Take 1 capsule by mouth daily.      calcium carbonate 500 mg, elemental, (OSCAL) 500 MG tablet Take 1 tablet (500 mg) by mouth 2 times daily. 180 tablet 3    diphenhydrAMINE (BENADRYL) 50 MG capsule Take 100 mg by mouth nightly as needed for sleep.      furosemide (LASIX) 20 MG tablet Take 1 tablet (20 mg) by mouth 2 times daily. 60 tablet 1    loperamide (IMODIUM A-D) 2 MG tablet Take 1 tablet (2 mg) by mouth 4 times daily as needed for diarrhea. 30 tablet 0    magnesium glycinate 100 MG CAPS capsule Take 2 capsules (200 mg) by mouth 2 times daily. 360 capsule 1    metoprolol tartrate (LOPRESSOR) 25 MG tablet Take 1 tablet (25 mg) by mouth 2 times daily. 180 tablet 3    mycophenolate (GENERIC EQUIVALENT) 250 MG capsule Take 3 capsules (750 mg) by mouth 2 times daily. 720 capsule 3    omeprazole (PRILOSEC) 20 MG capsule Take 1 capsule by mouth daily      oxyBUTYnin ER (DITROPAN XL) 5 MG 24 hr tablet Take by mouth daily.      phosphorus tablet 250 mg (PHOSPHA 250 NEUTRAL) 250 MG per tablet Take 2 tablets (500 mg) by mouth 3 times daily. 180 tablet 0    potassium chloride brielle ER (KLOR-CON M20) 20 MEQ CR tablet Take 1 tablet (20 mEq) by mouth 2 times daily. You can dissolve in 4 ounces of water, let sit for 2 minutes and then stir well and drink quickly. 60 tablet 1    sodium bicarbonate 650 MG tablet Take 2 tablets (1,300 mg) by mouth 2 times daily. 60 tablet 11    sulfamethoxazole-trimethoprim (BACTRIM) 400-80 MG tablet Take 1 tablet by mouth daily. 30 tablet 11    tacrolimus (GENERIC EQUIVALENT) 1 MG capsule Take 2 capsules (2 mg) by mouth 2 times daily. Total dose: 7mg twice daily 120 capsule 11    tacrolimus (GENERIC EQUIVALENT) 5 MG capsule Take  1 capsule (5 mg) by mouth 2 times daily. Total dose: 7mg twice daily 60 capsule 11    traZODone (DESYREL) 50 MG tablet Take 100 mg by mouth at bedtime.      valGANciclovir (VALCYTE) 450 MG tablet Take 2 tablets (900 mg) by mouth daily. 60 tablet 5     No current facility-administered medications for this visit.     Medications Discontinued During This Encounter   Medication Reason    magnesium oxide (MAG-OX) 400 MG tablet     mycophenolate (GENERIC EQUIVALENT) 250 MG capsule        Physical Exam   Vital Signs: BP (!) 167/67 (BP Location: Left arm, Patient Position: Sitting, Cuff Size: Adult Regular)   Pulse 64   Temp 98.4  F (36.9  C) (Oral)   Wt 60.4 kg (133 lb 3.2 oz)   SpO2 100%   BMI 22.51 kg/m      GENERAL APPEARANCE: alert and no distress  HENT: mouth without ulcers or lesions  RESP: lungs clear to auscultation - no rales, rhonchi or wheezes  CV: regular rhythm, normal rate, no rub, no murmur  EDEMA: plus 1 bilaterally   ABDOMEN: soft, nondistended, nontender, bowel sounds normal  MS: extremities normal - no gross deformities noted, no evidence of inflammation in joints, no muscle tenderness  SKIN: no rash  TRANSPLANT: Incision well healed, no bruit.    Data         Latest Ref Rng & Units 10/9/2024     8:28 AM 10/7/2024     8:01 AM 10/3/2024     7:45 AM   Renal   Sodium 135 - 145 mmol/L 140  140  139    K 3.4 - 5.3 mmol/L 4.3  4.6  4.3    Cl 98 - 107 mmol/L 106  104  106    Cl (external) 98 - 107 mmol/L 106  104  106    CO2 22 - 29 mmol/L 24  24  24    Urea Nitrogen 8.0 - 23.0 mg/dL 11.4  14.1  13.9    Creatinine 0.51 - 0.95 mg/dL 0.90  0.91  0.78    Glucose 70 - 99 mg/dL 90  85  82    Calcium 8.8 - 10.4 mg/dL 8.4  8.2  8.1    Magnesium 1.7 - 2.3 mg/dL 1.4  1.5  1.5          Latest Ref Rng & Units 10/9/2024     8:28 AM 10/7/2024     8:01 AM 10/3/2024     7:45 AM   Bone Health   Phosphorus 2.5 - 4.5 mg/dL 2.9  3.4  2.6          Latest Ref Rng & Units 10/9/2024     8:28 AM 10/7/2024     8:01 AM 10/3/2024      7:45 AM   Heme   WBC 4.0 - 11.0 10e3/uL 6.3  7.8  6.7    Hgb 11.7 - 15.7 g/dL 8.3  8.2  8.0    Plt 150 - 450 10e3/uL 201  217  249          Latest Ref Rng & Units 10/9/2024     8:28 AM 9/2/2024     9:57 PM 10/10/2019     2:31 PM   Liver   AP 40 - 150 U/L  50  38    TBili <=1.2 mg/dL  0.3  0.4    ALT 0 - 50 U/L  <5  16    AST 0 - 45 U/L  13  12    Tot Protein 6.4 - 8.3 g/dL  6.7  7.0    Albumin 3.4 - 5.0 g/dL   3.8    Albumin 3.5 - 5.2 g/dL 3.8  4.0           Latest Ref Rng & Units 9/2/2024     9:57 PM   Pancreas   A1C <5.7 % 4.4          Latest Ref Rng & Units 9/16/2024     7:57 AM 9/7/2024     7:14 AM   Iron studies   Iron 37 - 145 ug/dL 46  103    Iron Sat Index 15 - 46 % 22  57    Ferritin 11 - 328 ng/mL 664  674          Latest Ref Rng & Units 9/2/2024     9:57 PM 10/10/2019     2:30 PM 10/28/2013     7:41 AM   [facility] Vi[facility]   EBV CAPSID ANTIBODY IGG No detectable antibody. Positive  >8.0     Hep B Core NR^Nonreactive  Nonreactive     HIV 1&2 NEG   Negative      Failed to redirect to the Timeline version of the REVFS SmartLink.  Recent Labs   Lab Test 10/03/24  0745 10/07/24  0805 10/09/24  0827   DOSTAC 10/2/2024 10/6/2024 10/8/2024   TACROL 6.7 8.7 8.6     Recent Labs   Lab Test 09/11/24  0832 09/19/24  0806 10/03/24  0745   DOSMPA 9/10/2024   7:30 PM 9/18/2024   7:30 PM 10/2/2024   7:30 PM   MPACID 2.08 1.56 0.57*   MPAG 51.8 63.2 33.7

## 2024-10-09 NOTE — TELEPHONE ENCOUNTER
Clinical Pharmacy Consult:                                                      Transplant Specific: 1 Month Post Transplant Medication Review  Date of Transplant: 09/03/2024  Type of Transplant: kidney  First Transplant: yes  History of rejection: no    Immunosuppression Regimen   TAC 7mg qAM & 7mg qPM, and MMF 750mg qAM & 750mg qPM  Patient specific goal: 8-10  Most recent level: 8.7, date 10/07/2024  Immunosuppressant Levels:  Therapeutic  Pt adherent to lab draws: yes  Scr:   Lab Results   Component Value Date    CR 0.90 10/09/2024    CR 3.68 10/10/2019     Side effects: Diarrhea, transient blurry vision    Prophylactic Medications  Antibacterial:  Bactrim SS daily  Scheduled Discontinue Date: Lifelong    Antifungal: Not needed thus far  Scheduled Discontinue Date: N/A    Antiviral: CrCl >/=60 mL/minute: Valcyte 900mg daily   Scheduled Discontinue Date: 6 months    Acid Reducer: Prilosec (omeprazole)  Scheduled Reviewed Date:  MD to determine    Thrombosis Prevention: Not needed thus far  Scheduled Discontinue Date:  N/A    Blood Pressure Management  Frequency of home Blood Pressure checks: twice daily  Most recent home BP: 140-160/80-90  Patient Blood pressure goal: <140/90  Patient blood pressure at goal:  no  Hospitalizations/ER visits since last assessment: 0      Med rec/DUR performed: yes  Med Rec Discrepancies: no    Spoke to Lexy today via phone. She reports she is doing really well. The only side effects she has noticed thus far are some diarrhea and some blurry vision that lasts for a few hours after taking her morning pills. She is managing her own medications. She has developed a good routine for taking her medication and ties several of the doses to meals throughout the day. She knows to separate her magnesium from her mycophenolate by at least 2 hours. Lexy denies missing any doses to her medications.    Lexy reports checking her blood pressure twice daily. It typically ranges from  140-160/80-90 which is above goal. She mentioned her amlodipine was recently stopped in lieu of furosemide. She will continue to monitor BP and swelling.    No additional questions or concerns. Follow in 1 month with MTM.    Time spent: 20 minutes    Ernesto Vega, PharmD  [phone]

## 2024-10-09 NOTE — PATIENT INSTRUCTIONS
Patient Recommendations:  - Decrease mycophenolate 3 tablets of 250 mg (750 mg total) twice day  - Recommend increased dietary fiber, such as psyllium, Metamucil, Benefiber or Citrucel.  Would also recommend trying probiotics.  - We will send Imodium to use as needed for diarrhea.  - We'll change magnesium, to magnesium glycinate.  - Starting Lasix 20 mg tablet twice a day.    Transplant Patient Information  Your Post Transplant Coordinator is: Gina Huizar  For non urgent items, we encourage you to contact your coordinator/care team online via [employer]  You and your care team can also contact your transplant coordinator Monday - Friday, 8am - 5pm at [phone] (Option 2 to reach the coordinator or Option 4 to schedule an appointment).  After hours for urgent matters, please call [facility] at [phone].

## 2024-10-09 NOTE — NURSING NOTE
Chief Complaint   Patient presents with    RECHECK     K/P POST ACUTE TXP NEPH - Return, Post Transplant follow up, scheduled per order         Vitals:    10/09/24 0914 10/09/24 0922   BP: (!) 193/111 (!) 167/67   BP Location: Left arm Left arm   Patient Position: Sitting Sitting   Cuff Size: Adult Small Adult Regular   Pulse: 64    Temp: 98.4  F (36.9  C)    TempSrc: Oral    SpO2: 100%    Weight: 60.4 kg (133 lb 3.2 oz)        BP Readings from Last 3 Encounters:   10/09/24 (!) 167/67   10/02/24 (!) 152/69   09/23/24 (!) 157/80       BP (!) 167/67 (BP Location: Left arm, Patient Position: Sitting, Cuff Size: Adult Regular)   Pulse 64   Temp 98.4  F (36.9  C) (Oral)   Wt 60.4 kg (133 lb 3.2 oz)   SpO2 100%   BMI 22.51 kg/m       Janak Gonzalez, THOM

## 2024-10-09 NOTE — LETTER
10/9/2024      Lexy Cobb  [address] [facility] [address]      Dear Colleague,    Thank you for referring your patient, Lexy Cobb, to the [facility] TRANSPLANT [facility]. Please see a copy of my visit note below.    TRANSPLANT NEPHROLOGY CLINIC VISIT     Assessment & Plan  # DDKT: CKD Stage 2 - Stable, near normal   - Baseline Creatinine: ~ 0.8-1 mg/dL.   - Proteinuria: Normal (<0.2 grams)   - DSA Hx: No DSA   - Last cPRA: 0%   - BK Viremia: No   - Kidney Tx Biopsy Hx: No biopsy history.   - Stent :no stent placed at time of transplant     # Immunosuppression: Tacrolimus immediate release (goal 8-10) and Mycophenolate mofetil (dose 1000 mg every 12 hours)   - Induction with Recent Transplant:  Low Intensity Protocol   - Continue with intensive monitoring of immunosuppression for efficacy and toxicity.   - Historical Changes in Immunosuppression: None   - Changes: Yes - will decrease her MMF to 750mg BID for diarrhea     # Diarrhea: Recommend fiber, sent imodium as needed, reduced MMF to 750 mg bid. I've also changed magnesium oxide to magnesium glycinate.     # Infection Prevention:      - PJP: Sulfa/TMP (Bactrim) 400-80 mg every day.   - CMV: Valganciclovir (Valcyte) 900 mg every day for 6 months, then check CMV PCR monthly until 12 months post transplant.      - CMV IgG Ab High Risk Discordance (D+/R-): Yes  CMV Serostatus: Negative  - EBV IgG Ab High Risk Discordance (D+/R-): No  EBV Serostatus: Positive    # Hypertension / Volume: Inadequate control;  Goal BP: < 140/90 early post transplant.   - Increased BP and weight from 124 to 136 lbs after stopping Lasix.  - On metoprolol 25 mg twice a day.   - Daily weights and BP.    - If BP still high after diuresis, we can restart Amlodipine.   - Changes: Yes - we started lasix 20mg BID for BP and LLE edema    # Elevated Blood Glucose:    - HBA1c of 4.4% 09/2024.    # Anemia in Chronic Renal Disease: Hgb: Trend up      HIEU: No   - Iron  studies: Replete    # Mineral Bone Disorder:    - Secondary renal hyperparathyroidism; PTH level: Minimally elevated ( pg/ml)        On treatment: None  - Vitamin D; level: Normal        On supplement: No  - Calcium; level: Normal        On supplement: Yes calcium carbonate 500 mg twice a day.  - Phosphorus; level: Normal        On supplement: Yes phosphorous 2 tablets three times a day.    # Electrolytes:   - Potassium; level: Normal        On supplement: No  - Magnesium; level: Low        On supplement: Yes We switched magnesium oxide to Mg glycinate as its better absorbed   - Bicarbonate; level: Normal        On supplement: Yes sodium bicarbonate 1300 mg twice a day.    # Paroxysmal Atrial Fibrillation: Stable, ongoing atrial fibrillation with rate controlled on beta blocker.  Patient was reportedly in AF during her stress test in May 2024. Subsequent Zio patch was negative for AF.     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  At risk if secondary hyperoxaluria.  Oxalate level ~ 11 prior to transplant with CKD.  Now oxalate level <2 with improved kidney function following transplant.              - Would check oxalate level monthly x 3 months and if remains low, no need to follow.    # Other Significant PMH:  - GERD: Asymptomatic on PPI.  - RCC (2001): s/p right native nephrectomy with mass totally encapsulated. She did not require any chemotherapy or radiation. CT today pending to reassess recurrence.      # Skin Cancer Risk:    - Discussed sun protection and recommend regular follow up with Dermatology.    # Transplant History:  Etiology of Kidney Failure: Unknown etiology. CKD likely from hypertension, NSAID use, prior triamterene use, reduced nephron mass, potentially secondary FSGS, and solitary kidney (s/p unilateral nephrectomy for RCC)   Tx: DDKT  Transplant: 9/3/2024 (Kidney)  Significant transplant-related complications: None    Transplant Office Phone  Number: [phone]    Assessment and plan was discussed with the patient and she voiced her understanding and agreement.    Timoteo Trujillo MD  Nephrology Fellow    Attestation:  This patient has been seen and evaluated by me, Angel Sandhu MD.  I have reviewed the note and agree with plan of care as documented by the fellow.     Return visit: Return for appointment scheduled for 11/13/2024.    Angel Sandhu MD    The longitudinal plan of care for the diagnosis(es)/condition(s) as documented were addressed during this visit. Due to the added complexity in care, I will continue to support Lexy in the subsequent management and with ongoing continuity of care.      Chief Complaint  Ms. Cobb is a 79 year old here for kidney transplant and immunosuppression management.     History of Present Illness   Ms. Cobb reports feeling well overall.  Since last clinic visit:   Hospitalizations: No   New Medical Issues: No  Chest pain or shortness of breath: No  Lower extremity swelling: Yes  Weight change: gained 8 ibs since transplant   Nausea and vomiting: No  Diarrhea: Yes, diarrhea 3-4 times per day, non bloody, semi solid, increased to current freq after the transplant   Heartburn symptoms: No  Fever, sweats or chills: No  Urinary complaints: No    Home BP: sbp 140s-150s/80-90s range  On metoprolol, amlodipine was held 4 weeks ago.    Problem List  Patient Active Problem List   Diagnosis     Hypertension, renal     CKD (chronic kidney disease) stage 4, GFR 15-29 ml/min (H)     Renal cell cancer (H)     Anemia in chronic renal disease     Secondary renal hyperparathyroidism (H)     Vitamin D deficiency     Obesity     Vitamin B12 deficiency     Raynaud's syndrome     ABDULLAHI positive     Chronic lower back pain     Irritable bowel syndrome     GERD (gastroesophageal reflux disease)     Trauma to right eye     Kidney transplant candidate     Immunosuppression (H)     Status post kidney transplant      Hypophosphatemia       Allergies  Allergies   Allergen Reactions     Dapsone      Fluoxetine      Iron Diarrhea     Latex Rash       Medications  Current Outpatient Medications   Medication Sig Dispense Refill     acetaminophen (TYLENOL) 325 MG tablet Take 2 tablets (650 mg) by mouth every 4 hours as needed for other (For optimal non-opioid multimodal pain management to improve pain control.).       atorvastatin (LIPITOR) 10 MG tablet Take 1 tablet (10 mg) by mouth daily. 90 tablet 3     calcitRIOL (ROCALTROL) 0.25 MCG capsule Take 1 capsule by mouth daily.       calcium carbonate 500 mg, elemental, (OSCAL) 500 MG tablet Take 1 tablet (500 mg) by mouth 2 times daily. 180 tablet 3     diphenhydrAMINE (BENADRYL) 50 MG capsule Take 100 mg by mouth nightly as needed for sleep.       furosemide (LASIX) 20 MG tablet Take 1 tablet (20 mg) by mouth 2 times daily. 60 tablet 1     loperamide (IMODIUM A-D) 2 MG tablet Take 1 tablet (2 mg) by mouth 4 times daily as needed for diarrhea. 30 tablet 0     magnesium glycinate 100 MG CAPS capsule Take 2 capsules (200 mg) by mouth 2 times daily. 360 capsule 1     metoprolol tartrate (LOPRESSOR) 25 MG tablet Take 1 tablet (25 mg) by mouth 2 times daily. 180 tablet 3     mycophenolate (GENERIC EQUIVALENT) 250 MG capsule Take 3 capsules (750 mg) by mouth 2 times daily. 720 capsule 3     omeprazole (PRILOSEC) 20 MG capsule Take 1 capsule by mouth daily       oxyBUTYnin ER (DITROPAN XL) 5 MG 24 hr tablet Take by mouth daily.       phosphorus tablet 250 mg (PHOSPHA 250 NEUTRAL) 250 MG per tablet Take 2 tablets (500 mg) by mouth 3 times daily. 180 tablet 0     potassium chloride brielle ER (KLOR-CON M20) 20 MEQ CR tablet Take 1 tablet (20 mEq) by mouth 2 times daily. You can dissolve in 4 ounces of water, let sit for 2 minutes and then stir well and drink quickly. 60 tablet 1     sodium bicarbonate 650 MG tablet Take 2 tablets (1,300 mg) by mouth 2 times daily. 60 tablet 11      sulfamethoxazole-trimethoprim (BACTRIM) 400-80 MG tablet Take 1 tablet by mouth daily. 30 tablet 11     tacrolimus (GENERIC EQUIVALENT) 1 MG capsule Take 2 capsules (2 mg) by mouth 2 times daily. Total dose: 7mg twice daily 120 capsule 11     tacrolimus (GENERIC EQUIVALENT) 5 MG capsule Take 1 capsule (5 mg) by mouth 2 times daily. Total dose: 7mg twice daily 60 capsule 11     traZODone (DESYREL) 50 MG tablet Take 100 mg by mouth at bedtime.       valGANciclovir (VALCYTE) 450 MG tablet Take 2 tablets (900 mg) by mouth daily. 60 tablet 5     No current facility-administered medications for this visit.     Medications Discontinued During This Encounter   Medication Reason     magnesium oxide (MAG-OX) 400 MG tablet      mycophenolate (GENERIC EQUIVALENT) 250 MG capsule        Physical Exam  Vital Signs: BP (!) 167/67 (BP Location: Left arm, Patient Position: Sitting, Cuff Size: Adult Regular)   Pulse 64   Temp 98.4  F (36.9  C) (Oral)   Wt 60.4 kg (133 lb 3.2 oz)   SpO2 100%   BMI 22.51 kg/m      GENERAL APPEARANCE: alert and no distress  HENT: mouth without ulcers or lesions  RESP: lungs clear to auscultation - no rales, rhonchi or wheezes  CV: regular rhythm, normal rate, no rub, no murmur  EDEMA: plus 1 bilaterally   ABDOMEN: soft, nondistended, nontender, bowel sounds normal  MS: extremities normal - no gross deformities noted, no evidence of inflammation in joints, no muscle tenderness  SKIN: no rash  TRANSPLANT: Incision well healed, no bruit.    Data        Latest Ref Rng & Units 10/9/2024     8:28 AM 10/7/2024     8:01 AM 10/3/2024     7:45 AM   Renal   Sodium 135 - 145 mmol/L 140  140  139    K 3.4 - 5.3 mmol/L 4.3  4.6  4.3    Cl 98 - 107 mmol/L 106  104  106    Cl (external) 98 - 107 mmol/L 106  104  106    CO2 22 - 29 mmol/L 24  24  24    Urea Nitrogen 8.0 - 23.0 mg/dL 11.4  14.1  13.9    Creatinine 0.51 - 0.95 mg/dL 0.90  0.91  0.78    Glucose 70 - 99 mg/dL 90  85  82    Calcium 8.8 - 10.4 mg/dL 8.4   8.2  8.1    Magnesium 1.7 - 2.3 mg/dL 1.4  1.5  1.5          Latest Ref Rng & Units 10/9/2024     8:28 AM 10/7/2024     8:01 AM 10/3/2024     7:45 AM   Bone Health   Phosphorus 2.5 - 4.5 mg/dL 2.9  3.4  2.6          Latest Ref Rng & Units 10/9/2024     8:28 AM 10/7/2024     8:01 AM 10/3/2024     7:45 AM   Heme   WBC 4.0 - 11.0 10e3/uL 6.3  7.8  6.7    Hgb 11.7 - 15.7 g/dL 8.3  8.2  8.0    Plt 150 - 450 10e3/uL 201  217  249          Latest Ref Rng & Units 10/9/2024     8:28 AM 9/2/2024     9:57 PM 10/10/2019     2:31 PM   Liver   AP 40 - 150 U/L  50  38    TBili <=1.2 mg/dL  0.3  0.4    ALT 0 - 50 U/L  <5  16    AST 0 - 45 U/L  13  12    Tot Protein 6.4 - 8.3 g/dL  6.7  7.0    Albumin 3.4 - 5.0 g/dL   3.8    Albumin 3.5 - 5.2 g/dL 3.8  4.0           Latest Ref Rng & Units 9/2/2024     9:57 PM   Pancreas   A1C <5.7 % 4.4          Latest Ref Rng & Units 9/16/2024     7:57 AM 9/7/2024     7:14 AM   Iron studies   Iron 37 - 145 ug/dL 46  103    Iron Sat Index 15 - 46 % 22  57    Ferritin 11 - 328 ng/mL 664  674          Latest Ref Rng & Units 9/2/2024     9:57 PM 10/10/2019     2:30 PM 10/28/2013     7:41 AM   [facility]   EBV CAPSID ANTIBODY IGG No detectable antibody. Positive  >8.0     Hep B Core NR^Nonreactive  Nonreactive     HIV 1&2 NEG   Negative      Failed to redirect to the Timeline version of the REVFS SmartLink.  Recent Labs   Lab Test 10/03/24  0745 10/07/24  0805 10/09/24  0827   DOSTAC 10/2/2024 10/6/2024 10/8/2024   TACROL 6.7 8.7 8.6     Recent Labs   Lab Test 09/11/24  0832 09/19/24  0806 10/03/24  0745   DOSMPA 9/10/2024   7:30 PM 9/18/2024   7:30 PM 10/2/2024   7:30 PM   MPACID 2.08 1.56 0.57*   MPAG 51.8 63.2 33.7         Again, thank you for allowing me to participate in the care of your patient.        Sincerely,        Angel Sandhu MD

## 2024-10-11 ENCOUNTER — TELEPHONE (OUTPATIENT)
Dept: NEPHROLOGY | Facility: CLINIC | Age: 79
End: 2024-10-11
Payer: COMMERCIAL

## 2024-10-11 DIAGNOSIS — Z94.0 STATUS POST KIDNEY TRANSPLANT: ICD-10-CM

## 2024-10-14 ENCOUNTER — LAB (OUTPATIENT)
Dept: LAB | Facility: CLINIC | Age: 79
End: 2024-10-14
Payer: COMMERCIAL

## 2024-10-14 ENCOUNTER — OFFICE VISIT (OUTPATIENT)
Dept: TRANSPLANT | Facility: CLINIC | Age: 79
End: 2024-10-14
Attending: NURSE PRACTITIONER
Payer: COMMERCIAL

## 2024-10-14 VITALS
DIASTOLIC BLOOD PRESSURE: 79 MMHG | WEIGHT: 133.2 LBS | HEART RATE: 66 BPM | BODY MASS INDEX: 22.51 KG/M2 | SYSTOLIC BLOOD PRESSURE: 138 MMHG | RESPIRATION RATE: 16 BRPM | OXYGEN SATURATION: 99 %

## 2024-10-14 DIAGNOSIS — Z48.298 AFTERCARE FOLLOWING ORGAN TRANSPLANT: ICD-10-CM

## 2024-10-14 DIAGNOSIS — Z20.828 CONTACT WITH AND (SUSPECTED) EXPOSURE TO OTHER VIRAL COMMUNICABLE DISEASES: ICD-10-CM

## 2024-10-14 DIAGNOSIS — Z94.0 KIDNEY REPLACED BY TRANSPLANT: ICD-10-CM

## 2024-10-14 DIAGNOSIS — Z98.890 OTHER SPECIFIED POSTPROCEDURAL STATES: ICD-10-CM

## 2024-10-14 DIAGNOSIS — Z79.899 ENCOUNTER FOR LONG-TERM CURRENT USE OF MEDICATION: ICD-10-CM

## 2024-10-14 LAB
ANION GAP SERPL CALCULATED.3IONS-SCNC: 11 MMOL/L (ref 7–15)
BUN SERPL-MCNC: 15.8 MG/DL (ref 8–23)
CALCIUM SERPL-MCNC: 8.3 MG/DL (ref 8.8–10.4)
CHLORIDE SERPL-SCNC: 107 MMOL/L (ref 98–107)
CREAT SERPL-MCNC: 1.22 MG/DL (ref 0.51–0.95)
EGFRCR SERPLBLD CKD-EPI 2021: 45 ML/MIN/1.73M2
ERYTHROCYTE [DISTWIDTH] IN BLOOD BY AUTOMATED COUNT: 14.3 % (ref 10–15)
GLUCOSE SERPL-MCNC: 89 MG/DL (ref 70–99)
HCO3 SERPL-SCNC: 23 MMOL/L (ref 22–29)
HCT VFR BLD AUTO: 26.2 % (ref 35–47)
HGB BLD-MCNC: 8.2 G/DL (ref 11.7–15.7)
MAGNESIUM SERPL-MCNC: 1.3 MG/DL (ref 1.7–2.3)
MCH RBC QN AUTO: 34 PG (ref 26.5–33)
MCHC RBC AUTO-ENTMCNC: 31.3 G/DL (ref 31.5–36.5)
MCV RBC AUTO: 109 FL (ref 78–100)
PHOSPHATE SERPL-MCNC: 3.9 MG/DL (ref 2.5–4.5)
PLATELET # BLD AUTO: 233 10E3/UL (ref 150–450)
POTASSIUM SERPL-SCNC: 4.5 MMOL/L (ref 3.4–5.3)
RBC # BLD AUTO: 2.41 10E6/UL (ref 3.8–5.2)
SODIUM SERPL-SCNC: 141 MMOL/L (ref 135–145)
WBC # BLD AUTO: 5.6 10E3/UL (ref 4–11)

## 2024-10-14 PROCEDURE — G0463 HOSPITAL OUTPT CLINIC VISIT: HCPCS | Performed by: SURGERY

## 2024-10-14 PROCEDURE — 99213 OFFICE O/P EST LOW 20 MIN: CPT | Mod: 24 | Performed by: STUDENT IN AN ORGANIZED HEALTH CARE EDUCATION/TRAINING PROGRAM

## 2024-10-14 PROCEDURE — 80197 ASSAY OF TACROLIMUS: CPT

## 2024-10-14 PROCEDURE — 80048 BASIC METABOLIC PNL TOTAL CA: CPT

## 2024-10-14 PROCEDURE — 84100 ASSAY OF PHOSPHORUS: CPT

## 2024-10-14 PROCEDURE — 36415 COLL VENOUS BLD VENIPUNCTURE: CPT

## 2024-10-14 PROCEDURE — 83735 ASSAY OF MAGNESIUM: CPT

## 2024-10-14 PROCEDURE — 87799 DETECT AGENT NOS DNA QUANT: CPT

## 2024-10-14 PROCEDURE — 85027 COMPLETE CBC AUTOMATED: CPT

## 2024-10-14 NOTE — NURSING NOTE
Chief Complaint   Patient presents with    Follow Up     Kidney transplant 9/3/2024       /79 (BP Location: Right arm, Patient Position: Sitting, Cuff Size: Adult Regular)   Pulse 66   Resp 16   Wt 60.4 kg (133 lb 3.2 oz)   SpO2 99%   BMI 22.51 kg/m      BIRGIT CARREON, RN on 10/14/2024 at 2:41 PM

## 2024-10-14 NOTE — PROGRESS NOTES
Transplant Surgery Progress Note    Transplants:  9/3/2024 (Kidney); Postoperative day:  41  S: Doing very well.  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   Transplant Date:  9/3/2024 (Kidney)   Ureteral Stent:  No   Crossmatch:  negative   DSA at Tx:  No  Baseline Cr: 0.9     [place]ovo DSA: No    Acute Rejection Hx:  No    Present Maintenance Immunosuppression:    Tacrolimus and MMF    CMV IgG Ab Discordance:  Yes  EBV IgG Ab Discordance:  No    BK Viremia:  No  EBV Viremia:  No    Transplant Coordinator: Gina Huizar     Transplant Office Phone Number: [phone]     Immunosuppressant Medications       Immunosuppressive Agents Disp Start End     mycophenolate (GENERIC EQUIVALENT) 250 MG capsule 720 capsule 10/9/2024 --    Sig - Route: Take 3 capsules (750 mg) by mouth 2 times daily. - Oral    Class: E-Prescribe    Notes to Pharmacy: TXP DT 9/3/2024 (Kidney) TXP Dischg DT 9/6/2024 DX Kidney replaced by transplant Z94.0 TX [facility] ([place], [place])     tacrolimus (GENERIC EQUIVALENT) 1 MG capsule 120 capsule 10/4/2024 --    Sig - Route: Take 2 capsules (2 mg) by mouth 2 times daily. Total dose: 7mg twice daily - Oral    Class: E-Prescribe    Notes to Pharmacy: TXP DT 9/3/2024 (Kidney) TXP Dischg DT 9/6/2024 DX Kidney replaced by transplant Z94.0 TX [facility] ([place], [place])    No prior authorization was found for this prescription.    Found prior authorization for another prescription for the same medication: Closed     tacrolimus (GENERIC EQUIVALENT) 5 MG capsule 60 capsule 10/4/2024 --    Sig - Route: Take 1 capsule (5 mg) by mouth 2 times daily. Total dose: 7mg twice daily - Oral    Class: E-Prescribe    Notes to Pharmacy: TXP DT 9/3/2024 (Kidney) TXP Dischg DT 9/6/2024 DX Kidney replaced by transplant Z94.0 [facility]  ([place], [place])            Possible Immunosuppression-related side effects:   []             headache  []             vivid dreams  []             irritability  []             cognitive difficuties  []             fine tremor  []             nausea  []             diarrhea  []             neuropathy      []             edema  []             renal calcineurin toxicity  []             hyperkalemia  []             post-transplant diabetes  []             decreased appetite  []             increased appetite  []             other:  []             none    Prescription Medications as of 10/14/2024         Rx Number Disp Refills Start End Last Dispensed Date Next Fill Date Owning Pharmacy    acetaminophen (TYLENOL) 325 MG tablet  -- -- 9/6/2024 --       Sig: Take 2 tablets (650 mg) by mouth every 4 hours as needed for other (For optimal non-opioid multimodal pain management to improve pain control.).    Class: OTC    Route: Oral    atorvastatin (LIPITOR) 10 MG tablet  90 tablet 3 9/30/2024 --   [address]    Sig: Take 1 tablet (10 mg) by mouth daily.    Class: E-Prescribe    Route: Oral    calcitRIOL (ROCALTROL) 0.25 MCG capsule  -- -- 10/4/2024 --       Sig: Take 1 capsule by mouth daily.    Class: Historical    Route: Oral    calcium carbonate 500 mg, elemental, (OSCAL) 500 MG tablet  180 tablet 3 9/30/2024 --   [address]    Sig: Take 1 tablet (500 mg) by mouth 2 times daily.    Class: E-Prescribe    Route: Oral    diphenhydrAMINE (BENADRYL) 50 MG capsule  -- --  --       Sig: Take 100 mg by mouth nightly as needed for sleep.    Class: Historical    Route: Oral    furosemide (LASIX) 20 MG tablet  60 tablet 1 10/9/2024 12/8/2024   [address]    Sig: Take 1 tablet (20 mg) by mouth 2 times daily.    Class: E-Prescribe    Route: Oral    loperamide  (IMODIUM A-D) 2 MG tablet  30 tablet 0 10/9/2024 11/8/2024   [address]    Sig: Take 1 tablet (2 mg) by mouth 4 times daily as needed for diarrhea.    Class: E-Prescribe    Route: Oral    magnesium glycinate 100 MG CAPS capsule  360 capsule 1 10/9/2024 4/7/2025   [facility] [address]    Sig: Take 2 capsules (200 mg) by mouth 2 times daily.    Class: E-Prescribe    Route: Oral    metoprolol tartrate (LOPRESSOR) 25 MG tablet  180 tablet 3 9/30/2024 --   [address]    Sig: Take 1 tablet (25 mg) by mouth 2 times daily.    Class: E-Prescribe    Route: Oral    mycophenolate (GENERIC EQUIVALENT) 250 MG capsule  720 capsule 3 10/9/2024 --   [facility] [address]    Sig: Take 3 capsules (750 mg) by mouth 2 times daily.    Class: E-Prescribe    Notes to Pharmacy: TXP DT 9/3/2024 (Kidney) TXP Dischg DT 9/6/2024 DX Kidney replaced by transplant Z94.0 TX [facility] ([place], [place])    Route: Oral    omeprazole (PRILOSEC) 20 MG capsule  -- --  --       Sig: Take 1 capsule by mouth daily    Class: Historical    Route: Oral    oxyBUTYnin ER (DITROPAN XL) 5 MG 24 hr tablet  -- -- 10/4/2024 --       Sig: Take by mouth daily.    Class: Historical    Route: Oral    phosphorus tablet 250 mg (PHOSPHA 250 NEUTRAL) 250 MG per tablet  180 tablet 0 10/14/2024 --   [facility] - [place] [place] - [facility] [address]    Sig: Take 1 tablet (250 mg) by mouth 2 times daily.    Class: E-Prescribe    Route: Oral    potassium chloride brielle ER (KLOR-CON M20) 20 MEQ CR tablet  60 tablet 1 9/16/2024 --   [facility] [address]    Sig: Take 1 tablet (20 mEq) by mouth 2 times daily. You can dissolve in 4 ounces of water, let sit for 2 minutes  and then stir well and drink quickly.    Class: E-Prescribe    Route: Oral    sodium bicarbonate 650 MG tablet  60 tablet 11 10/2/2024 --   [address]    Sig: Take 2 tablets (1,300 mg) by mouth 2 times daily.    Class: E-Prescribe    Route: Oral    sulfamethoxazole-trimethoprim (BACTRIM) 400-80 MG tablet  30 tablet 11 9/9/2024 --   [address]    Sig: Take 1 tablet by mouth daily.    Class: E-Prescribe    Route: Oral    tacrolimus (GENERIC EQUIVALENT) 1 MG capsule  120 capsule 11 10/4/2024 --   [address]    Sig: Take 2 capsules (2 mg) by mouth 2 times daily. Total dose: 7mg twice daily    Class: E-Prescribe    Notes to Pharmacy: TXP DT 9/3/2024 (Kidney) TXP Dischg DT 9/6/2024 DX Kidney replaced by transplant Z94.0 [facility] ([place], [place])    Route: Oral    No prior authorization was found for this prescription.    Found prior authorization for another prescription for the same medication: Closed    tacrolimus (GENERIC EQUIVALENT) 5 MG capsule  60 capsule 11 10/4/2024 --   [address]    Sig: Take 1 capsule (5 mg) by mouth 2 times daily. Total dose: 7mg twice daily    Class: E-Prescribe    Notes to Pharmacy: TXP DT 9/3/2024 (Kidney) TXP Dischg DT 9/6/2024 DX Kidney replaced by transplant Z94.0 [facility] ([place], [place])    Route: Oral    traZODone (DESYREL) 50 MG tablet  -- -- 4/16/2019 --       Sig: Take 100 mg by mouth at bedtime.    Class: Historical    Route: Oral    valGANciclovir (VALCYTE) 450 MG tablet  60 tablet 5 9/9/2024 --   [address]    Sig: Take 2 tablets (900 mg) by mouth daily.    Class: E-Prescribe    Route: Oral             O:   [unfilled]        Latest Ref Rng & Units 10/14/2024     8:01 AM 10/9/2024     8:28 AM 10/7/2024     8:01 AM 10/3/2024     7:45 AM 9/30/2024     8:30 AM   Transplant Immunosuppression Labs   Creat 0.51 - 0.95 mg/dL  0.90  0.91  0.78  0.94    Urea Nitrogen 8.0 - 23.0 mg/dL  11.4  14.1  13.9  19.7    WBC 4.0 - 11.0 10e3/uL 5.6  6.3  7.8  6.7  7.3        Chemistries:   Recent Labs   Lab Test 10/09/24  0828   BUN 11.4   CR 0.90   GFRESTIMATED 66   GLC 90     Lab Results   Component Value Date    A1C 4.4 09/02/2024     Recent Labs   Lab Test 10/09/24  0828 09/02/24 2157   ALBUMIN 3.8 4.0   BILITOTAL  --  0.3   ALKPHOS  --  50   AST  --  13   ALT  --  <5     Urine Studies:  Recent Labs   Lab Test 09/17/24  1051 09/02/24  2345   COLOR Light Yellow Light Yellow   APPEARANCE Clear Slightly Cloudy*   URINEGLC Negative Negative   URINEBILI Negative Negative   URINEKETONE Negative Negative   SG 1.011 1.011   UBLD Negative Negative   URINEPH 5.0 6.0   PROTEIN Negative 100*   NITRITE Negative Negative   LEUKEST Negative Large*   RBCU  --  1   WBCU  --  >182*     No lab results found.  Hematology:   Recent Labs   Lab Test 10/14/24  0801 10/09/24  0828 10/07/24  0801   HGB 8.2* 8.3* 8.2*    201 217   WBC 5.6 6.3 7.8     Coags:   Recent Labs   Lab Test 09/02/24  2157 10/10/19  1431   INR 0.93 0.93     HLA antibodies:   SA1 Hi Risk [facility]   Date Value Ref Range Status   04/22/2020 None  Final     SA1 HI RISK [facility]   Date Value Ref Range Status   09/09/2024 None  Final     SA1 Mod Risk [facility]   Date Value Ref Range Status   04/22/2020 None  Final     SA1 MOD RISK [facility]   Date Value Ref Range Status   09/09/2024 None  Final     SA2 Hi Risk [facility]   Date Value Ref Range Status   04/22/2020 None  Final     SA2 HI RISK [facility]   Date Value Ref Range Status   09/09/2024 None  Final     SA2 Mod Risk [facility]   Date Value Ref Range Status   04/22/2020 None  Final     SA2 MOD RISK [facility]   Date Value Ref Range Status   09/09/2024 None  Final        Assessment: Lexy Cobb is doing well s/p DDKT:  Issues we addressed during her visit include:    Plan:    1. Graft function: Excellent  2. Immunosuppression Management: No change Tac  MMF.  Complexity of management:Low.  Contributing factors: none  Followup: 3 months    Total Time: 20 min,   Counselling Time: 15 min.    Seen with Dr. Ciera Fregoso

## 2024-10-14 NOTE — LETTER
10/14/2024      Lexy Cobb  [address]  [facility] [address]      Dear Colleague,    Thank you for referring your patient, Lexy Cobb, to the [facility]. Please see a copy of my visit note below.    Transplant Surgery Progress Note    Transplants:  9/3/2024 (Kidney); Postoperative day:  41  S: Doing very well.  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   Transplant Date:  9/3/2024 (Kidney)   Ureteral Stent:  No   Crossmatch:  negative   DSA at Tx:  No  Baseline Cr: 0.9     DeNovo DSA: No    Acute Rejection Hx:  No    Present Maintenance Immunosuppression:    Tacrolimus and MMF    CMV IgG Ab Discordance:  Yes  EBV IgG Ab Discordance:  No    BK Viremia:  No  EBV Viremia:  No    Transplant Coordinator: Gina Huizar     Transplant Office Phone Number: [phone]     Immunosuppressant Medications       Immunosuppressive Agents Disp Start End     mycophenolate (GENERIC EQUIVALENT) 250 MG capsule 720 capsule 10/9/2024 --    Sig - Route: Take 3 capsules (750 mg) by mouth 2 times daily. - Oral    Class: E-Prescribe    Notes to Pharmacy: TXP DT 9/3/2024 (Kidney) TXP Dischg DT 9/6/2024 DX Kidney replaced by transplant Z94.0 TX [facility] ([place], [place])     tacrolimus (GENERIC EQUIVALENT) 1 MG capsule 120 capsule 10/4/2024 --    Sig - Route: Take 2 capsules (2 mg) by mouth 2 times daily. Total dose: 7mg twice daily - Oral    Class: E-Prescribe    Notes to Pharmacy: TXP DT 9/3/2024 (Kidney) TXP Dischg DT 9/6/2024 DX Kidney replaced by transplant Z94.0 TX [facility] ([place], [place])    No prior authorization was found for this prescription.    Found prior authorization for another prescription for the same medication: Closed     tacrolimus (GENERIC EQUIVALENT) 5 MG capsule 60 capsule 10/4/2024 --    Sig - Route: Take 1 capsule (5 mg) by  mouth 2 times daily. Total dose: 7mg twice daily - Oral    Class: E-Prescribe    Notes to Pharmacy: TXP DT 9/3/2024 (Kidney) TXP Dischg DT 9/6/2024 DX Kidney replaced by transplant Z94.0 TX [facility] ([place], [place])            Possible Immunosuppression-related side effects:   []             headache  []             vivid dreams  []             irritability  []             cognitive difficuties  []             fine tremor  []             nausea  []             diarrhea  []             neuropathy      []             edema  []             renal calcineurin toxicity  []             hyperkalemia  []             post-transplant diabetes  []             decreased appetite  []             increased appetite  []             other:  []             none    Prescription Medications as of 10/14/2024         Rx Number Disp Refills Start End Last Dispensed Date Next Fill Date Owning Pharmacy    acetaminophen (TYLENOL) 325 MG tablet  -- -- 9/6/2024 --       Sig: Take 2 tablets (650 mg) by mouth every 4 hours as needed for other (For optimal non-opioid multimodal pain management to improve pain control.).    Class: OTC    Route: Oral    atorvastatin (LIPITOR) 10 MG tablet  90 tablet 3 9/30/2024 --   [address]    Sig: Take 1 tablet (10 mg) by mouth daily.    Class: E-Prescribe    Route: Oral    calcitRIOL (ROCALTROL) 0.25 MCG capsule  -- -- 10/4/2024 --       Sig: Take 1 capsule by mouth daily.    Class: Historical    Route: Oral    calcium carbonate 500 mg, elemental, (OSCAL) 500 MG tablet  180 tablet 3 9/30/2024 --   [address]    Sig: Take 1 tablet (500 mg) by mouth 2 times daily.    Class: E-Prescribe    Route: Oral    diphenhydrAMINE (BENADRYL) 50 MG capsule  -- --  --       Sig: Take 100 mg by mouth nightly as needed for sleep.    Class: Historical    Route: Oral     furosemide (LASIX) 20 MG tablet  60 tablet 1 10/9/2024 12/8/2024   [address]    Sig: Take 1 tablet (20 mg) by mouth 2 times daily.    Class: E-Prescribe    Route: Oral    loperamide (IMODIUM A-D) 2 MG tablet  30 tablet 0 10/9/2024 11/8/2024   [address]    Sig: Take 1 tablet (2 mg) by mouth 4 times daily as needed for diarrhea.    Class: E-Prescribe    Route: Oral    magnesium glycinate 100 MG CAPS capsule  360 capsule 1 10/9/2024 4/7/2025   [address]    Sig: Take 2 capsules (200 mg) by mouth 2 times daily.    Class: E-Prescribe    Route: Oral    metoprolol tartrate (LOPRESSOR) 25 MG tablet  180 tablet 3 9/30/2024 --   [address]    Sig: Take 1 tablet (25 mg) by mouth 2 times daily.    Class: E-Prescribe    Route: Oral    mycophenolate (GENERIC EQUIVALENT) 250 MG capsule  720 capsule 3 10/9/2024 --   [address]    Sig: Take 3 capsules (750 mg) by mouth 2 times daily.    Class: E-Prescribe    Notes to Pharmacy: TXP DT 9/3/2024 (Kidney) TXP Dischg DT 9/6/2024 DX Kidney replaced by transplant Z94.0 TX [facility] ([place], [place])    Route: Oral    omeprazole (PRILOSEC) 20 MG capsule  -- --  --       Sig: Take 1 capsule by mouth daily    Class: Historical    Route: Oral    oxyBUTYnin ER (DITROPAN XL) 5 MG 24 hr tablet  -- -- 10/4/2024 --       Sig: Take by mouth daily.    Class: Historical    Route: Oral    phosphorus tablet 250 mg (PHOSPHA 250 NEUTRAL) 250 MG per tablet  180 tablet 0 10/14/2024 --   [facility] - [place], [place] - [facility] [address]    Sig: Take 1 tablet (250 mg) by mouth 2 times daily.    Class: E-Prescribe    Route: Oral    potassium chloride brielle ER  (KLOR-CON M20) 20 MEQ CR tablet  60 tablet 1 9/16/2024 --   [address]    Sig: Take 1 tablet (20 mEq) by mouth 2 times daily. You can dissolve in 4 ounces of water, let sit for 2 minutes and then stir well and drink quickly.    Class: E-Prescribe    Route: Oral    sodium bicarbonate 650 MG tablet  60 tablet 11 10/2/2024 --   [address]    Sig: Take 2 tablets (1,300 mg) by mouth 2 times daily.    Class: E-Prescribe    Route: Oral    sulfamethoxazole-trimethoprim (BACTRIM) 400-80 MG tablet  30 tablet 11 9/9/2024 --   [address]    Sig: Take 1 tablet by mouth daily.    Class: E-Prescribe    Route: Oral    tacrolimus (GENERIC EQUIVALENT) 1 MG capsule  120 capsule 11 10/4/2024 --   [address]    Sig: Take 2 capsules (2 mg) by mouth 2 times daily. Total dose: 7mg twice daily    Class: E-Prescribe    Notes to Pharmacy: TXP DT 9/3/2024 (Kidney) TXP Dischg DT 9/6/2024 DX Kidney replaced by transplant Z94.0 [facility] ([place], [place])    Route: Oral    No prior authorization was found for this prescription.    Found prior authorization for another prescription for the same medication: Closed    tacrolimus (GENERIC EQUIVALENT) 5 MG capsule  60 capsule 11 10/4/2024 --   [address]    Sig: Take 1 capsule (5 mg) by mouth 2 times daily. Total dose: 7mg twice daily    Class: E-Prescribe    Notes to Pharmacy: TXP DT 9/3/2024 (Kidney) TXP Dischg DT 9/6/2024 DX Kidney replaced by transplant Z94.0 [facility] ([place], [place])    Route: Oral    traZODone (DESYREL) 50 MG tablet  -- -- 4/16/2019 --       Sig: Take 100 mg by mouth at bedtime.    Class: Historical     Route: Oral    valGANciclovir (VALCYTE) 450 MG tablet  60 tablet 5 9/9/2024 --   [address]    Sig: Take 2 tablets (900 mg) by mouth daily.    Class: E-Prescribe    Route: Oral            O:   [unfilled]        Latest Ref Rng & Units 10/14/2024     8:01 AM 10/9/2024     8:28 AM 10/7/2024     8:01 AM 10/3/2024     7:45 AM 9/30/2024     8:30 AM   Transplant Immunosuppression Labs   Creat 0.51 - 0.95 mg/dL  0.90  0.91  0.78  0.94    Urea Nitrogen 8.0 - 23.0 mg/dL  11.4  14.1  13.9  19.7    WBC 4.0 - 11.0 10e3/uL 5.6  6.3  7.8  6.7  7.3        Chemistries:   Recent Labs   Lab Test 10/09/24  0828   BUN 11.4   CR 0.90   GFRESTIMATED 66   GLC 90     Lab Results   Component Value Date    A1C 4.4 09/02/2024     Recent Labs   Lab Test 10/09/24  0828 09/02/24 2157   ALBUMIN 3.8 4.0   BILITOTAL  --  0.3   ALKPHOS  --  50   AST  --  13   ALT  --  <5     Urine Studies:  Recent Labs   Lab Test 09/17/24  1051 09/02/24  2345   COLOR Light Yellow Light Yellow   APPEARANCE Clear Slightly Cloudy*   URINEGLC Negative Negative   URINEBILI Negative Negative   URINEKETONE Negative Negative   SG 1.011 1.011   UBLD Negative Negative   URINEPH 5.0 6.0   PROTEIN Negative 100*   NITRITE Negative Negative   LEUKEST Negative Large*   RBCU  --  1   WBCU  --  >182*     No lab results found.  Hematology:   Recent Labs   Lab Test 10/14/24  0801 10/09/24  0828 10/07/24  0801   HGB 8.2* 8.3* 8.2*    201 217   WBC 5.6 6.3 7.8     Coags:   Recent Labs   Lab Test 09/02/24  2157 10/10/19  1431   INR 0.93 0.93     HLA antibodies:   SA1 Hi Risk [facility]   Date Value Ref Range Status   04/22/2020 None  Final     SA1 HI RISK [facility]   Date Value Ref Range Status   09/09/2024 None  Final     SA1 Mod Risk [facility]   Date Value Ref Range Status   04/22/2020 None  Final     SA1 MOD RISK [facility]   Date Value Ref Range Status   09/09/2024 None  Final     SA2 Hi Risk [facility]   Date Value Ref Range Status   04/22/2020  None  Final     SA2 HI RISK [facility]   Date Value Ref Range Status   09/09/2024 None  Final     SA2 Mod Risk [facility]   Date Value Ref Range Status   04/22/2020 None  Final     SA2 MOD RISK [facility]   Date Value Ref Range Status   09/09/2024 None  Final       Assessment: Lexy Cobb is doing well s/p DDKT:  Issues we addressed during her visit include:    Plan:    1. Graft function: Excellent  2. Immunosuppression Management: No change Tac  MMF.  Complexity of management:Low.  Contributing factors: none  Followup: 3 months    Total Time: 20 min,   Counselling Time: 15 min.    Seen with Dr. Ciera Fregoso      Again, thank you for allowing me to participate in the care of your patient.        Sincerely,        Ciera Fregoso MD

## 2024-10-15 LAB
BK VIRUS SPECIMEN TYPE: NORMAL
BKV DNA # SPEC NAA+PROBE: NOT DETECTED IU/ML
DONOR IDENTIFICATION: NORMAL
DSA COMMENTS: NORMAL
DSA PRESENT: NO
DSA TEST METHOD: NORMAL
ORGAN: NORMAL
SA 1  COMMENTS: NORMAL
SA 1 CELL: NORMAL
SA 1 TEST METHOD: NORMAL
SA 2 CELL: NORMAL
SA 2 COMMENTS: NORMAL
SA 2 TEST METHOD: NORMAL
SA1 HI RISK ABY: NORMAL
SA1 MOD RISK ABY: NORMAL
SA2 HI RISK ABY: NORMAL
SA2 MOD RISK ABY: NORMAL
TACROLIMUS BLD-MCNC: 10.7 UG/L (ref 5–15)
TME LAST DOSE: NORMAL H
TME LAST DOSE: NORMAL H
UNACCEPTABLE ANTIGENS: NORMAL
UNOS CPRA: 0

## 2024-10-17 ENCOUNTER — TELEPHONE (OUTPATIENT)
Dept: TRANSPLANT | Facility: CLINIC | Age: 79
End: 2024-10-17

## 2024-10-17 ENCOUNTER — LAB (OUTPATIENT)
Dept: LAB | Facility: CLINIC | Age: 79
End: 2024-10-17
Payer: COMMERCIAL

## 2024-10-17 DIAGNOSIS — Z79.899 ENCOUNTER FOR LONG-TERM CURRENT USE OF MEDICATION: ICD-10-CM

## 2024-10-17 DIAGNOSIS — Z20.828 CONTACT WITH AND (SUSPECTED) EXPOSURE TO OTHER VIRAL COMMUNICABLE DISEASES: ICD-10-CM

## 2024-10-17 DIAGNOSIS — Z48.298 AFTERCARE FOLLOWING ORGAN TRANSPLANT: Primary | ICD-10-CM

## 2024-10-17 DIAGNOSIS — R19.7 DIARRHEA: ICD-10-CM

## 2024-10-17 DIAGNOSIS — Z94.0 KIDNEY REPLACED BY TRANSPLANT: ICD-10-CM

## 2024-10-17 DIAGNOSIS — Z98.890 OTHER SPECIFIED POSTPROCEDURAL STATES: ICD-10-CM

## 2024-10-17 DIAGNOSIS — R79.89 ELEVATED SERUM CREATININE: ICD-10-CM

## 2024-10-17 DIAGNOSIS — Z48.298 AFTERCARE FOLLOWING ORGAN TRANSPLANT: ICD-10-CM

## 2024-10-17 LAB
ANION GAP SERPL CALCULATED.3IONS-SCNC: 12 MMOL/L (ref 7–15)
BUN SERPL-MCNC: 23.8 MG/DL (ref 8–23)
CALCIUM SERPL-MCNC: 8 MG/DL (ref 8.8–10.4)
CHLORIDE SERPL-SCNC: 102 MMOL/L (ref 98–107)
CREAT SERPL-MCNC: 1.48 MG/DL (ref 0.51–0.95)
EGFRCR SERPLBLD CKD-EPI 2021: 36 ML/MIN/1.73M2
ERYTHROCYTE [DISTWIDTH] IN BLOOD BY AUTOMATED COUNT: 13.9 % (ref 10–15)
GLUCOSE SERPL-MCNC: 94 MG/DL (ref 70–99)
HCO3 SERPL-SCNC: 23 MMOL/L (ref 22–29)
HCT VFR BLD AUTO: 27.3 % (ref 35–47)
HGB BLD-MCNC: 8.4 G/DL (ref 11.7–15.7)
MCH RBC QN AUTO: 33.2 PG (ref 26.5–33)
MCHC RBC AUTO-ENTMCNC: 30.8 G/DL (ref 31.5–36.5)
MCV RBC AUTO: 108 FL (ref 78–100)
PLATELET # BLD AUTO: 288 10E3/UL (ref 150–450)
POTASSIUM SERPL-SCNC: 4.4 MMOL/L (ref 3.4–5.3)
RBC # BLD AUTO: 2.53 10E6/UL (ref 3.8–5.2)
SODIUM SERPL-SCNC: 137 MMOL/L (ref 135–145)
TACROLIMUS BLD-MCNC: 10.2 UG/L (ref 5–15)
TME LAST DOSE: NORMAL H
TME LAST DOSE: NORMAL H
WBC # BLD AUTO: 4.6 10E3/UL (ref 4–11)

## 2024-10-17 PROCEDURE — 80048 BASIC METABOLIC PNL TOTAL CA: CPT

## 2024-10-17 PROCEDURE — 85027 COMPLETE CBC AUTOMATED: CPT

## 2024-10-17 PROCEDURE — 80197 ASSAY OF TACROLIMUS: CPT

## 2024-10-17 PROCEDURE — 36415 COLL VENOUS BLD VENIPUNCTURE: CPT

## 2024-10-17 NOTE — TELEPHONE ENCOUNTER
Call placed to patient to discuss how she is feeling. Patient states she is still having diarrhea 5-6 times per day. She otherwise feels ok. She reports drinking at least 64 oz of water per day, however creatinine continues to increase. RNCC sent message for provider to review.

## 2024-10-17 NOTE — TELEPHONE ENCOUNTER
[facility] Call Center    Phone Message    May a detailed message be left on voicemail: yes     Reason for Call: Other: Patient calling to schedule a follow up with Dr. Fregoso. There are no orders, please place orders if needed.      Action Taken: Message routed to:  [facility] CRISTOFER RAMIREZ    Travel Screening: Not Applicable     Date of Service:

## 2024-10-18 ENCOUNTER — TELEPHONE (OUTPATIENT)
Dept: TRANSPLANT | Facility: CLINIC | Age: 79
End: 2024-10-18
Payer: COMMERCIAL

## 2024-10-18 NOTE — TELEPHONE ENCOUNTER
[facility] Call Center    Phone Message    May a detailed message be left on voicemail: yes     Reason for Call: Appointment Intake    Patient stated that she was told she needed to give a stool sample.  Wondering how she can go about doing that?  Local clinic or [place]?  Does she need an appointment and are orders in place?  Please call patient directly to discuss.      Action Taken: Message routed to:  [facility] CRISTOFER SOT    Travel Screening: Not Applicable     Date of Service:

## 2024-10-18 NOTE — TELEPHONE ENCOUNTER
Angel Gutierrez MD Colianni, Lauren, DOROTHY Fuentes,    Yes, stool PCR, C Diff and CMV PCR. Fiber daily. Imodium PRN. Repeat labs tomorrow with Tac. If tac is not significantly elevated, then we should arrange US and IVF.    Angel

## 2024-10-21 ENCOUNTER — LAB (OUTPATIENT)
Dept: LAB | Facility: CLINIC | Age: 79
End: 2024-10-21
Payer: COMMERCIAL

## 2024-10-21 DIAGNOSIS — Z20.828 CONTACT WITH AND (SUSPECTED) EXPOSURE TO OTHER VIRAL COMMUNICABLE DISEASES: ICD-10-CM

## 2024-10-21 DIAGNOSIS — Z48.298 AFTERCARE FOLLOWING ORGAN TRANSPLANT: ICD-10-CM

## 2024-10-21 DIAGNOSIS — Z98.890 OTHER SPECIFIED POSTPROCEDURAL STATES: ICD-10-CM

## 2024-10-21 DIAGNOSIS — Z79.899 ENCOUNTER FOR LONG-TERM CURRENT USE OF MEDICATION: ICD-10-CM

## 2024-10-21 DIAGNOSIS — Z94.0 KIDNEY REPLACED BY TRANSPLANT: ICD-10-CM

## 2024-10-21 LAB
ANION GAP SERPL CALCULATED.3IONS-SCNC: 9 MMOL/L (ref 7–15)
BUN SERPL-MCNC: 26.9 MG/DL (ref 8–23)
CALCIUM SERPL-MCNC: 8.9 MG/DL (ref 8.8–10.4)
CHLORIDE SERPL-SCNC: 105 MMOL/L (ref 98–107)
CREAT SERPL-MCNC: 1.44 MG/DL (ref 0.51–0.95)
EGFRCR SERPLBLD CKD-EPI 2021: 37 ML/MIN/1.73M2
ERYTHROCYTE [DISTWIDTH] IN BLOOD BY AUTOMATED COUNT: 14.1 % (ref 10–15)
GLUCOSE SERPL-MCNC: 90 MG/DL (ref 70–99)
HCO3 SERPL-SCNC: 24 MMOL/L (ref 22–29)
HCT VFR BLD AUTO: 27.9 % (ref 35–47)
HGB BLD-MCNC: 8.4 G/DL (ref 11.7–15.7)
MAGNESIUM SERPL-MCNC: 1.5 MG/DL (ref 1.7–2.3)
MCH RBC QN AUTO: 32.8 PG (ref 26.5–33)
MCHC RBC AUTO-ENTMCNC: 30.1 G/DL (ref 31.5–36.5)
MCV RBC AUTO: 109 FL (ref 78–100)
PHOSPHATE SERPL-MCNC: 3.5 MG/DL (ref 2.5–4.5)
PLATELET # BLD AUTO: 254 10E3/UL (ref 150–450)
POTASSIUM SERPL-SCNC: 5.1 MMOL/L (ref 3.4–5.3)
RBC # BLD AUTO: 2.56 10E6/UL (ref 3.8–5.2)
SODIUM SERPL-SCNC: 138 MMOL/L (ref 135–145)
WBC # BLD AUTO: 4 10E3/UL (ref 4–11)

## 2024-10-21 PROCEDURE — 85027 COMPLETE CBC AUTOMATED: CPT

## 2024-10-21 PROCEDURE — 36415 COLL VENOUS BLD VENIPUNCTURE: CPT

## 2024-10-21 PROCEDURE — 83735 ASSAY OF MAGNESIUM: CPT

## 2024-10-21 PROCEDURE — 84100 ASSAY OF PHOSPHORUS: CPT

## 2024-10-21 PROCEDURE — 80197 ASSAY OF TACROLIMUS: CPT

## 2024-10-21 PROCEDURE — 80048 BASIC METABOLIC PNL TOTAL CA: CPT

## 2024-10-21 NOTE — TELEPHONE ENCOUNTER
Spoke to patient about collecting stool samples and how this works. Patient will do so this week. Labs from today pending, will review when they become available

## 2024-10-22 ENCOUNTER — TELEPHONE (OUTPATIENT)
Dept: TRANSPLANT | Facility: CLINIC | Age: 79
End: 2024-10-22
Payer: COMMERCIAL

## 2024-10-22 LAB
TACROLIMUS BLD-MCNC: 11 UG/L (ref 5–15)
TME LAST DOSE: NORMAL H
TME LAST DOSE: NORMAL H

## 2024-10-22 NOTE — TELEPHONE ENCOUNTER
----- Message from Angel Sandhu sent at 10/21/2024  8:12 PM CDT -----  Dariel Fuentes, rise in creatinine tracks starting Lasix in clinic 10/09. With her diarrhea. We should ask her to stop Lasix and reassess weight and BP.

## 2024-10-23 ENCOUNTER — TELEPHONE (OUTPATIENT)
Dept: TRANSPLANT | Facility: CLINIC | Age: 79
End: 2024-10-23

## 2024-10-23 ENCOUNTER — ANCILLARY PROCEDURE (OUTPATIENT)
Dept: ULTRASOUND IMAGING | Facility: CLINIC | Age: 79
End: 2024-10-23
Attending: STUDENT IN AN ORGANIZED HEALTH CARE EDUCATION/TRAINING PROGRAM
Payer: COMMERCIAL

## 2024-10-23 ENCOUNTER — LAB (OUTPATIENT)
Dept: LAB | Facility: CLINIC | Age: 79
End: 2024-10-23
Payer: COMMERCIAL

## 2024-10-23 DIAGNOSIS — R79.89 ELEVATED SERUM CREATININE: ICD-10-CM

## 2024-10-23 DIAGNOSIS — R19.7 DIARRHEA: ICD-10-CM

## 2024-10-23 DIAGNOSIS — Z48.298 AFTERCARE FOLLOWING ORGAN TRANSPLANT: ICD-10-CM

## 2024-10-23 DIAGNOSIS — E86.0 DEHYDRATION: Primary | ICD-10-CM

## 2024-10-23 PROCEDURE — 76776 US EXAM K TRANSPL W/DOPPLER: CPT | Mod: GC | Performed by: RADIOLOGY

## 2024-10-23 NOTE — TELEPHONE ENCOUNTER
Post Kidney and Pancreas Transplant Team Conference  Date: 10/23/2024  Transplant Coordinator: Gina     Attendees:  []  Dr. Higginbotham [] Nelia Cerna, RN [] Coco Fernandez LPN     [x]  Dr. De La Cruz [x] Gina Huizar, RN [] Christine Cardoso LPN    [x] Dr. Sandhu [x] Alejandrina Ferraro RN    [x] Dr. Gonzalez [x] Maria R Sanders, RN [] Opal Henry RN   [x] Dr. Muro [] Priya Cameron, DOROTHY Scott   [] Dr. House [] Kaur Hill RN    []  Dr. Barkley [] Misti Mckeon, RN    [] Dr. Fregoso [] Zaki Owen RN    [x] Veronica Ozuna, NP [] Tala Hernandez RN    [x] Shannan Christianson NP [] Gabriela Hutton RN        Verbal Plan Read Back:   Give IV fluids    Routed to RN Coordinator   Coco Fernandez LPN

## 2024-10-23 NOTE — TELEPHONE ENCOUNTER
Patient Call: Voicemail  Date/Time: 10/23/24 @ 3:13 pm   Reason for call: My name is Lexy Portillo. Birth date. [birth year]. My coordinator Gina asked me to give her a call and discuss uh a transfusion. So that's what I'm doing. She can reach me here at home at [phone]. Thank you.

## 2024-10-24 ENCOUNTER — LAB (OUTPATIENT)
Dept: LAB | Facility: CLINIC | Age: 79
End: 2024-10-24
Payer: COMMERCIAL

## 2024-10-24 DIAGNOSIS — Z48.298 AFTERCARE FOLLOWING ORGAN TRANSPLANT: ICD-10-CM

## 2024-10-24 DIAGNOSIS — Z94.0 KIDNEY REPLACED BY TRANSPLANT: ICD-10-CM

## 2024-10-24 DIAGNOSIS — Z98.890 OTHER SPECIFIED POSTPROCEDURAL STATES: ICD-10-CM

## 2024-10-24 DIAGNOSIS — Z79.899 ENCOUNTER FOR LONG-TERM CURRENT USE OF MEDICATION: ICD-10-CM

## 2024-10-24 DIAGNOSIS — Z20.828 CONTACT WITH AND (SUSPECTED) EXPOSURE TO OTHER VIRAL COMMUNICABLE DISEASES: ICD-10-CM

## 2024-10-24 DIAGNOSIS — R19.7 DIARRHEA: ICD-10-CM

## 2024-10-24 PROBLEM — E86.0 DEHYDRATION: Status: ACTIVE | Noted: 2024-10-24

## 2024-10-24 LAB
ANION GAP SERPL CALCULATED.3IONS-SCNC: 11 MMOL/L (ref 7–15)
BUN SERPL-MCNC: 23.1 MG/DL (ref 8–23)
CALCIUM SERPL-MCNC: 9.1 MG/DL (ref 8.8–10.4)
CHLORIDE SERPL-SCNC: 101 MMOL/L (ref 98–107)
CMV DNA SPEC NAA+PROBE-ACNC: NOT DETECTED IU/ML
CREAT SERPL-MCNC: 1.38 MG/DL (ref 0.51–0.95)
EGFRCR SERPLBLD CKD-EPI 2021: 39 ML/MIN/1.73M2
ERYTHROCYTE [DISTWIDTH] IN BLOOD BY AUTOMATED COUNT: 13.9 % (ref 10–15)
GLUCOSE SERPL-MCNC: 90 MG/DL (ref 70–99)
HCO3 SERPL-SCNC: 22 MMOL/L (ref 22–29)
HCT VFR BLD AUTO: 28.3 % (ref 35–47)
HGB BLD-MCNC: 8.6 G/DL (ref 11.7–15.7)
MAGNESIUM SERPL-MCNC: 1.5 MG/DL (ref 1.7–2.3)
MCH RBC QN AUTO: 32.3 PG (ref 26.5–33)
MCHC RBC AUTO-ENTMCNC: 30.4 G/DL (ref 31.5–36.5)
MCV RBC AUTO: 106 FL (ref 78–100)
PHOSPHATE SERPL-MCNC: 3.2 MG/DL (ref 2.5–4.5)
PLATELET # BLD AUTO: 273 10E3/UL (ref 150–450)
POTASSIUM SERPL-SCNC: 4.9 MMOL/L (ref 3.4–5.3)
RBC # BLD AUTO: 2.66 10E6/UL (ref 3.8–5.2)
SODIUM SERPL-SCNC: 134 MMOL/L (ref 135–145)
TACROLIMUS BLD-MCNC: 14.2 UG/L (ref 5–15)
TME LAST DOSE: NORMAL H
TME LAST DOSE: NORMAL H
WBC # BLD AUTO: 4.8 10E3/UL (ref 4–11)

## 2024-10-24 PROCEDURE — 83735 ASSAY OF MAGNESIUM: CPT

## 2024-10-24 PROCEDURE — 87799 DETECT AGENT NOS DNA QUANT: CPT

## 2024-10-24 PROCEDURE — 84100 ASSAY OF PHOSPHORUS: CPT

## 2024-10-24 PROCEDURE — 80048 BASIC METABOLIC PNL TOTAL CA: CPT

## 2024-10-24 PROCEDURE — 36415 COLL VENOUS BLD VENIPUNCTURE: CPT

## 2024-10-24 PROCEDURE — 85027 COMPLETE CBC AUTOMATED: CPT

## 2024-10-24 PROCEDURE — 80197 ASSAY OF TACROLIMUS: CPT

## 2024-10-24 RX ORDER — HEPARIN SODIUM (PORCINE) LOCK FLUSH IV SOLN 100 UNIT/ML 100 UNIT/ML
5 SOLUTION INTRAVENOUS
OUTPATIENT
Start: 2024-10-24

## 2024-10-24 RX ORDER — METHYLPREDNISOLONE SODIUM SUCCINATE 40 MG/ML
40 INJECTION INTRAMUSCULAR; INTRAVENOUS
Start: 2024-10-24

## 2024-10-24 RX ORDER — ALBUTEROL SULFATE 0.83 MG/ML
2.5 SOLUTION RESPIRATORY (INHALATION)
OUTPATIENT
Start: 2024-10-24

## 2024-10-24 RX ORDER — MEPERIDINE HYDROCHLORIDE 25 MG/ML
25 INJECTION INTRAMUSCULAR; INTRAVENOUS; SUBCUTANEOUS
OUTPATIENT
Start: 2024-10-24

## 2024-10-24 RX ORDER — HEPARIN SODIUM,PORCINE 10 UNIT/ML
5-20 VIAL (ML) INTRAVENOUS DAILY PRN
OUTPATIENT
Start: 2024-10-24

## 2024-10-24 RX ORDER — DIPHENHYDRAMINE HYDROCHLORIDE 50 MG/ML
25 INJECTION INTRAMUSCULAR; INTRAVENOUS
Start: 2024-10-24

## 2024-10-24 RX ORDER — DIPHENHYDRAMINE HYDROCHLORIDE 50 MG/ML
50 INJECTION INTRAMUSCULAR; INTRAVENOUS
Start: 2024-10-24

## 2024-10-24 RX ORDER — EPINEPHRINE 1 MG/ML
0.3 INJECTION, SOLUTION, CONCENTRATE INTRAVENOUS EVERY 5 MIN PRN
OUTPATIENT
Start: 2024-10-24

## 2024-10-24 RX ORDER — ALBUTEROL SULFATE 90 UG/1
1-2 INHALANT RESPIRATORY (INHALATION)
Start: 2024-10-24

## 2024-10-24 NOTE — TELEPHONE ENCOUNTER
Spoke to patient about IVF and she is agreeable to have this done on the 30th when she comes to see Veronica. Orders placed.

## 2024-10-25 DIAGNOSIS — Z94.0 STATUS POST KIDNEY TRANSPLANT: ICD-10-CM

## 2024-10-25 DIAGNOSIS — Z48.298 AFTERCARE FOLLOWING ORGAN TRANSPLANT: ICD-10-CM

## 2024-10-25 LAB
BK VIRUS SPECIMEN TYPE: NORMAL
BKV DNA # SPEC NAA+PROBE: NOT DETECTED IU/ML

## 2024-10-25 RX ORDER — TACROLIMUS 5 MG/1
5 CAPSULE ORAL 2 TIMES DAILY
Qty: 60 CAPSULE | Refills: 11 | Status: SHIPPED | OUTPATIENT
Start: 2024-10-25 | End: 2024-11-12

## 2024-10-25 RX ORDER — TACROLIMUS 1 MG/1
1 CAPSULE ORAL 2 TIMES DAILY
Qty: 60 CAPSULE | Refills: 11 | Status: SHIPPED | OUTPATIENT
Start: 2024-10-25 | End: 2024-11-12

## 2024-10-28 ENCOUNTER — LAB (OUTPATIENT)
Dept: LAB | Facility: CLINIC | Age: 79
End: 2024-10-28
Payer: COMMERCIAL

## 2024-10-28 ENCOUNTER — TELEPHONE (OUTPATIENT)
Dept: TRANSPLANT | Facility: CLINIC | Age: 79
End: 2024-10-28

## 2024-10-28 DIAGNOSIS — Z20.828 CONTACT WITH AND (SUSPECTED) EXPOSURE TO OTHER VIRAL COMMUNICABLE DISEASES: ICD-10-CM

## 2024-10-28 DIAGNOSIS — Z98.890 OTHER SPECIFIED POSTPROCEDURAL STATES: ICD-10-CM

## 2024-10-28 DIAGNOSIS — R19.7 DIARRHEA, UNSPECIFIED TYPE: ICD-10-CM

## 2024-10-28 DIAGNOSIS — Z94.0 KIDNEY REPLACED BY TRANSPLANT: ICD-10-CM

## 2024-10-28 DIAGNOSIS — Z79.899 ENCOUNTER FOR LONG-TERM CURRENT USE OF MEDICATION: ICD-10-CM

## 2024-10-28 DIAGNOSIS — E86.0 DEHYDRATION: Primary | ICD-10-CM

## 2024-10-28 DIAGNOSIS — Z48.298 AFTERCARE FOLLOWING ORGAN TRANSPLANT: ICD-10-CM

## 2024-10-28 DIAGNOSIS — Z94.0 STATUS POST KIDNEY TRANSPLANT: ICD-10-CM

## 2024-10-28 DIAGNOSIS — E87.8 LOW BICARBONATE LEVEL: ICD-10-CM

## 2024-10-28 LAB
ANION GAP SERPL CALCULATED.3IONS-SCNC: 11 MMOL/L (ref 7–15)
BUN SERPL-MCNC: 26.2 MG/DL (ref 8–23)
CALCIUM SERPL-MCNC: 9 MG/DL (ref 8.8–10.4)
CHLORIDE SERPL-SCNC: 101 MMOL/L (ref 98–107)
CREAT SERPL-MCNC: 1.65 MG/DL (ref 0.51–0.95)
EGFRCR SERPLBLD CKD-EPI 2021: 32 ML/MIN/1.73M2
ERYTHROCYTE [DISTWIDTH] IN BLOOD BY AUTOMATED COUNT: 13.9 % (ref 10–15)
GLUCOSE SERPL-MCNC: 102 MG/DL (ref 70–99)
HCO3 SERPL-SCNC: 23 MMOL/L (ref 22–29)
HCT VFR BLD AUTO: 28.5 % (ref 35–47)
HGB BLD-MCNC: 8.7 G/DL (ref 11.7–15.7)
MCH RBC QN AUTO: 32.5 PG (ref 26.5–33)
MCHC RBC AUTO-ENTMCNC: 30.5 G/DL (ref 31.5–36.5)
MCV RBC AUTO: 106 FL (ref 78–100)
PLATELET # BLD AUTO: 266 10E3/UL (ref 150–450)
POTASSIUM SERPL-SCNC: 4.7 MMOL/L (ref 3.4–5.3)
RBC # BLD AUTO: 2.68 10E6/UL (ref 3.8–5.2)
SODIUM SERPL-SCNC: 135 MMOL/L (ref 135–145)
TACROLIMUS BLD-MCNC: 10.7 UG/L (ref 5–15)
TME LAST DOSE: NORMAL H
TME LAST DOSE: NORMAL H
WBC # BLD AUTO: 4.6 10E3/UL (ref 4–11)

## 2024-10-28 PROCEDURE — 80048 BASIC METABOLIC PNL TOTAL CA: CPT

## 2024-10-28 PROCEDURE — 85027 COMPLETE CBC AUTOMATED: CPT

## 2024-10-28 PROCEDURE — 80197 ASSAY OF TACROLIMUS: CPT

## 2024-10-28 PROCEDURE — 36415 COLL VENOUS BLD VENIPUNCTURE: CPT

## 2024-10-28 RX ORDER — ATORVASTATIN CALCIUM 10 MG/1
10 TABLET, FILM COATED ORAL DAILY
Qty: 90 TABLET | Refills: 3 | Status: SHIPPED | OUTPATIENT
Start: 2024-10-28

## 2024-10-28 RX ORDER — LOPERAMIDE HYDROCHLORIDE 2 MG/1
2 TABLET ORAL 4 TIMES DAILY PRN
Qty: 30 TABLET | Refills: 0 | Status: SHIPPED | OUTPATIENT
Start: 2024-10-28

## 2024-10-28 RX ORDER — SULFAMETHOXAZOLE AND TRIMETHOPRIM 400; 80 MG/1; MG/1
1 TABLET ORAL DAILY
Qty: 30 TABLET | Refills: 11 | Status: SHIPPED | OUTPATIENT
Start: 2024-10-28

## 2024-10-28 RX ORDER — MYCOPHENOLATE MOFETIL 250 MG/1
750 CAPSULE ORAL 2 TIMES DAILY
Qty: 720 CAPSULE | Refills: 3 | Status: SHIPPED | OUTPATIENT
Start: 2024-10-28

## 2024-10-28 RX ORDER — SODIUM BICARBONATE 650 MG/1
1300 TABLET ORAL 2 TIMES DAILY
Qty: 60 TABLET | Refills: 11 | Status: SHIPPED | OUTPATIENT
Start: 2024-10-28 | End: 2024-11-12

## 2024-10-28 NOTE — TELEPHONE ENCOUNTER
Patient Call: Medication Refill  Route to LPN  Instruct the patient to first contact their pharmacy. If they have called their pharmacy and require further assistance, route to LPN.    Pharmacy Name: [facility]  Pharmacy Location: [facility]  Name of Medication: Mycophenolate 250 mg- Socdum bicarb- 650 mg- TMP sulfa- 400-80- lopermide 2mg-  Atorvastatin 10 mg  When will the patient be out of this medication?: Less than 3 days (Route high priority)

## 2024-10-29 ENCOUNTER — TELEPHONE (OUTPATIENT)
Dept: TRANSPLANT | Facility: CLINIC | Age: 79
End: 2024-10-29
Payer: COMMERCIAL

## 2024-10-29 NOTE — TELEPHONE ENCOUNTER
----- Message from Angel Sandhu sent at 10/29/2024  9:00 AM CDT -----  Dariel Fuentes, can we speak with her to see how she's doing with diarrhea and intake?

## 2024-10-29 NOTE — TELEPHONE ENCOUNTER
"Patient states she is feeling overall fatigued and tired, off balance and just \"not herself\". Creatinine is elevated and she states she is drinking enough fluids. Diarrhea is a bit better but still has occasional episodes. Lasix has been stopped and she has IV fluids tomorrow. Will review with committee.  "

## 2024-10-30 ENCOUNTER — OFFICE VISIT (OUTPATIENT)
Dept: TRANSPLANT | Facility: CLINIC | Age: 79
End: 2024-10-30
Attending: SURGERY
Payer: COMMERCIAL

## 2024-10-30 ENCOUNTER — INFUSION THERAPY VISIT (OUTPATIENT)
Dept: INFUSION THERAPY | Facility: CLINIC | Age: 79
End: 2024-10-30
Attending: NURSE PRACTITIONER
Payer: COMMERCIAL

## 2024-10-30 VITALS
RESPIRATION RATE: 20 BRPM | SYSTOLIC BLOOD PRESSURE: 170 MMHG | DIASTOLIC BLOOD PRESSURE: 94 MMHG | OXYGEN SATURATION: 100 % | TEMPERATURE: 98.1 F | WEIGHT: 134.4 LBS | BODY MASS INDEX: 22.71 KG/M2 | HEART RATE: 55 BPM

## 2024-10-30 VITALS
DIASTOLIC BLOOD PRESSURE: 80 MMHG | HEART RATE: 52 BPM | TEMPERATURE: 98.2 F | OXYGEN SATURATION: 96 % | RESPIRATION RATE: 16 BRPM | SYSTOLIC BLOOD PRESSURE: 143 MMHG

## 2024-10-30 DIAGNOSIS — E86.0 DEHYDRATION: Primary | ICD-10-CM

## 2024-10-30 DIAGNOSIS — R19.7 DIARRHEA, UNSPECIFIED TYPE: ICD-10-CM

## 2024-10-30 DIAGNOSIS — Z48.298 AFTERCARE FOLLOWING ORGAN TRANSPLANT: ICD-10-CM

## 2024-10-30 LAB
ADV 40+41 DNA STL QL NAA+NON-PROBE: NEGATIVE
ALBUMIN UR-MCNC: NEGATIVE MG/DL
ANION GAP SERPL CALCULATED.3IONS-SCNC: 8 MMOL/L (ref 7–15)
APPEARANCE UR: CLEAR
ASTRO TYP 1-8 RNA STL QL NAA+NON-PROBE: NEGATIVE
BILIRUB UR QL STRIP: NEGATIVE
BUN SERPL-MCNC: 22.5 MG/DL (ref 8–23)
C CAYETANENSIS DNA STL QL NAA+NON-PROBE: NEGATIVE
C DIFF TOX B STL QL: NEGATIVE
CALCIUM SERPL-MCNC: 8.3 MG/DL (ref 8.8–10.4)
CAMPYLOBACTER DNA SPEC NAA+PROBE: NEGATIVE
CHLORIDE SERPL-SCNC: 107 MMOL/L (ref 98–107)
COLOR UR AUTO: NORMAL
CREAT SERPL-MCNC: 1.21 MG/DL (ref 0.51–0.95)
CRYPTOSP DNA STL QL NAA+NON-PROBE: NEGATIVE
E COLI O157 DNA STL QL NAA+NON-PROBE: NORMAL
E HISTOLYT DNA STL QL NAA+NON-PROBE: NEGATIVE
EAEC ASTA GENE ISLT QL NAA+PROBE: NEGATIVE
EC STX1+STX2 GENES STL QL NAA+NON-PROBE: NEGATIVE
EGFRCR SERPLBLD CKD-EPI 2021: 46 ML/MIN/1.73M2
EPEC EAE GENE STL QL NAA+NON-PROBE: NEGATIVE
ETEC LTA+ST1A+ST1B TOX ST NAA+NON-PROBE: NEGATIVE
G LAMBLIA DNA STL QL NAA+NON-PROBE: NEGATIVE
GLUCOSE SERPL-MCNC: 113 MG/DL (ref 70–99)
GLUCOSE UR STRIP-MCNC: NEGATIVE MG/DL
HCO3 SERPL-SCNC: 22 MMOL/L (ref 22–29)
HGB UR QL STRIP: NEGATIVE
KETONES UR STRIP-MCNC: NEGATIVE MG/DL
LEUKOCYTE ESTERASE UR QL STRIP: NEGATIVE
NITRATE UR QL: NEGATIVE
NOROVIRUS GI+II RNA STL QL NAA+NON-PROBE: NEGATIVE
P SHIGELLOIDES DNA STL QL NAA+NON-PROBE: NEGATIVE
PH UR STRIP: 6 [PH] (ref 5–7)
POTASSIUM SERPL-SCNC: 4.8 MMOL/L (ref 3.4–5.3)
RVA RNA STL QL NAA+NON-PROBE: NEGATIVE
SALMONELLA SP RPOD STL QL NAA+PROBE: NEGATIVE
SAPO I+II+IV+V RNA STL QL NAA+NON-PROBE: NEGATIVE
SHIGELLA SP+EIEC IPAH ST NAA+NON-PROBE: NEGATIVE
SODIUM SERPL-SCNC: 137 MMOL/L (ref 135–145)
SP GR UR STRIP: 1.02 (ref 1–1.03)
UROBILINOGEN UR STRIP-MCNC: NORMAL MG/DL
V CHOLERAE DNA SPEC QL NAA+PROBE: NEGATIVE
VIBRIO DNA SPEC NAA+PROBE: NEGATIVE
Y ENTEROCOL DNA STL QL NAA+PROBE: NEGATIVE

## 2024-10-30 PROCEDURE — 99000 SPECIMEN HANDLING OFFICE-LAB: CPT | Performed by: PATHOLOGY

## 2024-10-30 PROCEDURE — 99213 OFFICE O/P EST LOW 20 MIN: CPT | Mod: 24 | Performed by: NURSE PRACTITIONER

## 2024-10-30 PROCEDURE — 87507 IADNA-DNA/RNA PROBE TQ 12-25: CPT | Performed by: STUDENT IN AN ORGANIZED HEALTH CARE EDUCATION/TRAINING PROGRAM

## 2024-10-30 PROCEDURE — 36415 COLL VENOUS BLD VENIPUNCTURE: CPT

## 2024-10-30 PROCEDURE — 80048 BASIC METABOLIC PNL TOTAL CA: CPT

## 2024-10-30 PROCEDURE — 258N000003 HC RX IP 258 OP 636: Performed by: INTERNAL MEDICINE

## 2024-10-30 PROCEDURE — 87493 C DIFF AMPLIFIED PROBE: CPT | Performed by: STUDENT IN AN ORGANIZED HEALTH CARE EDUCATION/TRAINING PROGRAM

## 2024-10-30 PROCEDURE — G0463 HOSPITAL OUTPT CLINIC VISIT: HCPCS | Performed by: NURSE PRACTITIONER

## 2024-10-30 PROCEDURE — 81003 URINALYSIS AUTO W/O SCOPE: CPT

## 2024-10-30 PROCEDURE — 82947 ASSAY GLUCOSE BLOOD QUANT: CPT

## 2024-10-30 RX ORDER — ALBUTEROL SULFATE 90 UG/1
1-2 INHALANT RESPIRATORY (INHALATION)
Start: 2024-10-30

## 2024-10-30 RX ORDER — DIPHENHYDRAMINE HYDROCHLORIDE 50 MG/ML
25 INJECTION INTRAMUSCULAR; INTRAVENOUS
Start: 2024-10-30

## 2024-10-30 RX ORDER — DIPHENHYDRAMINE HYDROCHLORIDE 50 MG/ML
50 INJECTION INTRAMUSCULAR; INTRAVENOUS
Start: 2024-10-30

## 2024-10-30 RX ORDER — HEPARIN SODIUM,PORCINE 10 UNIT/ML
5-20 VIAL (ML) INTRAVENOUS DAILY PRN
OUTPATIENT
Start: 2024-10-30

## 2024-10-30 RX ORDER — EPINEPHRINE 1 MG/ML
0.3 INJECTION, SOLUTION INTRAMUSCULAR; SUBCUTANEOUS EVERY 5 MIN PRN
OUTPATIENT
Start: 2024-10-30

## 2024-10-30 RX ORDER — HEPARIN SODIUM (PORCINE) LOCK FLUSH IV SOLN 100 UNIT/ML 100 UNIT/ML
5 SOLUTION INTRAVENOUS
OUTPATIENT
Start: 2024-10-30

## 2024-10-30 RX ORDER — ALBUTEROL SULFATE 0.83 MG/ML
2.5 SOLUTION RESPIRATORY (INHALATION)
OUTPATIENT
Start: 2024-10-30

## 2024-10-30 RX ORDER — MEPERIDINE HYDROCHLORIDE 25 MG/ML
25 INJECTION INTRAMUSCULAR; INTRAVENOUS; SUBCUTANEOUS
OUTPATIENT
Start: 2024-10-30

## 2024-10-30 RX ORDER — METHYLPREDNISOLONE SODIUM SUCCINATE 40 MG/ML
40 INJECTION INTRAMUSCULAR; INTRAVENOUS
Start: 2024-10-30

## 2024-10-30 RX ADMIN — SODIUM CHLORIDE 1000 ML: 9 INJECTION, SOLUTION INTRAVENOUS at 09:46

## 2024-10-30 NOTE — PROGRESS NOTES
Transplant Surgery Progress Note    Transplants:  9/3/2024 (Kidney); Postoperative day:  57  S:   Here today for elevated Creatinine. Miki 0.78 and now 1.65  Received IV fluids and repeat BMP showing Cr much improved, down to 1.2.   Drinking over 2 liters at home.   Interested in dietician visit to optimize her diet.   Appetite is fair.   CMV and BK from last week negative.       Did have diarrhea, stool studies negative. Did full week of imodium, stool now more formed. None yesterday and 2 episodes today.   No F/C. No CP or SOB.     Feels weak x3 weeks.  L hip pain around 3 weeks as well, tylenol helps. Walking ok.   No URI or UTI symptoms.     BPs at home: 155/84, 124/60.     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   Transplant Date:  9/3/2024 (Kidney)   Ureteral Stent:  No   Crossmatch:  negative   DSA at Tx:  No  Baseline Cr: ~0.8 - 1   [place] DSA: No    Acute Rejection Hx:  No    Present Maintenance Immunosuppression:  Tacrolimus and Mycophenolate mofetil    CMV IgG Ab Discordance:  No  EBV IgG Ab Discordance:  No    BK Viremia:  No  EBV Viremia:  No    Transplant Coordinator: Gina Huizar     Transplant Office Phone Number: [phone]     Immunosuppressant Medications       Immunosuppressive Agents Disp Start End     mycophenolate (GENERIC EQUIVALENT) 250 MG capsule 720 capsule 10/28/2024 --    Sig - Route: Take 3 capsules (750 mg) by mouth 2 times daily. - Oral    Class: E-Prescribe    Notes to Pharmacy: TXP DT 9/3/2024 ([facility]) TXP Dischg DT 9/6/2024 DX Kidney replaced by transplant [email or web address] [facility] ([place], [place])    Renewals       Renewal requests to authorizing provider (Joe Adames MD) <b>prohibited</b>    Renewal provider: Angel Gutierrez MD             tacrolimus (GENERIC EQUIVALENT) 1 MG capsule 60 capsule 10/25/2024 --    Sig - Route: Take 1 capsule (1 mg) by mouth 2 times daily. Total  dose: 6mg twice daily - Oral    Class: E-Prescribe    Notes to Pharmacy: TXP DT 9/3/2024 ([facility]) TXP Dischg DT 9/6/2024 DX Kidney replaced by transplant Z94.0 TX [facility] ([place], [place])    No prior authorization was found for this prescription.    Found prior authorization for another prescription for the same medication: Closed     tacrolimus (GENERIC EQUIVALENT) 5 MG capsule 60 capsule 10/25/2024 --    Sig - Route: Take 1 capsule (5 mg) by mouth 2 times daily. 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.0 [facility] ([place], [place])            Possible Immunosuppression-related side effects:   []             headache  []             vivid dreams  []             irritability  []             cognitive difficuties  []             fine tremor  []             nausea  []             diarrhea  []             neuropathy      []             edema  []             renal calcineurin toxicity  []             hyperkalemia  []             post-transplant diabetes  []             decreased appetite  []             increased appetite  []             other:  []             none    Prescription Medications as of 10/30/2024         Rx Number Disp Refills Start End Last Dispensed Date Next Fill Date Owning Pharmacy    acetaminophen (TYLENOL) 325 MG tablet  -- -- 9/6/2024 --       Sig: Take 2 tablets (650 mg) by mouth every 4 hours as needed for other (For optimal non-opioid multimodal pain management to improve pain control.).    Class: OTC    Route: Oral    atorvastatin (LIPITOR) 10 MG tablet  90 tablet 3 10/28/2024 --   [facility] [place], [place] - [address] [place] [address]    Sig: Take 1 tablet (10 mg) by mouth daily.    Class: E-Prescribe    Route: Oral    Renewals       Renewal requests to authorizing  provider (Joe Adames MD) <b>prohibited</b>    Renewal provider: Angel Gutierrez MD            calcitRIOL (ROCALTROL) 0.25 MCG capsule  -- -- 10/4/2024 --       Sig: Take 1 capsule by mouth daily.    Class: Historical    Route: Oral    calcium carbonate 500 mg, elemental, (OSCAL) 500 MG tablet  180 tablet 3 9/30/2024 --   [address]    Sig: Take 1 tablet (500 mg) by mouth 2 times daily.    Class: E-Prescribe    Route: Oral    diphenhydrAMINE (BENADRYL) 50 MG capsule  -- --  --       Sig: Take 100 mg by mouth nightly as needed for sleep.    Class: Historical    Route: Oral    loperamide (IMODIUM A-D) 2 MG tablet  30 tablet 0 10/28/2024 --   [address]    Sig: Take 1 tablet (2 mg) by mouth 4 times daily as needed for diarrhea.    Class: E-Prescribe    Route: Oral    Renewals       Renewal requests to authorizing provider (Joe Adames MD) <b>prohibited</b>    Renewal provider: Angel Gutierrez MD            magnesium glycinate 100 MG CAPS capsule  360 capsule 1 10/9/2024 4/7/2025   [address]    Sig: Take 2 capsules (200 mg) by mouth 2 times daily.    Class: E-Prescribe    Route: Oral    metoprolol tartrate (LOPRESSOR) 25 MG tablet  180 tablet 3 9/30/2024 --   [address]    Sig: Take 1 tablet (25 mg) by mouth 2 times daily.    Class: E-Prescribe    Route: Oral    mycophenolate (GENERIC EQUIVALENT) 250 MG capsule  720 capsule 3 10/28/2024 --   [address]    Sig: Take 3 capsules (750 mg) by mouth 2 times daily.    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    Class: Historical    Route: Oral    phosphorus tablet 250 mg (PHOSPHA 250 NEUTRAL) 250 MG per tablet  180 tablet 0 10/14/2024 --   [facility] [address]     Sig: Take 1 tablet (250 mg) by mouth 2 times daily.    Class: E-Prescribe    Route: Oral    potassium chloride brielle ER (KLOR-CON M20) 20 MEQ CR tablet  60 tablet 1 9/16/2024 --   [address]    Sig: Take 1 tablet (20 mEq) by mouth 2 times daily. You can dissolve in 4 ounces of water, let sit for 2 minutes and then stir well and drink quickly.    Class: E-Prescribe    Route: Oral    sodium bicarbonate 650 MG tablet  60 tablet 11 10/28/2024 --   [address]    Sig: Take 2 tablets (1,300 mg) by mouth 2 times daily.    Class: E-Prescribe    Route: Oral    Renewals       Renewal requests to authorizing provider (Joe Adames MD) <b>prohibited</b>    Renewal provider: Angel Gutierrez MD            sulfamethoxazole-trimethoprim (BACTRIM) 400-80 MG tablet  30 tablet 11 10/28/2024 --   [address]    Sig: Take 1 tablet by mouth daily.    Class: E-Prescribe    Route: Oral    Renewals       Renewal requests to authorizing provider (Joe Adames MD) <b>prohibited</b>    Renewal provider: Angel Gutierrez MD            tacrolimus (GENERIC EQUIVALENT) 1 MG capsule  60 capsule 11 10/25/2024 --   [facility]  [address]    Sig: Take 1 capsule (1 mg) by mouth 2 times daily. 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    Found prior authorization for another prescription for the same medication: Closed    tacrolimus (GENERIC EQUIVALENT) 5 MG capsule  60 capsule 11 10/25/2024 --   [facility] - [place], [place] - [address]    Sig: Take 1 capsule (5 mg) by mouth 2 times daily. 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    Class: Historical    Route: Oral    valGANciclovir (VALCYTE) 450 MG tablet  60 tablet 5 9/9/2024 --   [facility] [address]    Sig: Take 2 tablets (900 mg) by mouth daily.    Class: E-Prescribe    Route: Oral          Clinic-Administered Medications as of 10/30/2024         Dose Frequency Start End    sodium chloride 0.9% BOLUS 1,000 mL (Completed) 1,000 mL ONCE 10/30/2024 10/30/2024    Admin Instructions: Concomitant fluids.    Class: E-Prescribe    Route: Intravenous            O:   Temp:  [98.1  F (36.7  C)-98.2  F (36.8  C)] 98.1  F (36.7  C)  Pulse:  [52-55] 55  Resp:  [16-20] 20  BP: (143-170)/(80-94) 170/94  SpO2:  [96 %-100 %] 100 %  General Appearance: in no apparent distress.   Skin: Normal, no rashes or jaundice  Heart: regular rate and rhythm  Lungs: easy respirations, no audible wheezing.CTA throughout.   Abdomen: rounded, The wound is dry and intact,  without hernia. The abdomen is non-tender. The kidney graft is not tender.  There is no ascites.  Extremities: Tremor absent.   Edema: absent.         Latest Ref Rng & Units 10/28/2024     8:00 AM 10/24/2024     8:19 AM 10/21/2024     8:06 AM 10/17/2024     7:59 AM 10/14/2024     8:01 AM   Transplant Immunosuppression Labs   Creat 0.51 - 0.95 mg/dL 1.65  1.38  1.44  1.48  1.22    Urea Nitrogen 8.0 - 23.0 mg/dL 26.2  23.1  26.9  23.8  15.8    WBC 4.0 - 11.0 10e3/uL 4.6  4.8  4.0  4.6  5.6        Chemistries:   Recent Labs   Lab Test 10/28/24  0800   BUN 26.2*   CR 1.65*   GFRESTIMATED 32*   *     Lab Results   Component Value Date    A1C 4.4 09/02/2024     Recent Labs   Lab Test 10/09/24  0828 09/02/24 2157   ALBUMIN 3.8 4.0   BILITOTAL  --  0.3   ALKPHOS  --  50   AST  --  13   ALT  --  <5     Urine Studies:  Recent Labs   Lab Test 10/30/24  1050 09/17/24  1051 09/02/24  2345   COLOR Light Yellow   < > Light Yellow   APPEARANCE Clear   < > Slightly Cloudy*   URINEGLC Negative   < > Negative   URINEBILI Negative   < > Negative   URINEKETONE Negative   < > Negative   SG 1.016   < > 1.011   UBLD Negative   < > Negative   URINEPH 6.0   < > 6.0   PROTEIN Negative   < > 100*   NITRITE Negative   < > Negative   LEUKEST Negative   < > Large*   RBCU  --   --  1   WBCU  --   --  >182*    < > = values in this interval not displayed.     No lab results found.  Hematology:   Recent Labs   Lab Test 10/28/24  0800 10/24/24  0819 10/21/24  0806   HGB 8.7* 8.6* 8.4*    273 254   WBC 4.6 4.8 4.0     Coags:   Recent Labs   Lab Test 09/02/24  2157 10/10/19  1431   INR 0.93 0.93     HLA antibodies:   SA1 Hi Risk [facility]   Date Value Ref Range Status   04/22/2020 None  Final     SA1 HI RISK [facility]   Date Value Ref Range Status   10/03/2024 None  Final     SA1 Mod Risk [facility]   Date Value Ref Range Status   04/22/2020 None  Final     SA1 MOD RISK [facility]   Date Value Ref Range Status   10/03/2024 None  Final     SA2 Hi Risk [facility]   Date  Value Ref Range Status   04/22/2020 None  Final     SA2 HI RISK [facility]   Date Value Ref Range Status   10/03/2024 None  Final     SA2 Mod Risk [facility]   Date Value Ref Range Status   04/22/2020 None  Final     SA2 MOD RISK [facility]   Date Value Ref Range Status   10/03/2024 None  Final       Assessment: Lexy Cobb is doing fairly well s/p DDKT:  Issues we addressed during her visit include:    Plan:    1. Graft function: Cr down 1.6 - >1.2 post IV fluids. Rec she push oral fluids. Set up another IV fluids visit next week.   2. Immunosuppression Management: No change    .  Complexity of management:Medium.  Contributing factors:  elevated Cr, dehydration, diarrhea.   3. Diarrhea improving. Can continue Imodium PRN.   No need for renal biopsy with Cr down trend. Repeat CMV with labs next week.   Followup:     Total Time: 20 min,   Counselling Time: 10 min.    Shannan Christianson NP  Reviewed case with Dr. Martin.

## 2024-10-30 NOTE — LETTER
10/30/2024      Lexy Cobb  [address]  [facility] [address]      Dear Colleague,    Thank you for referring your patient, Lexy Cobb, to the [facility] TRANSPLANT CLINIC. Please see a copy of my visit note below.    Transplant Surgery Progress Note    Transplants:  9/3/2024 (Kidney); Postoperative day:  57  S:   Here today for elevated Creatinine. Miki 0.78 and now 1.65  Received IV fluids and repeat BMP showing Cr much improved, down to 1.2.   Drinking over 2 liters at home.   Interested in dietician visit to optimize her diet.   Appetite is fair.   CMV and BK from last week negative.       Did have diarrhea, stool studies negative. Did full week of imodium, stool now more formed. None yesterday and 2 episodes today.   No F/C. No CP or SOB.     Feels weak x3 weeks.  L hip pain around 3 weeks as well, tylenol helps. Walking ok.   No URI or UTI symptoms.     BPs at home: 155/84, 124/60.     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   Transplant Date:  9/3/2024 (Kidney)   Ureteral Stent:  No   Crossmatch:  negative   DSA at Tx:  No  Baseline Cr: ~0.8 - 1   DeNovo DSA: No    Acute Rejection Hx:  No    Present Maintenance Immunosuppression:  Tacrolimus and Mycophenolate mofetil    CMV IgG Ab Discordance:  No  EBV IgG Ab Discordance:  No    BK Viremia:  No  EBV Viremia:  No    Transplant Coordinator: Gina Huizar     Transplant Office Phone Number: [phone]     Immunosuppressant Medications       Immunosuppressive Agents Disp Start End     mycophenolate (GENERIC EQUIVALENT) 250 MG capsule 720 capsule 10/28/2024 --    Sig - Route: Take 3 capsules (750 mg) by mouth 2 times daily. 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 ([place], [place])    No prior authorization was found for this prescription.    Found prior authorization for another prescription for the same medication: Closed     tacrolimus (GENERIC EQUIVALENT) 5 MG capsule 60 capsule 10/25/2024 --    Sig - Route: Take 1 capsule (5 mg) by mouth 2 times daily. 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 ([place], [place])            Possible Immunosuppression-related side effects:   []             headache  []             vivid dreams  []             irritability  []             cognitive difficuties  []             fine tremor  []             nausea  []             diarrhea  []             neuropathy      []             edema  []             renal calcineurin toxicity  []             hyperkalemia  []             post-transplant diabetes  []             decreased appetite  []             increased appetite  []             other:  []             none    Prescription Medications as of 10/30/2024         Rx Number Disp Refills Start End Last Dispensed Date Next Fill Date Owning Pharmacy    acetaminophen (TYLENOL) 325 MG tablet  -- -- 9/6/2024 --       Sig: Take 2 tablets (650 mg) by mouth every 4 hours as needed for other (For optimal non-opioid multimodal pain management to improve pain control.).    Class: OTC    Route: Oral    atorvastatin  (LIPITOR) 10 MG tablet  90 tablet 3 10/28/2024 --   [address]    Sig: Take 1 tablet (10 mg) by mouth daily.    Class: E-Prescribe    Route: Oral    Renewals       Renewal requests to authorizing provider (Joe Adames MD) <b>prohibited</b>    Renewal provider: Angel Gutierrez MD            calcitRIOL (ROCALTROL) 0.25 MCG capsule  -- -- 10/4/2024 --       Sig: Take 1 capsule by mouth daily.    Class: Historical    Route: Oral    calcium carbonate 500 mg, elemental, (OSCAL) 500 MG tablet  180 tablet 3 9/30/2024 --   [address]    Sig: Take 1 tablet (500 mg) by mouth 2 times daily.    Class: E-Prescribe    Route: Oral    diphenhydrAMINE (BENADRYL) 50 MG capsule  -- --  --       Sig: Take 100 mg by mouth nightly as needed for sleep.    Class: Historical    Route: Oral    loperamide (IMODIUM A-D) 2 MG tablet  30 tablet 0 10/28/2024 --   [address]    Sig: Take 1 tablet (2 mg) by mouth 4 times daily as needed for diarrhea.    Class: E-Prescribe    Route: Oral    Renewals       Renewal requests to authorizing provider (Joe Adames MD) <b>prohibited</b>    Renewal provider: Angel Gutierrez MD            magnesium glycinate 100 MG CAPS capsule  360 capsule 1 10/9/2024 4/7/2025   [address]    Sig: Take 2 capsules (200 mg) by mouth 2 times daily.    Class: E-Prescribe    Route: Oral    metoprolol tartrate (LOPRESSOR) 25 MG tablet  180 tablet 3 9/30/2024 --   [address]    Sig: Take 1 tablet (25 mg) by mouth 2 times daily.    Class: E-Prescribe    Route: Oral    mycophenolate (GENERIC EQUIVALENT) 250 MG capsule  720 capsule 3 10/28/2024 --   [facility] [place], [place] - [address] [facility] [place]    Sig: Take  3 capsules (750 mg) by mouth 2 times daily.    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    Class: Historical    Route: Oral    phosphorus tablet 250 mg (PHOSPHA 250 NEUTRAL) 250 MG per tablet  180 tablet 0 10/14/2024 --   [facility] [address] [facility]    Sig: Take 1 tablet (250 mg) by mouth 2 times daily.    Class: E-Prescribe    Route: Oral    potassium chloride brielle ER (KLOR-CON M20) 20 MEQ CR tablet  60 tablet 1 9/16/2024 --   [address]    Sig: Take 1 tablet (20 mEq) by mouth 2 times daily. You can dissolve in 4 ounces of water, let sit for 2 minutes and then stir well and drink quickly.    Class: E-Prescribe    Route: Oral    sodium bicarbonate 650 MG tablet  60 tablet 11 10/28/2024 --   [address]    Sig: Take 2 tablets (1,300 mg) by mouth 2 times daily.    Class: E-Prescribe    Route: Oral    Renewals       Renewal requests to authorizing provider (Joe Adames MD) <b>prohibited</b>    Renewal provider: Angel Gutierrez MD            sulfamethoxazole-trimethoprim (BACTRIM) 400-80 MG tablet  30 tablet 11 10/28/2024 --   [address]    Sig: Take 1 tablet by mouth daily.    Class: E-Prescribe    Route: Oral    Renewals       Renewal requests  to authorizing provider (Joe Adames MD) <b>prohibited</b>    Renewal provider: Angel Gutierrez MD            tacrolimus (GENERIC EQUIVALENT) 1 MG capsule  60 capsule 11 10/25/2024 --   [facility] [place] [address]    Sig: Take 1 capsule (1 mg) by mouth 2 times daily. Total dose: 6mg twice daily    Class: E-Prescribe    Notes to Pharmacy: TXP DT 9/3/2024 (Kidney) TXP Dischg DT 9/6/2024 DX Kidney replaced by transplant Z94.0 TX [facility] ([place], [place])    Route: Oral    No prior authorization was found for this prescription.    Found prior authorization for another prescription for the same medication: Closed    tacrolimus (GENERIC EQUIVALENT) 5 MG capsule  60 capsule 11 10/25/2024 --   [facility] - [address]    Sig: Take 1 capsule (5 mg) by mouth 2 times daily. 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    Class: Historical    Route: Oral    valGANciclovir (VALCYTE) 450 MG tablet  60 tablet 5 9/9/2024 --   [facility] [address]    Sig: Take 2 tablets (900 mg) by mouth daily.    Class: E-Prescribe    Route: Oral          Clinic-Administered Medications as of 10/30/2024         Dose Frequency Start End    sodium chloride 0.9% BOLUS 1,000 mL (Completed) 1,000 mL ONCE 10/30/2024 10/30/2024    Admin Instructions: Concomitant fluids.    Class: E-Prescribe    Route: Intravenous            O:   Temp:  [98.1  F (36.7  C)-98.2  F (36.8  C)] 98.1  F (36.7  C)  Pulse:  [52-55] 55  Resp:  [16-20] 20  BP: (143-170)/(80-94)  170/94  SpO2:  [96 %-100 %] 100 %  General Appearance: in no apparent distress.   Skin: Normal, no rashes or jaundice  Heart: regular rate and rhythm  Lungs: easy respirations, no audible wheezing.CTA throughout.   Abdomen: rounded, The wound is dry and intact, without hernia. The abdomen is non-tender. The kidney graft is not tender.  There is no ascites.  Extremities: Tremor absent.   Edema: absent.         Latest Ref Rng & Units 10/28/2024     8:00 AM 10/24/2024     8:19 AM 10/21/2024     8:06 AM 10/17/2024     7:59 AM 10/14/2024     8:01 AM   Transplant Immunosuppression Labs   Creat 0.51 - 0.95 mg/dL 1.65  1.38  1.44  1.48  1.22    Urea Nitrogen 8.0 - 23.0 mg/dL 26.2  23.1  26.9  23.8  15.8    WBC 4.0 - 11.0 10e3/uL 4.6  4.8  4.0  4.6  5.6        Chemistries:   Recent Labs   Lab Test 10/28/24  0800   BUN 26.2*   CR 1.65*   GFRESTIMATED 32*   *     Lab Results   Component Value Date    A1C 4.4 09/02/2024     Recent Labs   Lab Test 10/09/24  0828 09/02/24  2157   ALBUMIN 3.8 4.0   BILITOTAL  --  0.3   ALKPHOS  --  50   AST  --  13   ALT  --  <5     Urine Studies:  Recent Labs   Lab Test 10/30/24  1050 09/17/24  1051 09/02/24  2345   COLOR Light Yellow   < > Light Yellow   APPEARANCE Clear   < > Slightly Cloudy*   URINEGLC Negative   < > Negative   URINEBILI Negative   < > Negative   URINEKETONE Negative   < > Negative   SG 1.016   < > 1.011   UBLD Negative   < > Negative   URINEPH 6.0   < > 6.0   PROTEIN Negative   < > 100*   NITRITE Negative   < > Negative   LEUKEST Negative   < > Large*   RBCU  --   --  1   WBCU  --   --  >182*    < > = values in this interval not displayed.     No lab results found.  Hematology:   Recent Labs   Lab Test 10/28/24  0800 10/24/24  0819 10/21/24  0806   HGB 8.7* 8.6* 8.4*    273 254   WBC 4.6 4.8 4.0     Coags:   Recent Labs   Lab Test 09/02/24  2157 10/10/19  1431   INR 0.93 0.93     HLA antibodies:   SA1 Hi Risk Ashley   Date Value Ref Range Status   04/22/2020 None   Final     SA1 HI RISK [facility]   Date Value Ref Range Status   10/03/2024 None  Final     SA1 Mod Risk [facility]   Date Value Ref Range Status   04/22/2020 None  Final     SA1 MOD RISK [facility]   Date Value Ref Range Status   10/03/2024 None  Final     SA2 Hi Risk [facility]   Date Value Ref Range Status   04/22/2020 None  Final     SA2 HI RISK [facility]   Date Value Ref Range Status   10/03/2024 None  Final     SA2 Mod Risk [facility]   Date Value Ref Range Status   04/22/2020 None  Final     SA2 MOD RISK [facility]   Date Value Ref Range Status   10/03/2024 None  Final       Assessment: Lexy Cobb is doing fairly well s/p DDKT:  Issues we addressed during her visit include:    Plan:    1. Graft function: Cr down 1.6 - >1.2 post IV fluids. Rec she push oral fluids. Set up another IV fluids visit next week.   2. Immunosuppression Management: No change    .  Complexity of management:Medium.  Contributing factors:  elevated Cr, dehydration, diarrhea.   3. Diarrhea improving. Can continue Imodium PRN.   No need for renal biopsy with Cr down trend. Repeat CMV with labs next week.   Followup:     Total Time: 20 min,   Counselling Time: 10 min.    Shannan Christianson NP  Reviewed case with Dr. Martin.        Again, thank you for allowing me to participate in the care of your patient.        Sincerely,        YOLETTE Cueva CNP

## 2024-10-30 NOTE — NURSING NOTE
"Chief Complaint   Patient presents with    Transplant Evaluation     Kidney transplant follow-up     Vital signs:  Temp: 98.1  F (36.7  C) Temp src: Oral BP: (!) 170/94 Pulse: 55   Resp: 20 SpO2: 100 %       Weight: 61 kg (134 lb 6.4 oz)  Estimated body mass index is 22.71 kg/m  as calculated from the following:    Height as of 7/1/24: 1.638 m (5' 4.5\").    Weight as of this encounter: 61 kg (134 lb 6.4 oz).      Jung Moya RN on 10/30/2024 at 11:24 AM    "

## 2024-10-30 NOTE — PROGRESS NOTES
Infusion Nursing Note:  Lexy Cobb presents today for IVF.    Patient seen by provider today: No   present during visit today: Not Applicable.    Note: Per orders, 1 L NS infsued.  Administrations This Visit       sodium chloride 0.9% BOLUS 1,000 mL       Admin Date  10/30/2024 Action  $New Bag Dose  1,000 mL Route  Intravenous Documented By  Opal Bourne, RN                     Intravenous Access:  Peripheral IV placed.  Lab drawn post infusion per orders.    Treatment Conditions:  None.      Post Infusion Assessment:  Patient tolerated infusion without incident.  Blood return noted pre and post infusion.  Site patent and intact, free from redness, edema or discomfort.  No evidence of extravasations.  Access discontinued per protocol.       Discharge Plan:   Discharge instructions reviewed with: Patient.  Patient and/or family verbalized understanding of discharge instructions and all questions answered.  AVS to patient via [employer].  Patient will return to her provider as needed for next appointment.   Patient discharged in stable condition accompanied by: self.  Departure Mode: Ambulatory.    BP (!) 143/80 (BP Location: Left arm, Patient Position: Sitting, Cuff Size: Adult Regular)   Pulse 52   Temp 98.2  F (36.8  C) (Oral)   Resp 16   SpO2 96%      Opal Bourne, RN

## 2024-10-30 NOTE — PATIENT INSTRUCTIONS
Dear Lexy Cobb    Thank you for choosing [facility] ([facility] for your infusion.  The following information is a summary of our appointment as well as important reminders.          If you are a transplant patient and require transplant education, please click on this link: [email or web address].    If you have any questions on your upcoming Specialty Infusion appointments, please call scheduling at [phone].  It was a pleasure taking care of you today.    Sincerely,    [facility]  [address] [place] [address]  [id number]  Phone:  [phone]

## 2024-10-31 ENCOUNTER — LAB (OUTPATIENT)
Dept: LAB | Facility: CLINIC | Age: 79
End: 2024-10-31
Payer: COMMERCIAL

## 2024-10-31 DIAGNOSIS — Z94.0 KIDNEY REPLACED BY TRANSPLANT: ICD-10-CM

## 2024-10-31 DIAGNOSIS — N18.6 ESRD (END STAGE RENAL DISEASE) (H): ICD-10-CM

## 2024-10-31 DIAGNOSIS — Z48.298 AFTERCARE FOLLOWING ORGAN TRANSPLANT: ICD-10-CM

## 2024-10-31 DIAGNOSIS — Z79.899 ENCOUNTER FOR LONG-TERM CURRENT USE OF MEDICATION: ICD-10-CM

## 2024-10-31 DIAGNOSIS — Z76.82 ORGAN TRANSPLANT CANDIDATE: ICD-10-CM

## 2024-10-31 DIAGNOSIS — Z20.828 CONTACT WITH AND (SUSPECTED) EXPOSURE TO OTHER VIRAL COMMUNICABLE DISEASES: ICD-10-CM

## 2024-10-31 DIAGNOSIS — Z98.890 OTHER SPECIFIED POSTPROCEDURAL STATES: ICD-10-CM

## 2024-10-31 LAB
ALBUMIN MFR UR ELPH: <6 MG/DL
ANION GAP SERPL CALCULATED.3IONS-SCNC: 9 MMOL/L (ref 7–15)
BUN SERPL-MCNC: 18.8 MG/DL (ref 8–23)
CALCIUM SERPL-MCNC: 9.1 MG/DL (ref 8.8–10.4)
CHLORIDE SERPL-SCNC: 105 MMOL/L (ref 98–107)
CMV DNA SPEC NAA+PROBE-ACNC: NOT DETECTED IU/ML
CREAT SERPL-MCNC: 1.13 MG/DL (ref 0.51–0.95)
CREAT UR-MCNC: 28.1 MG/DL
EGFRCR SERPLBLD CKD-EPI 2021: 50 ML/MIN/1.73M2
ERYTHROCYTE [DISTWIDTH] IN BLOOD BY AUTOMATED COUNT: 14 % (ref 10–15)
GLUCOSE SERPL-MCNC: 93 MG/DL (ref 70–99)
HCO3 SERPL-SCNC: 24 MMOL/L (ref 22–29)
HCT VFR BLD AUTO: 27.8 % (ref 35–47)
HGB BLD-MCNC: 8.5 G/DL (ref 11.7–15.7)
MAGNESIUM SERPL-MCNC: 1.7 MG/DL (ref 1.7–2.3)
MCH RBC QN AUTO: 32.7 PG (ref 26.5–33)
MCHC RBC AUTO-ENTMCNC: 30.6 G/DL (ref 31.5–36.5)
MCV RBC AUTO: 107 FL (ref 78–100)
PHOSPHATE SERPL-MCNC: 3.2 MG/DL (ref 2.5–4.5)
PLATELET # BLD AUTO: 228 10E3/UL (ref 150–450)
POTASSIUM SERPL-SCNC: 4.4 MMOL/L (ref 3.4–5.3)
PROT/CREAT 24H UR: NORMAL MG/G{CREAT}
RBC # BLD AUTO: 2.6 10E6/UL (ref 3.8–5.2)
SODIUM SERPL-SCNC: 138 MMOL/L (ref 135–145)
TACROLIMUS BLD-MCNC: 10.3 UG/L (ref 5–15)
TME LAST DOSE: NORMAL H
TME LAST DOSE: NORMAL H
WBC # BLD AUTO: 3.6 10E3/UL (ref 4–11)

## 2024-10-31 PROCEDURE — 86833 HLA CLASS II HIGH DEFIN QUAL: CPT

## 2024-10-31 PROCEDURE — 80048 BASIC METABOLIC PNL TOTAL CA: CPT

## 2024-10-31 PROCEDURE — 84156 ASSAY OF PROTEIN URINE: CPT

## 2024-10-31 PROCEDURE — 84100 ASSAY OF PHOSPHORUS: CPT

## 2024-10-31 PROCEDURE — 80180 DRUG SCRN QUAN MYCOPHENOLATE: CPT

## 2024-10-31 PROCEDURE — 83735 ASSAY OF MAGNESIUM: CPT

## 2024-10-31 PROCEDURE — 36415 COLL VENOUS BLD VENIPUNCTURE: CPT

## 2024-10-31 PROCEDURE — 86832 HLA CLASS I HIGH DEFIN QUAL: CPT

## 2024-10-31 PROCEDURE — 85027 COMPLETE CBC AUTOMATED: CPT

## 2024-10-31 PROCEDURE — 87799 DETECT AGENT NOS DNA QUANT: CPT

## 2024-10-31 PROCEDURE — 80197 ASSAY OF TACROLIMUS: CPT

## 2024-11-01 LAB
BK VIRUS SPECIMEN TYPE: NORMAL
BKV DNA # SPEC NAA+PROBE: NOT DETECTED IU/ML
MYCOPHENOLATE SERPL LC/MS/MS-MCNC: 1.36 MG/L (ref 1–3.5)
MYCOPHENOLATE-G SERPL LC/MS/MS-MCNC: 61.5 MG/L (ref 30–95)
TME LAST DOSE: NORMAL H
TME LAST DOSE: NORMAL H

## 2024-11-04 ENCOUNTER — LAB (OUTPATIENT)
Dept: LAB | Facility: CLINIC | Age: 79
End: 2024-11-04
Payer: COMMERCIAL

## 2024-11-04 DIAGNOSIS — Z98.890 OTHER SPECIFIED POSTPROCEDURAL STATES: ICD-10-CM

## 2024-11-04 DIAGNOSIS — Z20.828 CONTACT WITH AND (SUSPECTED) EXPOSURE TO OTHER VIRAL COMMUNICABLE DISEASES: ICD-10-CM

## 2024-11-04 DIAGNOSIS — Z94.0 KIDNEY REPLACED BY TRANSPLANT: ICD-10-CM

## 2024-11-04 DIAGNOSIS — Z48.298 AFTERCARE FOLLOWING ORGAN TRANSPLANT: ICD-10-CM

## 2024-11-04 DIAGNOSIS — Z79.899 ENCOUNTER FOR LONG-TERM CURRENT USE OF MEDICATION: ICD-10-CM

## 2024-11-04 LAB
ANION GAP SERPL CALCULATED.3IONS-SCNC: 10 MMOL/L (ref 7–15)
BUN SERPL-MCNC: 20.5 MG/DL (ref 8–23)
CALCIUM SERPL-MCNC: 8.7 MG/DL (ref 8.8–10.4)
CHLORIDE SERPL-SCNC: 106 MMOL/L (ref 98–107)
CREAT SERPL-MCNC: 1.23 MG/DL (ref 0.51–0.95)
EGFRCR SERPLBLD CKD-EPI 2021: 45 ML/MIN/1.73M2
ERYTHROCYTE [DISTWIDTH] IN BLOOD BY AUTOMATED COUNT: 14.2 % (ref 10–15)
GLUCOSE SERPL-MCNC: 97 MG/DL (ref 70–99)
HCO3 SERPL-SCNC: 23 MMOL/L (ref 22–29)
HCT VFR BLD AUTO: 26.9 % (ref 35–47)
HGB BLD-MCNC: 8.5 G/DL (ref 11.7–15.7)
MCH RBC QN AUTO: 33.7 PG (ref 26.5–33)
MCHC RBC AUTO-ENTMCNC: 31.6 G/DL (ref 31.5–36.5)
MCV RBC AUTO: 107 FL (ref 78–100)
PLATELET # BLD AUTO: 182 10E3/UL (ref 150–450)
POTASSIUM SERPL-SCNC: 4.7 MMOL/L (ref 3.4–5.3)
RBC # BLD AUTO: 2.52 10E6/UL (ref 3.8–5.2)
SODIUM SERPL-SCNC: 139 MMOL/L (ref 135–145)
TACROLIMUS BLD-MCNC: 10.4 UG/L (ref 5–15)
TME LAST DOSE: NORMAL H
TME LAST DOSE: NORMAL H
WBC # BLD AUTO: 4.6 10E3/UL (ref 4–11)

## 2024-11-04 PROCEDURE — 80048 BASIC METABOLIC PNL TOTAL CA: CPT

## 2024-11-04 PROCEDURE — 80197 ASSAY OF TACROLIMUS: CPT

## 2024-11-04 PROCEDURE — 85027 COMPLETE CBC AUTOMATED: CPT

## 2024-11-04 PROCEDURE — 36415 COLL VENOUS BLD VENIPUNCTURE: CPT

## 2024-11-08 DIAGNOSIS — E87.6 HYPOKALEMIA: ICD-10-CM

## 2024-11-08 DIAGNOSIS — Z94.0 STATUS POST KIDNEY TRANSPLANT: ICD-10-CM

## 2024-11-10 LAB
DONOR IDENTIFICATION: NORMAL
DSA COMMENTS: NORMAL
DSA PRESENT: NO
DSA TEST METHOD: NORMAL
ORGAN: NORMAL
SA 1  COMMENTS: NORMAL
SA 1 CELL: NORMAL
SA 1 TEST METHOD: NORMAL
SA 2 CELL: NORMAL
SA 2 COMMENTS: NORMAL
SA 2 TEST METHOD: NORMAL
SA1 HI RISK ABY: NORMAL
SA1 MOD RISK ABY: NORMAL
SA2 HI RISK ABY: NORMAL
SA2 MOD RISK ABY: NORMAL
UNACCEPTABLE ANTIGENS: NORMAL
UNOS CPRA: 0

## 2024-11-10 RX ORDER — POTASSIUM CHLORIDE 1500 MG/1
20 TABLET, EXTENDED RELEASE ORAL 2 TIMES DAILY
Qty: 60 TABLET | Refills: 1 | Status: SHIPPED | OUTPATIENT
Start: 2024-11-10 | End: 2024-11-12

## 2024-11-11 ENCOUNTER — VIRTUAL VISIT (OUTPATIENT)
Dept: TRANSPLANT | Facility: CLINIC | Age: 79
End: 2024-11-11
Attending: STUDENT IN AN ORGANIZED HEALTH CARE EDUCATION/TRAINING PROGRAM
Payer: COMMERCIAL

## 2024-11-11 ENCOUNTER — LAB (OUTPATIENT)
Dept: LAB | Facility: CLINIC | Age: 79
End: 2024-11-11
Payer: COMMERCIAL

## 2024-11-11 DIAGNOSIS — Z20.828 CONTACT WITH AND (SUSPECTED) EXPOSURE TO OTHER VIRAL COMMUNICABLE DISEASES: ICD-10-CM

## 2024-11-11 DIAGNOSIS — Z79.899 ENCOUNTER FOR LONG-TERM CURRENT USE OF MEDICATION: ICD-10-CM

## 2024-11-11 DIAGNOSIS — Z94.0 KIDNEY REPLACED BY TRANSPLANT: ICD-10-CM

## 2024-11-11 DIAGNOSIS — Z48.298 AFTERCARE FOLLOWING ORGAN TRANSPLANT: ICD-10-CM

## 2024-11-11 DIAGNOSIS — Z98.890 OTHER SPECIFIED POSTPROCEDURAL STATES: ICD-10-CM

## 2024-11-11 LAB
ANION GAP SERPL CALCULATED.3IONS-SCNC: 9 MMOL/L (ref 7–15)
BUN SERPL-MCNC: 25.7 MG/DL (ref 8–23)
CALCIUM SERPL-MCNC: 9 MG/DL (ref 8.8–10.4)
CHLORIDE SERPL-SCNC: 103 MMOL/L (ref 98–107)
CREAT SERPL-MCNC: 1.32 MG/DL (ref 0.51–0.95)
EGFRCR SERPLBLD CKD-EPI 2021: 41 ML/MIN/1.73M2
ERYTHROCYTE [DISTWIDTH] IN BLOOD BY AUTOMATED COUNT: 14.1 % (ref 10–15)
GLUCOSE SERPL-MCNC: 95 MG/DL (ref 70–99)
HCO3 SERPL-SCNC: 23 MMOL/L (ref 22–29)
HCT VFR BLD AUTO: 28.5 % (ref 35–47)
HGB BLD-MCNC: 8.7 G/DL (ref 11.7–15.7)
MCH RBC QN AUTO: 32.3 PG (ref 26.5–33)
MCHC RBC AUTO-ENTMCNC: 30.5 G/DL (ref 31.5–36.5)
MCV RBC AUTO: 106 FL (ref 78–100)
PLATELET # BLD AUTO: 207 10E3/UL (ref 150–450)
POTASSIUM SERPL-SCNC: 4.9 MMOL/L (ref 3.4–5.3)
RBC # BLD AUTO: 2.69 10E6/UL (ref 3.8–5.2)
SODIUM SERPL-SCNC: 135 MMOL/L (ref 135–145)
TACROLIMUS BLD-MCNC: 15 UG/L (ref 5–15)
TME LAST DOSE: NORMAL H
TME LAST DOSE: NORMAL H
WBC # BLD AUTO: 3.6 10E3/UL (ref 4–11)

## 2024-11-11 PROCEDURE — 87799 DETECT AGENT NOS DNA QUANT: CPT

## 2024-11-11 PROCEDURE — 80048 BASIC METABOLIC PNL TOTAL CA: CPT

## 2024-11-11 PROCEDURE — 99207 PR NO CHARGE COORDINATED CARE PS: CPT | Mod: 93

## 2024-11-11 PROCEDURE — 36415 COLL VENOUS BLD VENIPUNCTURE: CPT

## 2024-11-11 PROCEDURE — 80197 ASSAY OF TACROLIMUS: CPT

## 2024-11-11 PROCEDURE — 85027 COMPLETE CBC AUTOMATED: CPT

## 2024-11-11 NOTE — LETTER
"2024      Lexy Cobb   [address]  [facility] [address]      Dear Colleague,    Thank you for referring your patient, Lexy Cobb, to the [facility] TRANSPLANT CLINIC. Please see a copy of my visit note below.    Post Transplant Patient Social Work Assessment -Outpatient    Patient Name: Lexy Cobb  : 1945  Age: 79 year old  MRN: [id number]  Date of transplant: 2024    Patient known to this writer from follow up in the transplant program. Seen alone, virtually (via phone) today to update assessment post DD kidney transplant.      Presenting Information   Living Situation: Patient reported that she is living in a home in [place], [place] with her spouse, Ashley.   Functional Status: Ambulatory and independent with ADL's. Denied use of DME equipment. Reported that she is still working on \"building her strength\" post surgery.   Cultural/Language/Spiritual Considerations: None reported or indicated.     Support System  Primary Support Person: Keyt (Spouse).   Other Support: Rm (Son).     Health Care Directive  Decision Maker: Self.   Alternate Decision Maker: Keyt (Spouse).   Health Care Directive: Provided education.     Mental Health/Coping:   History of Mental Health: Denied any major concerns or use of medications to manage symptoms. Shared occasionally \"getting down on herself\" because of frustrations around the recovery process post-transplant, however, reported that these thoughts/feelings are \"fleeting\" and refers to them as her \"blue days.\" Indicated that the coping mechanisms mentioned below are helpful, as well as practicing kindness/compassion towards herself that the healing process isn't always quick or linear.   History of Chemical Health: Patient reported that she has 1 mixed drink/night. Reported her providers/team are aware of this. Denied concerns about use at this time. Denied tobacco or marijuana use. No prior treatment history.   Current " "Status: Denied concerns around compliance and stated, \"I haven't missed a follow-up visit yet.\" Denied concerns around managing medications and reported that getting them properly labeled/organized has helped a lot. No concerns around compliance post-transplant.   Coping: Appropriate. \"Playing bingo, reading, working on my crossword puzzles, going out for dinner with my .\"   Services Needed/Recommended: None reported or indicated at this time.     Financial   Income: Retired/Social Security longterm.   Impact of transplant on income: N/A  Insurance and Medication Coverage: [facility] Medicare.   Financial Concerns: Reported some concerns around a bill she received from [employer]. Provided patient with the phone number to [employer] Billing, as well as her financial , Maria R Clarke, to follow-up with/discuss payment plans, etc.   Resources Needed: None reported or indicated at this time.     Education provided by SW: Social Work role outpatient setting and financial resource supports for medical bills.     Assessment and Recommendations and Plan:  SOT SW Team to continue to monitor and follow-up as needed for questions and concerns.     JULIAN Chávez, LICCLAUDIA  [facility]  Outpatient Kidney/Pancreas/Auto Islet Transplant  Phone: [phone]       Again, thank you for allowing me to participate in the care of your patient.        Sincerely,        BRENDA Best  "

## 2024-11-11 NOTE — PROGRESS NOTES
"Post Transplant Patient Social Work Assessment -Outpatient    Patient Name: Lexy Cobb  : 1945  Age: 79 year old  MRN: [id number]  Date of transplant: 2024    Patient known to this writer from follow up in the transplant program. Seen alone, virtually (via phone) today to update assessment post DD kidney transplant.      Presenting Information   Living Situation: Patient reported that she is living in a home in [place], [place] with her spouse, Ashley.   Functional Status: Ambulatory and independent with ADL's. Denied use of DME equipment. Reported that she is still working on \"building her strength\" post surgery.   Cultural/Language/Spiritual Considerations: None reported or indicated.     Support System  Primary Support Person: Ashley (Spouse).   Other Support: Rm (Son).     Health Care Directive  Decision Maker: Self.   Alternate Decision Maker: Ashley (Spouse).   Health Care Directive: Provided education.     Mental Health/Coping:   History of Mental Health: Denied any major concerns or use of medications to manage symptoms. Shared occasionally \"getting down on herself\" because of frustrations around the recovery process post-transplant, however, reported that these thoughts/feelings are \"fleeting\" and refers to them as her \"blue days.\" Indicated that the coping mechanisms mentioned below are helpful, as well as practicing kindness/compassion towards herself that the healing process isn't always quick or linear.   History of Chemical Health: Patient reported that she has 1 mixed drink/night. Reported her providers/team are aware of this. Denied concerns about use at this time. Denied tobacco or marijuana use. No prior treatment history.   Current Status: Denied concerns around compliance and stated, \"I haven't missed a follow-up visit yet.\" Denied concerns around managing medications and reported that getting them properly labeled/organized has helped a lot. No concerns around compliance " "post-transplant.   Coping: Appropriate. \"Playing bingo, reading, working on my crossword puzzles, going out for dinner with my .\"   Services Needed/Recommended: None reported or indicated at this time.     Financial   Income: Retired/Social Security [employer].   Impact of transplant on income: N/A  Insurance and Medication Coverage: [facility] Medicare.   Financial Concerns: Reported some concerns around a bill she received from [employer]. Provided patient with the phone number to [employer] Billing, as well as her financial , Maria R Clarke, to follow-up with/discuss payment plans, etc.   Resources Needed: None reported or indicated at this time.     Education provided by SW: Social Work role outpatient setting and financial resource supports for medical bills.     Assessment and Recommendations and Plan:  SOT SW Team to continue to monitor and follow-up as needed for questions and concerns.     JULIAN Chávez, [facility]  Outpatient Kidney/Pancreas/Auto Islet Transplant  Phone: [phone]     "

## 2024-11-12 ENCOUNTER — TELEPHONE (OUTPATIENT)
Dept: TRANSPLANT | Facility: CLINIC | Age: 79
End: 2024-11-12
Payer: COMMERCIAL

## 2024-11-12 ENCOUNTER — VIRTUAL VISIT (OUTPATIENT)
Dept: PHARMACY | Facility: CLINIC | Age: 79
End: 2024-11-12
Payer: COMMERCIAL

## 2024-11-12 DIAGNOSIS — E87.6 HYPOKALEMIA: ICD-10-CM

## 2024-11-12 DIAGNOSIS — R19.5 LOOSE STOOLS: ICD-10-CM

## 2024-11-12 DIAGNOSIS — E78.5 HYPERLIPIDEMIA LDL GOAL <70: ICD-10-CM

## 2024-11-12 DIAGNOSIS — Z48.298 AFTERCARE FOLLOWING ORGAN TRANSPLANT: ICD-10-CM

## 2024-11-12 DIAGNOSIS — G47.00 INSOMNIA, UNSPECIFIED TYPE: ICD-10-CM

## 2024-11-12 DIAGNOSIS — R35.0 URINARY FREQUENCY: ICD-10-CM

## 2024-11-12 DIAGNOSIS — Z78.9 TAKES DIETARY SUPPLEMENTS: ICD-10-CM

## 2024-11-12 DIAGNOSIS — Z94.0 HYPERTENSION DUE TO KIDNEY TRANSPLANT: ICD-10-CM

## 2024-11-12 DIAGNOSIS — I15.1 HYPERTENSION DUE TO KIDNEY TRANSPLANT: ICD-10-CM

## 2024-11-12 DIAGNOSIS — Z94.0 STATUS POST KIDNEY TRANSPLANT: ICD-10-CM

## 2024-11-12 DIAGNOSIS — K21.9 GASTROESOPHAGEAL REFLUX DISEASE WITHOUT ESOPHAGITIS: ICD-10-CM

## 2024-11-12 DIAGNOSIS — Z94.0 STATUS POST KIDNEY TRANSPLANT: Primary | ICD-10-CM

## 2024-11-12 LAB
BK VIRUS SPECIMEN TYPE: NORMAL
BKV DNA # SPEC NAA+PROBE: NOT DETECTED IU/ML

## 2024-11-12 PROCEDURE — 99607 MTMS BY PHARM ADDL 15 MIN: CPT | Mod: 93 | Performed by: PHARMACIST

## 2024-11-12 PROCEDURE — 99606 MTMS BY PHARM EST 15 MIN: CPT | Mod: 93 | Performed by: PHARMACIST

## 2024-11-12 RX ORDER — POTASSIUM CHLORIDE 1500 MG/1
20 TABLET, EXTENDED RELEASE ORAL DAILY
Qty: 30 TABLET | Refills: 1 | Status: SHIPPED | OUTPATIENT
Start: 2024-11-12 | End: 2024-11-13

## 2024-11-12 RX ORDER — TACROLIMUS 5 MG/1
5 CAPSULE ORAL 2 TIMES DAILY
Qty: 60 CAPSULE | Refills: 11 | Status: SHIPPED | OUTPATIENT
Start: 2024-11-12

## 2024-11-12 RX ORDER — TACROLIMUS 1 MG/1
CAPSULE ORAL
Qty: 60 CAPSULE | Refills: 11 | Status: SHIPPED | OUTPATIENT
Start: 2024-11-12

## 2024-11-12 RX ORDER — CARVEDILOL 6.25 MG/1
6.25 TABLET ORAL 2 TIMES DAILY WITH MEALS
Qty: 180 TABLET | Refills: 3 | Status: SHIPPED | OUTPATIENT
Start: 2024-11-12

## 2024-11-12 RX ORDER — MAGNESIUM OXIDE 400 MG/1
800 TABLET ORAL 2 TIMES DAILY
Qty: 360 TABLET | Refills: 3 | Status: SHIPPED | OUTPATIENT
Start: 2024-11-12

## 2024-11-12 RX ORDER — SODIUM BICARBONATE 650 MG/1
1300 TABLET ORAL 2 TIMES DAILY
Qty: 60 TABLET | Refills: 11 | Status: SHIPPED | OUTPATIENT
Start: 2024-11-12

## 2024-11-12 NOTE — PATIENT INSTRUCTIONS
"Recommendations from today's MTM visit:                                                      Metamucil 1 teaspoonful once daily, can increase to twice daily if needed. To bulk up stools. Hold loperamide after a couple days.   Switch Metoprolol to Carvedilol 6.25mg (1 tab) twice daily. Check BP a few times a week midday.   Drop Phosphorus 1 tablets twice daily- finish off what you have, then stop.   Decrease Potassium to 1 tablet (20mEq) every morning.   Discuss Oxybutynin / memory issues with PCP at next Follow-up. Long term use can increase risk of dementia.     Follow-up: 12/11 at 10 AM     It was great speaking with you today.  I value your experience and would be very thankful for your time in providing feedback in our clinic survey. In the next few days, you may receive an email or text message from [employer] with a link to a survey related to your  clinical pharmacist.\"     To schedule another MTM appointment, please call the clinic directly or you may call the MTM scheduling line at [phone] or toll-free at [phone].     My Clinical Pharmacist's contact information:                                                      Please feel free to contact me with any questions or concerns you have.      Eddie Keene, PharmD  MTM Pharmacist    Phone: [phone]     "

## 2024-11-12 NOTE — PROGRESS NOTES
TRANSPLANT NEPHROLOGY CLINIC VISIT     Assessment & Plan   It is a pleasure to see Ms. Cobb in clinic again.  She is now just over 2 months from her kidney transplant.  There was initial bump in her creatinine likely related to starting furosemide and episodes of diarrhea.  Diarrhea has improved, but continues to have decreased appetite, albeit improving.  I do expect her creatinine to improve as tacrolimus reaches goal and her appetite improves.  I do not see reason to pursue a kidney biopsy at this time, but if creatinine remains elevated while tacrolimus is at goal, then we will need to consider a biopsy.    Her blood pressure is uncontrolled, but she does experience some lightheadedness on standing and she had some orthostatic changes during the first minute, but recovered afterwards.  Will have to slowly increase antihypertensive to initial goal of less than 140/90 mmHg.    Recommendation:  Stop calcitriol  Stop potassium chloride.  Stop phosphorous.  Start Amlodipine 2.5 mg every day at night.  Keep checking your blood pressure every day.  Awaiting tac levels.    # DDKT: CKD Stage 2 - Stable, near normal   - Baseline Creatinine: ~ 0.8-1 mg/dL.   - Proteinuria: Normal (<0.2 grams)   - DSA Hx: No DSA   - Last cPRA: 0%   - BK Viremia: No   - Kidney Tx Biopsy Hx: No biopsy history.   - Stent :no stent placed at time of transplant     # Immunosuppression: Tacrolimus immediate release (goal 8-10) and Mycophenolate mofetil (dose 750 mg every 12 hours)   - Induction with Recent Transplant:  Low Intensity Protocol   - Continue with intensive monitoring of immunosuppression for efficacy and toxicity.   - Historical Changes in Immunosuppression: None   - Changes: Not at this time as we await tacrolimus levels.    # Diarrhea: Recommend fiber, sent imodium as needed, reduced MMF to 750 mg bid. Improved. She has started fiber and will reduce imodium.      # Infection Prevention:      - PJP: Sulfa/TMP (Bactrim) 400-80  mg every day.   - CMV: Valganciclovir (Valcyte) 900 mg every day for 6 months, then check CMV PCR monthly until 12 months post transplant.      - CMV IgG Ab High Risk Discordance (D+/R-): Yes  CMV Serostatus: Negative  - EBV IgG Ab High Risk Discordance (D+/R-): No  EBV Serostatus: Positive    # Hypertension / Volume: Inadequate control;  Goal BP: < 140/90   - On metoprolol 25 mg twice a day.   - Changes: Not at this time    # Anemia in Chronic Renal Disease: Hgb: Trend up      HIEU: No   - Iron studies: Replete    # Mineral Bone Disorder:    - Secondary renal hyperparathyroidism; PTH level: Minimally elevated ( pg/ml)        On treatment: None  - Vitamin D; level: Normal        On supplement: No  - Calcium; level: Normal        On supplement: Yes calcium carbonate 500 mg twice a day.  - Phosphorus; level: Normal        On supplement: Yes phosphorous 2 tablets three times a day.    # Electrolytes:   - Potassium; level: Normal        On supplement: No  - Magnesium; level: Low        On supplement: Yes We switched magnesium oxide to Mg glycinate as its better absorbed   - Bicarbonate; level: Normal        On supplement: Yes sodium bicarbonate 1300 mg twice a day.    # Paroxysmal Atrial Fibrillation: Stable, ongoing atrial fibrillation with rate controlled on beta blocker.  Patient was reportedly in AF during her stress test in May 2024. Subsequent Zio patch was negative for AF.     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  At risk if secondary hyperoxaluria.  Oxalate level ~ 11 prior to transplant with CKD.  Now oxalate level <2 with improved kidney function following transplant.              - Would check oxalate level monthly x 3 months and if remains low, no need to follow.    # Other Significant PMH:  - GERD: Asymptomatic on PPI.  - RCC (2001): s/p right native nephrectomy with mass totally encapsulated. She did not require any chemotherapy or radiation. CT today  pending to reassess recurrence.      # Skin Cancer Risk: Discussed sun protection and recommend regular follow up with Dermatology.    # Transplant History:  Etiology of Kidney Failure: Unknown etiology. CKD likely from hypertension, NSAID use, prior triamterene use, reduced nephron mass, potentially secondary FSGS, and solitary kidney (s/p unilateral nephrectomy for RCC)   Tx: DDKT  Transplant: 9/3/2024 (Kidney)  Significant transplant-related complications: None    Transplant Office Phone Number: [phone]    Assessment and plan was discussed with the patient and she voiced her understanding and agreement.    Return visit: Return for appointment scheduled for 1/15/2025.    Angel Sandhu MD    The longitudinal plan of care for the diagnosis(es)/condition(s) as documented were addressed during this visit. Due to the added complexity in care, I will continue to support Lexy in the subsequent management and with ongoing continuity of care.      Chief Complaint   Ms. Cobb is a 79 year old here for kidney transplant and immunosuppression management.     History of Present Illness    Ms. Cobb reports feeling well overall.    Since last clinic visit:   Hospitalizations: No   New Medical Issues: No    Activity: She is still not as strong as her pretransplant state, but slowly progressing.  She continues to walk.  Does report occasional lightheadedness lasting less than a minute after standing.  Chest pain or shortness of breath: No  Lower extremity swelling: Yes  Nausea and vomiting: No  Weight loss: lost about 5 pounds.   Diarrhea: No, much better than before.  Heartburn symptoms: No  Fever, sweats or chills: No  Urinary complaints: No    Home BP: 155-168/90, pulse 80s.  Her best blood pressure in the past has been mid 130s without experiencing significant lightheadedness.    Problem List   Patient Active Problem List   Diagnosis    Hypertension, renal    CKD (chronic kidney disease) stage 4, GFR  15-29 ml/min (H)    Renal cell cancer (H)    Anemia in chronic renal disease    Secondary renal hyperparathyroidism (H)    Vitamin D deficiency    Obesity    Vitamin B12 deficiency    Raynaud's syndrome    ABDULLAHI positive    Chronic lower back pain    Irritable bowel syndrome    GERD (gastroesophageal reflux disease)    Trauma to right eye    Kidney transplant candidate    Immunosuppression (H)    Status post kidney transplant    Hypophosphatemia    Dehydration       Allergies   Allergies   Allergen Reactions    Dapsone     Fluoxetine     Iron Diarrhea    Latex Rash       Medications   Current Outpatient Medications   Medication Sig Dispense Refill    acetaminophen (TYLENOL) 325 MG tablet Take 2 tablets (650 mg) by mouth every 4 hours as needed for other (For optimal non-opioid multimodal pain management to improve pain control.).      amLODIPine (NORVASC) 2.5 MG tablet Take 1 tablet (2.5 mg) by mouth at bedtime. 30 tablet 1    atorvastatin (LIPITOR) 10 MG tablet Take 1 tablet (10 mg) by mouth daily. 90 tablet 3    calcium carbonate 500 mg, elemental, (OSCAL) 500 MG tablet Take 1 tablet (500 mg) by mouth 2 times daily. 180 tablet 3    carvedilol (COREG) 6.25 MG tablet Take 1 tablet (6.25 mg) by mouth 2 times daily (with meals). 180 tablet 3    magnesium oxide (MAG-OX) 400 MG tablet Take 2 tablets (800 mg) by mouth 2 times daily. 360 tablet 3    mycophenolate (GENERIC EQUIVALENT) 250 MG capsule Take 3 capsules (750 mg) by mouth 2 times daily. 720 capsule 3    omeprazole (PRILOSEC) 20 MG capsule Take 1 capsule by mouth daily      oxyBUTYnin ER (DITROPAN XL) 5 MG 24 hr tablet Take 5 mg by mouth daily.      sodium bicarbonate 650 MG tablet Take 2 tablets (1,300 mg) by mouth 2 times daily. 60 tablet 11    sulfamethoxazole-trimethoprim (BACTRIM) 400-80 MG tablet Take 1 tablet by mouth daily. 30 tablet 11    tacrolimus (GENERIC EQUIVALENT) 5 MG capsule Take 1 capsule (5 mg) by mouth 2 times daily. 60 capsule 11    traZODone  (DESYREL) 50 MG tablet Take 100 mg by mouth at bedtime.      valGANciclovir (VALCYTE) 450 MG tablet Take 2 tablets (900 mg) by mouth daily. 60 tablet 5    loperamide (IMODIUM A-D) 2 MG tablet Take 1 tablet (2 mg) by mouth 4 times daily as needed for diarrhea. (Patient not taking: Reported on 11/13/2024) 30 tablet 0    Multiple Vitamins-Minerals (ICAPS AREDS 2 PO) Take 1 capsule by mouth 2 times daily. (Patient not taking: Reported on 11/13/2024)      psyllium (METAMUCIL/KONSYL) 58.6 % powder Take 6 g (1 teaspoonful) by mouth daily. Can increase to twice daily if needed. (Patient not taking: Reported on 11/13/2024) 500 g 3    tacrolimus (GENERIC EQUIVALENT) 1 MG capsule Hold for dose changes (Patient not taking: Reported on 11/13/2024) 60 capsule 11     No current facility-administered medications for this visit.     Medications Discontinued During This Encounter   Medication Reason    potassium chloride brielle ER (KLOR-CON M20) 20 MEQ CR tablet     phosphorus tablet 250 mg (PHOSPHA 250 NEUTRAL) 250 MG per tablet     calcitRIOL (ROCALTROL) 0.25 MCG capsule     amLODIPine (NORVASC) 2.5 MG tablet        Physical Exam   Vital Signs: BP (!) 158/84 (BP Location: Left arm, Patient Position: Standing)   Pulse 52   Temp 97.9  F (36.6  C) (Oral)   Wt 58.6 kg (129 lb 3.2 oz)   SpO2 100%   BMI 21.83 kg/m      GENERAL APPEARANCE: alert and no distress  HENT: mouth without ulcers or lesions  RESP: lungs clear to auscultation - no rales, rhonchi or wheezes  CV: regular rhythm, normal rate, no rub, no murmur  EDEMA: plus 1 bilaterally   ABDOMEN: soft, nondistended, nontender, bowel sounds normal  MS: extremities normal - no gross deformities noted, no evidence of inflammation in joints, no muscle tenderness  SKIN: no rash  TRANSPLANT: Incision well healed, no bruit.    Data         Latest Ref Rng & Units 11/13/2024     8:43 AM 11/11/2024     8:12 AM 11/4/2024     8:18 AM   Renal   Sodium 135 - 145 mmol/L 135  135  139    K 3.4 -  5.3 mmol/L 5.1  4.9  4.7    Cl 98 - 107 mmol/L 103  103  106    Cl (external) 98 - 107 mmol/L 103  103  106    CO2 22 - 29 mmol/L 24  23  23    Urea Nitrogen 8.0 - 23.0 mg/dL 25.9  25.7  20.5    Creatinine 0.51 - 0.95 mg/dL 1.32  1.32  1.23    Glucose 70 - 99 mg/dL 99  95  97    Calcium 8.8 - 10.4 mg/dL 9.1  9.0  8.7          Latest Ref Rng & Units 10/31/2024     8:05 AM 10/24/2024     8:19 AM 10/21/2024     8:06 AM   Bone Health   Phosphorus 2.5 - 4.5 mg/dL 3.2  3.2  3.5          Latest Ref Rng & Units 11/13/2024     8:43 AM 11/11/2024     8:12 AM 11/4/2024     8:18 AM   Heme   WBC 4.0 - 11.0 10e3/uL 3.9  3.6  4.6    Hgb 11.7 - 15.7 g/dL 8.8  8.7  8.5    Plt 150 - 450 10e3/uL 209  207  182          Latest Ref Rng & Units 10/9/2024     8:28 AM 9/2/2024     9:57 PM 10/10/2019     2:31 PM   Liver   AP 40 - 150 U/L  50  38    TBili <=1.2 mg/dL  0.3  0.4    ALT 0 - 50 U/L  <5  16    AST 0 - 45 U/L  13  12    Tot Protein 6.4 - 8.3 g/dL  6.7  7.0    Albumin 3.4 - 5.0 g/dL   3.8    Albumin 3.5 - 5.2 g/dL 3.8  4.0           Latest Ref Rng & Units 9/2/2024     9:57 PM   Pancreas   A1C <5.7 % 4.4          Latest Ref Rng & Units 9/16/2024     7:57 AM 9/7/2024     7:14 AM   Iron studies   Iron 37 - 145 ug/dL 46  103    Iron Sat Index 15 - 46 % 22  57    Ferritin 11 - 328 ng/mL 664  674          Latest Ref Rng & Units 9/2/2024     9:57 PM 10/10/2019     2:30 PM 10/28/2013     7:41 AM   [facility]   EBV CAPSID ANTIBODY IGG No detectable antibody. Positive  >8.0     Hep B Core NR^Nonreactive  Nonreactive     HIV 1&2 NEG   Negative      Failed to redirect to the Timeline version of the REVFS SmartLink.  Recent Labs   Lab Test 11/04/24  0818 11/11/24  0812 11/13/24  0843   DOSTAC 11/3/2024 11/10/2024 11/12/2024   TACROL 10.4 15.0 12.9     Recent Labs   Lab Test 10/03/24  0745 10/09/24  0828 10/31/24  0805   DOSMPA 10/2/2024   7:30 PM 10/8/2024   8:00 PM 10/30/2024   8:00 PM   MPACID 0.57* 1.75 1.36   MPAG 33.7 60.5 61.5

## 2024-11-12 NOTE — PROGRESS NOTES
Medication Therapy Management (MTM) Encounter    ASSESSMENT:                            Medication Adherence/Access: No issues identified.    Kidney Transplant:    Stable.     Supplements:   Potassium at the high end of normal, will reduce dose, Phosphorus normal, patient to reduce dose of this as well.     Hypertension   BP not at goal <140/90 2-4 months post transplant. Will trial Carvedilol in place of Metoprolol for more BP reduction and Follow-up in a few weeks.     Hyperlipidemia   Stable.    GERD    Stable.     Insomnia   Stable.    Urinary Frequency:   Long term oxybutynin use dose increase dementia risk, recommend she discusses with PCP.     Loose stools:  Recommend trial of Metamucil for loose stools, hopefully can replace Loperamide.   PLAN:                            Metamucil 1 teaspoonful once daily, can increase to twice daily if needed. To bulk up stools. Hold loperamide after a couple days.   Switch Metoprolol to Carvedilol 6.25mg (1 tab) twice daily. Check BP a few times a week midday.   Drop Phosphorus 1 tablets twice daily- finish off what you have, then stop.   Decrease Potassium to 1 tablet (20mEq) every morning.   Discuss Oxybutynin / memory issues with PCP at next Follow-up. Long term use can increase risk of dementia.     Follow-up: 12/11 at 10 AM    SUBJECTIVE/OBJECTIVE:                          Lexy Cobb is a 79 year old female seen for a follow-up visit.       Reason for visit: 2 months post transplant.    Allergies/ADRs: Reviewed in chart  Past Medical History: Reviewed in chart  Tobacco: She reports that she has never smoked. She has never used smokeless tobacco.  Alcohol: 7 drinks weekly, 1 daily.     Medication Adherence/Access: no issues reported.    Kidney Transplant:    Tacrolimus 6 mg every morning, 6 mg every evening    Mycophenolate Mofetil 750 mg twice daily  Pt reports soft stools 3-5 times daily-   Transplant date: 9/3/24  Vascular Prophylaxis: None  CMV prophylaxis:  CrCl >/=60 mL/minute: Valcyte 900mg daily Donor (+), Recipient (-), treat 6 months post tx   PJP prophylaxis: Bactrim SS daily.  Tx Coordinator: Ferdinand Crawford MD: Dr. Martin, Using Med Card: Yes  Immunizations: annual flu shot 2023; Pneumovax 23:  2013; Prevnar 13: 2015; TDaP:  2021; Shingrix: unknown, HBV: unknown, COVID: Primary x 3, Bivalent x 1, and 23-24 x1    Estimated Creatinine Clearance: 34.4 mL/min (A) (based on SCr of 1.32 mg/dL (H)).     Supplements:   Mag Oxide 800mg daily at lunch ( 2 hours from MMF)  Calcium carbonate 500mg twice daily  Calcitriol 0.25mcg daily.  Potassium Chloride ER 20mEq twice daily  Phosphorus 500mg three times daily  Sodium Bicarbonate 1300mg twice daily.  Lab Results   Component Value Date    PHOS 3.2 10/31/2024    MAG 1.7 10/31/2024    CO2 23 11/11/2024    POTASSIUM 4.9 11/11/2024    CIARA 9.0 11/11/2024    PTHI 83 (H) 09/16/2024     Hypertension   Metoprolol Tartrate 25mg twice daily  Patient reports some swelling in thighs and ankles. Weights have been stable.   Patient self monitors blood pressure.  Home BP monitoring 155-160/84.     BP Readings from Last 3 Encounters:   10/30/24 (!) 170/94   10/30/24 (!) 143/80   10/14/24 138/79     Hyperlipidemia   Atorvastatin 10mg daily  Patient reports no significant myalgias or other side effects.  The 10-year ASCVD risk score (Pedro DK, et al., 2019) is: 18.9%    Values used to calculate the score:      Age: 79 years      Sex: Female      Is Non- : No      Diabetic: No      Tobacco smoker: No      Systolic Blood Pressure: 106 mmHg      Is BP treated: Yes      HDL Cholesterol: 81 mg/dL      Total Cholesterol: 134 mg/dL     GERD    Omeprazole 20 mg once daily   Patient has tried a trial off of therapy but heartburn symptoms returned when doing so.       Insomnia   Trazodone 100 mg at bedtime   Patient reports sleeping for 2 hours segments, but from 9:30 PM and 7:30 AM    Urinary Frequency:    Oxybutynin ER 5mg every morning.  Urination has been frequency, no urgency. No SE.     Loose stools:  Loperamide 2mg daily  Still having 3-4 loose BMs daily.    Today's Vitals: There were no vitals taken for this visit.  ----------------    I spent 30 minutes with this patient today. All changes were made via collaborative practice agreement with Dr. Martin. A copy of the visit note was provided to the patient's provider(s).    A summary of these recommendations was sent via mail.    Eddie Keene, PharmD  [facility] Pharmacist    Phone: [phone]     Telemedicine Visit Details  The patient's medications can be safely assessed via a telemedicine encounter.  Type of service:  Telephone visit  Originating Location (pt. Location): Home    Distant Location (provider location):  On-site  Start Time: 10:25 AM  End Time:  10:53 AM     Medication Therapy Recommendations  Hypertension due to kidney transplant   1 Current Medication: metoprolol tartrate (LOPRESSOR) 25 MG tablet (Discontinued)   Current Medication Sig: Take 1 tablet (25 mg) by mouth 2 times daily.   Rationale: More effective medication available - Ineffective medication - Effectiveness   Recommendation: Change Medication - CARVEDILOL   Status: Accepted per CPA   Identified Date: 11/12/2024 Completed Date: 11/12/2024         Loose stools   1 Current Medication: loperamide (IMODIUM A-D) 2 MG tablet   Current Medication Sig: Take 1 tablet (2 mg) by mouth 4 times daily as needed for diarrhea.   Rationale: More effective medication available - Ineffective medication - Effectiveness   Recommendation: Change Medication - Metamucil 48.57 % Powd   Status: Accepted per CPA   Identified Date: 11/12/2024 Completed Date: 11/12/2024         Takes dietary supplements   1 Current Medication: phosphorus tablet 250 mg (PHOSPHA 250 NEUTRAL) 250 MG per tablet   Current Medication Sig: Take 1 tablet (250 mg) by mouth 2 times daily.   Rationale: Dose too high - Dosage too high -  Safety   Recommendation: Decrease Dose   Status: Accepted per CPA   Identified Date: 11/12/2024 Completed Date: 11/12/2024         2 Current Medication: potassium chloride brielle ER (KLOR-CON M20) 20 MEQ CR tablet (Discontinued)   Current Medication Sig: Take 1 tablet (20 mEq) by mouth 2 times daily. You can dissolve in 4 ounces of water, let sit for 2 minutes and then stir well and drink quickly.   Rationale: Dose too high - Dosage too high - Safety   Recommendation: Decrease Frequency   Status: Accepted per CPA   Identified Date: 11/12/2024 Completed Date: 11/12/2024

## 2024-11-12 NOTE — TELEPHONE ENCOUNTER
Issue: elevated tacrolimus level    Plan: reduce tacro to 5mg BID    Outcome: called patient to reduce tac as this was a good 12hour trough.

## 2024-11-12 NOTE — Clinical Note
You are seeing patient tomorrow, I made this changes today:  1. Metamucil 1 teaspoonful once daily, can increase to twice daily if needed. To bulk up stools. Hold loperamide after a couple days.  2. Switch Metoprolol to Carvedilol 6.25mg (1 tab) twice daily. Check BP a few times a week midday.  3. Drop Phosphorus 1 tablets twice daily- finish off what you have, then stop.  4. Decrease Potassium to 1 tablet (20mEq) every morning.  Lab Results      Component                Value               Date                      POTASSIUM                4.9                 11/11/2024                POTASSIUM                4.7                 11/04/2024                POTASSIUM                4.4                 10/31/2024                PHOS                     3.2                 10/31/2024                PHOS                     3.2                 10/24/2024                PHOS                     3.5                 10/21/2024           BP Readings from Last  Encounters: 10/30/24 : (!) 170/94

## 2024-11-13 ENCOUNTER — LAB (OUTPATIENT)
Dept: LAB | Facility: CLINIC | Age: 79
End: 2024-11-13
Attending: STUDENT IN AN ORGANIZED HEALTH CARE EDUCATION/TRAINING PROGRAM
Payer: COMMERCIAL

## 2024-11-13 ENCOUNTER — OFFICE VISIT (OUTPATIENT)
Dept: TRANSPLANT | Facility: CLINIC | Age: 79
End: 2024-11-13
Attending: STUDENT IN AN ORGANIZED HEALTH CARE EDUCATION/TRAINING PROGRAM
Payer: COMMERCIAL

## 2024-11-13 VITALS
BODY MASS INDEX: 21.83 KG/M2 | TEMPERATURE: 97.9 F | DIASTOLIC BLOOD PRESSURE: 84 MMHG | HEART RATE: 52 BPM | OXYGEN SATURATION: 100 % | SYSTOLIC BLOOD PRESSURE: 158 MMHG | WEIGHT: 129.2 LBS

## 2024-11-13 DIAGNOSIS — Z94.0 KIDNEY REPLACED BY TRANSPLANT: ICD-10-CM

## 2024-11-13 DIAGNOSIS — I15.1 HTN, KIDNEY TRANSPLANT RELATED: Primary | ICD-10-CM

## 2024-11-13 DIAGNOSIS — E55.9 VITAMIN D DEFICIENCY: ICD-10-CM

## 2024-11-13 DIAGNOSIS — Z48.298 AFTERCARE FOLLOWING ORGAN TRANSPLANT: ICD-10-CM

## 2024-11-13 DIAGNOSIS — Z20.828 CONTACT WITH AND (SUSPECTED) EXPOSURE TO OTHER VIRAL COMMUNICABLE DISEASES: ICD-10-CM

## 2024-11-13 DIAGNOSIS — N18.2 CKD (CHRONIC KIDNEY DISEASE) STAGE 2, GFR 60-89 ML/MIN: ICD-10-CM

## 2024-11-13 DIAGNOSIS — D84.9 IMMUNOSUPPRESSION (H): ICD-10-CM

## 2024-11-13 DIAGNOSIS — Z79.899 ENCOUNTER FOR LONG-TERM CURRENT USE OF MEDICATION: ICD-10-CM

## 2024-11-13 DIAGNOSIS — D63.1 ANEMIA IN STAGE 2 CHRONIC KIDNEY DISEASE: ICD-10-CM

## 2024-11-13 DIAGNOSIS — Z94.0 HTN, KIDNEY TRANSPLANT RELATED: Primary | ICD-10-CM

## 2024-11-13 DIAGNOSIS — N25.81 SECONDARY RENAL HYPERPARATHYROIDISM (H): ICD-10-CM

## 2024-11-13 DIAGNOSIS — Z98.890 OTHER SPECIFIED POSTPROCEDURAL STATES: ICD-10-CM

## 2024-11-13 DIAGNOSIS — N18.2 ANEMIA IN STAGE 2 CHRONIC KIDNEY DISEASE: ICD-10-CM

## 2024-11-13 PROBLEM — Z76.82 KIDNEY TRANSPLANT CANDIDATE: Status: RESOLVED | Noted: 2024-09-03 | Resolved: 2024-11-13

## 2024-11-13 LAB
ANION GAP SERPL CALCULATED.3IONS-SCNC: 8 MMOL/L (ref 7–15)
BUN SERPL-MCNC: 25.9 MG/DL (ref 8–23)
CALCIUM SERPL-MCNC: 9.1 MG/DL (ref 8.8–10.4)
CHLORIDE SERPL-SCNC: 103 MMOL/L (ref 98–107)
CREAT SERPL-MCNC: 1.32 MG/DL (ref 0.51–0.95)
EGFRCR SERPLBLD CKD-EPI 2021: 41 ML/MIN/1.73M2
ERYTHROCYTE [DISTWIDTH] IN BLOOD BY AUTOMATED COUNT: 14.1 % (ref 10–15)
GLUCOSE SERPL-MCNC: 99 MG/DL (ref 70–99)
HCO3 SERPL-SCNC: 24 MMOL/L (ref 22–29)
HCT VFR BLD AUTO: 29.2 % (ref 35–47)
HGB BLD-MCNC: 8.8 G/DL (ref 11.7–15.7)
MCH RBC QN AUTO: 31.5 PG (ref 26.5–33)
MCHC RBC AUTO-ENTMCNC: 30.1 G/DL (ref 31.5–36.5)
MCV RBC AUTO: 105 FL (ref 78–100)
PLATELET # BLD AUTO: 209 10E3/UL (ref 150–450)
POTASSIUM SERPL-SCNC: 5.1 MMOL/L (ref 3.4–5.3)
RBC # BLD AUTO: 2.79 10E6/UL (ref 3.8–5.2)
SODIUM SERPL-SCNC: 135 MMOL/L (ref 135–145)
TACROLIMUS BLD-MCNC: 12.9 UG/L (ref 5–15)
TME LAST DOSE: NORMAL H
TME LAST DOSE: NORMAL H
WBC # BLD AUTO: 3.9 10E3/UL (ref 4–11)

## 2024-11-13 PROCEDURE — 99215 OFFICE O/P EST HI 40 MIN: CPT | Mod: 24 | Performed by: STUDENT IN AN ORGANIZED HEALTH CARE EDUCATION/TRAINING PROGRAM

## 2024-11-13 PROCEDURE — 80197 ASSAY OF TACROLIMUS: CPT | Performed by: STUDENT IN AN ORGANIZED HEALTH CARE EDUCATION/TRAINING PROGRAM

## 2024-11-13 PROCEDURE — G2211 COMPLEX E/M VISIT ADD ON: HCPCS | Performed by: STUDENT IN AN ORGANIZED HEALTH CARE EDUCATION/TRAINING PROGRAM

## 2024-11-13 PROCEDURE — 87799 DETECT AGENT NOS DNA QUANT: CPT | Performed by: STUDENT IN AN ORGANIZED HEALTH CARE EDUCATION/TRAINING PROGRAM

## 2024-11-13 PROCEDURE — 85027 COMPLETE CBC AUTOMATED: CPT | Performed by: PATHOLOGY

## 2024-11-13 PROCEDURE — 99000 SPECIMEN HANDLING OFFICE-LAB: CPT | Performed by: PATHOLOGY

## 2024-11-13 PROCEDURE — G0463 HOSPITAL OUTPT CLINIC VISIT: HCPCS | Performed by: STUDENT IN AN ORGANIZED HEALTH CARE EDUCATION/TRAINING PROGRAM

## 2024-11-13 PROCEDURE — 36415 COLL VENOUS BLD VENIPUNCTURE: CPT | Performed by: PATHOLOGY

## 2024-11-13 PROCEDURE — 80048 BASIC METABOLIC PNL TOTAL CA: CPT | Performed by: PATHOLOGY

## 2024-11-13 RX ORDER — AMLODIPINE BESYLATE 2.5 MG/1
2.5 TABLET ORAL DAILY
Qty: 30 TABLET | Refills: 1 | Status: SHIPPED | OUTPATIENT
Start: 2024-11-13 | End: 2024-11-13

## 2024-11-13 RX ORDER — AMLODIPINE BESYLATE 2.5 MG/1
2.5 TABLET ORAL AT BEDTIME
Qty: 30 TABLET | Refills: 1 | Status: SHIPPED | OUTPATIENT
Start: 2024-11-13

## 2024-11-13 ASSESSMENT — PAIN SCALES - GENERAL: PAINLEVEL_OUTOF10: NO PAIN (0)

## 2024-11-13 NOTE — LETTER
11/13/2024      Lexy Cobb  [address]  [facility] [address]      Dear Colleague,    Thank you for referring your patient, Lexy Cobb, to the [facility]PL[facility]. Please see a copy of my visit note below.    TRANSPLANT NEPHROLOGY CLINIC VISIT     Assessment & Plan  It is a pleasure to see Ms. Cobb in clinic again.  She is now just over 2 months from her kidney transplant.  There was initial bump in her creatinine likely related to starting furosemide and episodes of diarrhea.  Diarrhea has improved, but continues to have decreased appetite, albeit improving.  I do expect her creatinine to improve as tacrolimus reaches goal and her appetite improves.  I do not see reason to pursue a kidney biopsy at this time, but if creatinine remains elevated while tacrolimus is at goal, then we will need to consider a biopsy.    Her blood pressure is uncontrolled, but she does experience some lightheadedness on standing and she had some orthostatic changes during the first minute, but recovered afterwards.  Will have to slowly increase antihypertensive to initial goal of less than 140/90 mmHg.    Recommendation:  Stop calcitriol  Stop potassium chloride.  Stop phosphorous.  Start Amlodipine 2.5 mg every day at night.  Keep checking your blood pressure every day.  Awaiting tac levels.    # DDKT: CKD Stage 2 - Stable, near normal   - Baseline Creatinine: ~ 0.8-1 mg/dL.   - Proteinuria: Normal (<0.2 grams)   - DSA Hx: No DSA   - Last cPRA: 0%   - BK Viremia: No   - Kidney Tx Biopsy Hx: No biopsy history.   - Stent :no stent placed at time of transplant     # Immunosuppression: Tacrolimus immediate release (goal 8-10) and Mycophenolate mofetil (dose 750 mg every 12 hours)   - Induction with Recent Transplant:  Low Intensity Protocol   - Continue with intensive monitoring of immunosuppression for efficacy and toxicity.   - Historical Changes in Immunosuppression: None   - Changes: Not at this  time as we await tacrolimus levels.    # Diarrhea: Recommend fiber, sent imodium as needed, reduced MMF to 750 mg bid. Improved. She has started fiber and will reduce imodium.      # Infection Prevention:      - PJP: Sulfa/TMP (Bactrim) 400-80 mg every day.   - CMV: Valganciclovir (Valcyte) 900 mg every day for 6 months, then check CMV PCR monthly until 12 months post transplant.      - CMV IgG Ab High Risk Discordance (D+/R-): Yes  CMV Serostatus: Negative  - EBV IgG Ab High Risk Discordance (D+/R-): No  EBV Serostatus: Positive    # Hypertension / Volume: Inadequate control;  Goal BP: < 140/90   - On metoprolol 25 mg twice a day.   - Changes: Not at this time    # Anemia in Chronic Renal Disease: Hgb: Trend up      HIEU: No   - Iron studies: Replete    # Mineral Bone Disorder:    - Secondary renal hyperparathyroidism; PTH level: Minimally elevated ( pg/ml)        On treatment: None  - Vitamin D; level: Normal        On supplement: No  - Calcium; level: Normal        On supplement: Yes calcium carbonate 500 mg twice a day.  - Phosphorus; level: Normal        On supplement: Yes phosphorous 2 tablets three times a day.    # Electrolytes:   - Potassium; level: Normal        On supplement: No  - Magnesium; level: Low        On supplement: Yes We switched magnesium oxide to Mg glycinate as its better absorbed   - Bicarbonate; level: Normal        On supplement: Yes sodium bicarbonate 1300 mg twice a day.    # Paroxysmal Atrial Fibrillation: Stable, ongoing atrial fibrillation with rate controlled on beta blocker.  Patient was reportedly in AF during her stress test in May 2024. Subsequent Zio patch was negative for AF.     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  At risk if secondary hyperoxaluria.  Oxalate level ~ 11 prior to transplant with CKD.  Now oxalate level <2 with improved kidney function following transplant.              - Would check oxalate level monthly x 3  months and if remains low, no need to follow.    # Other Significant PMH:  - GERD: Asymptomatic on PPI.  - RCC (2001): s/p right native nephrectomy with mass totally encapsulated. She did not require any chemotherapy or radiation. CT today pending to reassess recurrence.      # Skin Cancer Risk: Discussed sun protection and recommend regular follow up with Dermatology.    # Transplant History:  Etiology of Kidney Failure: Unknown etiology. CKD likely from hypertension, NSAID use, prior triamterene use, reduced nephron mass, potentially secondary FSGS, and solitary kidney (s/p unilateral nephrectomy for RCC)   Tx: DDKT  Transplant: 9/3/2024 (Kidney)  Significant transplant-related complications: None    Transplant Office Phone Number: [phone]    Assessment and plan was discussed with the patient and she voiced her understanding and agreement.    Return visit: Return for appointment scheduled for 1/15/2025.    Angel Sandhu MD    The longitudinal plan of care for the diagnosis(es)/condition(s) as documented were addressed during this visit. Due to the added complexity in care, I will continue to support Lexy in the subsequent management and with ongoing continuity of care.      Chief Complaint  Ms. Cobb is a 79 year old here for kidney transplant and immunosuppression management.     History of Present Illness   Ms. Cobb reports feeling well overall.    Since last clinic visit:   Hospitalizations: No   New Medical Issues: No    Activity: She is still not as strong as her pretransplant state, but slowly progressing.  She continues to walk.  Does report occasional lightheadedness lasting less than a minute after standing.  Chest pain or shortness of breath: No  Lower extremity swelling: Yes  Nausea and vomiting: No  Weight loss: lost about 5 pounds.   Diarrhea: No, much better than before.  Heartburn symptoms: No  Fever, sweats or chills: No  Urinary complaints: No    Home BP: 155-168/90, pulse  80s.  Her best blood pressure in the past has been mid 130s without experiencing significant lightheadedness.    Problem List  Patient Active Problem List   Diagnosis     Hypertension, renal     CKD (chronic kidney disease) stage 4, GFR 15-29 ml/min (H)     Renal cell cancer (H)     Anemia in chronic renal disease     Secondary renal hyperparathyroidism (H)     Vitamin D deficiency     Obesity     Vitamin B12 deficiency     Raynaud's syndrome     ABDULLAHI positive     Chronic lower back pain     Irritable bowel syndrome     GERD (gastroesophageal reflux disease)     Trauma to right eye     Kidney transplant candidate     Immunosuppression (H)     Status post kidney transplant     Hypophosphatemia     Dehydration       Allergies  Allergies   Allergen Reactions     Dapsone      Fluoxetine      Iron Diarrhea     Latex Rash       Medications  Current Outpatient Medications   Medication Sig Dispense Refill     acetaminophen (TYLENOL) 325 MG tablet Take 2 tablets (650 mg) by mouth every 4 hours as needed for other (For optimal non-opioid multimodal pain management to improve pain control.).       amLODIPine (NORVASC) 2.5 MG tablet Take 1 tablet (2.5 mg) by mouth at bedtime. 30 tablet 1     atorvastatin (LIPITOR) 10 MG tablet Take 1 tablet (10 mg) by mouth daily. 90 tablet 3     calcium carbonate 500 mg, elemental, (OSCAL) 500 MG tablet Take 1 tablet (500 mg) by mouth 2 times daily. 180 tablet 3     carvedilol (COREG) 6.25 MG tablet Take 1 tablet (6.25 mg) by mouth 2 times daily (with meals). 180 tablet 3     magnesium oxide (MAG-OX) 400 MG tablet Take 2 tablets (800 mg) by mouth 2 times daily. 360 tablet 3     mycophenolate (GENERIC EQUIVALENT) 250 MG capsule Take 3 capsules (750 mg) by mouth 2 times daily. 720 capsule 3     omeprazole (PRILOSEC) 20 MG capsule Take 1 capsule by mouth daily       oxyBUTYnin ER (DITROPAN XL) 5 MG 24 hr tablet Take 5 mg by mouth daily.       sodium bicarbonate 650 MG tablet Take 2 tablets (1,300  mg) by mouth 2 times daily. 60 tablet 11     sulfamethoxazole-trimethoprim (BACTRIM) 400-80 MG tablet Take 1 tablet by mouth daily. 30 tablet 11     tacrolimus (GENERIC EQUIVALENT) 5 MG capsule Take 1 capsule (5 mg) by mouth 2 times daily. 60 capsule 11     traZODone (DESYREL) 50 MG tablet Take 100 mg by mouth at bedtime.       valGANciclovir (VALCYTE) 450 MG tablet Take 2 tablets (900 mg) by mouth daily. 60 tablet 5     loperamide (IMODIUM A-D) 2 MG tablet Take 1 tablet (2 mg) by mouth 4 times daily as needed for diarrhea. (Patient not taking: Reported on 11/13/2024) 30 tablet 0     Multiple Vitamins-Minerals (ICAPS AREDS 2 PO) Take 1 capsule by mouth 2 times daily. (Patient not taking: Reported on 11/13/2024)       psyllium (METAMUCIL/KONSYL) 58.6 % powder Take 6 g (1 teaspoonful) by mouth daily. Can increase to twice daily if needed. (Patient not taking: Reported on 11/13/2024) 500 g 3     tacrolimus (GENERIC EQUIVALENT) 1 MG capsule Hold for dose changes (Patient not taking: Reported on 11/13/2024) 60 capsule 11     No current facility-administered medications for this visit.     Medications Discontinued During This Encounter   Medication Reason     potassium chloride brielle ER (KLOR-CON M20) 20 MEQ CR tablet      phosphorus tablet 250 mg (PHOSPHA 250 NEUTRAL) 250 MG per tablet      calcitRIOL (ROCALTROL) 0.25 MCG capsule      amLODIPine (NORVASC) 2.5 MG tablet        Physical Exam  Vital Signs: BP (!) 158/84 (BP Location: Left arm, Patient Position: Standing)   Pulse 52   Temp 97.9  F (36.6  C) (Oral)   Wt 58.6 kg (129 lb 3.2 oz)   SpO2 100%   BMI 21.83 kg/m      GENERAL APPEARANCE: alert and no distress  HENT: mouth without ulcers or lesions  RESP: lungs clear to auscultation - no rales, rhonchi or wheezes  CV: regular rhythm, normal rate, no rub, no murmur  EDEMA: plus 1 bilaterally   ABDOMEN: soft, nondistended, nontender, bowel sounds normal  MS: extremities normal - no gross deformities noted, no  evidence of inflammation in joints, no muscle tenderness  SKIN: no rash  TRANSPLANT: Incision well healed, no bruit.    Data        Latest Ref Rng & Units 11/13/2024     8:43 AM 11/11/2024     8:12 AM 11/4/2024     8:18 AM   Renal   Sodium 135 - 145 mmol/L 135  135  139    K 3.4 - 5.3 mmol/L 5.1  4.9  4.7    Cl 98 - 107 mmol/L 103  103  106    Cl (external) 98 - 107 mmol/L 103  103  106    CO2 22 - 29 mmol/L 24  23  23    Urea Nitrogen 8.0 - 23.0 mg/dL 25.9  25.7  20.5    Creatinine 0.51 - 0.95 mg/dL 1.32  1.32  1.23    Glucose 70 - 99 mg/dL 99  95  97    Calcium 8.8 - 10.4 mg/dL 9.1  9.0  8.7          Latest Ref Rng & Units 10/31/2024     8:05 AM 10/24/2024     8:19 AM 10/21/2024     8:06 AM   Bone Health   Phosphorus 2.5 - 4.5 mg/dL 3.2  3.2  3.5          Latest Ref Rng & Units 11/13/2024     8:43 AM 11/11/2024     8:12 AM 11/4/2024     8:18 AM   Heme   WBC 4.0 - 11.0 10e3/uL 3.9  3.6  4.6    Hgb 11.7 - 15.7 g/dL 8.8  8.7  8.5    Plt 150 - 450 10e3/uL 209  207  182          Latest Ref Rng & Units 10/9/2024     8:28 AM 9/2/2024     9:57 PM 10/10/2019     2:31 PM   Liver   AP 40 - 150 U/L  50  38    TBili <=1.2 mg/dL  0.3  0.4    ALT 0 - 50 U/L  <5  16    AST 0 - 45 U/L  13  12    Tot Protein 6.4 - 8.3 g/dL  6.7  7.0    Albumin 3.4 - 5.0 g/dL   3.8    Albumin 3.5 - 5.2 g/dL 3.8  4.0           Latest Ref Rng & Units 9/2/2024     9:57 PM   Pancreas   A1C <5.7 % 4.4          Latest Ref Rng & Units 9/16/2024     7:57 AM 9/7/2024     7:14 AM   Iron studies   Iron 37 - 145 ug/dL 46  103    Iron Sat Index 15 - 46 % 22  57    Ferritin 11 - 328 ng/mL 664  674          Latest Ref Rng & Units 9/2/2024     9:57 PM 10/10/2019     2:30 PM 10/28/2013     7:41 AM   [facility] Txp Virology   EBV CAPSID ANTIBODY IGG No detectable antibody. Positive  >8.0     Hep B Core NR^Nonreactive  Nonreactive     HIV 1&2 NEG   Negative      Failed to redirect to the Timeline version of the REVFS SmartLink.  Recent Labs   Lab Test 11/04/24  0818  11/11/24  0812 11/13/24  0843   DOSTAC 11/3/2024 11/10/2024 11/12/2024   TACROL 10.4 15.0 12.9     Recent Labs   Lab Test 10/03/24  0745 10/09/24  0828 10/31/24  0805   DOSMPA 10/2/2024   7:30 PM 10/8/2024   8:00 PM 10/30/2024   8:00 PM   MPACID 0.57* 1.75 1.36   MPAG 33.7 60.5 61.5       Again, thank you for allowing me to participate in the care of your patient.        Sincerely,        Angel Sandhu MD

## 2024-11-13 NOTE — PATIENT INSTRUCTIONS
Patient Recommendations:  - Stop calcitriol  - Stop potassium chloride.  - Stop phosphorous.  - Start Amlodipine 2.5 mg every day at night.  - Keep checking your blood pressure every day.    Transplant Patient Information  Your Post Transplant Coordinator is: Gina Huizar  For non urgent items, we encourage you to contact your coordinator/care team online via [employer]  You and your care team can also contact your transplant coordinator Monday - Friday, 8am - 5pm at [phone] (Option 2 to reach the coordinator or Option 4 to schedule an appointment).  After hours for urgent matters, please call [facility] at [phone].

## 2024-11-13 NOTE — NURSING NOTE
Chief Complaint   Patient presents with    RECHECK     K/P POST ACUTE TXP NEPH - Return, Post Transplant follow up, scheduled per order         Vitals:    11/13/24 0922 11/13/24 0930   BP: (!) 185/98 (!) 172/89   BP Location: Left arm Left arm   Patient Position: Sitting Sitting   Cuff Size: Adult Small Adult Small   Pulse: 52    Temp: 97.9  F (36.6  C)    TempSrc: Oral    SpO2: 100%    Weight: 58.6 kg (129 lb 3.2 oz)        BP Readings from Last 3 Encounters:   11/13/24 (!) 172/89   10/30/24 (!) 170/94   10/30/24 (!) 143/80       BP (!) 172/89 (BP Location: Left arm, Patient Position: Sitting, Cuff Size: Adult Small)   Pulse 52   Temp 97.9  F (36.6  C) (Oral)   Wt 58.6 kg (129 lb 3.2 oz)   SpO2 100%   BMI 21.83 kg/m       Janak Gonzalez CMA

## 2024-11-14 DIAGNOSIS — Z94.0 HTN, KIDNEY TRANSPLANT RELATED: Primary | ICD-10-CM

## 2024-11-14 DIAGNOSIS — I15.1 HTN, KIDNEY TRANSPLANT RELATED: Primary | ICD-10-CM

## 2024-11-14 LAB
BK VIRUS SPECIMEN TYPE: NORMAL
BKV DNA # SPEC NAA+PROBE: NOT DETECTED IU/ML

## 2024-11-18 ENCOUNTER — LAB (OUTPATIENT)
Dept: LAB | Facility: CLINIC | Age: 79
End: 2024-11-18
Payer: COMMERCIAL

## 2024-11-18 DIAGNOSIS — Z79.899 ENCOUNTER FOR LONG-TERM CURRENT USE OF MEDICATION: ICD-10-CM

## 2024-11-18 DIAGNOSIS — Z98.890 OTHER SPECIFIED POSTPROCEDURAL STATES: ICD-10-CM

## 2024-11-18 DIAGNOSIS — I15.1 HTN, KIDNEY TRANSPLANT RELATED: Primary | ICD-10-CM

## 2024-11-18 DIAGNOSIS — D64.9 ANEMIA: ICD-10-CM

## 2024-11-18 DIAGNOSIS — Z94.0 HTN, KIDNEY TRANSPLANT RELATED: Primary | ICD-10-CM

## 2024-11-18 DIAGNOSIS — Z48.298 AFTERCARE FOLLOWING ORGAN TRANSPLANT: ICD-10-CM

## 2024-11-18 DIAGNOSIS — Z94.0 KIDNEY REPLACED BY TRANSPLANT: ICD-10-CM

## 2024-11-18 DIAGNOSIS — Z20.828 CONTACT WITH AND (SUSPECTED) EXPOSURE TO OTHER VIRAL COMMUNICABLE DISEASES: ICD-10-CM

## 2024-11-18 LAB
ANION GAP SERPL CALCULATED.3IONS-SCNC: 15 MMOL/L (ref 7–15)
BUN SERPL-MCNC: 25.9 MG/DL (ref 8–23)
CALCIUM SERPL-MCNC: 9 MG/DL (ref 8.8–10.4)
CHLORIDE SERPL-SCNC: 101 MMOL/L (ref 98–107)
CREAT SERPL-MCNC: 1.15 MG/DL (ref 0.51–0.95)
EGFRCR SERPLBLD CKD-EPI 2021: 49 ML/MIN/1.73M2
ERYTHROCYTE [DISTWIDTH] IN BLOOD BY AUTOMATED COUNT: 14.3 % (ref 10–15)
FOLATE SERPL-MCNC: 9 NG/ML (ref 4.6–34.8)
GLUCOSE SERPL-MCNC: 116 MG/DL (ref 70–99)
HCO3 SERPL-SCNC: 18 MMOL/L (ref 22–29)
HCT VFR BLD AUTO: 27.3 % (ref 35–47)
HGB BLD-MCNC: 8.7 G/DL (ref 11.7–15.7)
MCH RBC QN AUTO: 33.2 PG (ref 26.5–33)
MCHC RBC AUTO-ENTMCNC: 31.9 G/DL (ref 31.5–36.5)
MCV RBC AUTO: 104 FL (ref 78–100)
PLATELET # BLD AUTO: 250 10E3/UL (ref 150–450)
POTASSIUM SERPL-SCNC: 4.3 MMOL/L (ref 3.4–5.3)
RBC # BLD AUTO: 2.62 10E6/UL (ref 3.8–5.2)
SODIUM SERPL-SCNC: 134 MMOL/L (ref 135–145)
TACROLIMUS BLD-MCNC: 9.8 UG/L (ref 5–15)
TME LAST DOSE: NORMAL H
TME LAST DOSE: NORMAL H
VIT B12 SERPL-MCNC: 474 PG/ML (ref 232–1245)
WBC # BLD AUTO: 4.5 10E3/UL (ref 4–11)

## 2024-11-18 PROCEDURE — 80048 BASIC METABOLIC PNL TOTAL CA: CPT

## 2024-11-18 PROCEDURE — 82607 VITAMIN B-12: CPT

## 2024-11-18 PROCEDURE — 85027 COMPLETE CBC AUTOMATED: CPT

## 2024-11-18 PROCEDURE — 82746 ASSAY OF FOLIC ACID SERUM: CPT

## 2024-11-18 PROCEDURE — 80197 ASSAY OF TACROLIMUS: CPT

## 2024-11-18 PROCEDURE — 36415 COLL VENOUS BLD VENIPUNCTURE: CPT

## 2024-11-25 ENCOUNTER — TELEPHONE (OUTPATIENT)
Dept: TRANSPLANT | Facility: CLINIC | Age: 79
End: 2024-11-25

## 2024-11-25 ENCOUNTER — LAB (OUTPATIENT)
Dept: LAB | Facility: CLINIC | Age: 79
End: 2024-11-25
Payer: COMMERCIAL

## 2024-11-25 DIAGNOSIS — Z94.0 HTN, KIDNEY TRANSPLANT RELATED: Primary | ICD-10-CM

## 2024-11-25 DIAGNOSIS — Z98.890 OTHER SPECIFIED POSTPROCEDURAL STATES: ICD-10-CM

## 2024-11-25 DIAGNOSIS — Z79.899 ENCOUNTER FOR LONG-TERM CURRENT USE OF MEDICATION: ICD-10-CM

## 2024-11-25 DIAGNOSIS — I15.1 HTN, KIDNEY TRANSPLANT RELATED: Primary | ICD-10-CM

## 2024-11-25 DIAGNOSIS — Z94.0 KIDNEY REPLACED BY TRANSPLANT: ICD-10-CM

## 2024-11-25 DIAGNOSIS — Z48.298 AFTERCARE FOLLOWING ORGAN TRANSPLANT: ICD-10-CM

## 2024-11-25 DIAGNOSIS — Z94.0 STATUS POST KIDNEY TRANSPLANT: ICD-10-CM

## 2024-11-25 DIAGNOSIS — Z20.828 CONTACT WITH AND (SUSPECTED) EXPOSURE TO OTHER VIRAL COMMUNICABLE DISEASES: ICD-10-CM

## 2024-11-25 LAB
ANION GAP SERPL CALCULATED.3IONS-SCNC: 12 MMOL/L (ref 7–15)
BUN SERPL-MCNC: 38.4 MG/DL (ref 8–23)
CALCIUM SERPL-MCNC: 8.6 MG/DL (ref 8.8–10.4)
CHLORIDE SERPL-SCNC: 103 MMOL/L (ref 98–107)
CREAT SERPL-MCNC: 1.53 MG/DL (ref 0.51–0.95)
EGFRCR SERPLBLD CKD-EPI 2021: 35 ML/MIN/1.73M2
ERYTHROCYTE [DISTWIDTH] IN BLOOD BY AUTOMATED COUNT: 14.4 % (ref 10–15)
GLUCOSE SERPL-MCNC: 98 MG/DL (ref 70–99)
HCO3 SERPL-SCNC: 18 MMOL/L (ref 22–29)
HCT VFR BLD AUTO: 28 % (ref 35–47)
HGB BLD-MCNC: 9 G/DL (ref 11.7–15.7)
MCH RBC QN AUTO: 32.8 PG (ref 26.5–33)
MCHC RBC AUTO-ENTMCNC: 32.1 G/DL (ref 31.5–36.5)
MCV RBC AUTO: 102 FL (ref 78–100)
PLATELET # BLD AUTO: 248 10E3/UL (ref 150–450)
POTASSIUM SERPL-SCNC: 4.2 MMOL/L (ref 3.4–5.3)
RBC # BLD AUTO: 2.74 10E6/UL (ref 3.8–5.2)
SODIUM SERPL-SCNC: 133 MMOL/L (ref 135–145)
TACROLIMUS BLD-MCNC: 10.8 UG/L (ref 5–15)
TME LAST DOSE: NORMAL H
TME LAST DOSE: NORMAL H
WBC # BLD AUTO: 4.6 10E3/UL (ref 4–11)

## 2024-11-25 PROCEDURE — 85027 COMPLETE CBC AUTOMATED: CPT

## 2024-11-25 PROCEDURE — 80197 ASSAY OF TACROLIMUS: CPT

## 2024-11-25 PROCEDURE — 36415 COLL VENOUS BLD VENIPUNCTURE: CPT

## 2024-11-25 PROCEDURE — 80048 BASIC METABOLIC PNL TOTAL CA: CPT

## 2024-11-25 RX ORDER — SULFAMETHOXAZOLE AND TRIMETHOPRIM 400; 80 MG/1; MG/1
1 TABLET ORAL DAILY
Qty: 30 TABLET | Refills: 11 | Status: SHIPPED | OUTPATIENT
Start: 2024-11-25

## 2024-11-25 NOTE — TELEPHONE ENCOUNTER
Per Dr. Martin, he would like her to wait at least 6 months from transplant before having an extended colonoscopy prep. RNCC relayed this information to GI center and they will follow up with the patient.

## 2024-11-25 NOTE — TELEPHONE ENCOUNTER
Patient Call: Medication Refill  Route to LPN  Instruct the patient to first contact their pharmacy. If they have called their pharmacy and require further assistance, route to LPN.    Pharmacy Name: [facility]  Pharmacy Location: [place], [place]  Name of Medication: sulfamethoxazole-trimethoprim (BACTRIM) 400-80 MG tablet   When will the patient be out of this medication?: Greater than 3 days (Route standard priority)

## 2024-11-25 NOTE — TELEPHONE ENCOUNTER
Provider Call: General  Route to LPN    Reason for call: Call from [facility]- wants to clarify Colonoscopy prep orders     Call back needed? Yes    Return Call Needed  Same as documented in contacts section  When to return call?: Greater than one day: Route standard priority

## 2024-11-26 ENCOUNTER — TELEPHONE (OUTPATIENT)
Dept: TRANSPLANT | Facility: CLINIC | Age: 79
End: 2024-11-26
Payer: COMMERCIAL

## 2024-11-26 DIAGNOSIS — Z94.0 HTN, KIDNEY TRANSPLANT RELATED: Primary | ICD-10-CM

## 2024-11-26 DIAGNOSIS — I15.1 HTN, KIDNEY TRANSPLANT RELATED: Primary | ICD-10-CM

## 2024-11-26 NOTE — TELEPHONE ENCOUNTER
----- Message from Angel Sandhu sent at 11/25/2024  4:15 PM CST -----  Elevated creatinine. Let's make sure no signs of infection, good intake and no diarrhea. Repeat creatine in the next week.      Patient reports continued diarrhea since transplant, intermittent in nature. She states she is drinking enough water and does not have signs of infection. Will repeat labs next week.

## 2024-12-02 ENCOUNTER — LAB (OUTPATIENT)
Dept: LAB | Facility: CLINIC | Age: 79
End: 2024-12-02
Payer: COMMERCIAL

## 2024-12-02 DIAGNOSIS — Z94.0 HTN, KIDNEY TRANSPLANT RELATED: ICD-10-CM

## 2024-12-02 DIAGNOSIS — I15.1 HTN, KIDNEY TRANSPLANT RELATED: ICD-10-CM

## 2024-12-02 DIAGNOSIS — Z98.890 OTHER SPECIFIED POSTPROCEDURAL STATES: ICD-10-CM

## 2024-12-02 DIAGNOSIS — Z48.298 AFTERCARE FOLLOWING ORGAN TRANSPLANT: ICD-10-CM

## 2024-12-02 DIAGNOSIS — Z94.0 KIDNEY REPLACED BY TRANSPLANT: ICD-10-CM

## 2024-12-02 DIAGNOSIS — Z79.899 ENCOUNTER FOR LONG-TERM CURRENT USE OF MEDICATION: ICD-10-CM

## 2024-12-02 DIAGNOSIS — Z20.828 CONTACT WITH AND (SUSPECTED) EXPOSURE TO OTHER VIRAL COMMUNICABLE DISEASES: ICD-10-CM

## 2024-12-02 LAB
ANION GAP SERPL CALCULATED.3IONS-SCNC: 15 MMOL/L (ref 7–15)
BUN SERPL-MCNC: 40.6 MG/DL (ref 8–23)
CALCIUM SERPL-MCNC: 8.7 MG/DL (ref 8.8–10.4)
CHLORIDE SERPL-SCNC: 104 MMOL/L (ref 98–107)
CREAT SERPL-MCNC: 2.08 MG/DL (ref 0.51–0.95)
EGFRCR SERPLBLD CKD-EPI 2021: 24 ML/MIN/1.73M2
ERYTHROCYTE [DISTWIDTH] IN BLOOD BY AUTOMATED COUNT: 14.9 % (ref 10–15)
GLUCOSE SERPL-MCNC: 99 MG/DL (ref 70–99)
HCO3 SERPL-SCNC: 18 MMOL/L (ref 22–29)
HCT VFR BLD AUTO: 28.2 % (ref 35–47)
HGB BLD-MCNC: 9.2 G/DL (ref 11.7–15.7)
MCH RBC QN AUTO: 32.5 PG (ref 26.5–33)
MCHC RBC AUTO-ENTMCNC: 32.6 G/DL (ref 31.5–36.5)
MCV RBC AUTO: 100 FL (ref 78–100)
PLATELET # BLD AUTO: 235 10E3/UL (ref 150–450)
POTASSIUM SERPL-SCNC: 3.7 MMOL/L (ref 3.4–5.3)
RBC # BLD AUTO: 2.83 10E6/UL (ref 3.8–5.2)
SODIUM SERPL-SCNC: 137 MMOL/L (ref 135–145)
TACROLIMUS BLD-MCNC: 12.1 UG/L (ref 5–15)
TME LAST DOSE: NORMAL H
TME LAST DOSE: NORMAL H
WBC # BLD AUTO: 6.5 10E3/UL (ref 4–11)

## 2024-12-02 PROCEDURE — 80048 BASIC METABOLIC PNL TOTAL CA: CPT

## 2024-12-02 PROCEDURE — 80197 ASSAY OF TACROLIMUS: CPT

## 2024-12-02 PROCEDURE — 36415 COLL VENOUS BLD VENIPUNCTURE: CPT

## 2024-12-02 PROCEDURE — 83735 ASSAY OF MAGNESIUM: CPT

## 2024-12-02 PROCEDURE — 87799 DETECT AGENT NOS DNA QUANT: CPT

## 2024-12-02 PROCEDURE — 85027 COMPLETE CBC AUTOMATED: CPT

## 2024-12-03 ENCOUNTER — TELEPHONE (OUTPATIENT)
Dept: TRANSPLANT | Facility: CLINIC | Age: 79
End: 2024-12-03
Payer: COMMERCIAL

## 2024-12-03 ENCOUNTER — TELEPHONE (OUTPATIENT)
Dept: INTERNAL MEDICINE | Facility: CLINIC | Age: 79
End: 2024-12-03
Payer: COMMERCIAL

## 2024-12-03 DIAGNOSIS — Z94.0 HTN, KIDNEY TRANSPLANT RELATED: Primary | ICD-10-CM

## 2024-12-03 DIAGNOSIS — Z94.0 STATUS POST KIDNEY TRANSPLANT: ICD-10-CM

## 2024-12-03 DIAGNOSIS — I15.1 HTN, KIDNEY TRANSPLANT RELATED: Primary | ICD-10-CM

## 2024-12-03 LAB
BK VIRUS SPECIMEN TYPE: NORMAL
BKV DNA # SPEC NAA+PROBE: NOT DETECTED IU/ML
MAGNESIUM SERPL-MCNC: 0.9 MG/DL (ref 1.7–2.3)

## 2024-12-03 RX ORDER — MAGNESIUM SULFATE HEPTAHYDRATE 40 MG/ML
2 INJECTION, SOLUTION INTRAVENOUS ONCE
Status: CANCELLED
Start: 2024-12-03 | End: 2024-12-03

## 2024-12-03 RX ORDER — TACROLIMUS 1 MG/1
4 CAPSULE ORAL 2 TIMES DAILY
Qty: 240 CAPSULE | Refills: 11 | Status: SHIPPED | OUTPATIENT
Start: 2024-12-03

## 2024-12-03 NOTE — TELEPHONE ENCOUNTER
----- Message from Angel Sandhu sent at 12/2/2024  3:35 PM CST -----  Awaiting Tac, but creatinine trending up. Can we please get her in for IVF. We should add a magnesium and oxalate to labs. She should repeat DSA. She should use Imodium for diarrhea.

## 2024-12-03 NOTE — TELEPHONE ENCOUNTER
Spoke to patient to confirm she will be at her IVF appointment tomorrow at 9am. She will ensure she is there to get IVF and IV magnesium. Patient still having 3-4 watery stools per day. Taking Imodium every other day and Metamucil. Trying to drink fluids but having frequent bm's makes it challenging. Patient not taking magnesium supplement at this time due to GI issues.

## 2024-12-03 NOTE — TELEPHONE ENCOUNTER
DATE/TIME OF CALL RECEIVED FROM LAB:  12/03/24 at 11:06 AM   LAB TEST:  Magnesium  LAB VALUE:  0.9  PROVIDER NOTIFIED?: Yes  PROVIDER NAME: Edson Martin Mario  DATE/TIME LAB VALUE REPORTED TO PROVIDER: 11:07 am  MECHANISM OF PROVIDER NOTIFICATION:  Santiago priority routing via message.  PROVIDER RESPONSE: Pending  DOROTHY Curiel.

## 2024-12-04 ENCOUNTER — TELEPHONE (OUTPATIENT)
Dept: TRANSPLANT | Facility: CLINIC | Age: 79
End: 2024-12-04
Payer: COMMERCIAL

## 2024-12-04 ENCOUNTER — INFUSION THERAPY VISIT (OUTPATIENT)
Dept: INFUSION THERAPY | Facility: CLINIC | Age: 79
End: 2024-12-04
Attending: INTERNAL MEDICINE
Payer: COMMERCIAL

## 2024-12-04 VITALS
HEART RATE: 65 BPM | SYSTOLIC BLOOD PRESSURE: 88 MMHG | DIASTOLIC BLOOD PRESSURE: 54 MMHG | TEMPERATURE: 98.7 F | OXYGEN SATURATION: 98 % | RESPIRATION RATE: 16 BRPM

## 2024-12-04 DIAGNOSIS — Z94.0 HTN, KIDNEY TRANSPLANT RELATED: Primary | ICD-10-CM

## 2024-12-04 DIAGNOSIS — Z48.298 AFTERCARE FOLLOWING ORGAN TRANSPLANT: Primary | ICD-10-CM

## 2024-12-04 DIAGNOSIS — E86.0 DEHYDRATION: Primary | ICD-10-CM

## 2024-12-04 DIAGNOSIS — I15.1 HTN, KIDNEY TRANSPLANT RELATED: ICD-10-CM

## 2024-12-04 DIAGNOSIS — E86.0 DEHYDRATION: ICD-10-CM

## 2024-12-04 DIAGNOSIS — R19.7 DIARRHEA: ICD-10-CM

## 2024-12-04 DIAGNOSIS — I15.1 HTN, KIDNEY TRANSPLANT RELATED: Primary | ICD-10-CM

## 2024-12-04 DIAGNOSIS — Z94.0 HTN, KIDNEY TRANSPLANT RELATED: ICD-10-CM

## 2024-12-04 LAB
ANION GAP SERPL CALCULATED.3IONS-SCNC: 11 MMOL/L (ref 7–15)
BUN SERPL-MCNC: 34.8 MG/DL (ref 8–23)
CALCIUM SERPL-MCNC: 8.1 MG/DL (ref 8.8–10.4)
CHLORIDE SERPL-SCNC: 108 MMOL/L (ref 98–107)
CMV DNA SPEC NAA+PROBE-ACNC: NOT DETECTED IU/ML
CREAT SERPL-MCNC: 1.16 MG/DL (ref 0.51–0.95)
EGFRCR SERPLBLD CKD-EPI 2021: 48 ML/MIN/1.73M2
GLUCOSE SERPL-MCNC: 133 MG/DL (ref 70–99)
HCO3 SERPL-SCNC: 18 MMOL/L (ref 22–29)
POTASSIUM SERPL-SCNC: 3.8 MMOL/L (ref 3.4–5.3)
SODIUM SERPL-SCNC: 137 MMOL/L (ref 135–145)
SPECIMEN TYPE: NORMAL

## 2024-12-04 PROCEDURE — 82565 ASSAY OF CREATININE: CPT

## 2024-12-04 PROCEDURE — 36415 COLL VENOUS BLD VENIPUNCTURE: CPT

## 2024-12-04 PROCEDURE — 258N000003 HC RX IP 258 OP 636: Performed by: INTERNAL MEDICINE

## 2024-12-04 PROCEDURE — 84132 ASSAY OF SERUM POTASSIUM: CPT

## 2024-12-04 PROCEDURE — 96365 THER/PROPH/DIAG IV INF INIT: CPT

## 2024-12-04 PROCEDURE — 80048 BASIC METABOLIC PNL TOTAL CA: CPT

## 2024-12-04 PROCEDURE — 250N000011 HC RX IP 250 OP 636: Performed by: STUDENT IN AN ORGANIZED HEALTH CARE EDUCATION/TRAINING PROGRAM

## 2024-12-04 PROCEDURE — 84433 ASY THIOPURIN S-MTHYLTRNSFRS: CPT

## 2024-12-04 RX ORDER — ALBUTEROL SULFATE 90 UG/1
1-2 INHALANT RESPIRATORY (INHALATION)
Start: 2024-12-04

## 2024-12-04 RX ORDER — MAGNESIUM SULFATE HEPTAHYDRATE 40 MG/ML
2 INJECTION, SOLUTION INTRAVENOUS ONCE
Status: COMPLETED | OUTPATIENT
Start: 2024-12-04 | End: 2024-12-04

## 2024-12-04 RX ORDER — MYCOPHENOLIC ACID 180 MG/1
360 TABLET, DELAYED RELEASE ORAL 2 TIMES DAILY
Qty: 120 TABLET | Refills: 11 | Status: SHIPPED | OUTPATIENT
Start: 2024-12-04

## 2024-12-04 RX ORDER — MAGNESIUM SULFATE HEPTAHYDRATE 40 MG/ML
2 INJECTION, SOLUTION INTRAVENOUS ONCE
Status: CANCELLED
Start: 2024-12-04 | End: 2024-12-04

## 2024-12-04 RX ORDER — DIPHENHYDRAMINE HYDROCHLORIDE 50 MG/ML
50 INJECTION INTRAMUSCULAR; INTRAVENOUS
Start: 2024-12-04

## 2024-12-04 RX ORDER — DIPHENHYDRAMINE HYDROCHLORIDE 50 MG/ML
25 INJECTION INTRAMUSCULAR; INTRAVENOUS
Start: 2024-12-04

## 2024-12-04 RX ORDER — METHYLPREDNISOLONE SODIUM SUCCINATE 40 MG/ML
40 INJECTION INTRAMUSCULAR; INTRAVENOUS
Start: 2024-12-04

## 2024-12-04 RX ORDER — MEPERIDINE HYDROCHLORIDE 25 MG/ML
25 INJECTION INTRAMUSCULAR; INTRAVENOUS; SUBCUTANEOUS
OUTPATIENT
Start: 2024-12-04

## 2024-12-04 RX ORDER — EPINEPHRINE 1 MG/ML
0.3 INJECTION, SOLUTION INTRAMUSCULAR; SUBCUTANEOUS EVERY 5 MIN PRN
OUTPATIENT
Start: 2024-12-04

## 2024-12-04 RX ORDER — HEPARIN SODIUM,PORCINE 10 UNIT/ML
5-20 VIAL (ML) INTRAVENOUS DAILY PRN
OUTPATIENT
Start: 2024-12-04

## 2024-12-04 RX ORDER — ALBUTEROL SULFATE 0.83 MG/ML
2.5 SOLUTION RESPIRATORY (INHALATION)
OUTPATIENT
Start: 2024-12-04

## 2024-12-04 RX ORDER — HEPARIN SODIUM (PORCINE) LOCK FLUSH IV SOLN 100 UNIT/ML 100 UNIT/ML
5 SOLUTION INTRAVENOUS
OUTPATIENT
Start: 2024-12-04

## 2024-12-04 RX ADMIN — MAGNESIUM SULFATE HEPTAHYDRATE 2 G: 40 INJECTION, SOLUTION INTRAVENOUS at 09:25

## 2024-12-04 RX ADMIN — SODIUM CHLORIDE 1000 ML: 9 INJECTION, SOLUTION INTRAVENOUS at 09:25

## 2024-12-04 NOTE — TELEPHONE ENCOUNTER
Providers decided on 360mg BID given patients age and diarrhea status. Lab orders updated for stool samples

## 2024-12-04 NOTE — PROGRESS NOTES
Called patient to HOLD both Amlodipine and Coreg as her BP was soft in clinic today. LVM for patient to hold these medications and continue to report blood pressure readings tomorrow and Friday.

## 2024-12-04 NOTE — TELEPHONE ENCOUNTER
Post Kidney and Pancreas Transplant Team Conference  Date: 12/4/2024  Transplant Coordinator: Gina     Attendees:  [x]  Dr. Higginbotham [x] Nelia Cerna, RN [x] Coco Fernandez LPN     [x]  Dr. De La Cruz [x] Gina Huizar, RN [] Christine Cardoso LPN    [x] Dr. Sandhu [x] Alejandrina Ferraro, DOROTHY    [x] Dr. Gonzalez [x] Maria R Sanders, RN [] Opal Henry RN   [] Dr. Muro [] Priya Cameron, RN    [] Dr. House [] Kaur Hill RN    []  Dr. Barkley [] Misti Mckeon, RN    [] Dr. Fregoso [] Zaki Owen RN    [] Veronica Ozuna NP [] Tala Hernandez RN    [x] Shannan Christianson NP [] Gabriela Hutton, RN        Verbal Plan Read Back:   Change to   TPMT  Repeat CMV  Enteric panel  Use fiber    Routed to RN Coordinator   Coco Fernandez LPN

## 2024-12-04 NOTE — PROGRESS NOTES
Nursing Note  Lexy Cobb presents today to [facility] for:   Chief Complaint   Patient presents with    Infusion     IV fluids, magnesium replacement     During today's [facility] appointment, orders from Dr. De La Cruz were completed.  Frequency: once    Progress note: Patient identification verified by name and date of birth.  Assessment completed.  Vitals recorded in Doc Flowsheets.  Patient was provided with education regarding medication/procedure and possible side effects.  Patient verbalized understanding.     present during visit today: Not Applicable.    Treatment Conditions:   Magnesium   Date Value Ref Range Status   12/02/2024 0.9 (LL) 1.7 - 2.3 mg/dL Final     Premedications: were not ordered.  Drug Waste Record: No  Infusion length and rate:  999 ml/hr., over one hour and 50 ml/hr for mag over one hour  Labs: were drawn per orders post IV fluids and magnesium infusions, patient sent with supplies for stool collection as patient stated she could not go here.  Vascular access: peripheral IV placed today.  Is the next appt scheduled? No    Post Infusion Assessment:  Patient tolerated infusion without incident.  Site patent and intact, free from redness, edema or discomfort.  No evidence of extravasations.  Access discontinued per protocol.     Discharge Plan:   Follow up plan of care with: transplant coordinator.  Discharge instructions were reviewed with patient.  Patient/representative verbalized understanding of discharge instructions and all questions answered.  Patient discharged from [facility] in stable condition.    Rosa Cabello RN    Administrations This Visit       magnesium sulfate 2 g in 50 mL sterile water intermittent infusion       Admin Date  12/04/2024 Action  $New Bag Dose  2 g Rate  50 mL/hr Route  Intravenous Documented By  Rosa Cabello RN              sodium chloride  0.9% BOLUS 1,000 mL       Admin Date  12/04/2024 Action  $New Bag Dose  1,000 mL Route  Intravenous Documented By  Rosa Cabello, RN                    /60   Pulse 63   Temp 98.7  F (37.1  C) (Oral)   Resp 16   SpO2 98%

## 2024-12-05 ENCOUNTER — TELEPHONE (OUTPATIENT)
Dept: TRANSPLANT | Facility: CLINIC | Age: 79
End: 2024-12-05
Payer: COMMERCIAL

## 2024-12-05 DIAGNOSIS — Z94.0 KIDNEY REPLACED BY TRANSPLANT: Primary | ICD-10-CM

## 2024-12-05 DIAGNOSIS — Z94.0 HTN, KIDNEY TRANSPLANT RELATED: ICD-10-CM

## 2024-12-05 DIAGNOSIS — R19.7 DIARRHEA: ICD-10-CM

## 2024-12-05 DIAGNOSIS — I15.1 HTN, KIDNEY TRANSPLANT RELATED: ICD-10-CM

## 2024-12-05 RX ORDER — LOPERAMIDE HYDROCHLORIDE 2 MG/1
2 TABLET ORAL 3 TIMES DAILY PRN
Qty: 30 TABLET | Refills: 2 | Status: SHIPPED | OUTPATIENT
Start: 2024-12-05

## 2024-12-05 NOTE — TELEPHONE ENCOUNTER
Spoke to patient who states her BP today is 133/68. She is not taking any BP meds at this time; holding amlodipine and coreg. RNCC asked patient to log and monitor her BP and we can touch base next week.     Discussed with patient how to take new medication mycophenolic acid. Patient had no further questions

## 2024-12-05 NOTE — TELEPHONE ENCOUNTER
----- Message from Agnel Sandhu sent at 12/4/2024 12:40 PM CST -----  Dariel Fuentes,    Stop both for now until BP improves. Stop amlodipine indefinitely. She should measure BP and HR at home to see when and if she'll restart metoprolol as she has atrial fibrillation.    Angel Pink  ----- Message -----  From: Gina Huizar RN  Sent: 12/4/2024  11:53 AM CST  To: Angel Sandhu MD    Large improvement in creatinine after IVF. She is also hypotensive 88/64 in clinic. Should she stop Amlodipine and/or Coreg?

## 2024-12-08 LAB — TPMT BLD-CCNC: 23.6 U/ML

## 2024-12-09 ENCOUNTER — LAB (OUTPATIENT)
Dept: LAB | Facility: CLINIC | Age: 79
End: 2024-12-09
Payer: COMMERCIAL

## 2024-12-09 DIAGNOSIS — Z78.9 TAKES DIETARY SUPPLEMENTS: ICD-10-CM

## 2024-12-09 DIAGNOSIS — Z20.828 CONTACT WITH AND (SUSPECTED) EXPOSURE TO OTHER VIRAL COMMUNICABLE DISEASES: ICD-10-CM

## 2024-12-09 DIAGNOSIS — Z48.298 AFTERCARE FOLLOWING ORGAN TRANSPLANT: ICD-10-CM

## 2024-12-09 DIAGNOSIS — Z94.0 KIDNEY REPLACED BY TRANSPLANT: ICD-10-CM

## 2024-12-09 DIAGNOSIS — Z79.899 ENCOUNTER FOR LONG-TERM CURRENT USE OF MEDICATION: ICD-10-CM

## 2024-12-09 DIAGNOSIS — Z98.890 OTHER SPECIFIED POSTPROCEDURAL STATES: ICD-10-CM

## 2024-12-09 LAB
ANION GAP SERPL CALCULATED.3IONS-SCNC: 12 MMOL/L (ref 7–15)
BUN SERPL-MCNC: 24.3 MG/DL (ref 8–23)
CALCIUM SERPL-MCNC: 8.8 MG/DL (ref 8.8–10.4)
CHLORIDE SERPL-SCNC: 104 MMOL/L (ref 98–107)
CREAT SERPL-MCNC: 1.25 MG/DL (ref 0.51–0.95)
EGFRCR SERPLBLD CKD-EPI 2021: 44 ML/MIN/1.73M2
ERYTHROCYTE [DISTWIDTH] IN BLOOD BY AUTOMATED COUNT: 15.9 % (ref 10–15)
GLUCOSE SERPL-MCNC: 101 MG/DL (ref 70–99)
HCO3 SERPL-SCNC: 18 MMOL/L (ref 22–29)
HCT VFR BLD AUTO: 27.1 % (ref 35–47)
HGB BLD-MCNC: 8.9 G/DL (ref 11.7–15.7)
MCH RBC QN AUTO: 32.2 PG (ref 26.5–33)
MCHC RBC AUTO-ENTMCNC: 32.8 G/DL (ref 31.5–36.5)
MCV RBC AUTO: 98 FL (ref 78–100)
PLATELET # BLD AUTO: 277 10E3/UL (ref 150–450)
POTASSIUM SERPL-SCNC: 3.6 MMOL/L (ref 3.4–5.3)
RBC # BLD AUTO: 2.76 10E6/UL (ref 3.8–5.2)
SODIUM SERPL-SCNC: 134 MMOL/L (ref 135–145)
TACROLIMUS BLD-MCNC: 9.6 UG/L (ref 5–15)
TME LAST DOSE: NORMAL H
TME LAST DOSE: NORMAL H
WBC # BLD AUTO: 5.3 10E3/UL (ref 4–11)

## 2024-12-09 PROCEDURE — 83735 ASSAY OF MAGNESIUM: CPT

## 2024-12-09 PROCEDURE — 36415 COLL VENOUS BLD VENIPUNCTURE: CPT

## 2024-12-09 PROCEDURE — 80048 BASIC METABOLIC PNL TOTAL CA: CPT

## 2024-12-09 PROCEDURE — 85027 COMPLETE CBC AUTOMATED: CPT

## 2024-12-09 PROCEDURE — 80197 ASSAY OF TACROLIMUS: CPT

## 2024-12-11 ENCOUNTER — VIRTUAL VISIT (OUTPATIENT)
Dept: PHARMACY | Facility: CLINIC | Age: 79
End: 2024-12-11
Payer: COMMERCIAL

## 2024-12-11 DIAGNOSIS — Z78.9 TAKES DIETARY SUPPLEMENTS: ICD-10-CM

## 2024-12-11 DIAGNOSIS — Z94.0 HYPERTENSION DUE TO KIDNEY TRANSPLANT: Primary | ICD-10-CM

## 2024-12-11 DIAGNOSIS — I15.1 HYPERTENSION DUE TO KIDNEY TRANSPLANT: Primary | ICD-10-CM

## 2024-12-11 LAB — MAGNESIUM SERPL-MCNC: 0.9 MG/DL (ref 1.7–2.3)

## 2024-12-11 PROCEDURE — 99606 MTMS BY PHARM EST 15 MIN: CPT | Mod: 93 | Performed by: PHARMACIST

## 2024-12-11 PROCEDURE — 99607 MTMS BY PHARM ADDL 15 MIN: CPT | Mod: 93 | Performed by: PHARMACIST

## 2024-12-11 NOTE — PATIENT INSTRUCTIONS
"Recommendations from today's MTM visit:                                                      Push a little more salt and fluids in the afternoon since you are having lows in the evening.  Restart Magnesium oxide 1 tablets (400mg) twice daily.     Follow-up: 1/13 at 11 AM     It was great speaking with you today.  I value your experience and would be very thankful for your time in providing feedback in our clinic survey. In the next few days, you may receive an email or text message from [employer] with a link to a survey related to your  clinical pharmacist.\"     To schedule another MTM appointment, please call the clinic directly or you may call the MTM scheduling line at [phone] or toll-free at [phone].     My Clinical Pharmacist's contact information:                                                      Please feel free to contact me with any questions or concerns you have.      Eddie Keene, PharmD  MTM Pharmacist    Phone: [phone]     "

## 2024-12-11 NOTE — PROGRESS NOTES
Medication Therapy Management (MTM) Encounter    ASSESSMENT:                            Medication Adherence/Access: No issues identified.    Supplements:   Restart Magnesium.  Will check a level.     Hypertension   BP at goal <140/90, recommended she push fluids and little more salt in the afternoon as she is going low in the evening.     PLAN:                            Push a little more salt and fluids in the afternoon since you are having lows in the evening.  Restart Magnesium oxide 1 tablets (400mg) twice daily.     Follow-up: 1/13 at 11 AM    SUBJECTIVE/OBJECTIVE:                          Lexy Cobb is a 79 year old female seen for a follow-up visit.       Reason for visit: HTN follow-up.    Allergies/ADRs: Reviewed in chart  Past Medical History: Reviewed in chart  Tobacco: She reports that she has never smoked. She has never used smokeless tobacco.  Alcohol: not currently using    Medication Adherence/Access: no issues reported.    Supplements:   Has not been taking magnesium. Was told to hold she states, but got a magnesium infusion last week.   Sodium Bicarbonate 1300mg twice daily - missed a few doses, possibly why she is low.  Lab Results   Component Value Date    MAG 0.9 (LL) 12/02/2024     Hypertension   Carvedilol caused low BPs- was stopped 2 weeks ago.   Patient reports some swelling in thighs and ankles. Weights have been stable.   Patient self monitors blood pressure.  Home BP monitoring 114/77, 118/87, 111/70, 132/82, 137/87, just two readings <100 SBP over the past week at night.      BP Readings from Last 3 Encounters:   12/04/24 (!) 88/54   11/13/24 (!) 158/84   10/30/24 (!) 170/94     Today's Vitals: There were no vitals taken for this visit.  ----------------    I spent 13 minutes with this patient today. All changes were made via collaborative practice agreement with Dr. Martin. A copy of the visit note was provided to the patient's provider(s).    A summary of these recommendations  was sent via [employer].    Eddie Keene, PharmD  [facility] Pharmacist    Phone: [phone]     Telemedicine Visit Details  The patient's medications can be safely assessed via a telemedicine encounter.  Type of service:  Telephone visit  Originating Location (pt. Location): Home    Distant Location (provider location):  Off-site  Start Time: 9:56 AM  End Time: 10:09 AM     Medication Therapy Recommendations  Takes dietary supplements   1 Current Medication: magnesium oxide (MAG-OX) 400 MG tablet   Current Medication Sig: Take 2 tablets (800 mg) by mouth 2 times daily.   Rationale: Untreated condition - Needs additional medication therapy - Indication   Recommendation: Start Medication   Status: Accepted - no CPA Needed   Identified Date: 12/11/2024 Completed Date: 12/11/2024

## 2024-12-16 ENCOUNTER — LAB (OUTPATIENT)
Dept: LAB | Facility: CLINIC | Age: 79
End: 2024-12-16
Payer: COMMERCIAL

## 2024-12-16 DIAGNOSIS — Z98.890 OTHER SPECIFIED POSTPROCEDURAL STATES: ICD-10-CM

## 2024-12-16 DIAGNOSIS — Z20.828 CONTACT WITH AND (SUSPECTED) EXPOSURE TO OTHER VIRAL COMMUNICABLE DISEASES: ICD-10-CM

## 2024-12-16 DIAGNOSIS — Z79.899 ENCOUNTER FOR LONG-TERM CURRENT USE OF MEDICATION: ICD-10-CM

## 2024-12-16 DIAGNOSIS — Z48.298 AFTERCARE FOLLOWING ORGAN TRANSPLANT: ICD-10-CM

## 2024-12-16 DIAGNOSIS — Z94.0 KIDNEY REPLACED BY TRANSPLANT: ICD-10-CM

## 2024-12-16 LAB
ANION GAP SERPL CALCULATED.3IONS-SCNC: 13 MMOL/L (ref 7–15)
BUN SERPL-MCNC: 21.6 MG/DL (ref 8–23)
CALCIUM SERPL-MCNC: 7.6 MG/DL (ref 8.8–10.4)
CHLORIDE SERPL-SCNC: 103 MMOL/L (ref 98–107)
CREAT SERPL-MCNC: 1.3 MG/DL (ref 0.51–0.95)
EGFRCR SERPLBLD CKD-EPI 2021: 42 ML/MIN/1.73M2
ERYTHROCYTE [DISTWIDTH] IN BLOOD BY AUTOMATED COUNT: 16.7 % (ref 10–15)
GLUCOSE SERPL-MCNC: 85 MG/DL (ref 70–99)
HCO3 SERPL-SCNC: 19 MMOL/L (ref 22–29)
HCT VFR BLD AUTO: 24.4 % (ref 35–47)
HGB BLD-MCNC: 8.1 G/DL (ref 11.7–15.7)
MCH RBC QN AUTO: 32.7 PG (ref 26.5–33)
MCHC RBC AUTO-ENTMCNC: 33.2 G/DL (ref 31.5–36.5)
MCV RBC AUTO: 98 FL (ref 78–100)
PLATELET # BLD AUTO: 258 10E3/UL (ref 150–450)
POTASSIUM SERPL-SCNC: 3.6 MMOL/L (ref 3.4–5.3)
RBC # BLD AUTO: 2.48 10E6/UL (ref 3.8–5.2)
SODIUM SERPL-SCNC: 135 MMOL/L (ref 135–145)
TACROLIMUS BLD-MCNC: 8.4 UG/L (ref 5–15)
TME LAST DOSE: NORMAL H
TME LAST DOSE: NORMAL H
WBC # BLD AUTO: 5.2 10E3/UL (ref 4–11)

## 2024-12-16 PROCEDURE — 36415 COLL VENOUS BLD VENIPUNCTURE: CPT

## 2024-12-16 PROCEDURE — 80197 ASSAY OF TACROLIMUS: CPT

## 2024-12-16 PROCEDURE — 80048 BASIC METABOLIC PNL TOTAL CA: CPT

## 2024-12-16 PROCEDURE — 85027 COMPLETE CBC AUTOMATED: CPT

## 2024-12-23 ENCOUNTER — LAB (OUTPATIENT)
Dept: LAB | Facility: CLINIC | Age: 79
End: 2024-12-23
Payer: COMMERCIAL

## 2024-12-23 DIAGNOSIS — I15.1 HTN, KIDNEY TRANSPLANT RELATED: ICD-10-CM

## 2024-12-23 DIAGNOSIS — Z98.890 OTHER SPECIFIED POSTPROCEDURAL STATES: ICD-10-CM

## 2024-12-23 DIAGNOSIS — Z94.0 HTN, KIDNEY TRANSPLANT RELATED: ICD-10-CM

## 2024-12-23 DIAGNOSIS — Z48.298 AFTERCARE FOLLOWING ORGAN TRANSPLANT: ICD-10-CM

## 2024-12-23 DIAGNOSIS — Z94.0 KIDNEY REPLACED BY TRANSPLANT: ICD-10-CM

## 2024-12-23 DIAGNOSIS — Z20.828 CONTACT WITH AND (SUSPECTED) EXPOSURE TO OTHER VIRAL COMMUNICABLE DISEASES: ICD-10-CM

## 2024-12-23 DIAGNOSIS — Z79.899 ENCOUNTER FOR LONG-TERM CURRENT USE OF MEDICATION: ICD-10-CM

## 2024-12-23 LAB
ANION GAP SERPL CALCULATED.3IONS-SCNC: 12 MMOL/L (ref 7–15)
BUN SERPL-MCNC: 23.8 MG/DL (ref 8–23)
CALCIUM SERPL-MCNC: 8.6 MG/DL (ref 8.8–10.4)
CHLORIDE SERPL-SCNC: 104 MMOL/L (ref 98–107)
CREAT SERPL-MCNC: 1.28 MG/DL (ref 0.51–0.95)
EGFRCR SERPLBLD CKD-EPI 2021: 43 ML/MIN/1.73M2
ERYTHROCYTE [DISTWIDTH] IN BLOOD BY AUTOMATED COUNT: 17.4 % (ref 10–15)
GLUCOSE SERPL-MCNC: 86 MG/DL (ref 70–99)
HCO3 SERPL-SCNC: 21 MMOL/L (ref 22–29)
HCT VFR BLD AUTO: 26 % (ref 35–47)
HGB BLD-MCNC: 8.3 G/DL (ref 11.7–15.7)
MCH RBC QN AUTO: 32.9 PG (ref 26.5–33)
MCHC RBC AUTO-ENTMCNC: 31.9 G/DL (ref 31.5–36.5)
MCV RBC AUTO: 103 FL (ref 78–100)
PLATELET # BLD AUTO: 251 10E3/UL (ref 150–450)
POTASSIUM SERPL-SCNC: 4 MMOL/L (ref 3.4–5.3)
RBC # BLD AUTO: 2.52 10E6/UL (ref 3.8–5.2)
SODIUM SERPL-SCNC: 137 MMOL/L (ref 135–145)
TACROLIMUS BLD-MCNC: 7.6 UG/L (ref 5–15)
TME LAST DOSE: NORMAL H
TME LAST DOSE: NORMAL H
WBC # BLD AUTO: 6.7 10E3/UL (ref 4–11)

## 2024-12-23 PROCEDURE — 80048 BASIC METABOLIC PNL TOTAL CA: CPT

## 2024-12-23 PROCEDURE — 99000 SPECIMEN HANDLING OFFICE-LAB: CPT

## 2024-12-23 PROCEDURE — 83945 ASSAY OF OXALATE: CPT | Mod: 90

## 2024-12-23 PROCEDURE — 80197 ASSAY OF TACROLIMUS: CPT

## 2024-12-23 PROCEDURE — 85027 COMPLETE CBC AUTOMATED: CPT

## 2024-12-23 PROCEDURE — 36415 COLL VENOUS BLD VENIPUNCTURE: CPT

## 2024-12-26 LAB — OXALATE SERPL-SCNC: <2 UMOL/L

## 2024-12-30 ENCOUNTER — LAB (OUTPATIENT)
Dept: LAB | Facility: CLINIC | Age: 79
End: 2024-12-30
Payer: COMMERCIAL

## 2024-12-30 ENCOUNTER — TELEPHONE (OUTPATIENT)
Dept: TRANSPLANT | Facility: CLINIC | Age: 79
End: 2024-12-30

## 2024-12-30 ENCOUNTER — PATIENT OUTREACH (OUTPATIENT)
Dept: CARE COORDINATION | Facility: CLINIC | Age: 79
End: 2024-12-30

## 2024-12-30 DIAGNOSIS — Z94.0 KIDNEY REPLACED BY TRANSPLANT: ICD-10-CM

## 2024-12-30 DIAGNOSIS — D63.8 ANEMIA IN OTHER CHRONIC DISEASES CLASSIFIED ELSEWHERE: ICD-10-CM

## 2024-12-30 DIAGNOSIS — D63.8 ANEMIA IN OTHER CHRONIC DISEASES CLASSIFIED ELSEWHERE: Primary | ICD-10-CM

## 2024-12-30 DIAGNOSIS — I15.1 HTN, KIDNEY TRANSPLANT RELATED: ICD-10-CM

## 2024-12-30 DIAGNOSIS — Z20.828 CONTACT WITH AND (SUSPECTED) EXPOSURE TO OTHER VIRAL COMMUNICABLE DISEASES: ICD-10-CM

## 2024-12-30 DIAGNOSIS — Z94.0 HTN, KIDNEY TRANSPLANT RELATED: ICD-10-CM

## 2024-12-30 DIAGNOSIS — Z48.298 AFTERCARE FOLLOWING ORGAN TRANSPLANT: Primary | ICD-10-CM

## 2024-12-30 DIAGNOSIS — Z98.890 OTHER SPECIFIED POSTPROCEDURAL STATES: ICD-10-CM

## 2024-12-30 DIAGNOSIS — Z48.298 AFTERCARE FOLLOWING ORGAN TRANSPLANT: ICD-10-CM

## 2024-12-30 DIAGNOSIS — Z79.899 ENCOUNTER FOR LONG-TERM CURRENT USE OF MEDICATION: ICD-10-CM

## 2024-12-30 LAB
ANION GAP SERPL CALCULATED.3IONS-SCNC: 10 MMOL/L (ref 7–15)
BUN SERPL-MCNC: 23.7 MG/DL (ref 8–23)
CALCIUM SERPL-MCNC: 8.3 MG/DL (ref 8.8–10.4)
CHLORIDE SERPL-SCNC: 104 MMOL/L (ref 98–107)
CREAT SERPL-MCNC: 1.06 MG/DL (ref 0.51–0.95)
EGFRCR SERPLBLD CKD-EPI 2021: 54 ML/MIN/1.73M2
ERYTHROCYTE [DISTWIDTH] IN BLOOD BY AUTOMATED COUNT: 18.1 % (ref 10–15)
FERRITIN SERPL-MCNC: 608 NG/ML (ref 11–328)
GLUCOSE SERPL-MCNC: 89 MG/DL (ref 70–99)
HCO3 SERPL-SCNC: 23 MMOL/L (ref 22–29)
HCT VFR BLD AUTO: 24 % (ref 35–47)
HGB BLD-MCNC: 7.6 G/DL (ref 11.7–15.7)
IRON BINDING CAPACITY (ROCHE): 233 UG/DL (ref 240–430)
IRON SATN MFR SERPL: 33 % (ref 15–46)
IRON SERPL-MCNC: 77 UG/DL (ref 37–145)
MAGNESIUM SERPL-MCNC: 1.1 MG/DL (ref 1.7–2.3)
MCH RBC QN AUTO: 33.6 PG (ref 26.5–33)
MCHC RBC AUTO-ENTMCNC: 31.7 G/DL (ref 31.5–36.5)
MCV RBC AUTO: 106 FL (ref 78–100)
PLATELET # BLD AUTO: 197 10E3/UL (ref 150–450)
POTASSIUM SERPL-SCNC: 3.8 MMOL/L (ref 3.4–5.3)
RBC # BLD AUTO: 2.26 10E6/UL (ref 3.8–5.2)
SODIUM SERPL-SCNC: 137 MMOL/L (ref 135–145)
TACROLIMUS BLD-MCNC: 8 UG/L (ref 5–15)
TME LAST DOSE: NORMAL H
TME LAST DOSE: NORMAL H
WBC # BLD AUTO: 4.6 10E3/UL (ref 4–11)

## 2024-12-30 PROCEDURE — 85027 COMPLETE CBC AUTOMATED: CPT

## 2024-12-30 PROCEDURE — 36415 COLL VENOUS BLD VENIPUNCTURE: CPT

## 2024-12-30 PROCEDURE — 80197 ASSAY OF TACROLIMUS: CPT

## 2024-12-30 PROCEDURE — 83540 ASSAY OF IRON: CPT

## 2024-12-30 PROCEDURE — 83550 IRON BINDING TEST: CPT

## 2024-12-30 PROCEDURE — 83735 ASSAY OF MAGNESIUM: CPT

## 2024-12-30 PROCEDURE — 80048 BASIC METABOLIC PNL TOTAL CA: CPT

## 2024-12-30 PROCEDURE — 82728 ASSAY OF FERRITIN: CPT

## 2024-12-30 RX ORDER — SODIUM BICARBONATE 650 MG/1
1300 TABLET ORAL 2 TIMES DAILY
Qty: 90 TABLET | Refills: 1 | Status: SHIPPED | OUTPATIENT
Start: 2024-12-30

## 2024-12-30 NOTE — TELEPHONE ENCOUNTER
DATE:  12/30/2024     TIME OF RECEIPT FROM LAB:  8:53 am    ORDERING PROVIDER: Mario    LAB TEST:  Hgb    LAB VALUE:  7.6

## 2024-12-30 NOTE — PROGRESS NOTES
Anemia Management Note - Enrollment    SUBJECTIVE/OBJECTIVE:    Referred by Dr. 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   Iron regimen: TBD: Needs Updated Iron Studies.     Labs : 2025  RX/TX plans : TBD    Recent HIEU use, transfusion, IV iron: Aranesp - DC'd after Tx.  No history of stroke, MI, and blood clots.  Hx of RCC; Kidney Tx .     Contact: Consent to Communicate Scanned on 10/29/2013.         Latest Ref Rng & Units 2024   Anemia   Hemoglobin 11.7 - 15.7 g/dL 8.7  9.0  9.2  8.9  8.1  8.3  7.6      BP Readings from Last 3 Encounters:   24 (!) 88/54   24 (!) 158/84   10/30/24 (!) 170/94     Wt Readings from Last 2 Encounters:   24 58.6 kg (129 lb 3.2 oz)   10/30/24 61 kg (134 lb 6.4 oz)     Current Outpatient Medications   Medication Sig Dispense Refill    acetaminophen (TYLENOL) 325 MG tablet Take 2 tablets (650 mg) by mouth every 4 hours as needed for other (For optimal non-opioid multimodal pain management to improve pain control.).      atorvastatin (LIPITOR) 10 MG tablet Take 1 tablet (10 mg) by mouth daily. 90 tablet 3    calcium carbonate 500 mg, elemental, (OSCAL) 500 MG tablet Take 1 tablet (500 mg) by mouth 2 times daily. 180 tablet 3    loperamide (IMODIUM A-D) 2 MG tablet Take 1 tablet (2 mg) by mouth 3 times daily as needed for diarrhea. 30 tablet 2    magnesium oxide (MAG-OX) 400 MG tablet Take 2 tablets (800 mg) by mouth 2 times daily. 360 tablet 3    mycophenolic acid (GENERIC EQUIVALENT) 180 MG EC tablet Take 2 tablets (360 mg) by mouth 2 times daily. 120 tablet 11    omeprazole (PRILOSEC) 20 MG capsule Take 1 capsule by mouth daily       oxyBUTYnin ER (DITROPAN XL) 5 MG 24 hr tablet Take 5 mg by mouth daily.      sodium bicarbonate 650 MG tablet Take 2 tablets (1,300 mg) by mouth 2 times daily. 60 tablet 11    sulfamethoxazole-trimethoprim (BACTRIM) 400-80 MG tablet Take 1 tablet by mouth daily. 30 tablet 11    tacrolimus (GENERIC EQUIVALENT) 1 MG capsule Take 4 capsules (4 mg) by mouth 2 times daily. 240 capsule 11    traZODone (DESYREL) 50 MG tablet Take 100 mg by mouth at bedtime.      valGANciclovir (VALCYTE) 450 MG tablet Take 2 tablets (900 mg) by mouth daily. 60 tablet 5       ASSESSMENT:  Hgb Not at goal/Initiation of therapy   Ferritin: Due for labs  TSat: Due for labs  Iron regimen recommended: TBD: Needs Updated Iron Studies.   Recommended HIEU regimen: TBD: Needs Updated Iron Studies.   Blood Pressure: Hx of HTN.         PLAN:  1. Patient will called for enrollment in Anemia Management Service 1/31/24 after Iron Studies have resulted.  2. TO Be Discussed: anemia overview, monitoring service, and goal hemoglobin range and rationale and risks of HIEU blood clots, stroke, and increase in blood pressure  3. Dose location:  [facility]  4. Labs: [place]  5. Pharmacy: TBD    New Anemia Referral. Needs Updated Iron Studies before we can initiate treatment for her Anemia.  Was able to add-on Iron Studies to prior lab draw.  Results are pending.     Next call date:  12/31/24    Manda Oquendo RN   Anemia Services  [facility]  [email or web address]   Office : [phone]  Fax: [phone]

## 2024-12-30 NOTE — TELEPHONE ENCOUNTER
Called patient to discuss labs which all look good. She wanted sodium bicarb tablets filled for larger quantity. Discussed that we may be able to stop them at some point so smaller refills are best. She is ok with this plan. Overall she feels great and is pushing fluids. No further concerns

## 2024-12-30 NOTE — TELEPHONE ENCOUNTER
Patient Call: General  Route to LPN    Reason for call: Lexy wanting to talk with coordinator regarding a couple of her medications  patient is wanting to increase her Sodium bicarbonate 650 mg tablet to have 30 day supply and wanting to talk about her taking phosphate    Call back needed? Yes    Return Call Needed  Same as documented in contacts section  When to return call?: Same day: Route High Priority

## 2024-12-31 ENCOUNTER — PATIENT OUTREACH (OUTPATIENT)
Dept: CARE COORDINATION | Facility: CLINIC | Age: 79
End: 2024-12-31
Payer: COMMERCIAL

## 2024-12-31 DIAGNOSIS — Z94.0 KIDNEY REPLACED BY TRANSPLANT: Primary | ICD-10-CM

## 2024-12-31 DIAGNOSIS — Z20.828 CONTACT WITH AND (SUSPECTED) EXPOSURE TO OTHER VIRAL COMMUNICABLE DISEASES: ICD-10-CM

## 2024-12-31 DIAGNOSIS — D63.8 ANEMIA IN OTHER CHRONIC DISEASES CLASSIFIED ELSEWHERE: ICD-10-CM

## 2024-12-31 DIAGNOSIS — Z48.298 AFTERCARE FOLLOWING ORGAN TRANSPLANT: ICD-10-CM

## 2024-12-31 DIAGNOSIS — Z79.899 ENCOUNTER FOR LONG-TERM CURRENT USE OF MEDICATION: ICD-10-CM

## 2024-12-31 RX ORDER — MEPERIDINE HYDROCHLORIDE 25 MG/ML
25 INJECTION INTRAMUSCULAR; INTRAVENOUS; SUBCUTANEOUS
OUTPATIENT
Start: 2024-12-31

## 2024-12-31 RX ORDER — EPINEPHRINE 1 MG/ML
0.3 INJECTION, SOLUTION, CONCENTRATE INTRAVENOUS EVERY 5 MIN PRN
OUTPATIENT
Start: 2024-12-31

## 2024-12-31 RX ORDER — ALBUTEROL SULFATE 90 UG/1
1-2 INHALANT RESPIRATORY (INHALATION)
Start: 2024-12-31

## 2024-12-31 RX ORDER — DIPHENHYDRAMINE HYDROCHLORIDE 50 MG/ML
25 INJECTION INTRAMUSCULAR; INTRAVENOUS
Start: 2024-12-31

## 2024-12-31 RX ORDER — METHYLPREDNISOLONE SODIUM SUCCINATE 40 MG/ML
40 INJECTION INTRAMUSCULAR; INTRAVENOUS
Start: 2024-12-31

## 2024-12-31 RX ORDER — DIPHENHYDRAMINE HYDROCHLORIDE 50 MG/ML
50 INJECTION INTRAMUSCULAR; INTRAVENOUS
Start: 2024-12-31

## 2024-12-31 RX ORDER — ALBUTEROL SULFATE 0.83 MG/ML
2.5 SOLUTION RESPIRATORY (INHALATION)
OUTPATIENT
Start: 2024-12-31

## 2024-12-31 NOTE — PROGRESS NOTES
Anemia Management Note - Follow Up      SUBJECTIVE/OBJECTIVE:    Referred by Dr. Angel Sandhu  on 2024  Primary Diagnosis: Anemia of Other Chronic Illness (D63.8)     Secondary Diagnosis: Organ or tissue replaced by transplant, kidney (Z94.0)  Date of Kidney Transplant: 9/3/2024  Hgb goal range: 9-10     Epo/Darbo: Aranesp 40mcg subcutaneous every 14 days, PRN Hgb <10, in clinic  Iron regimen: TBD     Labs : 2025  RX/TX plans : [id number]     Recent HIEU use, transfusion, IV iron: Venofer May 2024, Aranesp - DC'd after Tx.  No history of stroke, MI, and blood clots.  Hx of RCC; Kidney Tx .      Contact: Consent to Communicate Scanned on 10/29/2013.         Latest Ref Rng & Units 2024   Anemia   Hemoglobin 11.7 - 15.7 g/dL 8.7  9.0  9.2  8.9  8.1  8.3  7.6    TSAT 15 - 46 %       33    Ferritin 11 - 328 ng/mL       608      BP Readings from Last 3 Encounters:   24 (!) 88/54   24 (!) 158/84   10/30/24 (!) 170/94     Wt Readings from Last 2 Encounters:   24 58.6 kg (129 lb 3.2 oz)   10/30/24 61 kg (134 lb 6.4 oz)           ASSESSMENT:    Hgb:Not at goal/Initiation of therapy  TSat: at goal >30% Ferritin: At goal (>100ng/mL)   Goals Addressed    None         PLAN:  Start Aranesp injections, review risks/benefits, frequency  Review Anemia Services enrollment, lab monitoring    Orders needed to be renewed (for next follow-up date) in EPIC: None    Iron labs due:  every 4 weeks, due late 2025    Plan discussed with:  Lexy. She 'remembers the drill' for the Aranesp, and denies any issues with injections. Gave her [facility] scheduling and anemia services call back numbers. Transferred her to [facility].       NEXT FOLLOW-UP DATE:  108    Carrie Leopold, RN BSN  Anemia Services  [facility]  [email or web address]  Office: [phone]  Fax [phone]

## 2025-01-06 ENCOUNTER — LAB (OUTPATIENT)
Dept: LAB | Facility: CLINIC | Age: 80
End: 2025-01-06
Payer: COMMERCIAL

## 2025-01-06 DIAGNOSIS — Z94.0 KIDNEY REPLACED BY TRANSPLANT: ICD-10-CM

## 2025-01-06 DIAGNOSIS — Z48.298 AFTERCARE FOLLOWING ORGAN TRANSPLANT: ICD-10-CM

## 2025-01-06 DIAGNOSIS — Z79.899 ENCOUNTER FOR LONG-TERM CURRENT USE OF MEDICATION: ICD-10-CM

## 2025-01-06 DIAGNOSIS — Z20.828 CONTACT WITH AND (SUSPECTED) EXPOSURE TO OTHER VIRAL COMMUNICABLE DISEASES: ICD-10-CM

## 2025-01-06 DIAGNOSIS — Z98.890 OTHER SPECIFIED POSTPROCEDURAL STATES: ICD-10-CM

## 2025-01-06 LAB
ALBUMIN MFR UR ELPH: 7.1 MG/DL
ANION GAP SERPL CALCULATED.3IONS-SCNC: 11 MMOL/L (ref 7–15)
BUN SERPL-MCNC: 16.9 MG/DL (ref 8–23)
CALCIUM SERPL-MCNC: 8.9 MG/DL (ref 8.8–10.4)
CHLORIDE SERPL-SCNC: 105 MMOL/L (ref 98–107)
CREAT SERPL-MCNC: 1.06 MG/DL (ref 0.51–0.95)
CREAT UR-MCNC: 40.8 MG/DL
EGFRCR SERPLBLD CKD-EPI 2021: 54 ML/MIN/1.73M2
ERYTHROCYTE [DISTWIDTH] IN BLOOD BY AUTOMATED COUNT: 16.7 % (ref 10–15)
GLUCOSE SERPL-MCNC: 86 MG/DL (ref 70–99)
HCO3 SERPL-SCNC: 24 MMOL/L (ref 22–29)
HCT VFR BLD AUTO: 25.7 % (ref 35–47)
HGB BLD-MCNC: 8.2 G/DL (ref 11.7–15.7)
MCH RBC QN AUTO: 34.9 PG (ref 26.5–33)
MCHC RBC AUTO-ENTMCNC: 31.9 G/DL (ref 31.5–36.5)
MCV RBC AUTO: 109 FL (ref 78–100)
PHOSPHATE SERPL-MCNC: 2.6 MG/DL (ref 2.5–4.5)
PLATELET # BLD AUTO: 172 10E3/UL (ref 150–450)
POTASSIUM SERPL-SCNC: 4.3 MMOL/L (ref 3.4–5.3)
PROT/CREAT 24H UR: 0.17 MG/MG CR (ref 0–0.2)
PTH-INTACT SERPL-MCNC: 51 PG/ML (ref 15–65)
RBC # BLD AUTO: 2.35 10E6/UL (ref 3.8–5.2)
SODIUM SERPL-SCNC: 140 MMOL/L (ref 135–145)
TACROLIMUS BLD-MCNC: 8.7 UG/L (ref 5–15)
TME LAST DOSE: NORMAL H
TME LAST DOSE: NORMAL H
WBC # BLD AUTO: 3.3 10E3/UL (ref 4–11)

## 2025-01-06 PROCEDURE — 36415 COLL VENOUS BLD VENIPUNCTURE: CPT

## 2025-01-06 PROCEDURE — 84100 ASSAY OF PHOSPHORUS: CPT

## 2025-01-06 PROCEDURE — 80048 BASIC METABOLIC PNL TOTAL CA: CPT

## 2025-01-06 PROCEDURE — 85027 COMPLETE CBC AUTOMATED: CPT

## 2025-01-06 PROCEDURE — 80197 ASSAY OF TACROLIMUS: CPT

## 2025-01-06 PROCEDURE — 83970 ASSAY OF PARATHORMONE: CPT

## 2025-01-06 PROCEDURE — 84156 ASSAY OF PROTEIN URINE: CPT

## 2025-01-06 PROCEDURE — 87799 DETECT AGENT NOS DNA QUANT: CPT

## 2025-01-07 LAB
BK VIRUS SPECIMEN TYPE: NORMAL
BKV DNA # SPEC NAA+PROBE: NOT DETECTED IU/ML

## 2025-01-10 DIAGNOSIS — Z94.0 STATUS POST KIDNEY TRANSPLANT: ICD-10-CM

## 2025-01-10 DIAGNOSIS — Z78.9 TAKES DIETARY SUPPLEMENTS: ICD-10-CM

## 2025-01-13 ENCOUNTER — LAB (OUTPATIENT)
Dept: INFUSION THERAPY | Facility: HOSPITAL | Age: 80
End: 2025-01-13
Payer: COMMERCIAL

## 2025-01-13 ENCOUNTER — INFUSION THERAPY VISIT (OUTPATIENT)
Dept: INFUSION THERAPY | Facility: HOSPITAL | Age: 80
End: 2025-01-13
Payer: COMMERCIAL

## 2025-01-13 ENCOUNTER — VIRTUAL VISIT (OUTPATIENT)
Dept: PHARMACY | Facility: CLINIC | Age: 80
End: 2025-01-13
Payer: COMMERCIAL

## 2025-01-13 VITALS
SYSTOLIC BLOOD PRESSURE: 160 MMHG | BODY MASS INDEX: 21.8 KG/M2 | OXYGEN SATURATION: 98 % | WEIGHT: 129 LBS | DIASTOLIC BLOOD PRESSURE: 80 MMHG | HEART RATE: 76 BPM | RESPIRATION RATE: 18 BRPM | TEMPERATURE: 97.8 F

## 2025-01-13 DIAGNOSIS — D63.8 ANEMIA IN OTHER CHRONIC DISEASES CLASSIFIED ELSEWHERE: ICD-10-CM

## 2025-01-13 DIAGNOSIS — Z94.0 KIDNEY REPLACED BY TRANSPLANT: Primary | ICD-10-CM

## 2025-01-13 DIAGNOSIS — E78.5 DYSLIPIDEMIA: ICD-10-CM

## 2025-01-13 DIAGNOSIS — Z94.0 KIDNEY REPLACED BY TRANSPLANT: ICD-10-CM

## 2025-01-13 DIAGNOSIS — Z94.0 HYPERTENSION DUE TO KIDNEY TRANSPLANT: ICD-10-CM

## 2025-01-13 DIAGNOSIS — Z78.9 TAKES DIETARY SUPPLEMENTS: ICD-10-CM

## 2025-01-13 DIAGNOSIS — R19.7 DIARRHEA: ICD-10-CM

## 2025-01-13 DIAGNOSIS — D63.8 ANEMIA IN OTHER CHRONIC DISEASES CLASSIFIED ELSEWHERE: Primary | ICD-10-CM

## 2025-01-13 DIAGNOSIS — Z94.0 STATUS POST KIDNEY TRANSPLANT: Primary | ICD-10-CM

## 2025-01-13 DIAGNOSIS — I15.1 HYPERTENSION DUE TO KIDNEY TRANSPLANT: ICD-10-CM

## 2025-01-13 DIAGNOSIS — K21.9 GASTROESOPHAGEAL REFLUX DISEASE, UNSPECIFIED WHETHER ESOPHAGITIS PRESENT: ICD-10-CM

## 2025-01-13 DIAGNOSIS — R19.7 DIARRHEA, UNSPECIFIED TYPE: ICD-10-CM

## 2025-01-13 LAB
HCT VFR BLD AUTO: 28.4 % (ref 35–47)
HGB BLD-MCNC: 8.8 G/DL (ref 11.7–15.7)

## 2025-01-13 PROCEDURE — 36415 COLL VENOUS BLD VENIPUNCTURE: CPT | Performed by: STUDENT IN AN ORGANIZED HEALTH CARE EDUCATION/TRAINING PROGRAM

## 2025-01-13 PROCEDURE — 85018 HEMOGLOBIN: CPT | Performed by: STUDENT IN AN ORGANIZED HEALTH CARE EDUCATION/TRAINING PROGRAM

## 2025-01-13 PROCEDURE — 99607 MTMS BY PHARM ADDL 15 MIN: CPT | Mod: 93 | Performed by: PHARMACIST

## 2025-01-13 PROCEDURE — 250N000011 HC RX IP 250 OP 636: Mod: JZ | Performed by: STUDENT IN AN ORGANIZED HEALTH CARE EDUCATION/TRAINING PROGRAM

## 2025-01-13 PROCEDURE — 99605 MTMS BY PHARM NP 15 MIN: CPT | Mod: 93 | Performed by: PHARMACIST

## 2025-01-13 PROCEDURE — 96372 THER/PROPH/DIAG INJ SC/IM: CPT | Performed by: STUDENT IN AN ORGANIZED HEALTH CARE EDUCATION/TRAINING PROGRAM

## 2025-01-13 PROCEDURE — 85014 HEMATOCRIT: CPT | Performed by: STUDENT IN AN ORGANIZED HEALTH CARE EDUCATION/TRAINING PROGRAM

## 2025-01-13 RX ORDER — METHYLPREDNISOLONE SODIUM SUCCINATE 40 MG/ML
40 INJECTION INTRAMUSCULAR; INTRAVENOUS
Status: CANCELLED
Start: 2025-01-27

## 2025-01-13 RX ORDER — DIPHENHYDRAMINE HYDROCHLORIDE 50 MG/ML
50 INJECTION INTRAMUSCULAR; INTRAVENOUS
Status: DISCONTINUED | OUTPATIENT
Start: 2025-01-13 | End: 2025-01-13 | Stop reason: HOSPADM

## 2025-01-13 RX ORDER — DIPHENHYDRAMINE HYDROCHLORIDE 50 MG/ML
50 INJECTION INTRAMUSCULAR; INTRAVENOUS
Start: 2025-01-27

## 2025-01-13 RX ORDER — DIPHENHYDRAMINE HYDROCHLORIDE 50 MG/ML
25 INJECTION INTRAMUSCULAR; INTRAVENOUS
Status: DISCONTINUED | OUTPATIENT
Start: 2025-01-13 | End: 2025-01-13 | Stop reason: HOSPADM

## 2025-01-13 RX ORDER — EPINEPHRINE 1 MG/ML
0.3 INJECTION, SOLUTION INTRAMUSCULAR; SUBCUTANEOUS EVERY 5 MIN PRN
Status: DISCONTINUED | OUTPATIENT
Start: 2025-01-13 | End: 2025-01-13 | Stop reason: HOSPADM

## 2025-01-13 RX ORDER — ALBUTEROL SULFATE 90 UG/1
1-2 INHALANT RESPIRATORY (INHALATION)
Start: 2025-01-27

## 2025-01-13 RX ORDER — MEPERIDINE HYDROCHLORIDE 25 MG/ML
25 INJECTION INTRAMUSCULAR; INTRAVENOUS; SUBCUTANEOUS
OUTPATIENT
Start: 2025-01-27

## 2025-01-13 RX ORDER — EPINEPHRINE 1 MG/ML
0.3 INJECTION, SOLUTION INTRAMUSCULAR; SUBCUTANEOUS EVERY 5 MIN PRN
OUTPATIENT
Start: 2025-01-27

## 2025-01-13 RX ORDER — ALBUTEROL SULFATE 0.83 MG/ML
2.5 SOLUTION RESPIRATORY (INHALATION)
Status: CANCELLED | OUTPATIENT
Start: 2025-01-27

## 2025-01-13 RX ORDER — ALBUTEROL SULFATE 0.83 MG/ML
2.5 SOLUTION RESPIRATORY (INHALATION)
OUTPATIENT
Start: 2025-01-27

## 2025-01-13 RX ORDER — DIPHENHYDRAMINE HYDROCHLORIDE 50 MG/ML
25 INJECTION INTRAMUSCULAR; INTRAVENOUS
Status: CANCELLED
Start: 2025-01-27

## 2025-01-13 RX ORDER — ALBUTEROL SULFATE 90 UG/1
1-2 INHALANT RESPIRATORY (INHALATION)
Status: CANCELLED
Start: 2025-01-27

## 2025-01-13 RX ORDER — METHYLPREDNISOLONE SODIUM SUCCINATE 40 MG/ML
40 INJECTION INTRAMUSCULAR; INTRAVENOUS
Status: DISCONTINUED | OUTPATIENT
Start: 2025-01-13 | End: 2025-01-13 | Stop reason: HOSPADM

## 2025-01-13 RX ORDER — DIPHENHYDRAMINE HYDROCHLORIDE 50 MG/ML
50 INJECTION INTRAMUSCULAR; INTRAVENOUS
Status: CANCELLED
Start: 2025-01-27

## 2025-01-13 RX ORDER — MEPERIDINE HYDROCHLORIDE 25 MG/ML
25 INJECTION INTRAMUSCULAR; INTRAVENOUS; SUBCUTANEOUS
Status: CANCELLED | OUTPATIENT
Start: 2025-01-27

## 2025-01-13 RX ORDER — ALBUTEROL SULFATE 90 UG/1
1-2 INHALANT RESPIRATORY (INHALATION)
Status: DISCONTINUED | OUTPATIENT
Start: 2025-01-13 | End: 2025-01-13 | Stop reason: HOSPADM

## 2025-01-13 RX ORDER — OMEPRAZOLE 10 MG/1
10 CAPSULE, DELAYED RELEASE ORAL DAILY
Qty: 90 CAPSULE | Refills: 3 | Status: SHIPPED | OUTPATIENT
Start: 2025-01-13

## 2025-01-13 RX ORDER — ALBUTEROL SULFATE 0.83 MG/ML
2.5 SOLUTION RESPIRATORY (INHALATION)
Status: DISCONTINUED | OUTPATIENT
Start: 2025-01-13 | End: 2025-01-13 | Stop reason: HOSPADM

## 2025-01-13 RX ORDER — MEPERIDINE HYDROCHLORIDE 25 MG/ML
25 INJECTION INTRAMUSCULAR; INTRAVENOUS; SUBCUTANEOUS
Status: DISCONTINUED | OUTPATIENT
Start: 2025-01-13 | End: 2025-01-13 | Stop reason: HOSPADM

## 2025-01-13 RX ORDER — METHYLPREDNISOLONE SODIUM SUCCINATE 40 MG/ML
40 INJECTION INTRAMUSCULAR; INTRAVENOUS
Start: 2025-01-27

## 2025-01-13 RX ORDER — DIPHENHYDRAMINE HYDROCHLORIDE 50 MG/ML
25 INJECTION INTRAMUSCULAR; INTRAVENOUS
Start: 2025-01-27

## 2025-01-13 RX ORDER — EPINEPHRINE 1 MG/ML
0.3 INJECTION, SOLUTION INTRAMUSCULAR; SUBCUTANEOUS EVERY 5 MIN PRN
Status: CANCELLED | OUTPATIENT
Start: 2025-01-27

## 2025-01-13 RX ADMIN — DARBEPOETIN ALFA 40 MCG: 40 INJECTION, SOLUTION INTRAVENOUS; SUBCUTANEOUS at 13:54

## 2025-01-13 ASSESSMENT — PAIN SCALES - GENERAL: PAINLEVEL_OUTOF10: NO PAIN (0)

## 2025-01-13 NOTE — PATIENT INSTRUCTIONS
"Recommendations from today's MTM visit:                                                      Talk to Dr. Martin about blood pressure at your upcoming appointment.   Pharmacist will discuss with Dr. Martin and transplant team about reducing Valcyte to 450 mg due to kidney function.  Reduce omeprazole to 10 mg daily. A new prescription was sent to your pharmacy.    Stop taking the magnesium oxide.   Start magnesium glycinate 240 mg twice daily. This can be taken at the same time as your other medications.     Follow-up: 2/17 at 9 am      It was great speaking with you today.  I value your experience and would be very thankful for your time in providing feedback in our clinic survey. In the next few days, you may receive an email or text message from [employer] with a link to a survey related to your  clinical pharmacist.\"     To schedule another MTM appointment, please call the clinic directly or you may call the MTM scheduling line at [phone] or toll-free at [phone].     My Clinical Pharmacist's contact information:                                                      Please feel free to contact me with any questions or concerns you have.      Eddie Keene, PharmD  MTM Pharmacist    Phone: [phone]     "

## 2025-01-13 NOTE — LETTER
"Recommended To-Do List      Prepared on: Jan 13, 2025       You can get the best results from your medications by completing the items on this \"To-Do List.\"      Bring your To-Do List when you go to your doctor. And, share it with your family or caregivers.    My To-Do List:  What we talked about: What I should do:   Your medication dosage being too high    Pharmacist will discuss Valcyte dose with MD.           What we talked about: What I should do:   Your medication dosage being too high    Decrease your dosage of priLOSEC (omeprazole) to 10mg daily.           What we talked about: What I should do:   A medication that is not working    Change the medication you are taking from magnesium oxide (MAG-OX) to Magnesium Glycinate 240mg daily          What we talked about: What I should do:                     "

## 2025-01-13 NOTE — LETTER
_  Medication List        Prepared on: Jan 13, 2025     Bring your Medication List when you go to the doctor, hospital, or   emergency room. And, share it with your family or caregivers.     Note any changes to how you take your medications.  Cross out medications when you no longer use them.    Medication How I take it Why I use it Prescriber   acetaminophen (TYLENOL) 325 MG tablet Take 2 tablets (650 mg) by mouth every 4 hours as needed for other (For optimal non-opioid multimodal pain management to improve pain control.). Pain Gabriela Qureshi, SHAKA   atorvastatin (LIPITOR) 10 MG tablet Take 1 tablet (10 mg) by mouth daily. Dyslipidemia Joe Bob MD   calcium carbonate 500 mg, elemental, (OSCAL) 500 MG tablet Take 1 tablet (500 mg) by mouth 2 times daily. Supplement Joe Bob MD   loperamide (IMODIUM A-D) 2 MG tablet Take 1 tablet (2 mg) by mouth 3 times daily as needed for diarrhea. Diarrhea Angel Sandhu MD   Magnesium Glycinate 120 MG CAPS Take 240 mg by mouth 2 times daily.   Patient Reported   mycophenolic acid (GENERIC EQUIVALENT) 180 MG EC tablet Take 2 tablets (360 mg) by mouth 2 times daily. Aftercare Following Organ Transplant Angel Sandhu MD   omeprazole (PRILOSEC) 10 MG DR capsule Take 1 capsule (10 mg) by mouth daily. GERD. Angel Sandhu MD   omeprazole (PRILOSEC) 20 MG capsule Take 1 capsule by mouth daily  GERD Patient Reported   sodium bicarbonate 650 MG tablet Take 2 tablets (1,300 mg) by mouth 2 times daily. Aftercare Following Organ Transplant Joe Bob MD   sulfamethoxazole-trimethoprim (BACTRIM) 400-80 MG tablet Take 1 tablet by mouth daily. Status post kidney transplant Joe Bob MD   tacrolimus (GENERIC EQUIVALENT) 1 MG capsule Take 4 capsules (4 mg) by mouth 2 times daily. Status post kidney transplant Angel Sandhu MD   traZODone (DESYREL) 50 MG tablet Take 100 mg by mouth at bedtime.  sleep Patient Reported   valGANciclovir  (VALCYTE) 450 MG tablet Take 2 tablets (900 mg) by mouth daily. Status post kidney transplant Angel Sandhu MD         Add new medications, over-the-counter drugs, herbals, vitamins, or  minerals in the blank rows below.    Medication How I take it Why I use it Prescriber                                      Allergies:      - Dapsone  - Fluoxetine  - Iron - Diarrhea  - Latex - Rash        Side effects I have had:      Not on File        Other Information:              My notes and questions:

## 2025-01-13 NOTE — LETTER
January 14, 2025  Lexy Cobb  [address] [address]    Dear Ms. Cobb, MARCELINO [facility] MTM     Thank you for talking with me on Jan 13, 2025 about your health and medications. As a follow-up to our conversation, I have included two documents:      Your Recommended To-Do List has steps you should take to get the best results from your medications.  Your Medication List will help you keep track of your medications and how to take them.    If you want to talk about these documents, please call Eddie Keene RPH at phone: [phone], Monday-Friday 8-4:30pm.    I look forward to working with you and your doctors to make sure your medications work well for you.    Sincerely,  Eddie Keene RPH  [facility] Pharmacist, [facility]

## 2025-01-13 NOTE — PROGRESS NOTES
Medication Therapy Management (MTM) Encounter    ASSESSMENT:                            Medication Adherence/Access: See below for considerations.    Kidney Transplant:    Patient may benefit from a dose reduction of Valcyte due to reduced kidney function.     Supplements:   Patient reported difficulty with remembering to take her magnesium oxide supplement since it should be  from her other medications. Due to her low magnesium levels and diarrhea, it is appropriate to switch formulations and start magnesium glycinate which may be better absorbed.     Hypertension   Home blood pressure readings are above goal of <140/90. Given history of dizziness, recent low BPs, and falls, patient would benefit from discussing medication options with her doctor at her upcoming appointment.      Hyperlipidemia   Stable.     GERD    Patient may benefit from a lower dose of omeprazole due to low magnesium levels with current regimen. Pt denies current GERD sx as well.     Loose Stools:   Patient is taking loperamide twice daily. We discussed that if stools become too firm, taking loperamide once daily may be appropriate and still provide relief of diarrhea.     PLAN:                            Talk to Dr. Martin about blood pressure at your upcoming appointment.   Pharmacist will discuss with Dr. Martin and transplant team about reducing Valcyte to 450 mg due to kidney function.  Reduce omeprazole to 10 mg daily. A new prescription was sent to your pharmacy.    Stop taking the magnesium oxide.   Start magnesium glycinate 240 mg twice daily. This can be taken at the same time as your other medications.     Follow-up: 2/17 at 9 am     SUBJECTIVE/OBJECTIVE:                          Lexy Cobb is a 79 year old female seen for follow up visit.     Reason for visit: 4 months post transplant.    Allergies/ADRs: Reviewed in chart  Past Medical History: Reviewed in chart  Tobacco: She reports that she has never smoked. She has  "never used smokeless tobacco.  Alcohol: 4-6 beverages / week  Medication Adherence/Access: Not many missed doses besides magnesium, having trouble remembering due to separation from other medications.     Kidney Transplant:    Tacrolimus 4 mg twice daily. Pt reports a mild tremor that goes away \"after a bit\". No headaches.  Mycophenolic acid 360 mg twice daily. No diarrhea.   Transplant date: 9/3/24  Vascular Prophylaxis: None  CMV prophylaxis: CrCl >/=60 mL/minute: Valcyte 900mg daily Donor (+), Recipient (-), treat 6 months post tx   PJP prophylaxis: Bactrim SS daily  Tx Coordinator: Gina Huizar, Tx MD: Dr. Martin, Using Med Card: Yes  Immunizations: annual flu shot ; Pneumovax 23:  ; Prevnar 13: ; TDaP:  ; Shingrix: unknown, HBV: unknown, COVID: Primary x 3 and Bivalent x 1 , and 23-24 x1    Estimated Creatinine Clearance: 41 mL/min (A) (based on SCr of 1.06 mg/dL (H)).    Supplements:   Mag Oxide 400 mg twice daily ( 2 hours from MMF) .  Calcium carbonate 500 mg twice daily  Sodium Bicarbonate 1300 mg twice daily - 2 tab twice daily   Lab Results   Component Value Date    PHOS 2.6 2025    MAG 1.1 (L) 2024    CO2 24 2025    POTASSIUM 4.3 2025    CIARA 8.9 2025    PTHI 51 2025      Hypertension   All HTN medications are on hold.   Patient reports dizziness, especially when bending over. No falls.   Patient self monitors blood pressure.  Home BP monitorin/90, 160/91, 149/102, 143/81.   BP Readings from Last 3 Encounters:   25 (!) 160/80   24 (!) 88/54   24 (!) 158/84      Hyperlipidemia   Atorvastatin 10mg daily  Patient reports no significant myalgias or other side effects.  The 10-year ASCVD risk score (Pedro TORRES, et al., 2019) is: 18.9%    Values used to calculate the score:      Age: 79 years      Sex: Female      Is Non- : No      Diabetic: No      Tobacco smoker: No      Systolic Blood Pressure: 106 " mmHg      Is BP treated: Yes      HDL Cholesterol: 81 mg/dL      Total Cholesterol: 134 mg/dL     GERD    Omeprazole 20 mg once daily   Patient has tried a trial off of therapy but heartburn symptoms returned when doing so.   Pt has not tried a lower dose.      Loose Stools:   Loperamide 2 mg twice daily. Patient reported some firm stools, but still experiencing diarrhea.     Today's Vitals: There were no vitals taken for this visit.  ----------------    I spent 21 minutes with this patient today. All changes were made via collaborative practice agreement with Dr. Martin.     A summary of these recommendations was sent via [employer].    Eddie Keene, PharmD  [facility] Pharmacist    Phone: [phone]     Telemedicine Visit Details  The patient's medications can be safely assessed via a telemedicine encounter.  Type of service:  Telephone visit  Originating Location (pt. Location): Home    Distant Location (provider location):  Off-site  Start Time: 11:04 AM  End Time: 11:25 AM     Medication Therapy Recommendations  GERD (gastroesophageal reflux disease)   1 Current Medication: omeprazole (PRILOSEC) 20 MG capsule   Current Medication Sig: Take 1 capsule by mouth daily   Rationale: Dose too high - Dosage too high - Safety   Recommendation: Decrease Dose   Status: Accepted per CPA   Identified Date: 1/13/2025 Completed Date: 1/13/2025         Status post kidney transplant   1 Current Medication: valGANciclovir (VALCYTE) 450 MG tablet   Current Medication Sig: Take 2 tablets (900 mg) by mouth daily.   Rationale: Dose too high - Dosage too high - Safety   Recommendation: Decrease Dose   Status: Contact Provider - Awaiting Response   Identified Date: 1/13/2025         Takes dietary supplements   1 Current Medication: magnesium oxide (MAG-OX) 400 MG tablet (Discontinued)   Current Medication Sig: Take 2 tablets (800 mg) by mouth 2 times daily.   Rationale: More effective medication available - Ineffective medication -  Effectiveness   Recommendation: Change Medication Formulation  - Magnesium Glycinate 120 MG Caps   Status: Accepted - no CPA Needed   Identified Date: 1/13/2025 Completed Date: 1/13/2025

## 2025-01-13 NOTE — Clinical Note
Couple of things: 1. Recommend reducing Valcyte to 450mg once daily, CrCl and GFR between 40-60. 2.  BPs at home running high (140s/90s), with previous hypotensive episodes I was cautious to start anything. I recommended she discuss with you on Friday, Dr. Martin.  Eddie Keene, PharmD [facility] Pharmacist  Phone: [phone]

## 2025-01-13 NOTE — PROGRESS NOTES
"Infusion Nursing Note:  Lexy Cobb presents today for Aranesp.    Patient seen by provider today: No   present during visit today: Not Applicable.    Note: BP (!) 160/80 (BP Location: Left arm, Patient Position: Sitting, Cuff Size: Adult Regular)   Pulse 76   Temp 97.8  F (36.6  C) (Oral)   Resp 18   Wt 58.5 kg (129 lb)   SpO2 98%   BMI 21.80 kg/m    pt denies s/s of hypertension., states, \"they are monitoring her BP, she is following up on Wednesday with her provider.\" Aranesp given in left arm without incident.      Intravenous Access:  No Intravenous access/labs at this visit.    Treatment Conditions:  Results reviewed, labs MET treatment parameters, ok to proceed with treatment.      Post Infusion Assessment:  Patient tolerated injection without incident.       Discharge Plan:   Discharge instructions reviewed with: Patient.  Patient and/or family verbalized understanding of discharge instructions and all questions answered.  Patient discharged in stable condition accompanied by: self.  Departure Mode: Ambulatory.      Manda Garcia RN   "

## 2025-01-14 ENCOUNTER — PATIENT OUTREACH (OUTPATIENT)
Dept: CARE COORDINATION | Facility: CLINIC | Age: 80
End: 2025-01-14
Payer: COMMERCIAL

## 2025-01-14 NOTE — PROGRESS NOTES
Anemia Management Note - Follow Up      SUBJECTIVE/OBJECTIVE:    Referred by Dr. Angel Sandhu  on 2024  Primary Diagnosis: Anemia of Other Chronic Illness (D63.8)     Secondary Diagnosis: Organ or tissue replaced by transplant, kidney (Z94.0)  Date of Kidney Transplant: 9/3/2024  Hgb goal range: 9-10     Epo/Darbo: Aranesp 40mcg subcutaneous every 14 days, PRN Hgb <10, in clinic  Iron regimen: TBD      Labs : 2025  RX/TX plans : [id number]     Recent HIEU use, transfusion, IV iron: Venofer May 2024, Aranesp - DC'd after Tx.  No history of stroke, MI, and blood clots.  Hx of RCC; Kidney Tx .      Contact: Consent to Communicate Scanned on 10/29/2013.         Latest Ref Rng & Units 2024   Anemia   HIEU Dose        40mcg   Hemoglobin 11.7 - 15.7 g/dL 9.2  8.9  8.1  8.3  7.6  8.2  8.8    TSAT 15 - 46 %     33      Ferritin 11 - 328 ng/mL     608        BP Readings from Last 3 Encounters:   25 (!) 160/80   24 (!) 88/54   24 (!) 158/84     Wt Readings from Last 2 Encounters:   25 58.5 kg (129 lb)   24 58.6 kg (129 lb 3.2 oz)         ASSESSMENT:    Hgb:Not at goal/Initiation of therapy  TSat: at goal >30% Ferritin: At goal (>100ng/mL)   Goals Addressed    None         PLAN:  Dose with Aranesp.  RTC for hgb then Aranesp if needed in 2 week(s).    Orders needed to be renewed (for next follow-up date) in EPIC: None    Iron labs due:  monthly, due late 2025    Plan discussed with:  Jose Francisco to call back if any questions/concerns      NEXT FOLLOW-UP DATE:  213    Carrie Leopold, RN BSN  Anemia Services  [facility]  [email or web address]  Office: [phone]  Fax [phone]

## 2025-01-15 ENCOUNTER — OFFICE VISIT (OUTPATIENT)
Dept: TRANSPLANT | Facility: CLINIC | Age: 80
End: 2025-01-15
Attending: STUDENT IN AN ORGANIZED HEALTH CARE EDUCATION/TRAINING PROGRAM
Payer: COMMERCIAL

## 2025-01-15 ENCOUNTER — LAB (OUTPATIENT)
Dept: LAB | Facility: CLINIC | Age: 80
End: 2025-01-15
Attending: STUDENT IN AN ORGANIZED HEALTH CARE EDUCATION/TRAINING PROGRAM
Payer: COMMERCIAL

## 2025-01-15 VITALS
OXYGEN SATURATION: 98 % | SYSTOLIC BLOOD PRESSURE: 166 MMHG | BODY MASS INDEX: 21.19 KG/M2 | WEIGHT: 125.4 LBS | TEMPERATURE: 98.6 F | HEART RATE: 70 BPM | DIASTOLIC BLOOD PRESSURE: 74 MMHG

## 2025-01-15 DIAGNOSIS — E83.39 HYPOPHOSPHATEMIA: ICD-10-CM

## 2025-01-15 DIAGNOSIS — D63.1 ANEMIA OF CHRONIC RENAL FAILURE, STAGE 2 (MILD): ICD-10-CM

## 2025-01-15 DIAGNOSIS — Z48.298 AFTERCARE FOLLOWING ORGAN TRANSPLANT: ICD-10-CM

## 2025-01-15 DIAGNOSIS — Z79.899 ENCOUNTER FOR LONG-TERM CURRENT USE OF MEDICATION: ICD-10-CM

## 2025-01-15 DIAGNOSIS — Z29.89 NEED FOR PNEUMOCYSTIS PROPHYLAXIS: ICD-10-CM

## 2025-01-15 DIAGNOSIS — Z94.0 KIDNEY REPLACED BY TRANSPLANT: ICD-10-CM

## 2025-01-15 DIAGNOSIS — E83.42 HYPOMAGNESEMIA: ICD-10-CM

## 2025-01-15 DIAGNOSIS — I15.1 HTN, KIDNEY TRANSPLANT RELATED: Primary | ICD-10-CM

## 2025-01-15 DIAGNOSIS — N25.81 SECONDARY RENAL HYPERPARATHYROIDISM: ICD-10-CM

## 2025-01-15 DIAGNOSIS — N18.2 ANEMIA OF CHRONIC RENAL FAILURE, STAGE 2 (MILD): ICD-10-CM

## 2025-01-15 DIAGNOSIS — Z20.828 CONTACT WITH AND (SUSPECTED) EXPOSURE TO OTHER VIRAL COMMUNICABLE DISEASES: ICD-10-CM

## 2025-01-15 DIAGNOSIS — N18.2 CKD (CHRONIC KIDNEY DISEASE) STAGE 2, GFR 60-89 ML/MIN: ICD-10-CM

## 2025-01-15 DIAGNOSIS — Z98.890 OTHER SPECIFIED POSTPROCEDURAL STATES: ICD-10-CM

## 2025-01-15 DIAGNOSIS — Z94.0 HTN, KIDNEY TRANSPLANT RELATED: Primary | ICD-10-CM

## 2025-01-15 DIAGNOSIS — R63.4 WEIGHT LOSS: ICD-10-CM

## 2025-01-15 DIAGNOSIS — D84.9 IMMUNOSUPPRESSION: ICD-10-CM

## 2025-01-15 LAB
ANION GAP SERPL CALCULATED.3IONS-SCNC: 10 MMOL/L (ref 7–15)
BUN SERPL-MCNC: 25.1 MG/DL (ref 8–23)
CALCIUM SERPL-MCNC: 8.6 MG/DL (ref 8.8–10.4)
CHLORIDE SERPL-SCNC: 102 MMOL/L (ref 98–107)
CREAT SERPL-MCNC: 1.32 MG/DL (ref 0.51–0.95)
EGFRCR SERPLBLD CKD-EPI 2021: 41 ML/MIN/1.73M2
ERYTHROCYTE [DISTWIDTH] IN BLOOD BY AUTOMATED COUNT: 14.6 % (ref 10–15)
GLUCOSE SERPL-MCNC: 94 MG/DL (ref 70–99)
HCO3 SERPL-SCNC: 24 MMOL/L (ref 22–29)
HCT VFR BLD AUTO: 28.9 % (ref 35–47)
HGB BLD-MCNC: 9.2 G/DL (ref 11.7–15.7)
MAGNESIUM SERPL-MCNC: 1.5 MG/DL (ref 1.7–2.3)
MCH RBC QN AUTO: 34.5 PG (ref 26.5–33)
MCHC RBC AUTO-ENTMCNC: 31.8 G/DL (ref 31.5–36.5)
MCV RBC AUTO: 108 FL (ref 78–100)
PHOSPHATE SERPL-MCNC: 3.3 MG/DL (ref 2.5–4.5)
PLATELET # BLD AUTO: 184 10E3/UL (ref 150–450)
POTASSIUM SERPL-SCNC: 4.2 MMOL/L (ref 3.4–5.3)
RBC # BLD AUTO: 2.67 10E6/UL (ref 3.8–5.2)
SODIUM SERPL-SCNC: 136 MMOL/L (ref 135–145)
TACROLIMUS BLD-MCNC: 12.2 UG/L (ref 5–15)
TME LAST DOSE: NORMAL H
TME LAST DOSE: NORMAL H
WBC # BLD AUTO: 3.5 10E3/UL (ref 4–11)

## 2025-01-15 PROCEDURE — 36415 COLL VENOUS BLD VENIPUNCTURE: CPT | Performed by: PATHOLOGY

## 2025-01-15 PROCEDURE — 87799 DETECT AGENT NOS DNA QUANT: CPT | Performed by: INTERNAL MEDICINE

## 2025-01-15 PROCEDURE — 80197 ASSAY OF TACROLIMUS: CPT | Performed by: STUDENT IN AN ORGANIZED HEALTH CARE EDUCATION/TRAINING PROGRAM

## 2025-01-15 PROCEDURE — 83735 ASSAY OF MAGNESIUM: CPT | Performed by: PATHOLOGY

## 2025-01-15 PROCEDURE — 85027 COMPLETE CBC AUTOMATED: CPT | Performed by: PATHOLOGY

## 2025-01-15 PROCEDURE — 99000 SPECIMEN HANDLING OFFICE-LAB: CPT | Performed by: PATHOLOGY

## 2025-01-15 PROCEDURE — 84100 ASSAY OF PHOSPHORUS: CPT | Performed by: PATHOLOGY

## 2025-01-15 PROCEDURE — 80048 BASIC METABOLIC PNL TOTAL CA: CPT | Performed by: PATHOLOGY

## 2025-01-15 PROCEDURE — G0463 HOSPITAL OUTPT CLINIC VISIT: HCPCS | Performed by: STUDENT IN AN ORGANIZED HEALTH CARE EDUCATION/TRAINING PROGRAM

## 2025-01-15 RX ORDER — CYANOCOBALAMIN 1000 UG/ML
1000 INJECTION, SOLUTION INTRAMUSCULAR; SUBCUTANEOUS
COMMUNITY

## 2025-01-15 RX ORDER — CALCITRIOL 0.25 UG/1
0.25 CAPSULE, LIQUID FILLED ORAL DAILY
COMMUNITY
Start: 2025-01-10

## 2025-01-15 ASSESSMENT — PAIN SCALES - GENERAL: PAINLEVEL_OUTOF10: NO PAIN (0)

## 2025-01-15 NOTE — LETTER
1/15/2025      Lexy Cobb  [address] [facility] [address]      Dear Colleague,    Thank you for referring your patient, Lexy Cobb, to the [facility]. Please see a copy of my visit note below.    TRANSPLANT NEPHROLOGY CLINIC VISIT     Assessment & Plan  # DDKT: CKD Stage 2 - Stable, near normal   - Baseline Creatinine: ~ 0.8-1 mg/dL, most recently 1-1.3 mg/dL.   - Proteinuria: Normal (<0.2 grams)   - DSA Hx: No DSA   - Last cPRA: 0%   - BK Viremia: No   - Kidney Tx Biopsy Hx: No biopsy history.    Her initial creatinine ranged from 0.8-1, but most recently 1-1.3 mg/dL. I do thing the latter is her baseline. She's un sensitized, no DSA, and no proteinuria. A bit higher today though with higher Tac trough that resulted after clinic.     # Immunosuppression: Tacrolimus immediate release (goal 8-10) and Mycophenolic acid (dose 360 mg every 12 hours)   - Induction with Recent Transplant:  Low Intensity Protocol   - Continue with intensive monitoring of immunosuppression for efficacy and toxicity.   - Historical Changes in Immunosuppression:  Changed MMF to EC-MPS due to diarrhea. Lowered dose due to continue diarrhea.   - Changes: Not at this time     # Diarrhea: Absent now on EC-MPS and imodium 1 tablet twice a day.    # Infection Prevention:      - PJP: Sulfa/TMP (Bactrim) 400-80 mg every day.   - CMV: Valganciclovir (Valcyte) 450 mg every day for 6 months, then check CMV PCR monthly until 12 months post transplant.      - CMV IgG Ab High Risk Discordance (D+/R-): Yes  CMV Serostatus: Negative  - EBV IgG Ab High Risk Discordance (D+/R-): No  EBV Serostatus: Positive    # Hypertension / Volume: Inadequate control;  Goal BP: < 150/90 in the short-term given her unsteadiness and prior significant drops with low dose antihypertensives.   - Off antihypertensives since ~ Nov, 2024, given hypotension on metoprolol and amlodipine.    - Changes: Not at this time    # Anemia in  Chronic Renal Disease: Hgb: Trend up      HIEU: No   - Iron studies: Replete    # Mineral Bone Disorder:    - Secondary renal hyperparathyroidism; PTH level: Minimally elevated ( pg/ml)        On treatment: None  - Vitamin D; level: Normal        On supplement: No  - Calcium; level: Normal        On supplement: Yes calcium carbonate 500 mg twice a day.  - Phosphorus; level: Normal        On supplement: No     # Electrolytes:   - Potassium; level: Normal        On supplement: No  - Magnesium; level: Low        On supplement: Yes magnesium glycinate 240 mg twice a day.  - Bicarbonate; level: Normal        On supplement: Yes sodium bicarbonate 1300 mg twice a day.    # Paroxysmal Atrial Fibrillation: Stable, ongoing atrial fibrillation with rate controlled on beta blocker.  Patient was reportedly in AF during her stress test in May 2024. Subsequent Zio patch was negative for AF.     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  At risk if secondary hyperoxaluria.  Oxalate level ~ 11 prior to transplant with CKD.  Now oxalate level <2 with improved kidney function following transplant.  - Low oxalate level. No need to re-check.    # Other Significant PMH:  - Weight loss: Reports good appetite  - GERD: Asymptomatic on PPI.  - RCC (2001): s/p right native nephrectomy with mass totally encapsulated. She did not require any chemotherapy or radiation. CT today pending to reassess recurrence.      # Skin Cancer Risk: Discussed sun protection and recommend regular follow up with Dermatology.    # Transplant History:  Etiology of Kidney Failure: Unknown etiology. CKD likely from hypertension, NSAID use, prior triamterene use, reduced nephron mass, potentially secondary FSGS, and solitary kidney (s/p unilateral nephrectomy for RCC)   Tx: DDKT  Transplant: 9/3/2024 (Kidney)  Significant transplant-related complications: None    Transplant Office Phone Number: [phone]    Assessment  and plan was discussed with the patient and she voiced her understanding and agreement.    Return visit: Return for appointment scheduled for 3/12/25.    Angel Sandhu MD    The longitudinal plan of care for the diagnosis(es)/condition(s) as documented were addressed during this visit. Due to the added complexity in care, I will continue to support Lexy in the subsequent management and with ongoing continuity of care.      Chief Complaint  Ms. Cobb is a 79 year old here for kidney transplant and immunosuppression management.     History of Present Illness   Ms. Cobb reports feeling okay overall.    Since last clinic visit:   Hospitalizations: No   New Medical Issues: Yes, she had an episode of orthostatic changes after amlodipine was started during last clinic visit, improved after IVF and stopping both metoprolol and amlodipine.     Unsteadiness with walking.  Chest pain or shortness of breath: No  Lower extremity swelling: Yes, stable, more on the right greater than left.  Weight change: Yes, decreased from 121 to 116 lbs despite eating better and the diarrhea getting better.  Nausea and vomiting: No  Diarrhea: Not present on loperamide twice a day.  Heartburn symptoms: No  Fever, sweats or chills: No  Night sweats: No  Urinary complaints: No    Home BP:  130-150/60-70    Problem List  Patient Active Problem List   Diagnosis     HTN, kidney transplant related     CKD (chronic kidney disease) stage 2, GFR 60-89 ml/min     Renal cell cancer (H)     Anemia of chronic kidney failure     Secondary renal hyperparathyroidism     Vitamin D deficiency     Hypomagnesemia     Obesity     Vitamin B12 deficiency     Raynaud's syndrome     ABDULLAHI positive     Chronic lower back pain     Irritable bowel syndrome     GERD (gastroesophageal reflux disease)     Trauma to right eye     Immunosuppression     Kidney replaced by transplant     Hypophosphatemia     Dehydration     Need for pneumocystis prophylaxis        Allergies  Allergies   Allergen Reactions     Dapsone      Fluoxetine      Iron Diarrhea     Latex Rash       Medications  Current Outpatient Medications   Medication Sig Dispense Refill     acetaminophen (TYLENOL) 325 MG tablet Take 2 tablets (650 mg) by mouth every 4 hours as needed for other (For optimal non-opioid multimodal pain management to improve pain control.).       calcitRIOL (ROCALTROL) 0.25 MCG capsule Take 0.25 mcg by mouth daily.       calcium carbonate 500 mg, elemental, (OSCAL) 500 MG tablet Take 1 tablet (500 mg) by mouth 2 times daily. 180 tablet 3     loperamide (IMODIUM A-D) 2 MG tablet Take 1 tablet (2 mg) by mouth 3 times daily as needed for diarrhea. 30 tablet 2     mycophenolic acid (GENERIC EQUIVALENT) 180 MG EC tablet Take 2 tablets (360 mg) by mouth 2 times daily. 120 tablet 11     omeprazole (PRILOSEC) 10 MG DR capsule Take 1 capsule (10 mg) by mouth daily. 90 capsule 3     sodium bicarbonate 650 MG tablet Take 2 tablets (1,300 mg) by mouth 2 times daily. 90 tablet 1     sulfamethoxazole-trimethoprim (BACTRIM) 400-80 MG tablet Take 1 tablet by mouth daily. 30 tablet 11     tacrolimus (GENERIC EQUIVALENT) 1 MG capsule Take 4 capsules (4 mg) by mouth 2 times daily. 240 capsule 11     traZODone (DESYREL) 50 MG tablet Take 100 mg by mouth at bedtime.       valGANciclovir (VALCYTE) 450 MG tablet Take 2 tablets (900 mg) by mouth daily. (Patient taking differently: Take 450 mg by mouth daily.) 60 tablet 5     atorvastatin (LIPITOR) 10 MG tablet Take 1 tablet (10 mg) by mouth daily. (Patient not taking: Reported on 1/15/2025) 90 tablet 3     cyanocobalamin (CYANOCOBALAMIN) 1000 mcg/mL injection Inject 1,000 mcg into the muscle every 30 days.       Magnesium Glycinate 120 MG CAPS Take 240 mg by mouth 2 times daily. (Patient not taking: Reported on 1/15/2025)       No current facility-administered medications for this visit.     Medications Discontinued During This Encounter   Medication  Reason     omeprazole (PRILOSEC) 20 MG capsule          Physical Exam  Vital Signs: BP (!) 166/74 (BP Location: Right arm, Patient Position: Sitting, Cuff Size: Adult Small)   Pulse 70   Temp 98.6  F (37  C) (Oral)   Wt 56.9 kg (125 lb 6.4 oz)   SpO2 98%   BMI 21.19 kg/m      GENERAL APPEARANCE: alert and no distress  HENT: mouth without ulcers or lesions  RESP: lungs clear to auscultation - no rales, rhonchi or wheezes  CV: regular rhythm, normal rate, no rub, no murmur  EDEMA: +1 right greater than left.   ABDOMEN: soft, nondistended, nontender, bowel sounds normal  MS: extremities normal - no gross deformities noted, no evidence of inflammation in joints, no muscle tenderness  SKIN: no rash  TRANSPLANT: Incision well healed, no bruit.    Data        Latest Ref Rng & Units 1/15/2025     8:25 AM 1/6/2025     7:58 AM 12/30/2024     8:02 AM   Renal   Sodium 135 - 145 mmol/L 136  140  137    K 3.4 - 5.3 mmol/L 4.2  4.3  3.8    Cl 98 - 107 mmol/L 102  105  104    Cl (external) 98 - 107 mmol/L 102  105  104    CO2 22 - 29 mmol/L 24  24  23    Urea Nitrogen 8.0 - 23.0 mg/dL 25.1  16.9  23.7    Creatinine 0.51 - 0.95 mg/dL 1.32  1.06  1.06    Glucose 70 - 99 mg/dL 94  86  89    Calcium 8.8 - 10.4 mg/dL 8.6  8.9  8.3    Magnesium 1.7 - 2.3 mg/dL 1.5   1.1          Latest Ref Rng & Units 1/15/2025     8:25 AM 1/6/2025     7:58 AM 10/31/2024     8:05 AM   Bone Health   Phosphorus 2.5 - 4.5 mg/dL 3.3  2.6  3.2    Parathyroid Hormone Intact 15 - 65 pg/mL  51           Latest Ref Rng & Units 1/15/2025     8:25 AM 1/13/2025    12:56 PM 1/6/2025     7:58 AM   Heme   WBC 4.0 - 11.0 10e3/uL 3.5   3.3    Hgb 11.7 - 15.7 g/dL 9.2  8.8  8.2    Plt 150 - 450 10e3/uL 184   172          Latest Ref Rng & Units 10/9/2024     8:28 AM 9/2/2024     9:57 PM 10/10/2019     2:31 PM   Liver   AP 40 - 150 U/L  50  38    TBili <=1.2 mg/dL  0.3  0.4    ALT 0 - 50 U/L  <5  16    AST 0 - 45 U/L  13  12    Tot Protein 6.4 - 8.3 g/dL  6.7  7.0     Albumin 3.4 - 5.0 g/dL   3.8    Albumin 3.5 - 5.2 g/dL 3.8  4.0           Latest Ref Rng & Units 9/2/2024     9:57 PM   Pancreas   A1C <5.7 % 4.4          Latest Ref Rng & Units 12/30/2024     8:02 AM 9/16/2024     7:57 AM 9/7/2024     7:14 AM   Iron studies   Iron 37 - 145 ug/dL 77  46  103    Iron Sat Index 15 - 46 % 33  22  57    Ferritin 11 - 328 ng/mL 608  664  674          Latest Ref Rng & Units 9/2/2024     9:57 PM 10/10/2019     2:30 PM 10/28/2013     7:41 AM   [facility] Txp Virology   EBV CAPSID ANTIBODY IGG No detectable antibody. Positive  >8.0     Hep B Core NR^Nonreactive  Nonreactive     HIV 1&2 NEG   Negative      Failed to redirect to the Timeline version of the REVFS SmartLink.  Recent Labs   Lab Test 12/30/24  0802 01/06/25  0811 01/15/25  0825   DOSTAC 12/29/2024 1/5/2025 1/14/2025   TACROL 8.0 8.7 12.2     Recent Labs   Lab Test 10/03/24  0745 10/09/24  0828 10/31/24  0805   DOSMPA 10/2/2024   7:30 PM 10/8/2024   8:00 PM 10/30/2024   8:00 PM   MPACID 0.57* 1.75 1.36   MPAG 33.7 60.5 61.5       Again, thank you for allowing me to participate in the care of your patient.        Sincerely,        Angel Sandhu MD    Electronically signed

## 2025-01-15 NOTE — PROGRESS NOTES
Lexy called in, reporting that the Aranesp dose went well yesterday, and we discussed further monitoring and doses as appropriate, with eventually spreading out appts further  as we hope to see hgb respond. Invited her to call back anytime for questions/concerns.    Carrie Leopold, RN BSN  Anemia Services  [facility]  [email or web address]  Office: [phone]  Fax [phone]

## 2025-01-15 NOTE — NURSING NOTE
Chief Complaint   Patient presents with    RECHECK     K/P POST ACUTE TXP NEPH - Return, Post Transplant follow up, scheduled per order         Vitals:    01/15/25 0917 01/15/25 0926   BP: (!) 166/87 (!) 166/74   BP Location: Right arm Right arm   Patient Position: Sitting Sitting   Cuff Size: Adult Small Adult Small   Pulse: 70    Temp: 98.6  F (37  C)    TempSrc: Oral    SpO2: 98%    Weight: 56.9 kg (125 lb 6.4 oz)        BP Readings from Last 3 Encounters:   01/15/25 (!) 166/74   01/13/25 (!) 160/80   12/04/24 (!) 88/54       BP (!) 166/74 (BP Location: Right arm, Patient Position: Sitting, Cuff Size: Adult Small)   Pulse 70   Temp 98.6  F (37  C) (Oral)   Wt 56.9 kg (125 lb 6.4 oz)   SpO2 98%   BMI 21.19 kg/m       Janak Gonzalez, [facility]

## 2025-01-15 NOTE — PROGRESS NOTES
TRANSPLANT NEPHROLOGY CLINIC VISIT     Assessment & Plan   # DDKT: CKD Stage 2 - Stable, near normal   - Baseline Creatinine: ~ 0.8-1 mg/dL, most recently 1-1.3 mg/dL.   - Proteinuria: Normal (<0.2 grams)   - DSA Hx: No DSA   - Last cPRA: 0%   - BK Viremia: No   - Kidney Tx Biopsy Hx: No biopsy history.    Her initial creatinine ranged from 0.8-1, but most recently 1-1.3 mg/dL. I do thing the latter is her baseline. She's un sensitized, no DSA, and no proteinuria. A bit higher today though with higher Tac trough that resulted after clinic.     # Immunosuppression: Tacrolimus immediate release (goal 8-10) and Mycophenolic acid (dose 360 mg every 12 hours)   - Induction with Recent Transplant:  Low Intensity Protocol   - Continue with intensive monitoring of immunosuppression for efficacy and toxicity.   - Historical Changes in Immunosuppression:  Changed MMF to EC-MPS due to diarrhea. Lowered dose due to continue diarrhea.   - Changes: Not at this time     # Diarrhea: Absent now on EC-MPS and imodium 1 tablet twice a day.    # Infection Prevention:      - PJP: Sulfa/TMP (Bactrim) 400-80 mg every day.   - CMV: Valganciclovir (Valcyte) 450 mg every day for 6 months, then check CMV PCR monthly until 12 months post transplant.      - CMV IgG Ab High Risk Discordance (D+/R-): Yes  CMV Serostatus: Negative  - EBV IgG Ab High Risk Discordance (D+/R-): No  EBV Serostatus: Positive    # Hypertension / Volume: Inadequate control;  Goal BP: < 150/90 in the short-term given her unsteadiness and prior significant drops with low dose antihypertensives.   - Off antihypertensives since ~ Nov, 2024, given hypotension on metoprolol and amlodipine.    - Changes: Not at this time    # Anemia in Chronic Renal Disease: Hgb: Trend up      HIEU: No   - Iron studies: Replete    # Mineral Bone Disorder:    - Secondary renal hyperparathyroidism; PTH level: Minimally elevated ( pg/ml)        On treatment: None  - Vitamin D; level: Normal         On supplement: No  - Calcium; level: Normal        On supplement: Yes calcium carbonate 500 mg twice a day.  - Phosphorus; level: Normal        On supplement: No     # Electrolytes:   - Potassium; level: Normal        On supplement: No  - Magnesium; level: Low        On supplement: Yes magnesium glycinate 240 mg twice a day.  - Bicarbonate; level: Normal        On supplement: Yes sodium bicarbonate 1300 mg twice a day.    # Paroxysmal Atrial Fibrillation: Stable, ongoing atrial fibrillation with rate controlled on beta blocker.  Patient was reportedly in AF during her stress test in May 2024. Subsequent Zio patch was negative for AF.     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  At risk if secondary hyperoxaluria.  Oxalate level ~ 11 prior to transplant with CKD.  Now oxalate level <2 with improved kidney function following transplant.  - Low oxalate level. No need to re-check.    # Other Significant PMH:  - Weight loss: Reports good appetite  - GERD: Asymptomatic on PPI.  - RCC (2001): s/p right native nephrectomy with mass totally encapsulated. She did not require any chemotherapy or radiation. CT today pending to reassess recurrence.      # Skin Cancer Risk: Discussed sun protection and recommend regular follow up with Dermatology.    # Transplant History:  Etiology of Kidney Failure: Unknown etiology. CKD likely from hypertension, NSAID use, prior triamterene use, reduced nephron mass, potentially secondary FSGS, and solitary kidney (s/p unilateral nephrectomy for RCC)   Tx: DDKT  Transplant: 9/3/2024 (Kidney)  Significant transplant-related complications: None    Transplant Office Phone Number: [phone]    Assessment and plan was discussed with the patient and she voiced her understanding and agreement.    Return visit: Return for appointment scheduled for 3/12/25.    Angel Sandhu MD    The longitudinal plan of care for the diagnosis(es)/condition(s)  as documented were addressed during this visit. Due to the added complexity in care, I will continue to support Lexy in the subsequent management and with ongoing continuity of care.      Chief Complaint   Ms. Cobb is a 79 year old here for kidney transplant and immunosuppression management.     History of Present Illness    Ms. Cobb reports feeling okay overall.    Since last clinic visit:   Hospitalizations: No   New Medical Issues: Yes, she had an episode of orthostatic changes after amlodipine was started during last clinic visit, improved after IVF and stopping both metoprolol and amlodipine.     Unsteadiness with walking.  Chest pain or shortness of breath: No  Lower extremity swelling: Yes, stable, more on the right greater than left.  Weight change: Yes, decreased from 121 to 116 lbs despite eating better and the diarrhea getting better.  Nausea and vomiting: No  Diarrhea: Not present on loperamide twice a day.  Heartburn symptoms: No  Fever, sweats or chills: No  Night sweats: No  Urinary complaints: No    Home BP:  130-150/60-70    Problem List   Patient Active Problem List   Diagnosis    HTN, kidney transplant related    CKD (chronic kidney disease) stage 2, GFR 60-89 ml/min    Renal cell cancer (H)    Anemia of chronic kidney failure    Secondary renal hyperparathyroidism    Vitamin D deficiency    Hypomagnesemia    Obesity    Vitamin B12 deficiency    Raynaud's syndrome    ABDULLAHI positive    Chronic lower back pain    Irritable bowel syndrome    GERD (gastroesophageal reflux disease)    Trauma to right eye    Immunosuppression    Kidney replaced by transplant    Hypophosphatemia    Dehydration    Need for pneumocystis prophylaxis       Allergies   Allergies   Allergen Reactions    Dapsone     Fluoxetine     Iron Diarrhea    Latex Rash       Medications   Current Outpatient Medications   Medication Sig Dispense Refill    acetaminophen (TYLENOL) 325 MG tablet Take 2 tablets (650 mg) by mouth  every 4 hours as needed for other (For optimal non-opioid multimodal pain management to improve pain control.).      calcitRIOL (ROCALTROL) 0.25 MCG capsule Take 0.25 mcg by mouth daily.      calcium carbonate 500 mg, elemental, (OSCAL) 500 MG tablet Take 1 tablet (500 mg) by mouth 2 times daily. 180 tablet 3    loperamide (IMODIUM A-D) 2 MG tablet Take 1 tablet (2 mg) by mouth 3 times daily as needed for diarrhea. 30 tablet 2    mycophenolic acid (GENERIC EQUIVALENT) 180 MG EC tablet Take 2 tablets (360 mg) by mouth 2 times daily. 120 tablet 11    omeprazole (PRILOSEC) 10 MG DR capsule Take 1 capsule (10 mg) by mouth daily. 90 capsule 3    sodium bicarbonate 650 MG tablet Take 2 tablets (1,300 mg) by mouth 2 times daily. 90 tablet 1    sulfamethoxazole-trimethoprim (BACTRIM) 400-80 MG tablet Take 1 tablet by mouth daily. 30 tablet 11    tacrolimus (GENERIC EQUIVALENT) 1 MG capsule Take 4 capsules (4 mg) by mouth 2 times daily. 240 capsule 11    traZODone (DESYREL) 50 MG tablet Take 100 mg by mouth at bedtime.      valGANciclovir (VALCYTE) 450 MG tablet Take 2 tablets (900 mg) by mouth daily. (Patient taking differently: Take 450 mg by mouth daily.) 60 tablet 5    atorvastatin (LIPITOR) 10 MG tablet Take 1 tablet (10 mg) by mouth daily. (Patient not taking: Reported on 1/15/2025) 90 tablet 3    cyanocobalamin (CYANOCOBALAMIN) 1000 mcg/mL injection Inject 1,000 mcg into the muscle every 30 days.      Magnesium Glycinate 120 MG CAPS Take 240 mg by mouth 2 times daily. (Patient not taking: Reported on 1/15/2025)       No current facility-administered medications for this visit.     Medications Discontinued During This Encounter   Medication Reason    omeprazole (PRILOSEC) 20 MG capsule          Physical Exam   Vital Signs: BP (!) 166/74 (BP Location: Right arm, Patient Position: Sitting, Cuff Size: Adult Small)   Pulse 70   Temp 98.6  F (37  C) (Oral)   Wt 56.9 kg (125 lb 6.4 oz)   SpO2 98%   BMI 21.19 kg/m       GENERAL APPEARANCE: alert and no distress  HENT: mouth without ulcers or lesions  RESP: lungs clear to auscultation - no rales, rhonchi or wheezes  CV: regular rhythm, normal rate, no rub, no murmur  EDEMA: +1 right greater than left.   ABDOMEN: soft, nondistended, nontender, bowel sounds normal  MS: extremities normal - no gross deformities noted, no evidence of inflammation in joints, no muscle tenderness  SKIN: no rash  TRANSPLANT: Incision well healed, no bruit.    Data         Latest Ref Rng & Units 1/15/2025     8:25 AM 1/6/2025     7:58 AM 12/30/2024     8:02 AM   Renal   Sodium 135 - 145 mmol/L 136  140  137    K 3.4 - 5.3 mmol/L 4.2  4.3  3.8    Cl 98 - 107 mmol/L 102  105  104    Cl (external) 98 - 107 mmol/L 102  105  104    CO2 22 - 29 mmol/L 24  24  23    Urea Nitrogen 8.0 - 23.0 mg/dL 25.1  16.9  23.7    Creatinine 0.51 - 0.95 mg/dL 1.32  1.06  1.06    Glucose 70 - 99 mg/dL 94  86  89    Calcium 8.8 - 10.4 mg/dL 8.6  8.9  8.3    Magnesium 1.7 - 2.3 mg/dL 1.5   1.1          Latest Ref Rng & Units 1/15/2025     8:25 AM 1/6/2025     7:58 AM 10/31/2024     8:05 AM   Bone Health   Phosphorus 2.5 - 4.5 mg/dL 3.3  2.6  3.2    Parathyroid Hormone Intact 15 - 65 pg/mL  51           Latest Ref Rng & Units 1/15/2025     8:25 AM 1/13/2025    12:56 PM 1/6/2025     7:58 AM   Heme   WBC 4.0 - 11.0 10e3/uL 3.5   3.3    Hgb 11.7 - 15.7 g/dL 9.2  8.8  8.2    Plt 150 - 450 10e3/uL 184   172          Latest Ref Rng & Units 10/9/2024     8:28 AM 9/2/2024     9:57 PM 10/10/2019     2:31 PM   Liver   AP 40 - 150 U/L  50  38    TBili <=1.2 mg/dL  0.3  0.4    ALT 0 - 50 U/L  <5  16    AST 0 - 45 U/L  13  12    Tot Protein 6.4 - 8.3 g/dL  6.7  7.0    Albumin 3.4 - 5.0 g/dL   3.8    Albumin 3.5 - 5.2 g/dL 3.8  4.0           Latest Ref Rng & Units 9/2/2024     9:57 PM   Pancreas   A1C <5.7 % 4.4          Latest Ref Rng & Units 12/30/2024     8:02 AM 9/16/2024     7:57 AM 9/7/2024     7:14 AM   Iron studies   Iron 37 - 145  ug/dL 77  46  103    Iron Sat Index 15 - 46 % 33  22  57    Ferritin 11 - 328 ng/mL 608  664  674          Latest Ref Rng & Units 9/2/2024     9:57 PM 10/10/2019     2:30 PM 10/28/2013     7:41 AM   [facility] Txp Virology   EBV CAPSID ANTIBODY IGG No detectable antibody. Positive  >8.0     Hep B Core NR^Nonreactive  Nonreactive     HIV 1&2 NEG   Negative      Failed to redirect to the Timeline version of the REVFS SmartLink.  Recent Labs   Lab Test 12/30/24  0802 01/06/25  0811 01/15/25  0825   DOSTAC 12/29/2024 1/5/2025 1/14/2025   TACROL 8.0 8.7 12.2     Recent Labs   Lab Test 10/03/24  0745 10/09/24  0828 10/31/24  0805   DOSMPA 10/2/2024   7:30 PM 10/8/2024   8:00 PM 10/30/2024   8:00 PM   MPACID 0.57* 1.75 1.36   MPAG 33.7 60.5 61.5

## 2025-01-15 NOTE — PATIENT INSTRUCTIONS
Patient Recommendations:  - Reduce Valcyte (valgancyclovir) to 450 mg every day.  - Your blood pressure goal for now is less than 150/90 mm Hg.  - We'll try to set up physical therapy.     Transplant Patient Information  Your Post Transplant Coordinator is: Gina Huizar  For non urgent items, we encourage you to contact your coordinator/care team online via [employer]  You and your care team can also contact your transplant coordinator Monday - Friday, 8am - 5pm at [phone] (Option 2 to reach the coordinator or Option 4 to schedule an appointment).  After hours for urgent matters, please call [facility] at [phone].

## 2025-01-16 ENCOUNTER — TELEPHONE (OUTPATIENT)
Dept: TRANSPLANT | Facility: CLINIC | Age: 80
End: 2025-01-16
Payer: COMMERCIAL

## 2025-01-16 DIAGNOSIS — Z48.298 AFTERCARE FOLLOWING ORGAN TRANSPLANT: Primary | ICD-10-CM

## 2025-01-16 DIAGNOSIS — Z94.0 HTN, KIDNEY TRANSPLANT RELATED: ICD-10-CM

## 2025-01-16 DIAGNOSIS — I15.1 HTN, KIDNEY TRANSPLANT RELATED: ICD-10-CM

## 2025-01-16 DIAGNOSIS — Z94.0 STATUS POST KIDNEY TRANSPLANT: ICD-10-CM

## 2025-01-16 LAB
BK VIRUS SPECIMEN TYPE: NORMAL
BKV DNA # SPEC NAA+PROBE: NOT DETECTED IU/ML

## 2025-01-16 RX ORDER — TACROLIMUS 1 MG/1
3 CAPSULE ORAL 2 TIMES DAILY
Qty: 180 CAPSULE | Refills: 11 | Status: SHIPPED | OUTPATIENT
Start: 2025-01-16

## 2025-01-16 NOTE — TELEPHONE ENCOUNTER
----- Message from Misti WATSON sent at 1/15/2025 10:05 AM CST -----  Regarding: Clinic  Hel[facility] Fuentes!    Lexy was in clinic this morning. Dr. Martin is wondering if you can order a referral for PT. She is still quite unsteady since surgery. That said, pt states each visit cost her 30$ in the past which is not something she can afford. If its at no cost to her for PT she wants to do that, otherwise she will just go to the gym to work on it.     Secondly, she has a visit with Mario 3/12/25 that needs to be rescheduled as she will be doing a colonoscopy prep all day for her 3/13/25 scope.     Thanks!  Misti

## 2025-01-16 NOTE — TELEPHONE ENCOUNTER
Placed a PT referral for patient. Called patient and let her know this and to discuss cost and coverage with them when they call. Decreased tacrolimus to meet goal

## 2025-01-19 DIAGNOSIS — Z94.0 KIDNEY REPLACED BY TRANSPLANT: ICD-10-CM

## 2025-01-19 DIAGNOSIS — R19.7 DIARRHEA: ICD-10-CM

## 2025-01-19 DIAGNOSIS — Z94.0 HTN, KIDNEY TRANSPLANT RELATED: ICD-10-CM

## 2025-01-19 DIAGNOSIS — I15.1 HTN, KIDNEY TRANSPLANT RELATED: ICD-10-CM

## 2025-01-19 RX ORDER — LOPERAMIDE HYDROCHLORIDE 2 MG/1
2 TABLET ORAL 3 TIMES DAILY PRN
Qty: 90 TABLET | Refills: 3 | Status: SHIPPED | OUTPATIENT
Start: 2025-01-19 | End: 2026-01-19

## 2025-01-20 ENCOUNTER — LAB (OUTPATIENT)
Dept: LAB | Facility: CLINIC | Age: 80
End: 2025-01-20
Payer: COMMERCIAL

## 2025-01-20 DIAGNOSIS — R63.4 WEIGHT LOSS: ICD-10-CM

## 2025-01-20 DIAGNOSIS — Z98.890 OTHER SPECIFIED POSTPROCEDURAL STATES: ICD-10-CM

## 2025-01-20 DIAGNOSIS — Z48.298 AFTERCARE FOLLOWING ORGAN TRANSPLANT: ICD-10-CM

## 2025-01-20 DIAGNOSIS — Z20.828 CONTACT WITH AND (SUSPECTED) EXPOSURE TO OTHER VIRAL COMMUNICABLE DISEASES: ICD-10-CM

## 2025-01-20 DIAGNOSIS — Z94.0 KIDNEY REPLACED BY TRANSPLANT: ICD-10-CM

## 2025-01-20 DIAGNOSIS — Z79.899 ENCOUNTER FOR LONG-TERM CURRENT USE OF MEDICATION: ICD-10-CM

## 2025-01-20 LAB
ANION GAP SERPL CALCULATED.3IONS-SCNC: 11 MMOL/L (ref 7–15)
BUN SERPL-MCNC: 17.2 MG/DL (ref 8–23)
CALCIUM SERPL-MCNC: 9.5 MG/DL (ref 8.8–10.4)
CHLORIDE SERPL-SCNC: 101 MMOL/L (ref 98–107)
CREAT SERPL-MCNC: 1 MG/DL (ref 0.51–0.95)
DONOR IDENTIFICATION: NORMAL
DSA COMMENTS: NORMAL
DSA PRESENT: NO
DSA TEST METHOD: NORMAL
EBV DNA SERPL NAA+PROBE-ACNC: NOT DETECTED IU/ML
EGFRCR SERPLBLD CKD-EPI 2021: 58 ML/MIN/1.73M2
ERYTHROCYTE [DISTWIDTH] IN BLOOD BY AUTOMATED COUNT: 13.9 % (ref 10–15)
GLUCOSE SERPL-MCNC: 95 MG/DL (ref 70–99)
HCO3 SERPL-SCNC: 24 MMOL/L (ref 22–29)
HCT VFR BLD AUTO: 30.3 % (ref 35–47)
HGB BLD-MCNC: 9.7 G/DL (ref 11.7–15.7)
MCH RBC QN AUTO: 34.6 PG (ref 26.5–33)
MCHC RBC AUTO-ENTMCNC: 32 G/DL (ref 31.5–36.5)
MCV RBC AUTO: 108 FL (ref 78–100)
ORGAN: NORMAL
PLATELET # BLD AUTO: 182 10E3/UL (ref 150–450)
POTASSIUM SERPL-SCNC: 4.2 MMOL/L (ref 3.4–5.3)
RBC # BLD AUTO: 2.8 10E6/UL (ref 3.8–5.2)
SA 1  COMMENTS: NORMAL
SA 1 CELL: NORMAL
SA 1 TEST METHOD: NORMAL
SA 2 CELL: NORMAL
SA 2 COMMENTS: NORMAL
SA 2 TEST METHOD: NORMAL
SA1 HI RISK ABY: NORMAL
SA1 MOD RISK ABY: NORMAL
SA2 HI RISK ABY: NORMAL
SA2 MOD RISK ABY: NORMAL
SODIUM SERPL-SCNC: 136 MMOL/L (ref 135–145)
TACROLIMUS BLD-MCNC: 7.6 UG/L (ref 5–15)
TME LAST DOSE: NORMAL H
TME LAST DOSE: NORMAL H
UNACCEPTABLE ANTIGENS: NORMAL
UNOS CPRA: 0
WBC # BLD AUTO: 3.4 10E3/UL (ref 4–11)

## 2025-01-20 PROCEDURE — 80048 BASIC METABOLIC PNL TOTAL CA: CPT

## 2025-01-20 PROCEDURE — 80197 ASSAY OF TACROLIMUS: CPT

## 2025-01-20 PROCEDURE — 85027 COMPLETE CBC AUTOMATED: CPT

## 2025-01-20 PROCEDURE — 87799 DETECT AGENT NOS DNA QUANT: CPT

## 2025-01-20 PROCEDURE — 36415 COLL VENOUS BLD VENIPUNCTURE: CPT

## 2025-01-22 ENCOUNTER — TELEPHONE (OUTPATIENT)
Dept: TRANSPLANT | Facility: CLINIC | Age: 80
End: 2025-01-22
Payer: COMMERCIAL

## 2025-01-22 DIAGNOSIS — I15.1 HTN, KIDNEY TRANSPLANT RELATED: ICD-10-CM

## 2025-01-22 DIAGNOSIS — Z94.0 HTN, KIDNEY TRANSPLANT RELATED: ICD-10-CM

## 2025-01-22 NOTE — TELEPHONE ENCOUNTER
Pt called and stated a week ago she bit her tongue and it has not healed and, thinks she has an infection. Pt has not contacted her PCP yet.

## 2025-01-22 NOTE — TELEPHONE ENCOUNTER
Spoke to patient who states her tongue is swollen, as well as her lymph nodes and surrounding neck. RNCC urged patient to present to the ER for evaluation as this swelling can lead to complications. Patient will present to urgent care/ER today.

## 2025-01-27 ENCOUNTER — LAB (OUTPATIENT)
Dept: INFUSION THERAPY | Facility: HOSPITAL | Age: 80
End: 2025-01-27
Attending: STUDENT IN AN ORGANIZED HEALTH CARE EDUCATION/TRAINING PROGRAM
Payer: COMMERCIAL

## 2025-01-27 VITALS
HEART RATE: 62 BPM | RESPIRATION RATE: 16 BRPM | DIASTOLIC BLOOD PRESSURE: 70 MMHG | SYSTOLIC BLOOD PRESSURE: 151 MMHG | OXYGEN SATURATION: 97 % | TEMPERATURE: 97.9 F

## 2025-01-27 DIAGNOSIS — Z94.0 KIDNEY REPLACED BY TRANSPLANT: ICD-10-CM

## 2025-01-27 DIAGNOSIS — D63.1 ANEMIA OF CHRONIC KIDNEY FAILURE: ICD-10-CM

## 2025-01-27 DIAGNOSIS — N18.9 ANEMIA OF CHRONIC KIDNEY FAILURE: ICD-10-CM

## 2025-01-27 DIAGNOSIS — Z94.0 KIDNEY REPLACED BY TRANSPLANT: Primary | ICD-10-CM

## 2025-01-27 DIAGNOSIS — N18.9 ANEMIA OF CHRONIC KIDNEY FAILURE: Primary | ICD-10-CM

## 2025-01-27 DIAGNOSIS — D63.1 ANEMIA OF CHRONIC KIDNEY FAILURE: Primary | ICD-10-CM

## 2025-01-27 LAB
HCT VFR BLD AUTO: 29.9 % (ref 35–47)
HGB BLD-MCNC: 9.3 G/DL (ref 11.7–15.7)

## 2025-01-27 PROCEDURE — 85014 HEMATOCRIT: CPT | Performed by: STUDENT IN AN ORGANIZED HEALTH CARE EDUCATION/TRAINING PROGRAM

## 2025-01-27 PROCEDURE — 250N000011 HC RX IP 250 OP 636: Mod: JZ | Performed by: STUDENT IN AN ORGANIZED HEALTH CARE EDUCATION/TRAINING PROGRAM

## 2025-01-27 PROCEDURE — 85018 HEMOGLOBIN: CPT | Performed by: STUDENT IN AN ORGANIZED HEALTH CARE EDUCATION/TRAINING PROGRAM

## 2025-01-27 PROCEDURE — 96372 THER/PROPH/DIAG INJ SC/IM: CPT | Performed by: STUDENT IN AN ORGANIZED HEALTH CARE EDUCATION/TRAINING PROGRAM

## 2025-01-27 PROCEDURE — 36415 COLL VENOUS BLD VENIPUNCTURE: CPT | Performed by: STUDENT IN AN ORGANIZED HEALTH CARE EDUCATION/TRAINING PROGRAM

## 2025-01-27 RX ORDER — METHYLPREDNISOLONE SODIUM SUCCINATE 40 MG/ML
40 INJECTION INTRAMUSCULAR; INTRAVENOUS
Status: DISCONTINUED | OUTPATIENT
Start: 2025-01-27 | End: 2025-01-27 | Stop reason: HOSPADM

## 2025-01-27 RX ORDER — DIPHENHYDRAMINE HYDROCHLORIDE 50 MG/ML
25 INJECTION INTRAMUSCULAR; INTRAVENOUS
Status: DISCONTINUED | OUTPATIENT
Start: 2025-01-27 | End: 2025-01-27 | Stop reason: HOSPADM

## 2025-01-27 RX ORDER — MEPERIDINE HYDROCHLORIDE 25 MG/ML
25 INJECTION INTRAMUSCULAR; INTRAVENOUS; SUBCUTANEOUS
OUTPATIENT
Start: 2025-02-10

## 2025-01-27 RX ORDER — EPINEPHRINE 1 MG/ML
0.3 INJECTION, SOLUTION INTRAMUSCULAR; SUBCUTANEOUS EVERY 5 MIN PRN
Status: CANCELLED | OUTPATIENT
Start: 2025-02-10

## 2025-01-27 RX ORDER — DIPHENHYDRAMINE HYDROCHLORIDE 50 MG/ML
25 INJECTION INTRAMUSCULAR; INTRAVENOUS
Status: CANCELLED
Start: 2025-02-10

## 2025-01-27 RX ORDER — ALBUTEROL SULFATE 0.83 MG/ML
2.5 SOLUTION RESPIRATORY (INHALATION)
OUTPATIENT
Start: 2025-02-10

## 2025-01-27 RX ORDER — DIPHENHYDRAMINE HYDROCHLORIDE 50 MG/ML
50 INJECTION INTRAMUSCULAR; INTRAVENOUS
Status: DISCONTINUED | OUTPATIENT
Start: 2025-01-27 | End: 2025-01-27 | Stop reason: HOSPADM

## 2025-01-27 RX ORDER — DIPHENHYDRAMINE HYDROCHLORIDE 50 MG/ML
25 INJECTION INTRAMUSCULAR; INTRAVENOUS
Start: 2025-02-10

## 2025-01-27 RX ORDER — ALBUTEROL SULFATE 90 UG/1
1-2 INHALANT RESPIRATORY (INHALATION)
Status: DISCONTINUED | OUTPATIENT
Start: 2025-01-27 | End: 2025-01-27 | Stop reason: HOSPADM

## 2025-01-27 RX ORDER — ALBUTEROL SULFATE 0.83 MG/ML
2.5 SOLUTION RESPIRATORY (INHALATION)
Status: CANCELLED | OUTPATIENT
Start: 2025-02-10

## 2025-01-27 RX ORDER — ALBUTEROL SULFATE 90 UG/1
1-2 INHALANT RESPIRATORY (INHALATION)
Status: CANCELLED
Start: 2025-02-10

## 2025-01-27 RX ORDER — EPINEPHRINE 1 MG/ML
0.3 INJECTION, SOLUTION INTRAMUSCULAR; SUBCUTANEOUS EVERY 5 MIN PRN
OUTPATIENT
Start: 2025-02-10

## 2025-01-27 RX ORDER — ALBUTEROL SULFATE 0.83 MG/ML
2.5 SOLUTION RESPIRATORY (INHALATION)
Status: DISCONTINUED | OUTPATIENT
Start: 2025-01-27 | End: 2025-01-27 | Stop reason: HOSPADM

## 2025-01-27 RX ORDER — METHYLPREDNISOLONE SODIUM SUCCINATE 40 MG/ML
40 INJECTION INTRAMUSCULAR; INTRAVENOUS
Status: CANCELLED
Start: 2025-02-10

## 2025-01-27 RX ORDER — DIPHENHYDRAMINE HYDROCHLORIDE 50 MG/ML
50 INJECTION INTRAMUSCULAR; INTRAVENOUS
Start: 2025-02-10

## 2025-01-27 RX ORDER — ALBUTEROL SULFATE 90 UG/1
1-2 INHALANT RESPIRATORY (INHALATION)
Start: 2025-02-10

## 2025-01-27 RX ORDER — METHYLPREDNISOLONE SODIUM SUCCINATE 40 MG/ML
40 INJECTION INTRAMUSCULAR; INTRAVENOUS
Start: 2025-02-10

## 2025-01-27 RX ORDER — MEPERIDINE HYDROCHLORIDE 25 MG/ML
25 INJECTION INTRAMUSCULAR; INTRAVENOUS; SUBCUTANEOUS
Status: CANCELLED | OUTPATIENT
Start: 2025-02-10

## 2025-01-27 RX ORDER — MEPERIDINE HYDROCHLORIDE 25 MG/ML
25 INJECTION INTRAMUSCULAR; INTRAVENOUS; SUBCUTANEOUS
Status: DISCONTINUED | OUTPATIENT
Start: 2025-01-27 | End: 2025-01-27 | Stop reason: HOSPADM

## 2025-01-27 RX ORDER — DIPHENHYDRAMINE HYDROCHLORIDE 50 MG/ML
50 INJECTION INTRAMUSCULAR; INTRAVENOUS
Status: CANCELLED
Start: 2025-02-10

## 2025-01-27 RX ORDER — EPINEPHRINE 1 MG/ML
0.3 INJECTION, SOLUTION INTRAMUSCULAR; SUBCUTANEOUS EVERY 5 MIN PRN
Status: DISCONTINUED | OUTPATIENT
Start: 2025-01-27 | End: 2025-01-27 | Stop reason: HOSPADM

## 2025-01-27 RX ADMIN — DARBEPOETIN ALFA 40 MCG: 40 INJECTION, SOLUTION INTRAVENOUS; SUBCUTANEOUS at 13:28

## 2025-01-27 NOTE — PROGRESS NOTES
Infusion Nursing Note:  Lexy Cobb presents today for labs and  Aranesp.    Patient seen by provider today: No   present during visit today: Not Applicable.    Note: Lexy comes in today for labs and poss Aranesp. Labs drawn upstairs and reviewed. Hgb 9.3 today and she does meet parameters to have her Aranesp today. I went over the plan of care with Lexy and she verbalized understanding. Aranesp given in her right arm and she tolerated without issue. Lexy left ambulatory to the [facility] and plans on returning on 02/10/2024.    BP (!) 151/70 (BP Location: Left arm, Patient Position: Sitting)   Pulse 62   Temp 97.9  F (36.6  C) (Oral)   Resp 16   SpO2 97%       Intravenous Access:  No Intravenous access/labs at this visit.    Treatment Conditions:  Lab Results   Component Value Date    HGB 9.3 (L) 01/27/2025    WBC 3.4 (L) 01/20/2025    ANEU 4.0 10/10/2019    ANEUTAUTO 6.7 09/08/2024     01/20/2025        Results reviewed, labs MET treatment parameters, ok to proceed with treatment. Hgb < 10      Post Infusion Assessment:  Patient tolerated injection without incident.       Discharge Plan:   Discharge instructions reviewed with: Patient.  Patient and/or family verbalized understanding of discharge instructions and all questions answered.  Patient discharged in stable condition accompanied by: self.  Departure Mode: Ambulatory.      Kacie Banuelos RN

## 2025-02-03 ENCOUNTER — LAB (OUTPATIENT)
Dept: LAB | Facility: CLINIC | Age: 80
End: 2025-02-03
Payer: COMMERCIAL

## 2025-02-03 DIAGNOSIS — D63.8 ANEMIA IN OTHER CHRONIC DISEASES CLASSIFIED ELSEWHERE: ICD-10-CM

## 2025-02-03 DIAGNOSIS — Z94.0 KIDNEY REPLACED BY TRANSPLANT: ICD-10-CM

## 2025-02-03 DIAGNOSIS — Z79.899 ENCOUNTER FOR LONG-TERM CURRENT USE OF MEDICATION: ICD-10-CM

## 2025-02-03 DIAGNOSIS — Z20.828 CONTACT WITH AND (SUSPECTED) EXPOSURE TO OTHER VIRAL COMMUNICABLE DISEASES: ICD-10-CM

## 2025-02-03 DIAGNOSIS — Z48.298 AFTERCARE FOLLOWING ORGAN TRANSPLANT: ICD-10-CM

## 2025-02-03 LAB
ANION GAP SERPL CALCULATED.3IONS-SCNC: 13 MMOL/L (ref 7–15)
BUN SERPL-MCNC: 19.6 MG/DL (ref 8–23)
CALCIUM SERPL-MCNC: 9.5 MG/DL (ref 8.8–10.4)
CHLORIDE SERPL-SCNC: 102 MMOL/L (ref 98–107)
CREAT SERPL-MCNC: 1.07 MG/DL (ref 0.51–0.95)
EGFRCR SERPLBLD CKD-EPI 2021: 53 ML/MIN/1.73M2
ERYTHROCYTE [DISTWIDTH] IN BLOOD BY AUTOMATED COUNT: 13.1 % (ref 10–15)
FERRITIN SERPL-MCNC: 487 NG/ML (ref 11–328)
GLUCOSE SERPL-MCNC: 88 MG/DL (ref 70–99)
HCO3 SERPL-SCNC: 22 MMOL/L (ref 22–29)
HCT VFR BLD AUTO: 31.2 % (ref 35–47)
HGB BLD-MCNC: 10.2 G/DL (ref 11.7–15.7)
IRON BINDING CAPACITY (ROCHE): 294 UG/DL (ref 240–430)
IRON SATN MFR SERPL: 24 % (ref 15–46)
IRON SERPL-MCNC: 71 UG/DL (ref 37–145)
MCH RBC QN AUTO: 35.3 PG (ref 26.5–33)
MCHC RBC AUTO-ENTMCNC: 32.7 G/DL (ref 31.5–36.5)
MCV RBC AUTO: 108 FL (ref 78–100)
PLATELET # BLD AUTO: 235 10E3/UL (ref 150–450)
POTASSIUM SERPL-SCNC: 3.6 MMOL/L (ref 3.4–5.3)
RBC # BLD AUTO: 2.89 10E6/UL (ref 3.8–5.2)
SODIUM SERPL-SCNC: 137 MMOL/L (ref 135–145)
TACROLIMUS BLD-MCNC: 8.5 UG/L (ref 5–15)
TME LAST DOSE: NORMAL H
TME LAST DOSE: NORMAL H
WBC # BLD AUTO: 4 10E3/UL (ref 4–11)

## 2025-02-03 PROCEDURE — 85027 COMPLETE CBC AUTOMATED: CPT

## 2025-02-03 PROCEDURE — 83550 IRON BINDING TEST: CPT

## 2025-02-03 PROCEDURE — 36415 COLL VENOUS BLD VENIPUNCTURE: CPT

## 2025-02-03 PROCEDURE — 83540 ASSAY OF IRON: CPT

## 2025-02-03 PROCEDURE — 82728 ASSAY OF FERRITIN: CPT

## 2025-02-03 PROCEDURE — 80197 ASSAY OF TACROLIMUS: CPT

## 2025-02-03 PROCEDURE — 80048 BASIC METABOLIC PNL TOTAL CA: CPT

## 2025-02-10 ENCOUNTER — INFUSION THERAPY VISIT (OUTPATIENT)
Dept: INFUSION THERAPY | Facility: HOSPITAL | Age: 80
End: 2025-02-10
Payer: COMMERCIAL

## 2025-02-10 ENCOUNTER — LAB (OUTPATIENT)
Dept: INFUSION THERAPY | Facility: HOSPITAL | Age: 80
End: 2025-02-10
Payer: COMMERCIAL

## 2025-02-10 DIAGNOSIS — Z94.0 KIDNEY REPLACED BY TRANSPLANT: ICD-10-CM

## 2025-02-10 DIAGNOSIS — N18.9 ANEMIA OF CHRONIC KIDNEY FAILURE: ICD-10-CM

## 2025-02-10 DIAGNOSIS — D63.1 ANEMIA OF CHRONIC KIDNEY FAILURE: ICD-10-CM

## 2025-02-10 DIAGNOSIS — Z94.0 KIDNEY REPLACED BY TRANSPLANT: Primary | ICD-10-CM

## 2025-02-10 LAB
HCT VFR BLD AUTO: 32.2 % (ref 35–47)
HGB BLD-MCNC: 10 G/DL (ref 11.7–15.7)

## 2025-02-10 PROCEDURE — 85014 HEMATOCRIT: CPT | Performed by: STUDENT IN AN ORGANIZED HEALTH CARE EDUCATION/TRAINING PROGRAM

## 2025-02-10 PROCEDURE — 36415 COLL VENOUS BLD VENIPUNCTURE: CPT | Performed by: STUDENT IN AN ORGANIZED HEALTH CARE EDUCATION/TRAINING PROGRAM

## 2025-02-10 PROCEDURE — 85018 HEMOGLOBIN: CPT | Performed by: STUDENT IN AN ORGANIZED HEALTH CARE EDUCATION/TRAINING PROGRAM

## 2025-02-10 RX ORDER — ALBUTEROL SULFATE 0.83 MG/ML
2.5 SOLUTION RESPIRATORY (INHALATION)
OUTPATIENT
Start: 2025-02-24

## 2025-02-10 RX ORDER — DIPHENHYDRAMINE HYDROCHLORIDE 50 MG/ML
50 INJECTION INTRAMUSCULAR; INTRAVENOUS
Start: 2025-02-24

## 2025-02-10 RX ORDER — METHYLPREDNISOLONE SODIUM SUCCINATE 40 MG/ML
40 INJECTION INTRAMUSCULAR; INTRAVENOUS
Start: 2025-02-24

## 2025-02-10 RX ORDER — ALBUTEROL SULFATE 90 UG/1
1-2 INHALANT RESPIRATORY (INHALATION)
Start: 2025-02-24

## 2025-02-10 RX ORDER — DIPHENHYDRAMINE HYDROCHLORIDE 50 MG/ML
25 INJECTION INTRAMUSCULAR; INTRAVENOUS
Start: 2025-02-24

## 2025-02-10 RX ORDER — EPINEPHRINE 1 MG/ML
0.3 INJECTION, SOLUTION INTRAMUSCULAR; SUBCUTANEOUS EVERY 5 MIN PRN
OUTPATIENT
Start: 2025-02-24

## 2025-02-10 RX ORDER — MEPERIDINE HYDROCHLORIDE 25 MG/ML
25 INJECTION INTRAMUSCULAR; INTRAVENOUS; SUBCUTANEOUS
OUTPATIENT
Start: 2025-02-24

## 2025-02-10 NOTE — PROGRESS NOTES
Infusion Nursing Note:  Lexy Cobb presents today for Labs and possible.    Patient seen by provider today: No   present during visit today: Not Applicable.    Note: Lexy comes in today for labs and possible Aranesp. Lexy had labs drawn upstairs. I reviewed the labs and Lexy does not meet requirements to have the Aranesp today. I printed labs and AVS and went to lobby to update Lexy and she verbalized understanding. Lexy left ambulatory and in stable condition to home and plans to come back on on 02/24/25.      Intravenous Access:  Labs drawn without difficulty upstairs.    Treatment Conditions:  Lab Results   Component Value Date    HGB 10.0 (L) 02/10/2025    WBC 4.0 02/03/2025    ANEU 4.0 10/10/2019    ANEUTAUTO 6.7 09/08/2024     02/03/2025        Results reviewed, labs did NOT meet treatment parameters: for Aranesp. Requirment is to give if hgb is <10.      Post Infusion Assessment:  Not applicable .       Discharge Plan:   Discharge instructions reviewed with: Patient.  Copy of AVS reviewed with patient and/or family.  Patient will return 02/24/25 for next appointment.  Patient discharged in stable condition accompanied by: self.  Departure Mode: Ambulatory.      HONG CORONA RN

## 2025-02-13 ENCOUNTER — PATIENT OUTREACH (OUTPATIENT)
Dept: CARE COORDINATION | Facility: CLINIC | Age: 80
End: 2025-02-13
Payer: COMMERCIAL

## 2025-02-13 NOTE — PROGRESS NOTES
Anemia Management Note - Follow Up      SUBJECTIVE/OBJECTIVE:    Referred by Dr. Angel Sandhu  on 2024  Primary Diagnosis: Anemia of Other Chronic Illness (D63.8)     Secondary Diagnosis: Organ or tissue replaced by transplant, kidney (Z94.0)  Date of Kidney Transplant: 9/3/2024  Hgb goal range: 9-10     Epo/Darbo: Aranesp 40mcg subcutaneous every 14 days, PRN Hgb <10, in clinic  Iron regimen: TBD      Labs : 2025  RX/TX plans : [id number]     Recent HIEU use, transfusion, IV iron: Venofer May 2024, Aranesp - DC'd after Tx.  No history of stroke, MI, and blood clots.  Hx of RCC; Kidney Tx .      Contact: Consent to Communicate Scanned on 10/29/2013.         Latest Ref Rng & Units 2025 2025 1/15/2025 2025 2025 2/3/2025 2/10/2025   Anemia   HIEU Dose   40mcg   40mcg     Hemoglobin 11.7 - 15.7 g/dL 8.2  8.8  9.2  9.7  9.3  10.2  10.0    TSAT 15 - 46 %      24     Ferritin 11 - 328 ng/mL      487       BP Readings from Last 3 Encounters:   25 (!) 151/70   01/15/25 (!) 166/74   25 (!) 160/80     Wt Readings from Last 2 Encounters:   01/15/25 56.9 kg (125 lb 6.4 oz)   25 58.5 kg (129 lb)         ASSESSMENT:    Hgb:Above goal - dose was held on 210  Dose was given on 127 for Hgb 9.3  TSat: not at goal of >30% Ferritin: At goal (>100ng/mL)   Goals Addressed    None         PLAN:  Hold Aranesp.  RTC for hgb then Aranesp if needed in 2 week(s).    Orders needed to be renewed (for next follow-up date) in EPIC: None    Iron labs due:  monthly, due early 2025    Plan discussed with:  no call, chart reviewed      NEXT FOLLOW-UP DATE:  304    Carrie Leopold, RN BSN  [facility]  [email or web address]  Office: [phone]  Fax [phone]

## 2025-02-17 ENCOUNTER — LAB (OUTPATIENT)
Dept: LAB | Facility: CLINIC | Age: 80
End: 2025-02-17
Payer: COMMERCIAL

## 2025-02-17 ENCOUNTER — VIRTUAL VISIT (OUTPATIENT)
Dept: PHARMACY | Facility: CLINIC | Age: 80
End: 2025-02-17
Payer: COMMERCIAL

## 2025-02-17 DIAGNOSIS — Z48.298 AFTERCARE FOLLOWING ORGAN TRANSPLANT: ICD-10-CM

## 2025-02-17 DIAGNOSIS — Z20.828 CONTACT WITH AND (SUSPECTED) EXPOSURE TO OTHER VIRAL COMMUNICABLE DISEASES: ICD-10-CM

## 2025-02-17 DIAGNOSIS — Z71.85 IMMUNIZATION COUNSELING: ICD-10-CM

## 2025-02-17 DIAGNOSIS — D63.8 ANEMIA IN OTHER CHRONIC DISEASES CLASSIFIED ELSEWHERE: ICD-10-CM

## 2025-02-17 DIAGNOSIS — Z94.0 HTN, KIDNEY TRANSPLANT RELATED: ICD-10-CM

## 2025-02-17 DIAGNOSIS — I15.1 HYPERTENSION DUE TO KIDNEY TRANSPLANT: ICD-10-CM

## 2025-02-17 DIAGNOSIS — K21.9 GASTROESOPHAGEAL REFLUX DISEASE, UNSPECIFIED WHETHER ESOPHAGITIS PRESENT: Primary | ICD-10-CM

## 2025-02-17 DIAGNOSIS — I15.1 HTN, KIDNEY TRANSPLANT RELATED: ICD-10-CM

## 2025-02-17 DIAGNOSIS — Z79.899 ENCOUNTER FOR LONG-TERM CURRENT USE OF MEDICATION: ICD-10-CM

## 2025-02-17 DIAGNOSIS — Z76.82 ORGAN TRANSPLANT CANDIDATE: ICD-10-CM

## 2025-02-17 DIAGNOSIS — Z94.0 KIDNEY REPLACED BY TRANSPLANT: ICD-10-CM

## 2025-02-17 DIAGNOSIS — Z94.0 HYPERTENSION DUE TO KIDNEY TRANSPLANT: ICD-10-CM

## 2025-02-17 DIAGNOSIS — N18.6 ESRD (END STAGE RENAL DISEASE) (H): ICD-10-CM

## 2025-02-17 DIAGNOSIS — R19.7 DIARRHEA, UNSPECIFIED TYPE: ICD-10-CM

## 2025-02-17 LAB
ALBUMIN MFR UR ELPH: 19 MG/DL
ANION GAP SERPL CALCULATED.3IONS-SCNC: 13 MMOL/L (ref 7–15)
BUN SERPL-MCNC: 21.3 MG/DL (ref 8–23)
CALCIUM SERPL-MCNC: 9 MG/DL (ref 8.8–10.4)
CHLORIDE SERPL-SCNC: 103 MMOL/L (ref 98–107)
CREAT SERPL-MCNC: 1.1 MG/DL (ref 0.51–0.95)
CREAT UR-MCNC: 102 MG/DL
EGFRCR SERPLBLD CKD-EPI 2021: 51 ML/MIN/1.73M2
ERYTHROCYTE [DISTWIDTH] IN BLOOD BY AUTOMATED COUNT: 13.1 % (ref 10–15)
FERRITIN SERPL-MCNC: 356 NG/ML (ref 11–328)
GLUCOSE SERPL-MCNC: 89 MG/DL (ref 70–99)
HCO3 SERPL-SCNC: 21 MMOL/L (ref 22–29)
HCT VFR BLD AUTO: 31.3 % (ref 35–47)
HGB BLD-MCNC: 9.7 G/DL (ref 11.7–15.7)
IRON BINDING CAPACITY (ROCHE): 268 UG/DL (ref 240–430)
IRON SATN MFR SERPL: 29 % (ref 15–46)
IRON SERPL-MCNC: 77 UG/DL (ref 37–145)
MAGNESIUM SERPL-MCNC: 1.4 MG/DL (ref 1.7–2.3)
MCH RBC QN AUTO: 34.3 PG (ref 26.5–33)
MCHC RBC AUTO-ENTMCNC: 31 G/DL (ref 31.5–36.5)
MCV RBC AUTO: 111 FL (ref 78–100)
PHOSPHATE SERPL-MCNC: 4 MG/DL (ref 2.5–4.5)
PLATELET # BLD AUTO: 180 10E3/UL (ref 150–450)
POTASSIUM SERPL-SCNC: 4.1 MMOL/L (ref 3.4–5.3)
PROT/CREAT 24H UR: 0.19 MG/MG CR (ref 0–0.2)
PTH-INTACT SERPL-MCNC: 50 PG/ML (ref 15–65)
RBC # BLD AUTO: 2.83 10E6/UL (ref 3.8–5.2)
SODIUM SERPL-SCNC: 137 MMOL/L (ref 135–145)
TACROLIMUS BLD-MCNC: 7.8 UG/L (ref 5–15)
TME LAST DOSE: NORMAL H
TME LAST DOSE: NORMAL H
WBC # BLD AUTO: 4.5 10E3/UL (ref 4–11)

## 2025-02-17 PROCEDURE — 83540 ASSAY OF IRON: CPT

## 2025-02-17 PROCEDURE — 87799 DETECT AGENT NOS DNA QUANT: CPT

## 2025-02-17 PROCEDURE — 84156 ASSAY OF PROTEIN URINE: CPT

## 2025-02-17 PROCEDURE — 83735 ASSAY OF MAGNESIUM: CPT

## 2025-02-17 PROCEDURE — 99607 MTMS BY PHARM ADDL 15 MIN: CPT | Mod: 93 | Performed by: PHARMACIST

## 2025-02-17 PROCEDURE — 83970 ASSAY OF PARATHORMONE: CPT

## 2025-02-17 PROCEDURE — 80048 BASIC METABOLIC PNL TOTAL CA: CPT

## 2025-02-17 PROCEDURE — 80197 ASSAY OF TACROLIMUS: CPT

## 2025-02-17 PROCEDURE — 36415 COLL VENOUS BLD VENIPUNCTURE: CPT

## 2025-02-17 PROCEDURE — 82728 ASSAY OF FERRITIN: CPT

## 2025-02-17 PROCEDURE — 85027 COMPLETE CBC AUTOMATED: CPT

## 2025-02-17 PROCEDURE — 83550 IRON BINDING TEST: CPT

## 2025-02-17 PROCEDURE — 99606 MTMS BY PHARM EST 15 MIN: CPT | Mod: 93 | Performed by: PHARMACIST

## 2025-02-17 PROCEDURE — 84100 ASSAY OF PHOSPHORUS: CPT

## 2025-02-17 RX ORDER — FAMOTIDINE 20 MG/1
20 TABLET, FILM COATED ORAL DAILY PRN
Qty: 30 TABLET | Refills: 11 | Status: SHIPPED | OUTPATIENT
Start: 2025-02-17

## 2025-02-17 RX ORDER — SODIUM BICARBONATE 650 MG/1
1300 TABLET ORAL 2 TIMES DAILY
Qty: 90 TABLET | Refills: 1 | Status: SHIPPED | OUTPATIENT
Start: 2025-02-17

## 2025-02-17 NOTE — Clinical Note
1. BPs still elevated at home 150/90s, but history of dizziness with HTN  med use.  2. Pt is wondering if referral to GI is needed, having loose stools for the past 3 weeks. Minimum of 3 daily.

## 2025-02-17 NOTE — PROGRESS NOTES
Medication Therapy Management (MTM) Encounter    ASSESSMENT:                            Medication Adherence/Access: No issues identified.    Immunizations Transplant:    At 6 months post transplant due for Shingrix and Prevnar 20.     Supplements:   Any changes pending magnesium level today.      Loose Stools:   Start Benefiber for loose stools. Patient wondering about referral to GI at this point due to diarrhea.     GERD    Continue tapering Omeprazole, start famotidine for PRN GERD sx.     Hypertension   BPs still elevated, but hx of dizziness with any HTN use. Will defer to nephrology.     PLAN:                            Txp team...  BPs still elevated at home 150/90s, but history of dizziness with HTN  med use.   Pt is wondering if referral to GI is needed, having loose stools for the past 3 weeks. Minimum of 3 daily.     Pt to...  Start Benefiber 2 tsp daily, can increase to twice daily for loose stools.   At 6 months post transplant due for Shingrix (2 doses 8 weeks apart). And then Prevnar 20 or 21.   Reduce Omeprazole 10mg every other day for 1 month, then try stopping.  Start Famotidine 20mg daily if needed.     Follow-up: 3/21    SUBJECTIVE/OBJECTIVE:                          Lexy Cobb is a 80 year old female seen for a follow-up visit.       Reason for visit: Follow-up on changes last visit.  Allergies/ADRs: Reviewed in chart  Past Medical History: Reviewed in chart  Tobacco: She reports that she has never smoked. She has never used smokeless tobacco.  Alcohol: 2 drinks daily    Medication Adherence/Access: no issues reported.    Immunizations Transplant:    Immunizations: annual flu shot 2024-25; Pneumovax 23:  2013; Prevnar 13: 2015; TDaP:  2021; Shingrix: unknown, HBV: unknown, COVID: Primary x 3 and Bivalent x 1 , and 23-24 x1 , RSV: x1      Supplements:   Magnesium glycinate 120mg twice daily  ( 2 hours from MMF) .  Calcium carbonate 500 mg twice daily  Sodium Bicarbonate 1300 mg  twice daily - 2 tab twice daily   B12 injections monthly.      Loose Stools:   Loperamide 2 mg twice daily to 3 times daily.   Hx of intestinal bypass.   Has tried metamucil supplement, but states it did not help.   Has been having loose stools 3 weeks. At least 3 times daily.     GERD    Omeprazole 10 mg once daily   Patient denies GERD sx  with lower dose of Omeprazole.   Pt has not tried a lower dose.      Hypertension   All HTN medications are on hold.   Patient reports dizziness has improved.  Gets dizzy with very low doses of blood pressures.   Patient self monitors blood pressure.  Home BP monitorin/80s, 157/94, 158/89   BP Readings from Last 3 Encounters:   25 (!) 151/70   01/15/25 (!) 166/74   25 (!) 160/80     Today's Vitals: There were no vitals taken for this visit.  ----------------    I spent 17 minutes with this patient today. All changes were made via collaborative practice agreement with Dr. Martin.     A summary of these recommendations was sent via mail.    Eddie Keene, PharmD  [facility] Pharmacist    Phone: [phone]     Telemedicine Visit Details  The patient's medications can be safely assessed via a telemedicine encounter.  Type of service:  Telephone visit  Originating Location (pt. Location): Home    Distant Location (provider location):  Off-site  Start Time: 8:27 AM  End Time: 8:44 AM     Medication Therapy Recommendations  Diarrhea, unspecified type   1 Rationale: Synergistic therapy - Needs additional medication therapy - Indication   Recommendation: Start Medication - BENEFIBER DRINK MIX PO   Status: Accepted - no CPA Needed   Identified Date: 2025 Completed Date: 2025         GERD (gastroesophageal reflux disease)   1 Current Medication: omeprazole (PRILOSEC) 10 MG DR capsule   Current Medication Sig: Take 1 capsule (10 mg) by mouth daily.   Rationale: Unsafe medication for the patient - Adverse medication event - Safety   Recommendation: Change Medication -  famotidine 20 MG tablet   Status: Accepted per CPA   Identified Date: 2/17/2025 Completed Date: 2/17/2025         Immunization counseling   1 Rationale: Preventive therapy - Needs additional medication therapy - Indication   Recommendation: Order Vaccine - Shingrix 50 MCG/0.5ML Susr   Status: Accepted - no CPA Needed   Identified Date: 2/17/2025 Completed Date: 2/17/2025

## 2025-02-17 NOTE — PATIENT INSTRUCTIONS
"Recommendations from today's MTM visit:                                                      Start Benefiber 2 tsp daily, can increase to twice daily for loose stools.   At 6 months post transplant due for Shingrix (2 doses 8 weeks apart). And then Prevnar 20 or 21.   Reduce Omeprazole 10mg every other day for 1 month, then try stopping.  Start Famotidine 20mg daily if needed.     Follow-up: 3/21     It was great speaking with you today.  I value your experience and would be very thankful for your time in providing feedback in our clinic survey. In the next few days, you may receive an email or text message from [employer] with a link to a survey related to your  clinical pharmacist.\"     To schedule another MTM appointment, please call the clinic directly or you may call the MTM scheduling line at [phone] or toll-free at [phone].     My Clinical Pharmacist's contact information:                                                      Please feel free to contact me with any questions or concerns you have.      Eddie Keene, PharmD  MTM Pharmacist    Phone: [phone]     "

## 2025-02-18 ENCOUNTER — TELEPHONE (OUTPATIENT)
Dept: PHARMACY | Facility: CLINIC | Age: 80
End: 2025-02-18
Payer: COMMERCIAL

## 2025-02-18 LAB
BK VIRUS SPECIMEN TYPE: NORMAL
BKV DNA # SPEC NAA+PROBE: NOT DETECTED IU/ML

## 2025-02-18 NOTE — TELEPHONE ENCOUNTER
Recommend increasing Magnesium to 2 tablets twice daily due to low Magnesium level. LM for patient..    Eddie Keene, PharmD  MTM Pharmacist

## 2025-02-19 DIAGNOSIS — I15.1 HTN, KIDNEY TRANSPLANT RELATED: ICD-10-CM

## 2025-02-19 DIAGNOSIS — Z48.298 AFTERCARE FOLLOWING ORGAN TRANSPLANT: Primary | ICD-10-CM

## 2025-02-19 DIAGNOSIS — Z94.0 HTN, KIDNEY TRANSPLANT RELATED: ICD-10-CM

## 2025-02-19 NOTE — PROGRESS NOTES
Eddie Keene, [facility]  Angel Gutierrez MD; Gina Huizar, DOROTHY Fuentes can you throw in a referral to GI for us?    Dr. Martin, would you like to discuss BPs with her next visit (3/19) or do you want me to start the low dose carvedilol now?    Thanks!

## 2025-02-24 ENCOUNTER — INFUSION THERAPY VISIT (OUTPATIENT)
Dept: INFUSION THERAPY | Facility: HOSPITAL | Age: 80
End: 2025-02-24
Payer: COMMERCIAL

## 2025-02-24 ENCOUNTER — LAB (OUTPATIENT)
Dept: INFUSION THERAPY | Facility: HOSPITAL | Age: 80
End: 2025-02-24
Payer: COMMERCIAL

## 2025-02-24 DIAGNOSIS — Z94.0 KIDNEY REPLACED BY TRANSPLANT: ICD-10-CM

## 2025-02-24 DIAGNOSIS — N18.9 ANEMIA OF CHRONIC KIDNEY FAILURE: ICD-10-CM

## 2025-02-24 DIAGNOSIS — Z94.0 KIDNEY REPLACED BY TRANSPLANT: Primary | ICD-10-CM

## 2025-02-24 DIAGNOSIS — D63.1 ANEMIA OF CHRONIC KIDNEY FAILURE: ICD-10-CM

## 2025-02-24 LAB
HCT VFR BLD AUTO: 31.8 % (ref 35–47)
HGB BLD-MCNC: 10.3 G/DL (ref 11.7–15.7)

## 2025-02-24 PROCEDURE — 85014 HEMATOCRIT: CPT | Performed by: STUDENT IN AN ORGANIZED HEALTH CARE EDUCATION/TRAINING PROGRAM

## 2025-02-24 PROCEDURE — 36415 COLL VENOUS BLD VENIPUNCTURE: CPT | Performed by: STUDENT IN AN ORGANIZED HEALTH CARE EDUCATION/TRAINING PROGRAM

## 2025-02-24 PROCEDURE — 85018 HEMOGLOBIN: CPT | Performed by: STUDENT IN AN ORGANIZED HEALTH CARE EDUCATION/TRAINING PROGRAM

## 2025-02-24 RX ORDER — DIPHENHYDRAMINE HYDROCHLORIDE 50 MG/ML
50 INJECTION INTRAMUSCULAR; INTRAVENOUS
Start: 2025-03-10

## 2025-02-24 RX ORDER — EPINEPHRINE 1 MG/ML
0.3 INJECTION, SOLUTION INTRAMUSCULAR; SUBCUTANEOUS EVERY 5 MIN PRN
OUTPATIENT
Start: 2025-03-10

## 2025-02-24 RX ORDER — ALBUTEROL SULFATE 90 UG/1
1-2 INHALANT RESPIRATORY (INHALATION)
Start: 2025-03-10

## 2025-02-24 RX ORDER — METHYLPREDNISOLONE SODIUM SUCCINATE 40 MG/ML
40 INJECTION INTRAMUSCULAR; INTRAVENOUS
Start: 2025-03-10

## 2025-02-24 RX ORDER — DIPHENHYDRAMINE HYDROCHLORIDE 50 MG/ML
25 INJECTION INTRAMUSCULAR; INTRAVENOUS
Start: 2025-03-10

## 2025-02-24 RX ORDER — MEPERIDINE HYDROCHLORIDE 25 MG/ML
25 INJECTION INTRAMUSCULAR; INTRAVENOUS; SUBCUTANEOUS
OUTPATIENT
Start: 2025-03-10

## 2025-02-24 RX ORDER — ALBUTEROL SULFATE 0.83 MG/ML
2.5 SOLUTION RESPIRATORY (INHALATION)
OUTPATIENT
Start: 2025-03-10

## 2025-02-24 NOTE — PROGRESS NOTES
Infusion Nursing Note:  Lexy Cobb presents today for Labs and possible.    Patient seen by provider today: No   present during visit today: Not Applicable.    Note: Lexy comes in today for labs and possible Aranesp. Lexy had labs drawn upstairs. I reviewed the labs and Lexy does not meet requirements to have the Aranesp today. I printed labs and went to lobby to update Lexy and she verbalized understanding. Lexy left ambulatory and in stable condition to home and plans to come back on on 03/10/25.      Intravenous Access:  Labs drawn without difficulty upstairs.    Treatment Conditions:  Lab Results   Component Value Date    HGB 10.3 (L) 02/24/2025    WBC 4.5 02/17/2025    ANEU 4.0 10/10/2019    ANEUTAUTO 6.7 09/08/2024     02/17/2025        Results reviewed, labs did NOT meet treatment parameters: for Aranesp. Requirment is to give if hgb is <10.      Post Infusion Assessment:  Not applicable .       Discharge Plan:   Discharge instructions reviewed with: Patient.  Copy of AVS reviewed with patient and/or family.  Patient will return 03/10/25 for next appointment.  Patient discharged in stable condition accompanied by: self.  Departure Mode: Ambulatory.      HONG CORONA RN

## 2025-03-03 ENCOUNTER — LAB (OUTPATIENT)
Dept: LAB | Facility: CLINIC | Age: 80
End: 2025-03-03
Payer: COMMERCIAL

## 2025-03-03 DIAGNOSIS — Z20.828 CONTACT WITH AND (SUSPECTED) EXPOSURE TO OTHER VIRAL COMMUNICABLE DISEASES: ICD-10-CM

## 2025-03-03 DIAGNOSIS — Z94.0 KIDNEY REPLACED BY TRANSPLANT: ICD-10-CM

## 2025-03-03 DIAGNOSIS — Z48.298 AFTERCARE FOLLOWING ORGAN TRANSPLANT: ICD-10-CM

## 2025-03-03 DIAGNOSIS — Z79.899 ENCOUNTER FOR LONG-TERM CURRENT USE OF MEDICATION: ICD-10-CM

## 2025-03-03 LAB
ALBUMIN MFR UR ELPH: 7.6 MG/DL
ALBUMIN SERPL BCG-MCNC: 4.1 G/DL (ref 3.5–5.2)
ALP SERPL-CCNC: 91 U/L (ref 40–150)
ALT SERPL W P-5'-P-CCNC: 21 U/L (ref 0–50)
ANION GAP SERPL CALCULATED.3IONS-SCNC: 11 MMOL/L (ref 7–15)
AST SERPL W P-5'-P-CCNC: 25 U/L (ref 0–45)
BILIRUB DIRECT SERPL-MCNC: 0.19 MG/DL (ref 0–0.3)
BILIRUB SERPL-MCNC: 0.3 MG/DL
BUN SERPL-MCNC: 26.9 MG/DL (ref 8–23)
CALCIUM SERPL-MCNC: 9.2 MG/DL (ref 8.8–10.4)
CHLORIDE SERPL-SCNC: 105 MMOL/L (ref 98–107)
CHOLEST SERPL-MCNC: 130 MG/DL
CMV DNA SPEC NAA+PROBE-ACNC: NOT DETECTED IU/ML
CREAT SERPL-MCNC: 1.04 MG/DL (ref 0.51–0.95)
CREAT UR-MCNC: 38.6 MG/DL
EGFRCR SERPLBLD CKD-EPI 2021: 55 ML/MIN/1.73M2
ERYTHROCYTE [DISTWIDTH] IN BLOOD BY AUTOMATED COUNT: 12.8 % (ref 10–15)
EST. AVERAGE GLUCOSE BLD GHB EST-MCNC: 91 MG/DL
FASTING STATUS PATIENT QL REPORTED: YES
FASTING STATUS PATIENT QL REPORTED: YES
GLUCOSE SERPL-MCNC: 88 MG/DL (ref 70–99)
HBA1C MFR BLD: 4.8 % (ref 0–5.6)
HCO3 SERPL-SCNC: 22 MMOL/L (ref 22–29)
HCT VFR BLD AUTO: 32.1 % (ref 35–47)
HDLC SERPL-MCNC: 70 MG/DL
HGB BLD-MCNC: 10.3 G/DL (ref 11.7–15.7)
LDLC SERPL CALC-MCNC: 41 MG/DL
MAGNESIUM SERPL-MCNC: 1.7 MG/DL (ref 1.7–2.3)
MCH RBC QN AUTO: 34 PG (ref 26.5–33)
MCHC RBC AUTO-ENTMCNC: 32.1 G/DL (ref 31.5–36.5)
MCV RBC AUTO: 106 FL (ref 78–100)
NONHDLC SERPL-MCNC: 60 MG/DL
PHOSPHATE SERPL-MCNC: 4.3 MG/DL (ref 2.5–4.5)
PLATELET # BLD AUTO: 172 10E3/UL (ref 150–450)
POTASSIUM SERPL-SCNC: 4.3 MMOL/L (ref 3.4–5.3)
PROT SERPL-MCNC: 6 G/DL (ref 6.4–8.3)
PROT/CREAT 24H UR: 0.2 MG/MG CR (ref 0–0.2)
RBC # BLD AUTO: 3.03 10E6/UL (ref 3.8–5.2)
SODIUM SERPL-SCNC: 138 MMOL/L (ref 135–145)
SPECIMEN TYPE: NORMAL
TACROLIMUS BLD-MCNC: 9.2 UG/L (ref 5–15)
TME LAST DOSE: NORMAL H
TME LAST DOSE: NORMAL H
TRIGL SERPL-MCNC: 94 MG/DL
URATE SERPL-MCNC: 6 MG/DL (ref 2.4–5.7)
WBC # BLD AUTO: 3.7 10E3/UL (ref 4–11)

## 2025-03-03 PROCEDURE — 82248 BILIRUBIN DIRECT: CPT

## 2025-03-03 PROCEDURE — 80197 ASSAY OF TACROLIMUS: CPT

## 2025-03-03 PROCEDURE — 87799 DETECT AGENT NOS DNA QUANT: CPT

## 2025-03-03 PROCEDURE — 83036 HEMOGLOBIN GLYCOSYLATED A1C: CPT

## 2025-03-03 PROCEDURE — 84100 ASSAY OF PHOSPHORUS: CPT

## 2025-03-03 PROCEDURE — 80061 LIPID PANEL: CPT

## 2025-03-03 PROCEDURE — 80053 COMPREHEN METABOLIC PANEL: CPT

## 2025-03-03 PROCEDURE — 84156 ASSAY OF PROTEIN URINE: CPT

## 2025-03-03 PROCEDURE — 36415 COLL VENOUS BLD VENIPUNCTURE: CPT

## 2025-03-03 PROCEDURE — 85027 COMPLETE CBC AUTOMATED: CPT

## 2025-03-03 PROCEDURE — 84550 ASSAY OF BLOOD/URIC ACID: CPT

## 2025-03-03 PROCEDURE — 83735 ASSAY OF MAGNESIUM: CPT

## 2025-03-04 ENCOUNTER — PATIENT OUTREACH (OUTPATIENT)
Dept: CARE COORDINATION | Facility: CLINIC | Age: 80
End: 2025-03-04
Payer: COMMERCIAL

## 2025-03-04 LAB
BK VIRUS SPECIMEN TYPE: NORMAL
BKV DNA # SPEC NAA+PROBE: NOT DETECTED IU/ML

## 2025-03-04 NOTE — PROGRESS NOTES
Anemia Management Note - Follow Up      SUBJECTIVE/OBJECTIVE:    Referred by Dr. 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  No history of stroke, MI, and blood clots.  Hx of RCC; Kidney Tx .      Contact: Consent to Communicate Scanned on 10/29/2013.         Latest Ref Rng & Units 2025 2025 2/3/2025 2/10/2025 2025 2025 3/3/2025   Anemia   HIEU Dose   40mcg        Hemoglobin 11.7 - 15.7 g/dL 9.7  9.3  10.2  10.0  9.7  10.3  10.3    TSAT 15 - 46 %   24   29      Ferritin 11 - 328 ng/mL   487   356        BP Readings from Last 3 Encounters:   25 (!) 151/70   01/15/25 (!) 166/74   25 (!) 160/80     Wt Readings from Last 2 Encounters:   01/15/25 56.9 kg (125 lb 6.4 oz)   25 58.5 kg (129 lb)         ASSESSMENT:    Hgb:Above goal - recommend hold dose  TSat: not at goal of >30% Ferritin: At goal (>100ng/mL)   Goals Addressed    None         PLAN:  Hold Aranesp.  Next appt is scheduled on 310, then 324  RTC for hgb then Aranesp if needed in 2 week(s).    Orders needed to be renewed (for next follow-up date) in EPIC: None    Iron labs due:  monthly, mid 2025    Plan discussed with:  no call, chart reviewed      NEXT FOLLOW-UP DATE:  325    Carrie Leopold, RN BSN  Anemia Services  [facility]  [email or web address]  Office: [phone]  Fax [phone]

## 2025-03-10 ENCOUNTER — LAB (OUTPATIENT)
Dept: INFUSION THERAPY | Facility: HOSPITAL | Age: 80
End: 2025-03-10
Payer: COMMERCIAL

## 2025-03-10 ENCOUNTER — INFUSION THERAPY VISIT (OUTPATIENT)
Dept: INFUSION THERAPY | Facility: HOSPITAL | Age: 80
End: 2025-03-10
Payer: COMMERCIAL

## 2025-03-10 DIAGNOSIS — Z94.0 KIDNEY REPLACED BY TRANSPLANT: ICD-10-CM

## 2025-03-10 DIAGNOSIS — Z94.0 KIDNEY REPLACED BY TRANSPLANT: Primary | ICD-10-CM

## 2025-03-10 DIAGNOSIS — N18.9 ANEMIA OF CHRONIC KIDNEY FAILURE: ICD-10-CM

## 2025-03-10 DIAGNOSIS — D63.1 ANEMIA OF CHRONIC KIDNEY FAILURE: ICD-10-CM

## 2025-03-10 LAB
DONOR IDENTIFICATION: NORMAL
DSA COMMENTS: NORMAL
DSA PRESENT: NO
DSA TEST METHOD: NORMAL
HCT VFR BLD AUTO: 32.1 % (ref 35–47)
HGB BLD-MCNC: 10.3 G/DL (ref 11.7–15.7)
ORGAN: NORMAL
SA 1  COMMENTS: NORMAL
SA 1 CELL: NORMAL
SA 1 TEST METHOD: NORMAL
SA 2 CELL: NORMAL
SA 2 COMMENTS: NORMAL
SA 2 TEST METHOD: NORMAL
SA1 HI RISK ABY: NORMAL
SA1 MOD RISK ABY: NORMAL
SA2 HI RISK ABY: NORMAL
SA2 MOD RISK ABY: NORMAL
UNACCEPTABLE ANTIGENS: NORMAL
UNOS CPRA: 0

## 2025-03-10 PROCEDURE — 85018 HEMOGLOBIN: CPT | Performed by: STUDENT IN AN ORGANIZED HEALTH CARE EDUCATION/TRAINING PROGRAM

## 2025-03-10 PROCEDURE — 85014 HEMATOCRIT: CPT | Performed by: STUDENT IN AN ORGANIZED HEALTH CARE EDUCATION/TRAINING PROGRAM

## 2025-03-10 PROCEDURE — 36415 COLL VENOUS BLD VENIPUNCTURE: CPT | Performed by: STUDENT IN AN ORGANIZED HEALTH CARE EDUCATION/TRAINING PROGRAM

## 2025-03-10 RX ORDER — DIPHENHYDRAMINE HYDROCHLORIDE 50 MG/ML
25 INJECTION, SOLUTION INTRAMUSCULAR; INTRAVENOUS
Start: 2025-03-24

## 2025-03-10 RX ORDER — ALBUTEROL SULFATE 0.83 MG/ML
2.5 SOLUTION RESPIRATORY (INHALATION)
OUTPATIENT
Start: 2025-03-24

## 2025-03-10 RX ORDER — DIPHENHYDRAMINE HYDROCHLORIDE 50 MG/ML
50 INJECTION, SOLUTION INTRAMUSCULAR; INTRAVENOUS
Start: 2025-03-24

## 2025-03-10 RX ORDER — MEPERIDINE HYDROCHLORIDE 25 MG/ML
25 INJECTION INTRAMUSCULAR; INTRAVENOUS; SUBCUTANEOUS
OUTPATIENT
Start: 2025-03-24

## 2025-03-10 RX ORDER — METHYLPREDNISOLONE SODIUM SUCCINATE 40 MG/ML
40 INJECTION INTRAMUSCULAR; INTRAVENOUS
Start: 2025-03-24

## 2025-03-10 RX ORDER — EPINEPHRINE 1 MG/ML
0.3 INJECTION, SOLUTION INTRAMUSCULAR; SUBCUTANEOUS EVERY 5 MIN PRN
OUTPATIENT
Start: 2025-03-24

## 2025-03-10 RX ORDER — ALBUTEROL SULFATE 90 UG/1
1-2 INHALANT RESPIRATORY (INHALATION)
Start: 2025-03-24

## 2025-03-10 NOTE — PROGRESS NOTES
Infusion Nursing Note:  Lexy Cobb presents today for lab and possible Aranesp injection.    Patient seen by provider today: No   present during visit today: Not Applicable.    Note: Lexy arrived ambulatory in stable condition. Labs were drawn on second floor. She does not meet parameters for Aranesp injection today.  Given copy of labs from today and also results from labs taken on 3/3/25 per patient request. Lexy is scheduled to return for labs and possible Aranesp on 3/24/25.       Intravenous Access:  Labs drawn without difficulty.    Treatment Conditions:  Results reviewed, labs did NOT meet treatment parameters: Hg 10.3.          Discharge Plan:   Patient discharged in stable condition accompanied by: self.  Departure Mode: Ambulatory.      Mima Vicente RN

## 2025-03-12 ENCOUNTER — PATIENT OUTREACH (OUTPATIENT)
Dept: CARE COORDINATION | Facility: CLINIC | Age: 80
End: 2025-03-12

## 2025-03-12 ENCOUNTER — TELEPHONE (OUTPATIENT)
Dept: TRANSPLANT | Facility: CLINIC | Age: 80
End: 2025-03-12

## 2025-03-12 DIAGNOSIS — Z94.0 HTN, KIDNEY TRANSPLANT RELATED: ICD-10-CM

## 2025-03-12 DIAGNOSIS — I15.1 HTN, KIDNEY TRANSPLANT RELATED: ICD-10-CM

## 2025-03-12 ASSESSMENT — ENCOUNTER SYMPTOMS: NEW SYMPTOMS OF CORONARY ARTERY DISEASE: 0

## 2025-03-12 NOTE — TELEPHONE ENCOUNTER
Lexy stated she would like to discuss how to take her medication the day of her lab appointment at 1 30 pm on 6/13/2025 requesting a call back

## 2025-03-12 NOTE — PROGRESS NOTES
Anemia Management Note -    Follow-up with anemia management service:    Lexy called in to see if she needed to continue to schedule Aranesp doses. Her last dose was given on 012725. She's feeling well.  After discussion, she will cancel her next appt on 032425. She has labs done every other Monday, and RN will monitor results. If Aranesp is warranted based on Hgb <10, RN will call Lexy to schedule dose appt. She verbalized agreement with the plan and will call to cancel the appts on 032425        Latest Ref Rng & Units 1/27/2025 2/3/2025 2/10/2025 2/17/2025 2/24/2025 3/3/2025 3/10/2025   Anemia   HIEU Dose  40mcg         Hemoglobin 11.7 - 15.7 g/dL 9.3  10.2  10.0  9.7  10.3  10.3  10.3    TSAT 15 - 46 %  24   29       Ferritin 11 - 328 ng/mL  487   356           Follow-up call date: 031825    Carrie Leopold, RN BSN  Anemia Services  [facility]  [email or web address]  Office: [phone]  Fax [phone]

## 2025-03-12 NOTE — TELEPHONE ENCOUNTER
Pt is scheduled for her usual labs on 3/17/25 at [place]  Can we cancel lab appt on 3/19 before her Dr appt at 930

## 2025-03-13 NOTE — TELEPHONE ENCOUNTER
Left message for patient that she can get labs locally at an appropriate time for her drug level prior to this appointment.

## 2025-03-17 ENCOUNTER — PATIENT OUTREACH (OUTPATIENT)
Dept: CARE COORDINATION | Facility: CLINIC | Age: 80
End: 2025-03-17

## 2025-03-17 ENCOUNTER — LAB (OUTPATIENT)
Dept: LAB | Facility: CLINIC | Age: 80
End: 2025-03-17
Payer: COMMERCIAL

## 2025-03-17 DIAGNOSIS — Z20.828 CONTACT WITH AND (SUSPECTED) EXPOSURE TO OTHER VIRAL COMMUNICABLE DISEASES: ICD-10-CM

## 2025-03-17 DIAGNOSIS — Z94.0 KIDNEY REPLACED BY TRANSPLANT: ICD-10-CM

## 2025-03-17 DIAGNOSIS — Z79.899 ENCOUNTER FOR LONG-TERM CURRENT USE OF MEDICATION: ICD-10-CM

## 2025-03-17 DIAGNOSIS — D63.8 ANEMIA IN OTHER CHRONIC DISEASES CLASSIFIED ELSEWHERE: ICD-10-CM

## 2025-03-17 DIAGNOSIS — Z48.298 AFTERCARE FOLLOWING ORGAN TRANSPLANT: ICD-10-CM

## 2025-03-17 LAB
ANION GAP SERPL CALCULATED.3IONS-SCNC: 12 MMOL/L (ref 7–15)
BUN SERPL-MCNC: 26.2 MG/DL (ref 8–23)
CALCIUM SERPL-MCNC: 9.6 MG/DL (ref 8.8–10.4)
CHLORIDE SERPL-SCNC: 104 MMOL/L (ref 98–107)
CREAT SERPL-MCNC: 1.24 MG/DL (ref 0.51–0.95)
EGFRCR SERPLBLD CKD-EPI 2021: 44 ML/MIN/1.73M2
ERYTHROCYTE [DISTWIDTH] IN BLOOD BY AUTOMATED COUNT: 12.6 % (ref 10–15)
FERRITIN SERPL-MCNC: 421 NG/ML (ref 11–328)
GLUCOSE SERPL-MCNC: 96 MG/DL (ref 70–99)
HCO3 SERPL-SCNC: 24 MMOL/L (ref 22–29)
HCT VFR BLD AUTO: 32.5 % (ref 35–47)
HGB BLD-MCNC: 10.4 G/DL (ref 11.7–15.7)
IRON BINDING CAPACITY (ROCHE): 305 UG/DL (ref 240–430)
IRON SATN MFR SERPL: 23 % (ref 15–46)
IRON SERPL-MCNC: 69 UG/DL (ref 37–145)
MCH RBC QN AUTO: 33.7 PG (ref 26.5–33)
MCHC RBC AUTO-ENTMCNC: 32 G/DL (ref 31.5–36.5)
MCV RBC AUTO: 105 FL (ref 78–100)
PLATELET # BLD AUTO: 153 10E3/UL (ref 150–450)
POTASSIUM SERPL-SCNC: 4.3 MMOL/L (ref 3.4–5.3)
RBC # BLD AUTO: 3.09 10E6/UL (ref 3.8–5.2)
SODIUM SERPL-SCNC: 140 MMOL/L (ref 135–145)
TACROLIMUS BLD-MCNC: 9.2 UG/L (ref 5–15)
TME LAST DOSE: NORMAL H
TME LAST DOSE: NORMAL H
WBC # BLD AUTO: 4.4 10E3/UL (ref 4–11)

## 2025-03-17 PROCEDURE — 80197 ASSAY OF TACROLIMUS: CPT

## 2025-03-17 PROCEDURE — 36415 COLL VENOUS BLD VENIPUNCTURE: CPT

## 2025-03-17 PROCEDURE — 83550 IRON BINDING TEST: CPT

## 2025-03-17 PROCEDURE — 82728 ASSAY OF FERRITIN: CPT

## 2025-03-17 PROCEDURE — 85027 COMPLETE CBC AUTOMATED: CPT

## 2025-03-17 PROCEDURE — 80048 BASIC METABOLIC PNL TOTAL CA: CPT

## 2025-03-17 PROCEDURE — 83540 ASSAY OF IRON: CPT

## 2025-03-17 NOTE — PROGRESS NOTES
Anemia Management Note - Follow Up      SUBJECTIVE/OBJECTIVE:    Referred by Dr. 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  No history of stroke, MI, and blood clots.  Hx of RCC; Kidney Tx .      Contact: Consent to Communicate Scanned on 10/29/2013.         Latest Ref Rng & Units 2/3/2025 2/10/2025 2025 2025 3/3/2025 3/10/2025 3/17/2025   Anemia   Hemoglobin 11.7 - 15.7 g/dL 10.2  10.0  9.7  10.3  10.3  10.3  10.4    TSAT 15 - 46 % 24   29        Ferritin 11 - 328 ng/mL 487   356          BP Readings from Last 3 Encounters:   25 (!) 151/70   01/15/25 (!) 166/74   25 (!) 160/80     Wt Readings from Last 2 Encounters:   01/15/25 56.9 kg (125 lb 6.4 oz)   25 58.5 kg (129 lb)         ASSESSMENT:    Hgb:Above goal - recommend hold dose  TSat: not at goal of >30% Ferritin: At goal (>100ng/mL)   Goals Addressed    None         PLAN:  Hold Aranesp.  RTC for hgb then Aranesp if needed in 2 week(s).    Orders needed to be renewed (for next follow-up date) in EPIC: None    Iron labs due:  mid 2025    Plan discussed with:  no call, chart reviewed      NEXT FOLLOW-UP DATE:  401    Carrie Leopold, RN BSN  Anemia Services  [facility]  [email or web address]  Office: [phone]  Fax [phone]

## 2025-03-19 ENCOUNTER — OFFICE VISIT (OUTPATIENT)
Dept: TRANSPLANT | Facility: CLINIC | Age: 80
End: 2025-03-19
Attending: STUDENT IN AN ORGANIZED HEALTH CARE EDUCATION/TRAINING PROGRAM
Payer: COMMERCIAL

## 2025-03-19 VITALS
BODY MASS INDEX: 19.61 KG/M2 | HEART RATE: 52 BPM | TEMPERATURE: 98.2 F | HEIGHT: 66 IN | WEIGHT: 122 LBS | SYSTOLIC BLOOD PRESSURE: 153 MMHG | OXYGEN SATURATION: 96 % | DIASTOLIC BLOOD PRESSURE: 76 MMHG

## 2025-03-19 DIAGNOSIS — Z29.89 NEED FOR PNEUMOCYSTIS PROPHYLAXIS: ICD-10-CM

## 2025-03-19 DIAGNOSIS — Z94.0 KIDNEY REPLACED BY TRANSPLANT: ICD-10-CM

## 2025-03-19 DIAGNOSIS — Z48.298 AFTERCARE FOLLOWING ORGAN TRANSPLANT: ICD-10-CM

## 2025-03-19 DIAGNOSIS — I15.1 HTN, KIDNEY TRANSPLANT RELATED: ICD-10-CM

## 2025-03-19 DIAGNOSIS — N18.2 CKD (CHRONIC KIDNEY DISEASE) STAGE 2, GFR 60-89 ML/MIN: ICD-10-CM

## 2025-03-19 DIAGNOSIS — N25.81 SECONDARY RENAL HYPERPARATHYROIDISM: Primary | ICD-10-CM

## 2025-03-19 DIAGNOSIS — N18.2 ANEMIA IN STAGE 2 CHRONIC KIDNEY DISEASE: ICD-10-CM

## 2025-03-19 DIAGNOSIS — Z94.0 HTN, KIDNEY TRANSPLANT RELATED: ICD-10-CM

## 2025-03-19 DIAGNOSIS — D63.1 ANEMIA IN STAGE 2 CHRONIC KIDNEY DISEASE: ICD-10-CM

## 2025-03-19 DIAGNOSIS — K52.9 CHRONIC DIARRHEA: ICD-10-CM

## 2025-03-19 DIAGNOSIS — D84.9 IMMUNOSUPPRESSED STATUS: ICD-10-CM

## 2025-03-19 PROCEDURE — G0463 HOSPITAL OUTPT CLINIC VISIT: HCPCS | Performed by: STUDENT IN AN ORGANIZED HEALTH CARE EDUCATION/TRAINING PROGRAM

## 2025-03-19 ASSESSMENT — PAIN SCALES - GENERAL: PAINLEVEL_OUTOF10: NO PAIN (0)

## 2025-03-19 NOTE — NURSING NOTE
"Chief Complaint   Patient presents with    Follow Up     POST ACUTE TXP NEPH       BP (!) 153/76   Pulse 52   Temp 98.2  F (36.8  C) (Oral)   Ht 1.664 m (5' 5.5\")   Wt 55.3 kg (122 lb)   SpO2 96%   BMI 19.99 kg/m    Coco Canas MA on 3/19/2025 at 9:35 AM    "

## 2025-03-19 NOTE — LETTER
3/19/2025      Lexy Cobb  [address]  [place] [address]      Dear Colleague,    Thank you for referring your patient, Lexy Cobb, to the [facility]PL[facility]. Please see a copy of my visit note below.    TRANSPLANT NEPHROLOGY CLINIC VISIT   Assessment & Plan  Change mycophenolate to everolimus 1 mg twice a day (goal 3-5).  Continue tacrolimus with new goal of 6-8 as she's 6 months post.  Start amlodipine 2.5 mg every day.   Continue daily BP checks.  Decrease calcium carbonate to 500 mg every day.   Decrease sodium bicarbonate to 650 mg twice a day.    # DDKT: CKD Stage 2 - Stable, near normal   - Baseline Creatinine: ~ 1-1.3 mg/dL.   - Proteinuria: Normal (<0.2 grams)   - DSA Hx: No DSA   - Last cPRA: 0%   - BK Viremia: No   - Kidney Tx Biopsy Hx: No biopsy history.    Her initial creatinine ranged from 0.8-1, but most recently 1-1.3 mg/dL. I do thing the latter is her baseline. She's un sensitized, no DSA, and no proteinuria. A bit higher today though with higher Tac trough that resulted after clinic.     # Immunosuppression: Tacrolimus immediate release (goal 6-8) and Mycophenolic acid (dose 360 mg every 12 hours)   - Induction with Recent Transplant:  Low Intensity Protocol   - Continue with intensive monitoring of immunosuppression for efficacy and toxicity.   - Historical Changes in Immunosuppression:  Changed MMF to EC-MPS due to diarrhea. Lowered dose due to continue diarrhea.   - Changes: Yes - plan to STOP EC-MPS and start everolimus, await insurance coverage before starting      # Diarrhea: persistent on EC-MPS and imodium 1 tablet twice a day.  I have asked her to still see gastroenterology close to home to assess for treatment of pancreatic insufficiency or diarrhea related to her past history of Thalia-en-Y gastric bypass.   - Switch to everolimus as above    # Infection Prevention:      - PJP: Sulfa/TMP (Bactrim) 400-80 mg every day.   - CMV: None, prophylaxis  completed check CMV PCR monthly until 12 months post transplant.      - CMV IgG Ab High Risk Discordance (D+/R-): Yes  CMV Serostatus: Negative  - EBV IgG Ab High Risk Discordance (D+/R-): No  EBV Serostatus: Positive    # Hypertension / Volume: Inadequate control;  Goal BP: < 140/90  - Off antihypertensives since ~ Nov, 2024, given hypotension on metoprolol and amlodipine.    - Changes: Yes - start amlodipine 2.5 mg every day.    # Anemia in Chronic Renal Disease: Hgb: Trend up      HIEU: No   - Iron studies: Replete    # Mineral Bone Disorder:    - Secondary renal hyperparathyroidism; PTH level: Minimally elevated ( pg/ml)        On treatment: None  - Vitamin D; level: Normal        On supplement: Yes vitamin d injection once a month.  - Calcium; level: Normal        On supplement: Yes calcium carbonate 500 mg reduce to once daily.  - Phosphorus; level: Normal        On supplement: No     # Electrolytes:   - Potassium; level: Normal        On supplement: No  - Magnesium; level: Low        On supplement: Yes magnesium glycinate 240 mg twice a day.  - Bicarbonate; level: Normal        On supplement: Yes sodium bicarbonate reduce to 650 mg twice a day.    # Paroxysmal Atrial Fibrillation: Stable, ongoing atrial fibrillation with rate controlled on beta blocker.  Patient was reportedly in AF during her stress test in May 2024. Subsequent Zio patch was negative for AF.     # H/o Obesity, s/p Gastric Bypass (Thalia-en-y in 1973): Patient with stable weight.  At risk if secondary hyperoxaluria. Oxalate level ~ 11 prior to transplant with CKD.  Now oxalate level <2 with improved kidney function following transplant.  - Low oxalate level. No need to re-check.    # Other Significant PMH:  - Weight loss: Reports good appetite  - GERD: Asymptomatic on PPI.  - RCC (2001): s/p right native nephrectomy with mass totally encapsulated. She did not require any chemotherapy or radiation. CT today pending to reassess recurrence.       # Skin Cancer Risk: Discussed sun protection and recommend regular follow up with Dermatology.    # Transplant History:  Etiology of Kidney Failure: Unknown etiology. CKD likely from hypertension, NSAID use, prior triamterene use, reduced nephron mass, potentially secondary FSGS, and solitary kidney (s/p unilateral nephrectomy for RCC)   Tx: DDKT  Transplant: 9/3/2024 (Kidney)  Significant transplant-related complications: None    Transplant Office Phone Number: [phone]    Assessment and plan was discussed with the patient and she voiced her understanding and agreement.    Return visit: Return for appointment scheduled for June 13, 2025.    Zaki Ruth    Attestation:  This patient has been seen and evaluated by me, Angel Sandhu MD.  I was present for the entire procedure as done by the fellow.     The longitudinal plan of care for the diagnosis(es)/condition(s) as documented were addressed during this visit. Due to the added complexity in care, I will continue to support Lexy in the subsequent management and with ongoing continuity of care.      Chief Complaint  Ms. Cobb is a 80 year old here for kidney transplant and immunosuppression management.     History of Present Illness   Ms. Cobb reports feeling well overall.    Since last clinic visit, she continues to have diarrhea with episodes of 5-7-day and continues low dietary weight from 125 to 102 pounds despite eating better. The diarrhea is sometimes better but she is taking imodium every day. If she stops it, it gets significantly worse. There's been some improvement on the lower dose of mycophenolate sodium, but not as she expected.  She had a colonoscopy this month or 2 polyps were removed consistent with tubular adenomas.  Unfortunately colon biopsies were not performed to rule out mycophenolate toxicity.  She continues to have some unsteadiness with walking, but no lightheadedness on standing.     Chest pain or shortness of  breath: No  Lower extremity swelling: improved  Nausea and vomiting: No  Heartburn symptoms: No  Fever, sweats or chills: No  Night sweats: No  Urinary complaints: No    Home BP:  150s/70s    Problem List  Patient Active Problem List   Diagnosis     HTN, kidney transplant related     CKD (chronic kidney disease) stage 2, GFR 60-89 ml/min     Renal cell cancer (H)     Anemia of chronic kidney failure     Secondary renal hyperparathyroidism     Vitamin D deficiency     Hypomagnesemia     Obesity     Vitamin B12 deficiency     Raynaud's syndrome     ABDULLAHI positive     Chronic lower back pain     Irritable bowel syndrome     GERD (gastroesophageal reflux disease)     Trauma to right eye     Immunosuppression     Kidney replaced by transplant     Hypophosphatemia     Dehydration     Need for pneumocystis prophylaxis       Allergies  Allergies   Allergen Reactions     Dapsone      Fluoxetine      Iron Diarrhea     Latex Rash       Medications  Current Outpatient Medications   Medication Sig Dispense Refill     acetaminophen (TYLENOL) 325 MG tablet Take 2 tablets (650 mg) by mouth every 4 hours as needed for other (For optimal non-opioid multimodal pain management to improve pain control.).       atorvastatin (LIPITOR) 10 MG tablet Take 1 tablet (10 mg) by mouth daily. 90 tablet 3     calcitRIOL (ROCALTROL) 0.25 MCG capsule Take 0.25 mcg by mouth daily.       calcium carbonate 500 mg, elemental, (OSCAL) 500 MG tablet Take 1 tablet (500 mg) by mouth 2 times daily. 180 tablet 3     cyanocobalamin (CYANOCOBALAMIN) 1000 mcg/mL injection Inject 1,000 mcg into the muscle every 30 days.       famotidine (PEPCID) 20 MG tablet Take 1 tablet (20 mg) by mouth daily as needed (for heartburn). 30 tablet 11     loperamide (IMODIUM A-D) 2 MG tablet Take 1 tablet (2 mg) by mouth 3 times daily as needed for diarrhea. 90 tablet 3     Magnesium Glycinate 120 MG CAPS Take 240 mg by mouth 2 times daily.       mycophenolic acid (GENERIC  "EQUIVALENT) 180 MG EC tablet Take 2 tablets (360 mg) by mouth 2 times daily. 120 tablet 11     omeprazole (PRILOSEC) 10 MG DR capsule Take 1 capsule (10 mg) by mouth daily. 90 capsule 3     sodium bicarbonate 650 MG tablet Take 2 tablets (1,300 mg) by mouth 2 times daily. 90 tablet 1     sulfamethoxazole-trimethoprim (BACTRIM) 400-80 MG tablet Take 1 tablet by mouth daily. 30 tablet 11     tacrolimus (GENERIC EQUIVALENT) 1 MG capsule Take 3 capsules (3 mg) by mouth 2 times daily. 180 capsule 11     traZODone (DESYREL) 50 MG tablet Take 100 mg by mouth at bedtime.       valGANciclovir (VALCYTE) 450 MG tablet Take 2 tablets (900 mg) by mouth daily. (Patient taking differently: Take 450 mg by mouth daily.) 60 tablet 5     No current facility-administered medications for this visit.     There are no discontinued medications.    Physical Exam  Vital Signs: BP (!) 153/76   Pulse 52   Temp 98.2  F (36.8  C) (Oral)   Ht 1.664 m (5' 5.5\")   Wt 55.3 kg (122 lb)   SpO2 96%   BMI 19.99 kg/m      GENERAL APPEARANCE: alert and no distress  HENT: mouth without ulcers or lesions  RESP: lungs clear to auscultation - no rales, rhonchi or wheezes  CV: regular rhythm, normal rate, no rub, no murmur  EDEMA: trace right greater than left.   ABDOMEN: soft, nondistended, nontender, bowel sounds normal  MS: extremities normal - no gross deformities noted, no evidence of inflammation in joints, no muscle tenderness  SKIN: no rash  TRANSPLANT: nontender    Data        Latest Ref Rng & Units 3/17/2025     8:27 AM 3/3/2025     8:18 AM 2/17/2025     7:48 AM   Renal   Sodium 135 - 145 mmol/L 140  138     K 3.4 - 5.3 mmol/L 4.3  4.3     Cl 98 - 107 mmol/L 104  105     Cl (external) 98 - 107 mmol/L 104  105     CO2 22 - 29 mmol/L 24  22     Urea Nitrogen 8.0 - 23.0 mg/dL 26.2  26.9     Creatinine 0.51 - 0.95 mg/dL 1.24  1.04     Glucose 70 - 99 mg/dL 96  88     Calcium 8.8 - 10.4 mg/dL 9.6  9.2     Magnesium 1.7 - 2.3 mg/dL  1.7  1.4          " Latest Ref Rng & Units 3/3/2025     8:18 AM 2/17/2025     7:54 AM 2/17/2025     7:48 AM   Bone Health   Phosphorus 2.5 - 4.5 mg/dL 4.3   4.0    Parathyroid Hormone Intact 15 - 65 pg/mL  50           Latest Ref Rng & Units 3/17/2025     8:27 AM 3/10/2025    12:50 PM 3/3/2025     8:18 AM   Heme   WBC 4.0 - 11.0 10e3/uL 4.4   3.7    Hgb 11.7 - 15.7 g/dL 10.4  10.3  10.3    Plt 150 - 450 10e3/uL 153   172          Latest Ref Rng & Units 3/3/2025     8:18 AM 10/9/2024     8:28 AM 9/2/2024     9:57 PM   Liver   AP 40 - 150 U/L 91   50    TBili <=1.2 mg/dL 0.3   0.3    Bilirubin Direct 0.00 - 0.30 mg/dL 0.19      ALT 0 - 50 U/L 21   <5    AST 0 - 45 U/L 25   13    Tot Protein 6.4 - 8.3 g/dL 6.0   6.7    Albumin 3.5 - 5.2 g/dL 4.1  3.8  4.0          Latest Ref Rng & Units 3/3/2025     8:18 AM 9/2/2024     9:57 PM   Pancreas   A1C 0.0 - 5.6 % 4.8  4.4          Latest Ref Rng & Units 3/17/2025     8:27 AM 2/17/2025     7:45 AM 2/3/2025     7:39 AM   Iron studies   Iron 37 - 145 ug/dL 69  77  71    Iron Sat Index 15 - 46 % 23  29  24    Ferritin 11 - 328 ng/mL 421  356  487          Latest Ref Rng & Units 9/2/2024     9:57 PM 10/10/2019     2:30 PM 10/28/2013     7:41 AM   [facility] Txp Virology   EBV CAPSID ANTIBODY IGG No detectable antibody. Positive  >8.0     Hep B Core NR^Nonreactive  Nonreactive     HIV 1&2 NEG   Negative      Failed to redirect to the Timeline version of the REVFS SmartLink.  Recent Labs   Lab Test 02/03/25  0738 02/17/25  0745 03/03/25  0818 03/17/25  0827   DOSTAC 2/2/2025  --  3/2/2025 3/16/2025   TACROL 8.5 7.8 9.2 9.2     Recent Labs   Lab Test 10/03/24  0745 10/09/24  0828 10/31/24  0805   DOSMPA 10/2/2024   7:30 PM 10/8/2024   8:00 PM 10/30/2024   8:00 PM   MPACID 0.57* 1.75 1.36   MPAG 33.7 60.5 61.5       Again, thank you for allowing me to participate in the care of your patient.        Sincerely,        Angel Sandhu MD    Electronically signed

## 2025-03-19 NOTE — PROGRESS NOTES
TRANSPLANT NEPHROLOGY CLINIC VISIT   Assessment & Plan   Change mycophenolate to everolimus 1 mg twice a day (goal 3-5).  Continue tacrolimus with new goal of 6-8 as she's 6 months post.  Start amlodipine 2.5 mg every day.   Continue daily BP checks.  Decrease calcium carbonate to 500 mg every day.   Decrease sodium bicarbonate to 650 mg twice a day.    # DDKT: CKD Stage 2 - Stable, near normal   - Baseline Creatinine: ~ 1-1.3 mg/dL.   - Proteinuria: Normal (<0.2 grams)   - DSA Hx: No DSA   - Last cPRA: 0%   - BK Viremia: No   - Kidney Tx Biopsy Hx: No biopsy history.    Her initial creatinine ranged from 0.8-1, but most recently 1-1.3 mg/dL. I do thing the latter is her baseline. She's un sensitized, no DSA, and no proteinuria. A bit higher today though with higher Tac trough that resulted after clinic.     # Immunosuppression: Tacrolimus immediate release (goal 6-8) and Mycophenolic acid (dose 360 mg every 12 hours)   - Induction with Recent Transplant:  Low Intensity Protocol   - Continue with intensive monitoring of immunosuppression for efficacy and toxicity.   - Historical Changes in Immunosuppression:  Changed MMF to EC-MPS due to diarrhea. Lowered dose due to continue diarrhea.   - Changes: Yes - plan to STOP EC-MPS and start everolimus, await insurance coverage before starting      # Diarrhea: persistent on EC-MPS and imodium 1 tablet twice a day.  I have asked her to still see gastroenterology close to home to assess for treatment of pancreatic insufficiency or diarrhea related to her past history of Thalia-en-Y gastric bypass.   - Switch to everolimus as above    # Infection Prevention:      - PJP: Sulfa/TMP (Bactrim) 400-80 mg every day.   - CMV: None, prophylaxis completed check CMV PCR monthly until 12 months post transplant.      - CMV IgG Ab High Risk Discordance (D+/R-): Yes  CMV Serostatus: Negative  - EBV IgG Ab High Risk Discordance (D+/R-): No  EBV Serostatus: Positive    # Hypertension / Volume:  Inadequate control;  Goal BP: < 140/90  - Off antihypertensives since ~ Nov, 2024, given hypotension on metoprolol and amlodipine.    - Changes: Yes - start amlodipine 2.5 mg every day.    # Anemia in Chronic Renal Disease: Hgb: Trend up      HIEU: No   - Iron studies: Replete    # Mineral Bone Disorder:    - Secondary renal hyperparathyroidism; PTH level: Minimally elevated ( pg/ml)        On treatment: None  - Vitamin D; level: Normal        On supplement: Yes vitamin d injection once a month.  - Calcium; level: Normal        On supplement: Yes calcium carbonate 500 mg reduce to once daily.  - Phosphorus; level: Normal        On supplement: No     # Electrolytes:   - Potassium; level: Normal        On supplement: No  - Magnesium; level: Low        On supplement: Yes magnesium glycinate 240 mg twice a day.  - Bicarbonate; level: Normal        On supplement: Yes sodium bicarbonate reduce to 650 mg twice a day.    # Paroxysmal Atrial Fibrillation: Stable, ongoing atrial fibrillation with rate controlled on beta blocker.  Patient was reportedly in AF during her stress test in May 2024. Subsequent Zio patch was negative for AF.     # H/o Obesity, s/p Gastric Bypass (Thalia-en-y in 1973): Patient with stable weight.  At risk if secondary hyperoxaluria. Oxalate level ~ 11 prior to transplant with CKD.  Now oxalate level <2 with improved kidney function following transplant.  - Low oxalate level. No need to re-check.    # Other Significant PMH:  - Weight loss: Reports good appetite  - GERD: Asymptomatic on PPI.  - RCC (2001): s/p right native nephrectomy with mass totally encapsulated. She did not require any chemotherapy or radiation. CT today pending to reassess recurrence.      # Skin Cancer Risk: Discussed sun protection and recommend regular follow up with Dermatology.    # Transplant History:  Etiology of Kidney Failure: Unknown etiology. CKD likely from hypertension, NSAID use, prior triamterene use, reduced  nephron mass, potentially secondary FSGS, and solitary kidney (s/p unilateral nephrectomy for RCC)   Tx: DDKT  Transplant: 9/3/2024 (Kidney)  Significant transplant-related complications: None    Transplant Office Phone Number: [phone]    Assessment and plan was discussed with the patient and she voiced her understanding and agreement.    Return visit: Return for appointment scheduled for June 13, 2025.    Zaki Ruth    Attestation:  This patient has been seen and evaluated by me, Angel Sandhu MD.  I have reviewed the note and agree with plan of care as documented by the fellow.     The longitudinal plan of care for the diagnosis(es)/condition(s) as documented were addressed during this visit. Due to the added complexity in care, I will continue to support Lexy in the subsequent management and with ongoing continuity of care.      Chief Complaint   Ms. Cobb is a 80 year old here for kidney transplant and immunosuppression management.     History of Present Illness    Ms. Cobb reports feeling well overall.    Since last clinic visit, she continues to have diarrhea with episodes of 5-7-day and continues low dietary weight from 125 to 102 pounds despite eating better. The diarrhea is sometimes better but she is taking imodium every day. If she stops it, it gets significantly worse. There's been some improvement on the lower dose of mycophenolate sodium, but not as she expected.  She had a colonoscopy this month or 2 polyps were removed consistent with tubular adenomas.  Unfortunately colon biopsies were not performed to rule out mycophenolate toxicity.  She continues to have some unsteadiness with walking, but no lightheadedness on standing.     Chest pain or shortness of breath: No  Lower extremity swelling: improved  Nausea and vomiting: No  Heartburn symptoms: No  Fever, sweats or chills: No  Night sweats: No  Urinary complaints: No    Home BP:  150s/70s    Problem List   Patient Active  Problem List   Diagnosis    HTN, kidney transplant related    CKD (chronic kidney disease) stage 2, GFR 60-89 ml/min    Renal cell cancer (H)    Anemia of chronic kidney failure    Secondary renal hyperparathyroidism    Vitamin D deficiency    Hypomagnesemia    Obesity    Vitamin B12 deficiency    Raynaud's syndrome    ABDULLAHI positive    Chronic lower back pain    Irritable bowel syndrome    GERD (gastroesophageal reflux disease)    Trauma to right eye    Immunosuppression    Kidney replaced by transplant    Hypophosphatemia    Dehydration    Need for pneumocystis prophylaxis       Allergies   Allergies   Allergen Reactions    Dapsone     Fluoxetine     Iron Diarrhea    Latex Rash       Medications   Current Outpatient Medications   Medication Sig Dispense Refill    acetaminophen (TYLENOL) 325 MG tablet Take 2 tablets (650 mg) by mouth every 4 hours as needed for other (For optimal non-opioid multimodal pain management to improve pain control.).      atorvastatin (LIPITOR) 10 MG tablet Take 1 tablet (10 mg) by mouth daily. 90 tablet 3    calcitRIOL (ROCALTROL) 0.25 MCG capsule Take 0.25 mcg by mouth daily.      calcium carbonate 500 mg, elemental, (OSCAL) 500 MG tablet Take 1 tablet (500 mg) by mouth 2 times daily. 180 tablet 3    cyanocobalamin (CYANOCOBALAMIN) 1000 mcg/mL injection Inject 1,000 mcg into the muscle every 30 days.      famotidine (PEPCID) 20 MG tablet Take 1 tablet (20 mg) by mouth daily as needed (for heartburn). 30 tablet 11    loperamide (IMODIUM A-D) 2 MG tablet Take 1 tablet (2 mg) by mouth 3 times daily as needed for diarrhea. 90 tablet 3    Magnesium Glycinate 120 MG CAPS Take 240 mg by mouth 2 times daily.      mycophenolic acid (GENERIC EQUIVALENT) 180 MG EC tablet Take 2 tablets (360 mg) by mouth 2 times daily. 120 tablet 11    omeprazole (PRILOSEC) 10 MG DR capsule Take 1 capsule (10 mg) by mouth daily. 90 capsule 3    sodium bicarbonate 650 MG tablet Take 2 tablets (1,300 mg) by mouth 2  "times daily. 90 tablet 1    sulfamethoxazole-trimethoprim (BACTRIM) 400-80 MG tablet Take 1 tablet by mouth daily. 30 tablet 11    tacrolimus (GENERIC EQUIVALENT) 1 MG capsule Take 3 capsules (3 mg) by mouth 2 times daily. 180 capsule 11    traZODone (DESYREL) 50 MG tablet Take 100 mg by mouth at bedtime.      valGANciclovir (VALCYTE) 450 MG tablet Take 2 tablets (900 mg) by mouth daily. (Patient taking differently: Take 450 mg by mouth daily.) 60 tablet 5     No current facility-administered medications for this visit.     There are no discontinued medications.    Physical Exam   Vital Signs: BP (!) 153/76   Pulse 52   Temp 98.2  F (36.8  C) (Oral)   Ht 1.664 m (5' 5.5\")   Wt 55.3 kg (122 lb)   SpO2 96%   BMI 19.99 kg/m      GENERAL APPEARANCE: alert and no distress  HENT: mouth without ulcers or lesions  RESP: lungs clear to auscultation - no rales, rhonchi or wheezes  CV: regular rhythm, normal rate, no rub, no murmur  EDEMA: trace right greater than left.   ABDOMEN: soft, nondistended, nontender, bowel sounds normal  MS: extremities normal - no gross deformities noted, no evidence of inflammation in joints, no muscle tenderness  SKIN: no rash  TRANSPLANT: nontender    Data         Latest Ref Rng & Units 3/17/2025     8:27 AM 3/3/2025     8:18 AM 2/17/2025     7:48 AM   Renal   Sodium 135 - 145 mmol/L 140  138     K 3.4 - 5.3 mmol/L 4.3  4.3     Cl 98 - 107 mmol/L 104  105     Cl (external) 98 - 107 mmol/L 104  105     CO2 22 - 29 mmol/L 24  22     Urea Nitrogen 8.0 - 23.0 mg/dL 26.2  26.9     Creatinine 0.51 - 0.95 mg/dL 1.24  1.04     Glucose 70 - 99 mg/dL 96  88     Calcium 8.8 - 10.4 mg/dL 9.6  9.2     Magnesium 1.7 - 2.3 mg/dL  1.7  1.4          Latest Ref Rng & Units 3/3/2025     8:18 AM 2/17/2025     7:54 AM 2/17/2025     7:48 AM   Bone Health   Phosphorus 2.5 - 4.5 mg/dL 4.3   4.0    Parathyroid Hormone Intact 15 - 65 pg/mL  50           Latest Ref Rng & Units 3/17/2025     8:27 AM 3/10/2025    " 12:50 PM 3/3/2025     8:18 AM   Heme   WBC 4.0 - 11.0 10e3/uL 4.4   3.7    Hgb 11.7 - 15.7 g/dL 10.4  10.3  10.3    Plt 150 - 450 10e3/uL 153   172          Latest Ref Rng & Units 3/3/2025     8:18 AM 10/9/2024     8:28 AM 9/2/2024     9:57 PM   Liver   AP 40 - 150 U/L 91   50    TBili <=1.2 mg/dL 0.3   0.3    Bilirubin Direct 0.00 - 0.30 mg/dL 0.19      ALT 0 - 50 U/L 21   <5    AST 0 - 45 U/L 25   13    Tot Protein 6.4 - 8.3 g/dL 6.0   6.7    Albumin 3.5 - 5.2 g/dL 4.1  3.8  4.0          Latest Ref Rng & Units 3/3/2025     8:18 AM 9/2/2024     9:57 PM   Pancreas   A1C 0.0 - 5.6 % 4.8  4.4          Latest Ref Rng & Units 3/17/2025     8:27 AM 2/17/2025     7:45 AM 2/3/2025     7:39 AM   Iron studies   Iron 37 - 145 ug/dL 69  77  71    Iron Sat Index 15 - 46 % 23  29  24    Ferritin 11 - 328 ng/mL 421  356  487          Latest Ref Rng & Units 9/2/2024     9:57 PM 10/10/2019     2:30 PM 10/28/2013     7:41 AM   [facility] Txp Virology   EBV CAPSID ANTIBODY IGG No detectable antibody. Positive  >8.0     Hep B Core NR^Nonreactive  Nonreactive     HIV 1&2 NEG   Negative      Failed to redirect to the Timeline version of the REVFS SmartLink.  Recent Labs   Lab Test 02/03/25  0738 02/17/25  0745 03/03/25  0818 03/17/25  0827   DOSTAC 2/2/2025  --  3/2/2025 3/16/2025   TACROL 8.5 7.8 9.2 9.2     Recent Labs   Lab Test 10/03/24  0745 10/09/24  0828 10/31/24  0805   DOSMPA 10/2/2024   7:30 PM 10/8/2024   8:00 PM 10/30/2024   8:00 PM   MPACID 0.57* 1.75 1.36   MPAG 33.7 60.5 61.5

## 2025-03-19 NOTE — PATIENT INSTRUCTIONS
Patient Recommendations:  - Decrease calcium carbonate to one tablet of 500 mg a day.  - We will change to Everolimus.  - Reduce sodium bicarbonate to one tablet of 650 mg twice a day.  - Start amlodipine 2.5 mg every day.    Transplant Patient Information  Your Post Transplant Coordinator is: Gina Huizar  For non urgent items, we encourage you to contact your coordinator/care team online via [employer]  You and your care team can also contact your transplant coordinator Monday - Friday, 8am - 5pm at [phone] (Option 2 to reach the coordinator or Option 4 to schedule an appointment).  After hours for urgent matters, please call [facility] at [phone].

## 2025-03-31 ENCOUNTER — LAB (OUTPATIENT)
Dept: LAB | Facility: CLINIC | Age: 80
End: 2025-03-31
Payer: COMMERCIAL

## 2025-03-31 ENCOUNTER — PATIENT OUTREACH (OUTPATIENT)
Dept: CARE COORDINATION | Facility: CLINIC | Age: 80
End: 2025-03-31

## 2025-03-31 DIAGNOSIS — Z48.298 AFTERCARE FOLLOWING ORGAN TRANSPLANT: ICD-10-CM

## 2025-03-31 DIAGNOSIS — Z79.899 ENCOUNTER FOR LONG-TERM CURRENT USE OF MEDICATION: ICD-10-CM

## 2025-03-31 DIAGNOSIS — Z20.828 CONTACT WITH AND (SUSPECTED) EXPOSURE TO OTHER VIRAL COMMUNICABLE DISEASES: ICD-10-CM

## 2025-03-31 DIAGNOSIS — Z94.0 KIDNEY REPLACED BY TRANSPLANT: ICD-10-CM

## 2025-03-31 LAB
ANION GAP SERPL CALCULATED.3IONS-SCNC: 12 MMOL/L (ref 7–15)
BUN SERPL-MCNC: 26.9 MG/DL (ref 8–23)
CALCIUM SERPL-MCNC: 9.4 MG/DL (ref 8.8–10.4)
CHLORIDE SERPL-SCNC: 104 MMOL/L (ref 98–107)
CREAT SERPL-MCNC: 1.35 MG/DL (ref 0.51–0.95)
EGFRCR SERPLBLD CKD-EPI 2021: 40 ML/MIN/1.73M2
ERYTHROCYTE [DISTWIDTH] IN BLOOD BY AUTOMATED COUNT: 13.1 % (ref 10–15)
GLUCOSE SERPL-MCNC: 89 MG/DL (ref 70–99)
HCO3 SERPL-SCNC: 20 MMOL/L (ref 22–29)
HCT VFR BLD AUTO: 30.9 % (ref 35–47)
HGB BLD-MCNC: 10.1 G/DL (ref 11.7–15.7)
MCH RBC QN AUTO: 34.2 PG (ref 26.5–33)
MCHC RBC AUTO-ENTMCNC: 32.7 G/DL (ref 31.5–36.5)
MCV RBC AUTO: 105 FL (ref 78–100)
PLATELET # BLD AUTO: 175 10E3/UL (ref 150–450)
POTASSIUM SERPL-SCNC: 4.6 MMOL/L (ref 3.4–5.3)
RBC # BLD AUTO: 2.95 10E6/UL (ref 3.8–5.2)
SODIUM SERPL-SCNC: 136 MMOL/L (ref 135–145)
TACROLIMUS BLD-MCNC: 8.6 UG/L (ref 5–15)
TME LAST DOSE: NORMAL H
TME LAST DOSE: NORMAL H
WBC # BLD AUTO: 4.3 10E3/UL (ref 4–11)

## 2025-03-31 PROCEDURE — 80048 BASIC METABOLIC PNL TOTAL CA: CPT

## 2025-03-31 PROCEDURE — 85027 COMPLETE CBC AUTOMATED: CPT

## 2025-03-31 PROCEDURE — 80197 ASSAY OF TACROLIMUS: CPT

## 2025-03-31 PROCEDURE — 36415 COLL VENOUS BLD VENIPUNCTURE: CPT

## 2025-03-31 NOTE — PROGRESS NOTES
Anemia Management Note - Follow Up      SUBJECTIVE/OBJECTIVE:    Referred by Dr. Angel Sandhu  on 2024  Primary Diagnosis: Anemia of Other Chronic Illness (D63.8)     Secondary Diagnosis: Organ or tissue replaced by transplant, kidney (Z94.0)  Date of Kidney Transplant: 9/3/2024  Hgb goal range: 9-10     Epo/Darbo: Aranesp 40mcg subcutaneous every 14 days, PRN Hgb <10, in clinic  Iron regimen: TBD      Labs : 2025  RX/TX plans : [id number]     Recent HIEU use, transfusion, IV iron: Venofer May 2024, Aranesp - DC'd after Tx.  No history of stroke, MI, and blood clots.  Hx of RCC; Kidney Tx .      Contact: Consent to Communicate Scanned on 10/29/2013.         Latest Ref Rng & Units 2/10/2025 2025 2025 3/3/2025 3/10/2025 3/17/2025 3/31/2025   Anemia   Hemoglobin 11.7 - 15.7 g/dL 10.0  9.7  10.3  10.3  10.3  10.4  10.1    TSAT 15 - 46 %  29     23     Ferritin 11 - 328 ng/mL  356     421       BP Readings from Last 3 Encounters:   25 (!) 153/76   25 (!) 151/70   01/15/25 (!) 166/74     Wt Readings from Last 2 Encounters:   25 55.3 kg (122 lb)   01/15/25 56.9 kg (125 lb 6.4 oz)         ASSESSMENT:    Hgb:Above goal - recommend hold dose  TSat: not at goal of >30% Ferritin: At goal (>100ng/mL)   Goals Addressed    None         PLAN:  Hold Aranesp.  RTC for hgb then Aranesp if needed in 2 week(s).    Orders needed to be renewed (for next follow-up date) in EPIC: None    Iron labs due:  mid 2025    Plan discussed with:  no call, chart reviewed      NEXT FOLLOW-UP DATE:  408    Carrie Leopold, RN BSN  Anemia Services  [facility]  [email or web address]  Office: [phone]  Fax [phone]

## 2025-04-07 ENCOUNTER — LAB (OUTPATIENT)
Dept: LAB | Facility: CLINIC | Age: 80
End: 2025-04-07
Payer: COMMERCIAL

## 2025-04-07 DIAGNOSIS — Z48.298 AFTERCARE FOLLOWING ORGAN TRANSPLANT: ICD-10-CM

## 2025-04-07 DIAGNOSIS — Z94.0 KIDNEY REPLACED BY TRANSPLANT: ICD-10-CM

## 2025-04-07 DIAGNOSIS — Z79.899 ENCOUNTER FOR LONG-TERM CURRENT USE OF MEDICATION: ICD-10-CM

## 2025-04-07 LAB
ANION GAP SERPL CALCULATED.3IONS-SCNC: 11 MMOL/L (ref 7–15)
BK VIRUS SPECIMEN TYPE: NORMAL
BKV DNA # SPEC NAA+PROBE: NOT DETECTED IU/ML
BUN SERPL-MCNC: 26.5 MG/DL (ref 8–23)
CALCIUM SERPL-MCNC: 9.8 MG/DL (ref 8.8–10.4)
CHLORIDE SERPL-SCNC: 106 MMOL/L (ref 98–107)
CMV DNA SPEC NAA+PROBE-ACNC: NOT DETECTED IU/ML
CREAT SERPL-MCNC: 0.95 MG/DL (ref 0.51–0.95)
EGFRCR SERPLBLD CKD-EPI 2021: 61 ML/MIN/1.73M2
ERYTHROCYTE [DISTWIDTH] IN BLOOD BY AUTOMATED COUNT: 13.2 % (ref 10–15)
GLUCOSE SERPL-MCNC: 91 MG/DL (ref 70–99)
HCO3 SERPL-SCNC: 21 MMOL/L (ref 22–29)
HCT VFR BLD AUTO: 30.4 % (ref 35–47)
HGB BLD-MCNC: 9.8 G/DL (ref 11.7–15.7)
MCH RBC QN AUTO: 33.3 PG (ref 26.5–33)
MCHC RBC AUTO-ENTMCNC: 32.2 G/DL (ref 31.5–36.5)
MCV RBC AUTO: 103 FL (ref 78–100)
PLATELET # BLD AUTO: 152 10E3/UL (ref 150–450)
POTASSIUM SERPL-SCNC: 4.6 MMOL/L (ref 3.4–5.3)
RBC # BLD AUTO: 2.94 10E6/UL (ref 3.8–5.2)
SODIUM SERPL-SCNC: 138 MMOL/L (ref 135–145)
SPECIMEN TYPE: NORMAL
TACROLIMUS BLD-MCNC: 8.7 UG/L (ref 5–15)
TME LAST DOSE: NORMAL H
TME LAST DOSE: NORMAL H
WBC # BLD AUTO: 3.1 10E3/UL (ref 4–11)

## 2025-04-07 PROCEDURE — 80048 BASIC METABOLIC PNL TOTAL CA: CPT

## 2025-04-07 PROCEDURE — 87799 DETECT AGENT NOS DNA QUANT: CPT

## 2025-04-07 PROCEDURE — 85027 COMPLETE CBC AUTOMATED: CPT

## 2025-04-07 PROCEDURE — 36415 COLL VENOUS BLD VENIPUNCTURE: CPT

## 2025-04-07 PROCEDURE — 80197 ASSAY OF TACROLIMUS: CPT

## 2025-04-08 ENCOUNTER — TELEPHONE (OUTPATIENT)
Dept: TRANSPLANT | Facility: CLINIC | Age: 80
End: 2025-04-08
Payer: COMMERCIAL

## 2025-04-08 ENCOUNTER — PATIENT OUTREACH (OUTPATIENT)
Dept: CARE COORDINATION | Facility: CLINIC | Age: 80
End: 2025-04-08
Payer: COMMERCIAL

## 2025-04-08 DIAGNOSIS — I15.1 HTN, KIDNEY TRANSPLANT RELATED: ICD-10-CM

## 2025-04-08 DIAGNOSIS — Z94.0 HTN, KIDNEY TRANSPLANT RELATED: ICD-10-CM

## 2025-04-08 DIAGNOSIS — Z48.298 AFTERCARE FOLLOWING ORGAN TRANSPLANT: Primary | ICD-10-CM

## 2025-04-08 DIAGNOSIS — Z94.0 STATUS POST KIDNEY TRANSPLANT: ICD-10-CM

## 2025-04-08 RX ORDER — TACROLIMUS 1 MG/1
3 CAPSULE ORAL 2 TIMES DAILY
Qty: 180 CAPSULE | Refills: 11 | Status: SHIPPED | OUTPATIENT
Start: 2025-04-08

## 2025-04-08 RX ORDER — MYCOPHENOLIC ACID 180 MG/1
360 TABLET, DELAYED RELEASE ORAL 2 TIMES DAILY
Qty: 120 TABLET | Refills: 11 | Status: SHIPPED | OUTPATIENT
Start: 2025-04-08

## 2025-04-08 NOTE — PROGRESS NOTES
Anemia Management Note - Follow Up      SUBJECTIVE/OBJECTIVE:    Referred by Dr. Angel Sandhu  on 2024  Primary Diagnosis: Anemia of Other Chronic Illness (D63.8)     Secondary Diagnosis: Organ or tissue replaced by transplant, kidney (Z94.0)  Date of Kidney Transplant: 9/3/2024  Hgb goal range: 9-10     Epo/Darbo: Aranesp 40mcg subcutaneous every 14 days, PRN Hgb <10, in clinic  Iron regimen: TBD      Labs : 2025  RX/TX plans : [id number]     Recent HIEU use, transfusion, IV iron: Venofer May 2024, Aranesp - DC'd after Tx.  No history of stroke, MI, and blood clots.  Hx of RCC; Kidney Tx .      Contact: Consent to Communicate Scanned on 10/29/2013.        Latest Ref Rng & Units 2025 2025 3/3/2025 3/10/2025 3/17/2025 3/31/2025 2025   Anemia   Hemoglobin 11.7 - 15.7 g/dL 9.7  10.3  10.3  10.3  10.4  10.1  9.8    TSAT 15 - 46 % 29     23      Ferritin 11 - 328 ng/mL 356     421        BP Readings from Last 3 Encounters:   25 (!) 153/76   25 (!) 151/70   01/15/25 (!) 166/74     Wt Readings from Last 2 Encounters:   25 55.3 kg (122 lb)   01/15/25 56.9 kg (125 lb 6.4 oz)         ASSESSMENT:    Hgb:At goal - recommend dose  TSat: not at goal of >30% Ferritin: At goal (>100ng/mL)   Goals Addressed    None         PLAN:  Dose with Aranesp.  RTC for hgb then Aranesp if needed in 2 week(s).  If TSAT remains under 30, discuss oral iron/IV iron supplementation. Labs due in mid 2025    Orders needed to be renewed (for next follow-up date) in EPIC: None    Iron labs due:      Plan discussed with:  Lexy, she will call to schedule Aranesp injection      NEXT FOLLOW-UP DATE:  288407-dprga dose, review labs    Carrie Leopold, RN BSN  Anemia Services  [facility]  [email or web address]  Office: [phone]  Fax [phone]

## 2025-04-08 NOTE — TELEPHONE ENCOUNTER
Patient confirms she is taking 4mg BID of Tacrolimus, will reduce to 3mg BID  Patient will stop Valcyte now that she is 6 months from transplant  Paitent confirms she is taking 360mg twice daily of MPA

## 2025-04-10 RX ORDER — MAGNESIUM OXIDE 400 MG/1
400 TABLET ORAL DAILY
Qty: 90 TABLET | Refills: 0 | OUTPATIENT
Start: 2025-04-10

## 2025-04-15 ENCOUNTER — VIRTUAL VISIT (OUTPATIENT)
Dept: PHARMACY | Facility: CLINIC | Age: 80
End: 2025-04-15
Payer: COMMERCIAL

## 2025-04-15 DIAGNOSIS — R19.5 LOOSE STOOLS: ICD-10-CM

## 2025-04-15 DIAGNOSIS — I15.1 HYPERTENSION DUE TO KIDNEY TRANSPLANT: Primary | ICD-10-CM

## 2025-04-15 DIAGNOSIS — Z94.0 HYPERTENSION DUE TO KIDNEY TRANSPLANT: Primary | ICD-10-CM

## 2025-04-15 PROCEDURE — 99607 MTMS BY PHARM ADDL 15 MIN: CPT | Mod: 93 | Performed by: PHARMACIST

## 2025-04-15 PROCEDURE — 99606 MTMS BY PHARM EST 15 MIN: CPT | Mod: 93 | Performed by: PHARMACIST

## 2025-04-15 RX ORDER — AMLODIPINE BESYLATE 5 MG/1
5 TABLET ORAL EVERY EVENING
Qty: 90 TABLET | Refills: 3 | Status: SHIPPED | OUTPATIENT
Start: 2025-04-15

## 2025-04-15 NOTE — PROGRESS NOTES
Medication Therapy Management (MTM) Encounter    ASSESSMENT:                            Medication Adherence/Access: No issues identified.    Hypertension   Most blood pressures over goal of 140/90, although today <140/90 barely. Higher goal used due to age and hx of hypotensive symptoms. I will titrate amlodipine slowly to see if we can bring BPs lower.      Loose Stools:   Loose stools have not improved with fiber supplement twice daily. Can continue taking for now.         PLAN:                            Increase Amlodipine to 5mg at bedtime     Follow-up:  at 9:30 AM    SUBJECTIVE/OBJECTIVE:                          Lexy Cobb is a 80 year old female seen for a follow-up visit.       Reason for visit: HTN.    Allergies/ADRs: Reviewed in chart  Past Medical History: Reviewed in chart  Tobacco: She reports that she has never smoked. She has never used smokeless tobacco.  Alcohol: not currently using    Medication Adherence/Access: no issues reported.    Hypertension   Amlodipine 2.5mg every evening  Patient reports  no side effects.   Gets dizzy with very low doses of blood pressures.   Patient self monitors blood pressure.  Home BP monitorin/87, 119/68, 153/91, 151/87, 162/96  PM:127/69, 149/83, 145/82  Today: 139/81 pulse of 59.      Loose Stools:   Loperamide 2 mg twice daily  Benefiber 2 tsp twice daily- no improvement at this dose.   Hx of intestinal bypass.      Today's Vitals: There were no vitals taken for this visit.  ----------------    I spent 9 minutes with this patient today. All changes were made via collaborative practice agreement with Dr. Martin.     A summary of these recommendations was sent via [employer].    Eddie Keene, PharmD  MTM Pharmacist    Phone: [phone]     Telemedicine Visit Details  The patient's medications can be safely assessed via a telemedicine encounter.  Type of service:  Telephone visit  Originating Location (pt. Location): Home    Distant Location  (provider location):  On-site  Start Time: 9 AM  End Time: 9:09 AM     Medication Therapy Recommendations  HTN, kidney transplant related   1 Current Medication: amLODIPine (NORVASC) 2.5 MG tablet (Discontinued)   Current Medication Sig: Take 1 tablet (2.5 mg) by mouth every evening.   Rationale: Dose too low - Dosage too low - Effectiveness   Recommendation: Increase Dose   Status: Accepted per CPA   Identified Date: 4/15/2025 Completed Date: 4/15/2025

## 2025-04-15 NOTE — PATIENT INSTRUCTIONS
"Recommendations from today's MTM visit:                                                      Increase Amlodipine to 5mg at bedtime. You may decrease back to 2.5mg daily if you get dizzy.     Follow-up: 5/6 at 9:30 AM    It was great speaking with you today.  I value your experience and would be very thankful for your time in providing feedback in our clinic survey. In the next few days, you may receive an email or text message from [employer] with a link to a survey related to your  clinical pharmacist.\"     To schedule another MTM appointment, please call the clinic directly or you may call the MTM scheduling line at [phone] or toll-free at [phone].     My Clinical Pharmacist's contact information:                                                      Please feel free to contact me with any questions or concerns you have.      Eddie Keene, PharmD  MTM Pharmacist    Phone: [phone]     "

## 2025-04-30 ENCOUNTER — INFUSION THERAPY VISIT (OUTPATIENT)
Dept: INFUSION THERAPY | Facility: HOSPITAL | Age: 80
End: 2025-04-30
Payer: COMMERCIAL

## 2025-04-30 ENCOUNTER — LAB (OUTPATIENT)
Dept: INFUSION THERAPY | Facility: HOSPITAL | Age: 80
End: 2025-04-30
Payer: COMMERCIAL

## 2025-04-30 VITALS
SYSTOLIC BLOOD PRESSURE: 140 MMHG | TEMPERATURE: 98.3 F | HEART RATE: 63 BPM | DIASTOLIC BLOOD PRESSURE: 70 MMHG | RESPIRATION RATE: 14 BRPM | OXYGEN SATURATION: 100 %

## 2025-04-30 DIAGNOSIS — N18.9 ANEMIA IN CHRONIC RENAL DISEASE: Primary | ICD-10-CM

## 2025-04-30 DIAGNOSIS — D63.8 ANEMIA IN OTHER CHRONIC DISEASES CLASSIFIED ELSEWHERE: ICD-10-CM

## 2025-04-30 DIAGNOSIS — N18.9 ANEMIA IN CHRONIC RENAL DISEASE: ICD-10-CM

## 2025-04-30 DIAGNOSIS — D63.1 ANEMIA IN CHRONIC RENAL DISEASE: Primary | ICD-10-CM

## 2025-04-30 DIAGNOSIS — D63.1 ANEMIA IN CHRONIC RENAL DISEASE: ICD-10-CM

## 2025-04-30 DIAGNOSIS — Z94.0 KIDNEY REPLACED BY TRANSPLANT: ICD-10-CM

## 2025-04-30 DIAGNOSIS — Z94.0 KIDNEY REPLACED BY TRANSPLANT: Primary | ICD-10-CM

## 2025-04-30 LAB
FERRITIN SERPL-MCNC: 367 NG/ML (ref 11–328)
HCT VFR BLD AUTO: 28.9 % (ref 35–47)
HGB BLD-MCNC: 9.4 G/DL (ref 11.7–15.7)
IRON BINDING CAPACITY (ROCHE): 290 UG/DL (ref 240–430)
IRON SATN MFR SERPL: 27 % (ref 15–46)
IRON SERPL-MCNC: 77 UG/DL (ref 37–145)

## 2025-04-30 PROCEDURE — 83550 IRON BINDING TEST: CPT

## 2025-04-30 PROCEDURE — 36415 COLL VENOUS BLD VENIPUNCTURE: CPT

## 2025-04-30 PROCEDURE — 96372 THER/PROPH/DIAG INJ SC/IM: CPT | Performed by: STUDENT IN AN ORGANIZED HEALTH CARE EDUCATION/TRAINING PROGRAM

## 2025-04-30 PROCEDURE — 85014 HEMATOCRIT: CPT | Performed by: STUDENT IN AN ORGANIZED HEALTH CARE EDUCATION/TRAINING PROGRAM

## 2025-04-30 PROCEDURE — 250N000011 HC RX IP 250 OP 636: Mod: JZ,EC | Performed by: STUDENT IN AN ORGANIZED HEALTH CARE EDUCATION/TRAINING PROGRAM

## 2025-04-30 PROCEDURE — 85018 HEMOGLOBIN: CPT | Performed by: STUDENT IN AN ORGANIZED HEALTH CARE EDUCATION/TRAINING PROGRAM

## 2025-04-30 PROCEDURE — 82728 ASSAY OF FERRITIN: CPT

## 2025-04-30 RX ORDER — METHYLPREDNISOLONE SODIUM SUCCINATE 40 MG/ML
40 INJECTION INTRAMUSCULAR; INTRAVENOUS
Status: CANCELLED
Start: 2025-05-05

## 2025-04-30 RX ORDER — DIPHENHYDRAMINE HYDROCHLORIDE 50 MG/ML
50 INJECTION, SOLUTION INTRAMUSCULAR; INTRAVENOUS
Start: 2025-05-14

## 2025-04-30 RX ORDER — ALBUTEROL SULFATE 0.83 MG/ML
2.5 SOLUTION RESPIRATORY (INHALATION)
OUTPATIENT
Start: 2025-05-14

## 2025-04-30 RX ORDER — EPINEPHRINE 1 MG/ML
0.3 INJECTION, SOLUTION INTRAMUSCULAR; SUBCUTANEOUS EVERY 5 MIN PRN
OUTPATIENT
Start: 2025-05-14

## 2025-04-30 RX ORDER — DIPHENHYDRAMINE HYDROCHLORIDE 50 MG/ML
25 INJECTION, SOLUTION INTRAMUSCULAR; INTRAVENOUS
Status: CANCELLED
Start: 2025-05-05

## 2025-04-30 RX ORDER — DIPHENHYDRAMINE HYDROCHLORIDE 50 MG/ML
25 INJECTION, SOLUTION INTRAMUSCULAR; INTRAVENOUS
Start: 2025-05-14

## 2025-04-30 RX ORDER — DIPHENHYDRAMINE HYDROCHLORIDE 50 MG/ML
50 INJECTION, SOLUTION INTRAMUSCULAR; INTRAVENOUS
Status: CANCELLED
Start: 2025-05-05

## 2025-04-30 RX ORDER — METHYLPREDNISOLONE SODIUM SUCCINATE 40 MG/ML
40 INJECTION INTRAMUSCULAR; INTRAVENOUS
Start: 2025-05-14

## 2025-04-30 RX ORDER — ALBUTEROL SULFATE 90 UG/1
1-2 INHALANT RESPIRATORY (INHALATION)
Status: CANCELLED
Start: 2025-05-05

## 2025-04-30 RX ORDER — ALBUTEROL SULFATE 90 UG/1
1-2 INHALANT RESPIRATORY (INHALATION)
Start: 2025-05-14

## 2025-04-30 RX ORDER — EPINEPHRINE 1 MG/ML
0.3 INJECTION, SOLUTION INTRAMUSCULAR; SUBCUTANEOUS EVERY 5 MIN PRN
Status: CANCELLED | OUTPATIENT
Start: 2025-05-05

## 2025-04-30 RX ORDER — ALBUTEROL SULFATE 0.83 MG/ML
2.5 SOLUTION RESPIRATORY (INHALATION)
Status: CANCELLED | OUTPATIENT
Start: 2025-05-05

## 2025-04-30 RX ORDER — MEPERIDINE HYDROCHLORIDE 25 MG/ML
25 INJECTION INTRAMUSCULAR; INTRAVENOUS; SUBCUTANEOUS
OUTPATIENT
Start: 2025-05-14

## 2025-04-30 RX ORDER — MEPERIDINE HYDROCHLORIDE 25 MG/ML
25 INJECTION INTRAMUSCULAR; INTRAVENOUS; SUBCUTANEOUS
Status: CANCELLED | OUTPATIENT
Start: 2025-05-05

## 2025-04-30 RX ADMIN — DARBEPOETIN ALFA 40 MCG: 40 INJECTION, SOLUTION INTRAVENOUS; SUBCUTANEOUS at 13:26

## 2025-04-30 NOTE — PROGRESS NOTES
Infusion Nursing Note:  Lexy Cobb presents today for aranesp injection.    Patient seen by provider today: No   present during visit today: Not Applicable.    Note: N/A.      Intravenous Access:  No Intravenous access/labs at this visit.    Treatment Conditions:  Lab Results   Component Value Date    HGB 9.4 (L) 04/30/2025    WBC 3.1 (L) 04/07/2025    ANEU 6.7 09/08/2024     04/07/2025        Results reviewed, labs MET treatment parameters, ok to proceed with treatment.      Post Infusion Assessment:  Patient tolerated injection without incident.       Discharge Plan:   Discharge instructions reviewed with: Patient.  Patient and/or family verbalized understanding of discharge instructions and all questions answered.  Patient discharged in stable condition accompanied by: self.  Departure Mode: Ambulatory.      Mily Figueredo RN

## 2025-05-01 ENCOUNTER — PATIENT OUTREACH (OUTPATIENT)
Dept: CARE COORDINATION | Facility: CLINIC | Age: 80
End: 2025-05-01
Payer: COMMERCIAL

## 2025-05-01 NOTE — PROGRESS NOTES
Anemia Management Note - Follow Up      SUBJECTIVE/OBJECTIVE:  Referred by Dr. Angel Sandhu  on 2024  Primary Diagnosis: Anemia of Other Chronic Illness (D63.8)     Secondary Diagnosis: Organ or tissue replaced by transplant, kidney (Z94.0)  Date of Kidney Transplant: 9/3/2024  Hgb goal range: 9-10     Epo/Darbo: Aranesp 40mcg subcutaneous every 14 days, PRN Hgb <10, in clinic  Iron regimen: TBD      Labs : 2025  RX/TX plans : [id number]     Recent HIEU use, transfusion, IV iron: Venofer May 2024, Aranesp - DC'd after Tx.  No history of stroke, MI, and blood clots.  Hx of RCC; Kidney Tx .      Contact: Consent to Communicate Scanned on 10/29/2013.        Latest Ref Rng & Units 3/3/2025 3/10/2025 3/17/2025 3/31/2025 2025 2025 2025   Anemia   HGB Goal        9 - 10   HIEU Dose        40mcg   Hemoglobin 11.7 - 15.7 g/dL 10.3  10.3  10.4  10.1  9.8  9.4     TSAT 15 - 46 %   23    27     Ferritin 11 - 328 ng/mL   421    367       BP Readings from Last 3 Encounters:   25 (!) 140/70   25 (!) 153/76   25 (!) 151/70     Wt Readings from Last 2 Encounters:   25 55.3 kg (122 lb)   01/15/25 56.9 kg (125 lb 6.4 oz)         ASSESSMENT:    Hgb:at goal - received dose in clinic - recommend continue current regimen  TSat: not at goal of >30% but trending up. Ferritin: At goal (>100ng/mL)   Goals Addressed    None         PLAN:  Dose with Aranesp.  RTC for hgb then Aranesp if needed in 2 week(s).    Orders needed to be renewed (for next follow-up date) in EPIC: None    Iron labs due:  late May 2025    Plan discussed with:  no call, chart reviewed.       NEXT FOLLOW-UP DATE:  513    Carrie Leopold, RN BSN  Anemia Services  [email or web address]  Office: [phone]  Fax [phone]

## 2025-05-05 ENCOUNTER — LAB (OUTPATIENT)
Dept: LAB | Facility: CLINIC | Age: 80
End: 2025-05-05
Payer: COMMERCIAL

## 2025-05-05 DIAGNOSIS — Z79.899 ENCOUNTER FOR LONG-TERM CURRENT USE OF MEDICATION: ICD-10-CM

## 2025-05-05 DIAGNOSIS — Z48.298 AFTERCARE FOLLOWING ORGAN TRANSPLANT: ICD-10-CM

## 2025-05-05 DIAGNOSIS — Z94.0 KIDNEY REPLACED BY TRANSPLANT: ICD-10-CM

## 2025-05-05 LAB
ANION GAP SERPL CALCULATED.3IONS-SCNC: 12 MMOL/L (ref 7–15)
BUN SERPL-MCNC: 27.7 MG/DL (ref 8–23)
CALCIUM SERPL-MCNC: 9.6 MG/DL (ref 8.8–10.4)
CHLORIDE SERPL-SCNC: 105 MMOL/L (ref 98–107)
CMV DNA SPEC NAA+PROBE-ACNC: NOT DETECTED IU/ML
CREAT SERPL-MCNC: 1.14 MG/DL (ref 0.51–0.95)
EGFRCR SERPLBLD CKD-EPI 2021: 49 ML/MIN/1.73M2
ERYTHROCYTE [DISTWIDTH] IN BLOOD BY AUTOMATED COUNT: 14.5 % (ref 10–15)
GLUCOSE SERPL-MCNC: 93 MG/DL (ref 70–99)
HCO3 SERPL-SCNC: 22 MMOL/L (ref 22–29)
HCT VFR BLD AUTO: 29 % (ref 35–47)
HGB BLD-MCNC: 9.4 G/DL (ref 11.7–15.7)
MAGNESIUM SERPL-MCNC: 1.7 MG/DL (ref 1.7–2.3)
MCH RBC QN AUTO: 33.5 PG (ref 26.5–33)
MCHC RBC AUTO-ENTMCNC: 32.4 G/DL (ref 31.5–36.5)
MCV RBC AUTO: 103 FL (ref 78–100)
PLATELET # BLD AUTO: 192 10E3/UL (ref 150–450)
POTASSIUM SERPL-SCNC: 4.7 MMOL/L (ref 3.4–5.3)
RBC # BLD AUTO: 2.81 10E6/UL (ref 3.8–5.2)
SODIUM SERPL-SCNC: 139 MMOL/L (ref 135–145)
SPECIMEN TYPE: NORMAL
TACROLIMUS BLD-MCNC: 6.8 UG/L (ref 5–15)
TME LAST DOSE: NORMAL H
TME LAST DOSE: NORMAL H
WBC # BLD AUTO: 4.9 10E3/UL (ref 4–11)

## 2025-05-05 PROCEDURE — 85027 COMPLETE CBC AUTOMATED: CPT

## 2025-05-05 PROCEDURE — 87799 DETECT AGENT NOS DNA QUANT: CPT

## 2025-05-05 PROCEDURE — 80048 BASIC METABOLIC PNL TOTAL CA: CPT

## 2025-05-05 PROCEDURE — 36415 COLL VENOUS BLD VENIPUNCTURE: CPT

## 2025-05-05 PROCEDURE — 80197 ASSAY OF TACROLIMUS: CPT

## 2025-05-05 PROCEDURE — 83735 ASSAY OF MAGNESIUM: CPT

## 2025-05-06 ENCOUNTER — VIRTUAL VISIT (OUTPATIENT)
Dept: PHARMACY | Facility: CLINIC | Age: 80
End: 2025-05-06
Payer: COMMERCIAL

## 2025-05-06 DIAGNOSIS — K12.0 CANKER SORES ORAL: ICD-10-CM

## 2025-05-06 DIAGNOSIS — I15.1 HYPERTENSION DUE TO KIDNEY TRANSPLANT: Primary | ICD-10-CM

## 2025-05-06 DIAGNOSIS — Z94.0 HYPERTENSION DUE TO KIDNEY TRANSPLANT: Primary | ICD-10-CM

## 2025-05-06 DIAGNOSIS — Z78.9 TAKES DIETARY SUPPLEMENTS: ICD-10-CM

## 2025-05-06 LAB
BK VIRUS SPECIMEN TYPE: NORMAL
BKV DNA # SPEC NAA+PROBE: NOT DETECTED IU/ML

## 2025-05-06 PROCEDURE — 99607 MTMS BY PHARM ADDL 15 MIN: CPT | Mod: 93 | Performed by: PHARMACIST

## 2025-05-06 PROCEDURE — 99606 MTMS BY PHARM EST 15 MIN: CPT | Mod: 93 | Performed by: PHARMACIST

## 2025-05-06 RX ORDER — AMLODIPINE BESYLATE 10 MG/1
10 TABLET ORAL EVERY EVENING
Qty: 90 TABLET | Refills: 3 | Status: SHIPPED | OUTPATIENT
Start: 2025-05-06

## 2025-05-06 NOTE — PROGRESS NOTES
Medication Therapy Management (MTM) Encounter    ASSESSMENT:                            Medication Adherence/Access: No issues identified.    Hypertension   BP not at goal <140/90. Will increase Amlodipine dose to 10mg daily. If she starts feeling dizzy she can reduce back to 7.5mg every evening.    Supplements   Magnesium dose is adequate as levels are normal.      Canker Sores:   Patient can trial sublingual B12 to see if this improves her canker sores. If this is ineffective, can try lysine in the future.  PLAN:                            Try B12 sublingual tablets 2000-3000mcg under your tongue. [employer] has a sublingual product you can use.   Your levels are well controlled, continue Magnesium Glycinate 120mg twice daily.  Increase Amlodipine to 10mg every evening. If you start feeling dizzy reduce back to 7.5mg every evening.    Follow-up:  at 4 PM    SUBJECTIVE/OBJECTIVE:                          Lexy Cobb is a 80 year old female seen for a follow-up visit.       Reason for visit: Follow-up on HTN.    Allergies/ADRs: Reviewed in chart  Past Medical History: Reviewed in chart  Tobacco: She reports that she has never smoked. She has never used smokeless tobacco.  Alcohol: not currently using    Medication Adherence/Access: no issues reported.    Hypertension   Amlodipine 5mg every evening  Patient reports  no side effects.   Gets dizzy with very low doses of blood pressures.   Patient self monitors blood pressure.    Home BP monitoring this visit:   /96, 141/82, 152/94, 120/80, 150/78 Today: 153/85  PM  161/88, 128/70, 153/83, 139/79  Last visit: Home BP monitorin/87, 119/68, 153/91, 151/87, 162/96  PM:127/69, 149/83, 145/82  Today: 139/81 pulse of 59.      Supplements   Magnesium 120mg twice daily- wondering if she needs a higher dose.     Canker Sores:   Patient been getting sores shortly after taking Valcyte.  Has had 3 in the past 6 months.     Today's Vitals: There were no vitals  taken for this visit.  ----------------    I spent 16 minutes with this patient today. All changes were made via collaborative practice agreement with Dr. Martin.     A summary of these recommendations was mailed to the patient.    Eddie Keene PharmD  [facility] Pharmacist    Phone: [phone]     Telemedicine Visit Details  The patient's medications can be safely assessed via a telemedicine encounter.  Type of service:  Telephone visit  Originating Location (pt. Location): Home    Distant Location (provider location):  On-site  Start Time: 9:32 AM  End Time: 9:48 AM     Medication Therapy Recommendations  Canker sores oral   1 Rationale: Untreated condition - Needs additional medication therapy - Indication   Recommendation: Start Medication - cyanocobalamin 100 MCG tablet   Status: Accepted - no CPA Needed   Identified Date: 5/6/2025 Completed Date: 5/6/2025         HTN, kidney transplant related   1 Current Medication: amLODIPine (NORVASC) 5 MG tablet (Discontinued)   Current Medication Sig: Take 1 tablet (5 mg) by mouth every evening. Put on hold   Rationale: Dose too low - Dosage too low - Effectiveness   Recommendation: Increase Dose   Status: Accepted per CPA   Identified Date: 5/6/2025 Completed Date: 5/6/2025

## 2025-05-06 NOTE — PATIENT INSTRUCTIONS
"Recommendations from today's MTM visit:                                                      Try B12 sublingual tablets 2000-3000mcg under your tongue. [employer] has a sublingual product you can use.   Your levels are well controlled, continue Magnesium Glycinate 120mg twice daily.  Increase Amlodipine to 10mg every evening. If you start feeling dizzy reduce back to 7.5mg every evening.    Follow-up: 5/29 at 4 PM    It was great speaking with you today.  I value your experience and would be very thankful for your time in providing feedback in our clinic survey. In the next few days, you may receive an email or text message from [employer] with a link to a survey related to your  clinical pharmacist.\"     To schedule another MTM appointment, please call the clinic directly or you may call the MTM scheduling line at [phone] or toll-free at [phone].     My Clinical Pharmacist's contact information:                                                      Please feel free to contact me with any questions or concerns you have.      Eddie Keene, [place]  MTM Pharmacist    Phone: [phone]     "

## 2025-05-12 ENCOUNTER — RESULTS FOLLOW-UP (OUTPATIENT)
Dept: TRANSPLANT | Facility: CLINIC | Age: 80
End: 2025-05-12

## 2025-05-12 DIAGNOSIS — I15.1 HTN, KIDNEY TRANSPLANT RELATED: ICD-10-CM

## 2025-05-12 DIAGNOSIS — Z94.0 HTN, KIDNEY TRANSPLANT RELATED: ICD-10-CM

## 2025-05-12 NOTE — TELEPHONE ENCOUNTER
Received call from Dr. Amezquita with [facility]. He completed pts colonoscopy today. 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 Orginally pt was referred to him for EMR of large polyps. Colonoscopy 6 months ago they removed 13 polyps and non grew back but prep was not sufficient. Today 28 polyps were removed. Report is in CE. Appears to be adenomas. Planning on follow up in 6 months but would be willing to do follow up in 3 months if transplant felt needed. He will continue with the every 6 months follow up until colon is clean. Clinic nurse coordinator working with him is Kacie Reilly [phone]. Will review with our team and update on recommendations.    no complications no complications no complications no complications

## 2025-05-15 ENCOUNTER — INFUSION THERAPY VISIT (OUTPATIENT)
Dept: INFUSION THERAPY | Facility: HOSPITAL | Age: 80
End: 2025-05-15
Payer: COMMERCIAL

## 2025-05-15 ENCOUNTER — RESULTS FOLLOW-UP (OUTPATIENT)
Dept: TRANSPLANT | Facility: CLINIC | Age: 80
End: 2025-05-15

## 2025-05-15 ENCOUNTER — LAB (OUTPATIENT)
Dept: INFUSION THERAPY | Facility: HOSPITAL | Age: 80
End: 2025-05-15
Payer: COMMERCIAL

## 2025-05-15 VITALS
TEMPERATURE: 98.2 F | RESPIRATION RATE: 16 BRPM | OXYGEN SATURATION: 100 % | SYSTOLIC BLOOD PRESSURE: 134 MMHG | HEART RATE: 61 BPM | DIASTOLIC BLOOD PRESSURE: 60 MMHG

## 2025-05-15 DIAGNOSIS — D63.1 ANEMIA IN CHRONIC RENAL DISEASE: Primary | ICD-10-CM

## 2025-05-15 DIAGNOSIS — Z94.0 HTN, KIDNEY TRANSPLANT RELATED: ICD-10-CM

## 2025-05-15 DIAGNOSIS — I15.1 HTN, KIDNEY TRANSPLANT RELATED: ICD-10-CM

## 2025-05-15 DIAGNOSIS — Z94.0 KIDNEY REPLACED BY TRANSPLANT: ICD-10-CM

## 2025-05-15 DIAGNOSIS — N18.9 ANEMIA IN CHRONIC RENAL DISEASE: Primary | ICD-10-CM

## 2025-05-15 DIAGNOSIS — Z94.0 KIDNEY REPLACED BY TRANSPLANT: Primary | ICD-10-CM

## 2025-05-15 LAB
HCT VFR BLD AUTO: 29.8 % (ref 35–47)
HGB BLD-MCNC: 9.4 G/DL (ref 11.7–15.7)
MCV RBC AUTO: 104 FL (ref 78–100)
MCV RBC AUTO: 104 FL (ref 78–100)

## 2025-05-15 PROCEDURE — 36415 COLL VENOUS BLD VENIPUNCTURE: CPT | Performed by: STUDENT IN AN ORGANIZED HEALTH CARE EDUCATION/TRAINING PROGRAM

## 2025-05-15 PROCEDURE — 85018 HEMOGLOBIN: CPT | Performed by: STUDENT IN AN ORGANIZED HEALTH CARE EDUCATION/TRAINING PROGRAM

## 2025-05-15 PROCEDURE — 96372 THER/PROPH/DIAG INJ SC/IM: CPT | Performed by: STUDENT IN AN ORGANIZED HEALTH CARE EDUCATION/TRAINING PROGRAM

## 2025-05-15 PROCEDURE — 85014 HEMATOCRIT: CPT | Performed by: STUDENT IN AN ORGANIZED HEALTH CARE EDUCATION/TRAINING PROGRAM

## 2025-05-15 PROCEDURE — 250N000011 HC RX IP 250 OP 636: Mod: JZ | Performed by: STUDENT IN AN ORGANIZED HEALTH CARE EDUCATION/TRAINING PROGRAM

## 2025-05-15 RX ORDER — EPINEPHRINE 1 MG/ML
0.3 INJECTION, SOLUTION INTRAMUSCULAR; SUBCUTANEOUS EVERY 5 MIN PRN
OUTPATIENT
Start: 2025-05-29

## 2025-05-15 RX ORDER — DIPHENHYDRAMINE HYDROCHLORIDE 50 MG/ML
25 INJECTION, SOLUTION INTRAMUSCULAR; INTRAVENOUS
Start: 2025-05-29

## 2025-05-15 RX ORDER — DIPHENHYDRAMINE HYDROCHLORIDE 50 MG/ML
25 INJECTION, SOLUTION INTRAMUSCULAR; INTRAVENOUS
Status: DISCONTINUED | OUTPATIENT
Start: 2025-05-15 | End: 2025-05-15 | Stop reason: HOSPADM

## 2025-05-15 RX ORDER — EPINEPHRINE 1 MG/ML
0.3 INJECTION, SOLUTION INTRAMUSCULAR; SUBCUTANEOUS EVERY 5 MIN PRN
Status: DISCONTINUED | OUTPATIENT
Start: 2025-05-15 | End: 2025-05-15 | Stop reason: HOSPADM

## 2025-05-15 RX ORDER — MEPERIDINE HYDROCHLORIDE 25 MG/ML
25 INJECTION INTRAMUSCULAR; INTRAVENOUS; SUBCUTANEOUS
Status: CANCELLED | OUTPATIENT
Start: 2025-05-28

## 2025-05-15 RX ORDER — ALBUTEROL SULFATE 0.83 MG/ML
2.5 SOLUTION RESPIRATORY (INHALATION)
OUTPATIENT
Start: 2025-05-29

## 2025-05-15 RX ORDER — ALBUTEROL SULFATE 90 UG/1
1-2 INHALANT RESPIRATORY (INHALATION)
Status: CANCELLED
Start: 2025-05-28

## 2025-05-15 RX ORDER — METHYLPREDNISOLONE SODIUM SUCCINATE 40 MG/ML
40 INJECTION INTRAMUSCULAR; INTRAVENOUS
Status: CANCELLED
Start: 2025-05-28

## 2025-05-15 RX ORDER — MEPERIDINE HYDROCHLORIDE 25 MG/ML
25 INJECTION INTRAMUSCULAR; INTRAVENOUS; SUBCUTANEOUS
OUTPATIENT
Start: 2025-05-29

## 2025-05-15 RX ORDER — DIPHENHYDRAMINE HYDROCHLORIDE 50 MG/ML
50 INJECTION, SOLUTION INTRAMUSCULAR; INTRAVENOUS
Start: 2025-05-29

## 2025-05-15 RX ORDER — METHYLPREDNISOLONE SODIUM SUCCINATE 40 MG/ML
40 INJECTION INTRAMUSCULAR; INTRAVENOUS
Start: 2025-05-29

## 2025-05-15 RX ORDER — DIPHENHYDRAMINE HYDROCHLORIDE 50 MG/ML
50 INJECTION, SOLUTION INTRAMUSCULAR; INTRAVENOUS
Status: DISCONTINUED | OUTPATIENT
Start: 2025-05-15 | End: 2025-05-15 | Stop reason: HOSPADM

## 2025-05-15 RX ORDER — EPINEPHRINE 1 MG/ML
0.3 INJECTION, SOLUTION INTRAMUSCULAR; SUBCUTANEOUS EVERY 5 MIN PRN
Status: CANCELLED | OUTPATIENT
Start: 2025-05-28

## 2025-05-15 RX ORDER — ALBUTEROL SULFATE 90 UG/1
1-2 INHALANT RESPIRATORY (INHALATION)
Start: 2025-05-29

## 2025-05-15 RX ORDER — DIPHENHYDRAMINE HYDROCHLORIDE 50 MG/ML
25 INJECTION, SOLUTION INTRAMUSCULAR; INTRAVENOUS
Status: CANCELLED
Start: 2025-05-28

## 2025-05-15 RX ORDER — ALBUTEROL SULFATE 0.83 MG/ML
2.5 SOLUTION RESPIRATORY (INHALATION)
Status: CANCELLED | OUTPATIENT
Start: 2025-05-28

## 2025-05-15 RX ORDER — DIPHENHYDRAMINE HYDROCHLORIDE 50 MG/ML
50 INJECTION, SOLUTION INTRAMUSCULAR; INTRAVENOUS
Status: CANCELLED
Start: 2025-05-28

## 2025-05-15 RX ADMIN — DARBEPOETIN ALFA 40 MCG: 40 INJECTION, SOLUTION INTRAVENOUS; SUBCUTANEOUS at 13:35

## 2025-05-15 NOTE — PROGRESS NOTES
Infusion Nursing Note:  Lexy Cobb presents today for labs and  Aranesp.    Patient seen by provider today: No   present during visit today: Not Applicable.    Note: Lexy comes in today for labs and poss Aranesp. Labs drawn upstairs and reviewed. Hgb 9.4 today and she does meet parameters to have her Aranesp today. I went over the plan of care with Lexy and she verbalized understanding. Aranesp given in her left arm and she tolerated without issue. Lexy left ambulatory to the [facility] and plans on returning on 05/29/2025.    /60 (BP Location: Left arm, Patient Position: Sitting)   Pulse 61   Temp 98.2  F (36.8  C) (Oral)   Resp 16   SpO2 100%       Intravenous Access:  No Intravenous access/labs at this visit.    Treatment Conditions:  Lab Results   Component Value Date    HGB 9.4 (L) 05/15/2025    WBC 4.9 05/05/2025    ANEU 6.7 09/08/2024     05/05/2025        Results reviewed, labs MET treatment parameters, ok to proceed with treatment. Hgb < 10      Post Infusion Assessment:  Patient tolerated injection without incident.       Discharge Plan:   Discharge instructions reviewed with: Patient.  Patient and/or family verbalized understanding of discharge instructions and all questions answered.  Patient discharged in stable condition accompanied by: self.  Departure Mode: Ambulatory.      Kacie Banuelos RN

## 2025-05-29 ENCOUNTER — VIRTUAL VISIT (OUTPATIENT)
Dept: PHARMACY | Facility: CLINIC | Age: 80
End: 2025-05-29
Payer: COMMERCIAL

## 2025-05-29 ENCOUNTER — LAB (OUTPATIENT)
Dept: INFUSION THERAPY | Facility: HOSPITAL | Age: 80
End: 2025-05-29
Payer: COMMERCIAL

## 2025-05-29 ENCOUNTER — TELEPHONE (OUTPATIENT)
Dept: TRANSPLANT | Facility: CLINIC | Age: 80
End: 2025-05-29

## 2025-05-29 ENCOUNTER — INFUSION THERAPY VISIT (OUTPATIENT)
Dept: INFUSION THERAPY | Facility: HOSPITAL | Age: 80
End: 2025-05-29
Payer: COMMERCIAL

## 2025-05-29 VITALS
HEART RATE: 56 BPM | DIASTOLIC BLOOD PRESSURE: 69 MMHG | OXYGEN SATURATION: 100 % | SYSTOLIC BLOOD PRESSURE: 147 MMHG | RESPIRATION RATE: 16 BRPM

## 2025-05-29 DIAGNOSIS — Z94.0 HTN, KIDNEY TRANSPLANT RELATED: ICD-10-CM

## 2025-05-29 DIAGNOSIS — Z48.298 AFTERCARE FOLLOWING ORGAN TRANSPLANT: ICD-10-CM

## 2025-05-29 DIAGNOSIS — Z94.0 KIDNEY REPLACED BY TRANSPLANT: Primary | ICD-10-CM

## 2025-05-29 DIAGNOSIS — Z94.0 KIDNEY REPLACED BY TRANSPLANT: ICD-10-CM

## 2025-05-29 DIAGNOSIS — I15.1 HTN, KIDNEY TRANSPLANT RELATED: ICD-10-CM

## 2025-05-29 DIAGNOSIS — D63.1 ANEMIA IN CHRONIC RENAL DISEASE: Primary | ICD-10-CM

## 2025-05-29 DIAGNOSIS — Z94.0 HYPERTENSION DUE TO KIDNEY TRANSPLANT: Primary | ICD-10-CM

## 2025-05-29 DIAGNOSIS — N18.9 ANEMIA IN CHRONIC RENAL DISEASE: ICD-10-CM

## 2025-05-29 DIAGNOSIS — Z79.899 ENCOUNTER FOR LONG-TERM CURRENT USE OF MEDICATION: ICD-10-CM

## 2025-05-29 DIAGNOSIS — D63.1 ANEMIA IN CHRONIC RENAL DISEASE: ICD-10-CM

## 2025-05-29 DIAGNOSIS — K12.0 CANKER SORES ORAL: ICD-10-CM

## 2025-05-29 DIAGNOSIS — N18.9 ANEMIA IN CHRONIC RENAL DISEASE: Primary | ICD-10-CM

## 2025-05-29 DIAGNOSIS — I15.1 HYPERTENSION DUE TO KIDNEY TRANSPLANT: Primary | ICD-10-CM

## 2025-05-29 LAB
ERYTHROCYTE [DISTWIDTH] IN BLOOD BY AUTOMATED COUNT: 14.6 % (ref 10–15)
HCT VFR BLD AUTO: 29.7 % (ref 35–47)
HCT VFR BLD AUTO: 29.7 % (ref 35–47)
HGB BLD-MCNC: 9.2 G/DL (ref 11.7–15.7)
HGB BLD-MCNC: 9.2 G/DL (ref 11.7–15.7)
MCH RBC QN AUTO: 32.5 PG (ref 26.5–33)
MCHC RBC AUTO-ENTMCNC: 31 G/DL (ref 31.5–36.5)
MCV RBC AUTO: 105 FL (ref 78–100)
PLATELET # BLD AUTO: 198 10E3/UL (ref 150–450)
RBC # BLD AUTO: 2.83 10E6/UL (ref 3.8–5.2)
WBC # BLD AUTO: 5.8 10E3/UL (ref 4–11)

## 2025-05-29 PROCEDURE — 36415 COLL VENOUS BLD VENIPUNCTURE: CPT | Performed by: STUDENT IN AN ORGANIZED HEALTH CARE EDUCATION/TRAINING PROGRAM

## 2025-05-29 PROCEDURE — 250N000011 HC RX IP 250 OP 636: Mod: JZ | Performed by: STUDENT IN AN ORGANIZED HEALTH CARE EDUCATION/TRAINING PROGRAM

## 2025-05-29 PROCEDURE — 85014 HEMATOCRIT: CPT | Performed by: STUDENT IN AN ORGANIZED HEALTH CARE EDUCATION/TRAINING PROGRAM

## 2025-05-29 PROCEDURE — 85018 HEMOGLOBIN: CPT | Performed by: STUDENT IN AN ORGANIZED HEALTH CARE EDUCATION/TRAINING PROGRAM

## 2025-05-29 PROCEDURE — 96372 THER/PROPH/DIAG INJ SC/IM: CPT | Performed by: STUDENT IN AN ORGANIZED HEALTH CARE EDUCATION/TRAINING PROGRAM

## 2025-05-29 RX ORDER — ALBUTEROL SULFATE 0.83 MG/ML
2.5 SOLUTION RESPIRATORY (INHALATION)
OUTPATIENT
Start: 2025-06-12

## 2025-05-29 RX ORDER — DIPHENHYDRAMINE HYDROCHLORIDE 50 MG/ML
25 INJECTION, SOLUTION INTRAMUSCULAR; INTRAVENOUS
Status: CANCELLED
Start: 2025-06-12

## 2025-05-29 RX ORDER — EPINEPHRINE 1 MG/ML
0.3 INJECTION, SOLUTION INTRAMUSCULAR; SUBCUTANEOUS EVERY 5 MIN PRN
OUTPATIENT
Start: 2025-06-12

## 2025-05-29 RX ORDER — MAGNESIUM 200 MG
TABLET ORAL DAILY
COMMUNITY

## 2025-05-29 RX ORDER — MEPERIDINE HYDROCHLORIDE 25 MG/ML
25 INJECTION INTRAMUSCULAR; INTRAVENOUS; SUBCUTANEOUS
OUTPATIENT
Start: 2025-06-12

## 2025-05-29 RX ORDER — ALBUTEROL SULFATE 90 UG/1
1-2 INHALANT RESPIRATORY (INHALATION)
Start: 2025-06-12

## 2025-05-29 RX ORDER — METHYLPREDNISOLONE SODIUM SUCCINATE 40 MG/ML
40 INJECTION INTRAMUSCULAR; INTRAVENOUS
Start: 2025-06-12

## 2025-05-29 RX ORDER — ALBUTEROL SULFATE 90 UG/1
1-2 INHALANT RESPIRATORY (INHALATION)
Status: CANCELLED
Start: 2025-06-12

## 2025-05-29 RX ORDER — EPINEPHRINE 1 MG/ML
0.3 INJECTION, SOLUTION INTRAMUSCULAR; SUBCUTANEOUS EVERY 5 MIN PRN
Status: CANCELLED | OUTPATIENT
Start: 2025-06-12

## 2025-05-29 RX ORDER — MEPERIDINE HYDROCHLORIDE 25 MG/ML
25 INJECTION INTRAMUSCULAR; INTRAVENOUS; SUBCUTANEOUS
Status: CANCELLED | OUTPATIENT
Start: 2025-06-12

## 2025-05-29 RX ORDER — DIPHENHYDRAMINE HYDROCHLORIDE 50 MG/ML
25 INJECTION, SOLUTION INTRAMUSCULAR; INTRAVENOUS
Start: 2025-06-12

## 2025-05-29 RX ORDER — DIPHENHYDRAMINE HYDROCHLORIDE 50 MG/ML
50 INJECTION, SOLUTION INTRAMUSCULAR; INTRAVENOUS
Start: 2025-06-12

## 2025-05-29 RX ORDER — METHYLPREDNISOLONE SODIUM SUCCINATE 40 MG/ML
40 INJECTION INTRAMUSCULAR; INTRAVENOUS
Status: CANCELLED
Start: 2025-06-12

## 2025-05-29 RX ORDER — ALBUTEROL SULFATE 0.83 MG/ML
2.5 SOLUTION RESPIRATORY (INHALATION)
Status: CANCELLED | OUTPATIENT
Start: 2025-06-12

## 2025-05-29 RX ORDER — DIPHENHYDRAMINE HYDROCHLORIDE 50 MG/ML
50 INJECTION, SOLUTION INTRAMUSCULAR; INTRAVENOUS
Status: CANCELLED
Start: 2025-06-12

## 2025-05-29 RX ADMIN — DARBEPOETIN ALFA 40 MCG: 40 INJECTION, SOLUTION INTRAVENOUS; SUBCUTANEOUS at 14:09

## 2025-05-29 NOTE — PROGRESS NOTES
Infusion Nursing Note:  Lexy Cobb presents today for labs and Aranesp.    Patient seen by provider today: No   present during visit today: Not Applicable.    Note: Lexy comes in today for labs and poss Aranesp. Labs drawn upstairs and reviewed.       BP (!) 147/69 (BP Location: Left arm, Patient Position: Sitting, Cuff Size: Adult Regular)   Pulse 56   Resp 16   SpO2 100%       Intravenous Access:  No Intravenous access/labs at this visit.    Treatment Conditions:  Lab Results   Component Value Date    HGB 9.2 (L) 05/29/2025    HGB 9.2 (L) 05/29/2025    WBC 5.8 05/29/2025    ANEU 6.7 09/08/2024     05/29/2025        Results reviewed, labs MET treatment parameters, ok to proceed with treatment. Hgb < 10      Post Infusion Assessment:  Patient tolerated injection without incident.   Lexy expressed frustration over length of wait today, stating she has never had to wait an hour for her injection. Writer acknowledged her statement with apology    Discharge Plan:   Discharge instructions reviewed with: Patient.  Patient and/or family verbalized understanding of discharge instructions and all questions answered.  Patient discharged in stable condition accompanied by: self.  Departure Mode: Ambulatory to [facility], escorted by writer      Bryanna

## 2025-05-29 NOTE — PATIENT INSTRUCTIONS
"Recommendations from today's MTM visit:                                                      Stop amlodipine, start Nifedipine ER 60mg every evening      Follow-up: 7/2 at 11 AM    It was great speaking with you today.  I value your experience and would be very thankful for your time in providing feedback in our clinic survey. In the next few days, you may receive an email or text message from [facility] [employer]. with a link to a survey related to your  clinical pharmacist.\"     To schedule another MTM appointment, please call the clinic directly or you may call the MTM scheduling line at [phone] or toll-free at [phone].     My Clinical Pharmacist's contact information:                                                      Please feel free to contact me with any questions or concerns you have.      Eddie Keene, PharmD  MTM Pharmacist    Phone: [phone]     "

## 2025-05-29 NOTE — PROGRESS NOTES
Medication Therapy Management (MTM) Encounter    ASSESSMENT:                            Medication Adherence/Access: No issues identified.    Hypertension   Blood pressure still above 140 despite amlodipine titration, we will try nifedipine next in place of amlodipine.  May get a little better movement on blood pressures.     Canker Sores:   Stable.    PLAN:                            Stop amlodipine, start Nifedipine ER 60mg every evening     Follow-up: 7/2 at 11 AM    SUBJECTIVE/OBJECTIVE:                          Lexy Cobb is a 80 year old female seen for a follow-up visit.       Reason for visit: HTN, canker sores.     Allergies/ADRs: Reviewed in chart  Past Medical History: Reviewed in chart  Tobacco: She reports that she has never smoked. She has never used smokeless tobacco.  Alcohol: not currently using    Medication Adherence/Access: no issues reported.    Hypertension   Amlodipine 10mg every evening  Patient reports  no side effects.   Gets dizzy with very low doses of blood pressures.   Patient self monitors blood pressure.    Home BP monitoring this visit:   Home BPs: 155/93 63, 167/52, 55 135/78 65, 140/80 57, 166/90 63, 159/81 58  Today: 142/80 HR 71  Last visit:  /96, 141/82, 152/94, 120/80, 150/78 Today: 153/85  PM  161/88, 128/70, 153/83, 139/79     Canker Sores:   Vitamin B12 SL daily  Patient been getting sores shortly after taking Valcyte.  Has had 3 in the past 6 months.     Today's Vitals: There were no vitals taken for this visit.  ----------------    I spent 13 minutes with this patient today. All changes were made via collaborative practice agreement with Dr. Martin.     A summary of these recommendations was sent via [employer].    Eddie Keene, PharmD  MTM Pharmacist    Phone: [phone]     Telemedicine Visit Details  The patient's medications can be safely assessed via a telemedicine encounter.  Type of service:  Telephone visit  Originating Location (pt. Location):  Home    Distant Location (provider location):  Off-site  Start Time: 3:27 PM  End Time: 3:40 PM     Medication Therapy Recommendations  HTN, kidney transplant related   1 Current Medication: amLODIPine (NORVASC) 10 MG tablet   Current Medication Sig: Take 1 tablet (10 mg) by mouth every evening. Put on hold   Rationale: More effective medication available - Ineffective medication - Effectiveness   Recommendation: Change Medication - NIFEdipine ER 60 MG 24 hr tablet   Status: Accepted per CPA   Identified Date: 5/29/2025 Completed Date: 5/29/2025

## 2025-06-09 ENCOUNTER — LAB (OUTPATIENT)
Dept: LAB | Facility: CLINIC | Age: 80
End: 2025-06-09
Payer: COMMERCIAL

## 2025-06-09 ENCOUNTER — RESULTS FOLLOW-UP (OUTPATIENT)
Dept: TRANSPLANT | Facility: CLINIC | Age: 80
End: 2025-06-09

## 2025-06-09 DIAGNOSIS — Z94.0 HTN, KIDNEY TRANSPLANT RELATED: ICD-10-CM

## 2025-06-09 DIAGNOSIS — Z48.298 AFTERCARE FOLLOWING ORGAN TRANSPLANT: ICD-10-CM

## 2025-06-09 DIAGNOSIS — I15.1 HTN, KIDNEY TRANSPLANT RELATED: ICD-10-CM

## 2025-06-09 DIAGNOSIS — Z79.899 ENCOUNTER FOR LONG-TERM CURRENT USE OF MEDICATION: ICD-10-CM

## 2025-06-09 DIAGNOSIS — Z94.0 KIDNEY REPLACED BY TRANSPLANT: ICD-10-CM

## 2025-06-09 LAB
ALBUMIN MFR UR ELPH: 9.1 MG/DL
ANION GAP SERPL CALCULATED.3IONS-SCNC: 13 MMOL/L (ref 7–15)
BUN SERPL-MCNC: 21.6 MG/DL (ref 8–23)
CALCIUM SERPL-MCNC: 9.6 MG/DL (ref 8.8–10.4)
CD3 CELLS # BLD: 778 CELLS/UL (ref 603–2990)
CD3 CELLS NFR BLD: 63 % (ref 49–84)
CD3+CD4+ CELLS # BLD: 666 CELLS/UL (ref 441–2156)
CD3+CD4+ CELLS NFR BLD: 54 % (ref 28–63)
CD3+CD4+ CELLS/CD3+CD8+ CLL BLD: 8.59 % (ref 1.4–2.6)
CD3+CD8+ CELLS # BLD: 78 CELLS/UL (ref 125–1312)
CD3+CD8+ CELLS NFR BLD: 6 % (ref 10–40)
CHLORIDE SERPL-SCNC: 105 MMOL/L (ref 98–107)
CMV DNA SPEC NAA+PROBE-ACNC: NOT DETECTED IU/ML
CREAT SERPL-MCNC: 1.1 MG/DL (ref 0.51–0.95)
CREAT UR-MCNC: 39.3 MG/DL
EGFRCR SERPLBLD CKD-EPI 2021: 51 ML/MIN/1.73M2
ERYTHROCYTE [DISTWIDTH] IN BLOOD BY AUTOMATED COUNT: 14.5 % (ref 10–15)
GLUCOSE SERPL-MCNC: 94 MG/DL (ref 70–99)
HCO3 SERPL-SCNC: 21 MMOL/L (ref 22–29)
HCT VFR BLD AUTO: 33 % (ref 35–47)
HGB BLD-MCNC: 10.5 G/DL (ref 11.7–15.7)
MCH RBC QN AUTO: 33.3 PG (ref 26.5–33)
MCHC RBC AUTO-ENTMCNC: 31.8 G/DL (ref 31.5–36.5)
MCV RBC AUTO: 105 FL (ref 78–100)
PLATELET # BLD AUTO: 204 10E3/UL (ref 150–450)
POTASSIUM SERPL-SCNC: 4.1 MMOL/L (ref 3.4–5.3)
PROT/CREAT 24H UR: 0.23 MG/MG CR (ref 0–0.2)
RBC # BLD AUTO: 3.15 10E6/UL (ref 3.8–5.2)
SODIUM SERPL-SCNC: 139 MMOL/L (ref 135–145)
SPECIMEN TYPE: NORMAL
T CELL COMMENT: ABNORMAL
WBC # BLD AUTO: 7.1 10E3/UL (ref 4–11)

## 2025-06-09 PROCEDURE — 80048 BASIC METABOLIC PNL TOTAL CA: CPT

## 2025-06-09 PROCEDURE — 80197 ASSAY OF TACROLIMUS: CPT

## 2025-06-09 PROCEDURE — 85027 COMPLETE CBC AUTOMATED: CPT

## 2025-06-09 PROCEDURE — 36415 COLL VENOUS BLD VENIPUNCTURE: CPT

## 2025-06-09 PROCEDURE — 87799 DETECT AGENT NOS DNA QUANT: CPT

## 2025-06-09 PROCEDURE — 86360 T CELL ABSOLUTE COUNT/RATIO: CPT

## 2025-06-09 PROCEDURE — 86359 T CELLS TOTAL COUNT: CPT

## 2025-06-09 PROCEDURE — 84156 ASSAY OF PROTEIN URINE: CPT

## 2025-06-10 ENCOUNTER — TELEPHONE (OUTPATIENT)
Dept: TRANSPLANT | Facility: CLINIC | Age: 80
End: 2025-06-10

## 2025-06-10 DIAGNOSIS — Z94.0 HTN, KIDNEY TRANSPLANT RELATED: ICD-10-CM

## 2025-06-10 DIAGNOSIS — I15.1 HTN, KIDNEY TRANSPLANT RELATED: ICD-10-CM

## 2025-06-10 LAB
BK VIRUS SPECIMEN TYPE: NORMAL
BKV DNA # SPEC NAA+PROBE: NOT DETECTED IU/ML
TACROLIMUS BLD-MCNC: 6.3 UG/L (ref 5–15)
TME LAST DOSE: NORMAL H
TME LAST DOSE: NORMAL H

## 2025-06-10 NOTE — TELEPHONE ENCOUNTER
Patient is able to stop Bactrim now. RNCC LVM for patient with this information and instructed patient to call back if she had any questions.

## 2025-06-11 ENCOUNTER — PATIENT OUTREACH (OUTPATIENT)
Dept: CARE COORDINATION | Facility: CLINIC | Age: 80
End: 2025-06-11
Payer: COMMERCIAL

## 2025-06-11 NOTE — PROGRESS NOTES
Anemia Management Note - Follow Up      SUBJECTIVE/OBJECTIVE:    Referred by Dr. 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  No history of stroke, MI, and blood clots.  Hx of RCC; Kidney Tx .      Contact: Consent to Communicate Scanned on 10/29/2013.        Latest Ref Rng & Units 2025 2025 2025 2025 5/15/2025 2025 2025   Anemia   HGB Goal    9 - 10  9 - 10 9 - 10    HIEU Dose    40mcg  40mcg 40mcg    Hemoglobin 11.7 - 15.7 g/dL 9.8  9.4   9.4  9.4  9.2     9.2  10.5    TSAT 15 - 46 %  27         Ferritin 11 - 328 ng/mL  367             Multiple values from one day are sorted in reverse-chronological order     BP Readings from Last 3 Encounters:   25 (!) 147/69   05/15/25 134/60   25 (!) 140/70     Wt Readings from Last 2 Encounters:   25 55.3 kg (122 lb)   01/15/25 56.9 kg (125 lb 6.4 oz)         ASSESSMENT:    Hgb:Above goal - recommend hold dose  Dose was given on 529 for Hgb 9.2  TSat: Due for labs Ferritin: Due for labs   Goals Addressed    None         PLAN:  Hold Aranesp. Aranesp appt for  has been cancelled  RTC for hgb then Aranesp if needed in 2-3 week(s).    Orders needed to be renewed (for next follow-up date) in EPIC: None    Iron labs due:  DUE- added to appt note on 626    Plan discussed with:  no call, chart reviewed      NEXT FOLLOW-UP DATE:  627- review OV notes, chart dose    Carrie Leopold, RN BSN  Anemia Services  [facility]  [email or web address]  Office: [phone]  Fax [phone]

## 2025-06-12 ENCOUNTER — TELEPHONE (OUTPATIENT)
Dept: TRANSPLANT | Facility: CLINIC | Age: 80
End: 2025-06-12
Payer: COMMERCIAL

## 2025-06-12 DIAGNOSIS — I15.1 HTN, KIDNEY TRANSPLANT RELATED: ICD-10-CM

## 2025-06-12 DIAGNOSIS — Z94.0 HTN, KIDNEY TRANSPLANT RELATED: ICD-10-CM

## 2025-06-12 NOTE — TELEPHONE ENCOUNTER
Patient called stating that while she was hospitalized two days for cellulitis, they did not give her her anti-rejection medications for 24 hours due to the antibiotics. RNCC stated that at this time, there was nothing to do but get back on track, and discuss this with Dr. Martin at her appointment tomorrow AM

## 2025-06-13 PROBLEM — E83.39 HYPOPHOSPHATEMIA: Status: RESOLVED | Noted: 2024-09-06 | Resolved: 2025-06-13

## 2025-06-26 ENCOUNTER — LAB (OUTPATIENT)
Dept: INFUSION THERAPY | Facility: HOSPITAL | Age: 80
End: 2025-06-26
Payer: COMMERCIAL

## 2025-06-26 ENCOUNTER — INFUSION THERAPY VISIT (OUTPATIENT)
Dept: INFUSION THERAPY | Facility: HOSPITAL | Age: 80
End: 2025-06-26
Payer: COMMERCIAL

## 2025-06-26 VITALS
TEMPERATURE: 97.8 F | BODY MASS INDEX: 19.83 KG/M2 | RESPIRATION RATE: 16 BRPM | DIASTOLIC BLOOD PRESSURE: 76 MMHG | SYSTOLIC BLOOD PRESSURE: 101 MMHG | WEIGHT: 121 LBS | HEART RATE: 58 BPM | OXYGEN SATURATION: 100 %

## 2025-06-26 DIAGNOSIS — N18.9 ANEMIA IN CHRONIC RENAL DISEASE: ICD-10-CM

## 2025-06-26 DIAGNOSIS — N18.9 ANEMIA IN CHRONIC RENAL DISEASE: Primary | ICD-10-CM

## 2025-06-26 DIAGNOSIS — D63.1 ANEMIA IN CHRONIC RENAL DISEASE: ICD-10-CM

## 2025-06-26 DIAGNOSIS — Z94.0 KIDNEY REPLACED BY TRANSPLANT: ICD-10-CM

## 2025-06-26 DIAGNOSIS — D63.1 ANEMIA IN CHRONIC RENAL DISEASE: Primary | ICD-10-CM

## 2025-06-26 DIAGNOSIS — Z94.0 KIDNEY REPLACED BY TRANSPLANT: Primary | ICD-10-CM

## 2025-06-26 LAB
HCT VFR BLD AUTO: 31.1 % (ref 35–47)
HGB BLD-MCNC: 9.8 G/DL (ref 11.7–15.7)
MCV RBC AUTO: 102 FL (ref 78–100)
MCV RBC AUTO: 102 FL (ref 78–100)

## 2025-06-26 PROCEDURE — 85018 HEMOGLOBIN: CPT | Performed by: STUDENT IN AN ORGANIZED HEALTH CARE EDUCATION/TRAINING PROGRAM

## 2025-06-26 PROCEDURE — 85014 HEMATOCRIT: CPT | Performed by: STUDENT IN AN ORGANIZED HEALTH CARE EDUCATION/TRAINING PROGRAM

## 2025-06-26 PROCEDURE — 96372 THER/PROPH/DIAG INJ SC/IM: CPT | Performed by: STUDENT IN AN ORGANIZED HEALTH CARE EDUCATION/TRAINING PROGRAM

## 2025-06-26 PROCEDURE — 250N000011 HC RX IP 250 OP 636: Mod: JZ | Performed by: STUDENT IN AN ORGANIZED HEALTH CARE EDUCATION/TRAINING PROGRAM

## 2025-06-26 PROCEDURE — 36415 COLL VENOUS BLD VENIPUNCTURE: CPT | Performed by: STUDENT IN AN ORGANIZED HEALTH CARE EDUCATION/TRAINING PROGRAM

## 2025-06-26 RX ORDER — ALBUTEROL SULFATE 90 UG/1
1-2 INHALANT RESPIRATORY (INHALATION)
Start: 2025-07-10

## 2025-06-26 RX ORDER — EPINEPHRINE 1 MG/ML
0.3 INJECTION, SOLUTION INTRAMUSCULAR; SUBCUTANEOUS EVERY 5 MIN PRN
Status: CANCELLED | OUTPATIENT
Start: 2025-07-10

## 2025-06-26 RX ORDER — MEPERIDINE HYDROCHLORIDE 25 MG/ML
25 INJECTION INTRAMUSCULAR; INTRAVENOUS; SUBCUTANEOUS
Status: DISCONTINUED | OUTPATIENT
Start: 2025-06-26 | End: 2025-06-26 | Stop reason: HOSPADM

## 2025-06-26 RX ORDER — EPINEPHRINE 1 MG/ML
0.3 INJECTION, SOLUTION INTRAMUSCULAR; SUBCUTANEOUS EVERY 5 MIN PRN
OUTPATIENT
Start: 2025-06-26

## 2025-06-26 RX ORDER — ALBUTEROL SULFATE 90 UG/1
1-2 INHALANT RESPIRATORY (INHALATION)
Start: 2025-06-26

## 2025-06-26 RX ORDER — MEPERIDINE HYDROCHLORIDE 25 MG/ML
25 INJECTION INTRAMUSCULAR; INTRAVENOUS; SUBCUTANEOUS
OUTPATIENT
Start: 2025-07-10

## 2025-06-26 RX ORDER — DIPHENHYDRAMINE HYDROCHLORIDE 50 MG/ML
25 INJECTION, SOLUTION INTRAMUSCULAR; INTRAVENOUS
Status: CANCELLED
Start: 2025-07-10

## 2025-06-26 RX ORDER — ALBUTEROL SULFATE 90 UG/1
1-2 INHALANT RESPIRATORY (INHALATION)
Status: DISCONTINUED | OUTPATIENT
Start: 2025-06-26 | End: 2025-06-26 | Stop reason: HOSPADM

## 2025-06-26 RX ORDER — DIPHENHYDRAMINE HYDROCHLORIDE 50 MG/ML
50 INJECTION, SOLUTION INTRAMUSCULAR; INTRAVENOUS
Status: DISCONTINUED | OUTPATIENT
Start: 2025-06-26 | End: 2025-06-26 | Stop reason: HOSPADM

## 2025-06-26 RX ORDER — EPINEPHRINE 1 MG/ML
0.3 INJECTION, SOLUTION INTRAMUSCULAR; SUBCUTANEOUS EVERY 5 MIN PRN
OUTPATIENT
Start: 2025-07-10

## 2025-06-26 RX ORDER — DIPHENHYDRAMINE HYDROCHLORIDE 50 MG/ML
25 INJECTION, SOLUTION INTRAMUSCULAR; INTRAVENOUS
Start: 2025-07-10

## 2025-06-26 RX ORDER — ALBUTEROL SULFATE 0.83 MG/ML
2.5 SOLUTION RESPIRATORY (INHALATION)
Status: DISCONTINUED | OUTPATIENT
Start: 2025-06-26 | End: 2025-06-26 | Stop reason: HOSPADM

## 2025-06-26 RX ORDER — EPINEPHRINE 1 MG/ML
0.3 INJECTION, SOLUTION INTRAMUSCULAR; SUBCUTANEOUS EVERY 5 MIN PRN
Status: DISCONTINUED | OUTPATIENT
Start: 2025-06-26 | End: 2025-06-26 | Stop reason: HOSPADM

## 2025-06-26 RX ORDER — HEPARIN SODIUM,PORCINE 10 UNIT/ML
5-20 VIAL (ML) INTRAVENOUS DAILY PRN
OUTPATIENT
Start: 2025-06-26

## 2025-06-26 RX ORDER — METHYLPREDNISOLONE SODIUM SUCCINATE 40 MG/ML
40 INJECTION INTRAMUSCULAR; INTRAVENOUS
Status: CANCELLED
Start: 2025-07-10

## 2025-06-26 RX ORDER — DIPHENHYDRAMINE HYDROCHLORIDE 50 MG/ML
50 INJECTION, SOLUTION INTRAMUSCULAR; INTRAVENOUS
Status: CANCELLED
Start: 2025-07-10

## 2025-06-26 RX ORDER — DIPHENHYDRAMINE HYDROCHLORIDE 50 MG/ML
25 INJECTION, SOLUTION INTRAMUSCULAR; INTRAVENOUS
Status: DISCONTINUED | OUTPATIENT
Start: 2025-06-26 | End: 2025-06-26 | Stop reason: HOSPADM

## 2025-06-26 RX ORDER — HEPARIN SODIUM (PORCINE) LOCK FLUSH IV SOLN 100 UNIT/ML 100 UNIT/ML
5 SOLUTION INTRAVENOUS
OUTPATIENT
Start: 2025-06-26

## 2025-06-26 RX ORDER — DIPHENHYDRAMINE HYDROCHLORIDE 50 MG/ML
50 INJECTION, SOLUTION INTRAMUSCULAR; INTRAVENOUS
Start: 2025-06-26

## 2025-06-26 RX ORDER — ALBUTEROL SULFATE 0.83 MG/ML
2.5 SOLUTION RESPIRATORY (INHALATION)
OUTPATIENT
Start: 2025-07-10

## 2025-06-26 RX ORDER — ALBUTEROL SULFATE 0.83 MG/ML
2.5 SOLUTION RESPIRATORY (INHALATION)
OUTPATIENT
Start: 2025-06-26

## 2025-06-26 RX ORDER — DIPHENHYDRAMINE HYDROCHLORIDE 50 MG/ML
25 INJECTION, SOLUTION INTRAMUSCULAR; INTRAVENOUS
Start: 2025-06-26

## 2025-06-26 RX ORDER — METHYLPREDNISOLONE SODIUM SUCCINATE 40 MG/ML
40 INJECTION INTRAMUSCULAR; INTRAVENOUS
Status: DISCONTINUED | OUTPATIENT
Start: 2025-06-26 | End: 2025-06-26 | Stop reason: HOSPADM

## 2025-06-26 RX ORDER — METHYLPREDNISOLONE SODIUM SUCCINATE 40 MG/ML
40 INJECTION INTRAMUSCULAR; INTRAVENOUS
Start: 2025-06-26

## 2025-06-26 RX ORDER — METHYLPREDNISOLONE SODIUM SUCCINATE 40 MG/ML
40 INJECTION INTRAMUSCULAR; INTRAVENOUS
Start: 2025-07-10

## 2025-06-26 RX ORDER — MEPERIDINE HYDROCHLORIDE 25 MG/ML
25 INJECTION INTRAMUSCULAR; INTRAVENOUS; SUBCUTANEOUS
Status: CANCELLED | OUTPATIENT
Start: 2025-07-10

## 2025-06-26 RX ORDER — ALBUTEROL SULFATE 90 UG/1
1-2 INHALANT RESPIRATORY (INHALATION)
Status: CANCELLED
Start: 2025-07-10

## 2025-06-26 RX ORDER — ALBUTEROL SULFATE 0.83 MG/ML
2.5 SOLUTION RESPIRATORY (INHALATION)
Status: CANCELLED | OUTPATIENT
Start: 2025-07-10

## 2025-06-26 RX ORDER — MEPERIDINE HYDROCHLORIDE 25 MG/ML
25 INJECTION INTRAMUSCULAR; INTRAVENOUS; SUBCUTANEOUS
OUTPATIENT
Start: 2025-06-26

## 2025-06-26 RX ORDER — DIPHENHYDRAMINE HYDROCHLORIDE 50 MG/ML
50 INJECTION, SOLUTION INTRAMUSCULAR; INTRAVENOUS
Start: 2025-07-10

## 2025-06-26 RX ADMIN — DARBEPOETIN ALFA 40 MCG: 40 INJECTION, SOLUTION INTRAVENOUS; SUBCUTANEOUS at 14:25

## 2025-06-26 ASSESSMENT — PAIN SCALES - GENERAL: PAINLEVEL_OUTOF10: NO PAIN (0)

## 2025-06-26 NOTE — PROGRESS NOTES
Infusion Nursing Note:  Lexy Cobb presents today for ARANESP.    Patient seen by provider today: No   present during visit today: Not Applicable.    Note: Lexy arrived into the 1st floor infusion center after labs for ANDRY, VSS. Plan of care reviewed, she verbalized understanading of her plan of care and return to clinic. Aranesp shot given in the left arm was well tolerated.    Intravenous Access:  No Intravenous access/labs at this visit.    Treatment Conditions:  Lab Results   Component Value Date    HGB 9.8 (L) 06/26/2025    WBC 7.1 06/09/2025    ANEU 6.7 09/08/2024     06/09/2025        Lab Results   Component Value Date     06/09/2025    POTASSIUM 4.1 06/09/2025    MAG 1.7 05/05/2025    CR 1.10 (H) 06/09/2025    CIARA 9.6 06/09/2025    BILITOTAL 0.3 03/03/2025    ALBUMIN 4.1 03/03/2025    ALT 21 03/03/2025    AST 25 03/03/2025     Results reviewed, labs MET treatment parameters, ok to proceed with treatment.    Post Infusion Assessment:  Patient tolerated injection without incident.  Site patent and intact, free from redness, edema or discomfort.  No evidence of extravasations.       Discharge Plan:   Discharge instructions reviewed with: Patient.  Patient discharged in stable condition accompanied by: self.  Departure Mode: Ambulatory.    Anay Gavin RN

## 2025-07-02 ENCOUNTER — VIRTUAL VISIT (OUTPATIENT)
Dept: PHARMACY | Facility: CLINIC | Age: 80
End: 2025-07-02
Payer: COMMERCIAL

## 2025-07-02 DIAGNOSIS — K21.9 GASTROESOPHAGEAL REFLUX DISEASE, UNSPECIFIED WHETHER ESOPHAGITIS PRESENT: ICD-10-CM

## 2025-07-02 DIAGNOSIS — I15.1 HYPERTENSION DUE TO KIDNEY TRANSPLANT: Primary | ICD-10-CM

## 2025-07-02 DIAGNOSIS — Z94.0 HYPERTENSION DUE TO KIDNEY TRANSPLANT: Primary | ICD-10-CM

## 2025-07-02 PROCEDURE — 99606 MTMS BY PHARM EST 15 MIN: CPT | Mod: 93 | Performed by: PHARMACIST

## 2025-07-02 PROCEDURE — 99607 MTMS BY PHARM ADDL 15 MIN: CPT | Mod: 93 | Performed by: PHARMACIST

## 2025-07-02 PROCEDURE — 1111F DSCHRG MED/CURRENT MED MERGE: CPT | Mod: 93 | Performed by: PHARMACIST

## 2025-07-02 RX ORDER — NIFEDIPINE 30 MG
30 TABLET, EXTENDED RELEASE ORAL EVERY MORNING
Qty: 30 TABLET | Refills: 3 | Status: SHIPPED | OUTPATIENT
Start: 2025-07-02

## 2025-07-02 NOTE — PATIENT INSTRUCTIONS
"Recommendations from today's MTM visit:                                                      Talk to Dr. Rm about increasing Omeprazole 20mg daily for a few weeks then go back 10mg daily. Can get this OTC as well. Alternatively, can take Famotidine 20mg at bedtime.   Start Nifedipine Er 30mg every morning and continue taking 60mg every evening.     Follow-up: 7/21     It was great speaking with you today.  I value your experience and would be very thankful for your time in providing feedback in our clinic survey. In the next few days, you may receive an email or text message from [employer] with a link to a survey related to your  clinical pharmacist.\"     To schedule another MTM appointment, please call the clinic directly or you may call the MTM scheduling line at [phone] or toll-free at [phone].     My Clinical Pharmacist's contact information:                                                      Please feel free to contact me with any questions or concerns you have.      Eddie Keene, Alisia  MTM Pharmacist    Phone: [phone]     "

## 2025-07-02 NOTE — PROGRESS NOTES
Medication Therapy Management (MTM) Encounter    ASSESSMENT:                            Medication Adherence/Access: No issues identified.    Hypertension   Blood pressures improved on nifedipine, but still over 140/90 over 50% of the time. Will slightly increase nifedipine.      GERD    Having some breakthrough GERD overnight. Patient can follow-up with PCP for omeprazole dose adjustments, or just take famotidine in the evenings.     PLAN:                            Talk to Dr. Rm about increasing Omeprazole 20mg daily for a few weeks then go back 10mg daily. Can get this OTC as well. Alternatively, can take Famotidine 20mg at bedtime.   Start Nifedipine Er 30mg every morning and continue taking 60mg every evening.     Follow-up: 7/21    SUBJECTIVE/OBJECTIVE:                          Lexy Cobb is a 80 year old female seen for a follow-up visit.       Reason for visit: Hypertension follow-up.    Allergies/ADRs: Reviewed in chart  Past Medical History: Reviewed in chart  Tobacco: She reports that she has never smoked. She has never used smokeless tobacco.  Alcohol: not currently using    Medication Adherence/Access: no issues reported.    Hypertension   Nifedipine ER 60mg at bedtime   Patient reports  no side effects.   Gets dizzy with very low doses of blood pressures.   Patient self monitors blood pressure.    Home BP monitoring this visit:   Home BPs: , 125/76 this morning, 148/84, 119/79 (did feel a little dizzy when changes head position), 147/88, in the evenings 148/70s  Last visit:  Home BPs: 155/93 63, 167/52, 55 135/78 65, 140/80 57, 166/90 63, 159/81 58  Today: 142/80 HR 71     GERD    Omeprazole 10 mg every day- tried to taper off, but skipped one day and threw up the next.   Famotidine 20mg prn- has not been using.   Patient reports increased GERD sx over.      Today's Vitals: There were no vitals taken for this visit.  ----------------  Post Discharge Medication Reconciliation Status:  discharge medications reconciled and changed, per note/orders.    I spent 15 minutes with this patient today. All changes were made via collaborative practice agreement with Dr. Martin.     Pt was mailed a summary of these recommendations.    Eddie Keene PharmD  [facility] Pharmacist    Phone: [phone]     Telemedicine Visit Details  The patient's medications can be safely assessed via a telemedicine encounter.  Type of service:  Telephone visit  Originating Location (pt. Location): Home    Distant Location (provider location):  Off-site  Start Time: 9:32 AM  End Time: 9:45 AM     Medication Therapy Recommendations  GERD (gastroesophageal reflux disease)   1 Current Medication: omeprazole (PRILOSEC) 10 MG DR capsule   Current Medication Sig: Take 1 capsule (10 mg) by mouth daily.   Rationale: Synergistic therapy - Needs additional medication therapy - Indication   Recommendation: Start Medication - famotidine 20 MG tablet   Status: Accepted - no CPA Needed   Identified Date: 7/2/2025 Completed Date: 7/2/2025         HTN, kidney transplant related   1 Current Medication: NIFEdipine ER (ADALAT CC) 60 MG 24 hr tablet   Current Medication Sig: Take 1 tablet (60 mg) by mouth daily.   Rationale: Dose too low - Dosage too low - Effectiveness   Recommendation: Increase Frequency   Status: Accepted per CPA   Identified Date: 7/2/2025 Completed Date: 7/2/2025

## 2025-07-07 ENCOUNTER — LAB (OUTPATIENT)
Dept: LAB | Facility: CLINIC | Age: 80
End: 2025-07-07
Payer: COMMERCIAL

## 2025-07-07 DIAGNOSIS — Z48.298 AFTERCARE FOLLOWING ORGAN TRANSPLANT: ICD-10-CM

## 2025-07-07 DIAGNOSIS — Z94.0 KIDNEY REPLACED BY TRANSPLANT: ICD-10-CM

## 2025-07-07 DIAGNOSIS — D63.8 ANEMIA IN OTHER CHRONIC DISEASES CLASSIFIED ELSEWHERE: ICD-10-CM

## 2025-07-07 DIAGNOSIS — Z79.899 ENCOUNTER FOR LONG-TERM CURRENT USE OF MEDICATION: ICD-10-CM

## 2025-07-07 LAB
ANION GAP SERPL CALCULATED.3IONS-SCNC: 13 MMOL/L (ref 7–15)
BUN SERPL-MCNC: 37 MG/DL (ref 8–23)
CALCIUM SERPL-MCNC: 9.8 MG/DL (ref 8.8–10.4)
CHLORIDE SERPL-SCNC: 107 MMOL/L (ref 98–107)
CMV DNA SPEC NAA+PROBE-ACNC: NOT DETECTED IU/ML
CREAT SERPL-MCNC: 1.12 MG/DL (ref 0.51–0.95)
EGFRCR SERPLBLD CKD-EPI 2021: 50 ML/MIN/1.73M2
ERYTHROCYTE [DISTWIDTH] IN BLOOD BY AUTOMATED COUNT: 13.9 % (ref 10–15)
GLUCOSE SERPL-MCNC: 112 MG/DL (ref 70–99)
HCO3 SERPL-SCNC: 22 MMOL/L (ref 22–29)
HCT VFR BLD AUTO: 32.8 % (ref 35–47)
HGB BLD-MCNC: 10.5 G/DL (ref 11.7–15.7)
MCH RBC QN AUTO: 32.5 PG (ref 26.5–33)
MCHC RBC AUTO-ENTMCNC: 32 G/DL (ref 31.5–36.5)
MCV RBC AUTO: 102 FL (ref 78–100)
PLATELET # BLD AUTO: 206 10E3/UL (ref 150–450)
POTASSIUM SERPL-SCNC: 4.2 MMOL/L (ref 3.4–5.3)
RBC # BLD AUTO: 3.23 10E6/UL (ref 3.8–5.2)
SODIUM SERPL-SCNC: 142 MMOL/L (ref 135–145)
SPECIMEN TYPE: NORMAL
TACROLIMUS BLD-MCNC: 6.8 UG/L (ref 5–15)
TME LAST DOSE: NORMAL H
TME LAST DOSE: NORMAL H
WBC # BLD AUTO: 8.8 10E3/UL (ref 4–11)

## 2025-07-07 PROCEDURE — 80197 ASSAY OF TACROLIMUS: CPT

## 2025-07-07 PROCEDURE — 83550 IRON BINDING TEST: CPT

## 2025-07-07 PROCEDURE — 36415 COLL VENOUS BLD VENIPUNCTURE: CPT

## 2025-07-07 PROCEDURE — 80048 BASIC METABOLIC PNL TOTAL CA: CPT

## 2025-07-07 PROCEDURE — 82728 ASSAY OF FERRITIN: CPT

## 2025-07-07 PROCEDURE — 87799 DETECT AGENT NOS DNA QUANT: CPT

## 2025-07-07 PROCEDURE — 85027 COMPLETE CBC AUTOMATED: CPT

## 2025-07-07 PROCEDURE — 83540 ASSAY OF IRON: CPT

## 2025-07-08 ENCOUNTER — PATIENT OUTREACH (OUTPATIENT)
Dept: CARE COORDINATION | Facility: CLINIC | Age: 80
End: 2025-07-08
Payer: COMMERCIAL

## 2025-07-08 DIAGNOSIS — D63.8 ANEMIA IN OTHER CHRONIC DISEASES CLASSIFIED ELSEWHERE: ICD-10-CM

## 2025-07-08 DIAGNOSIS — Z94.0 KIDNEY REPLACED BY TRANSPLANT: Primary | ICD-10-CM

## 2025-07-08 DIAGNOSIS — E86.0 DEHYDRATION: ICD-10-CM

## 2025-07-08 LAB
BK VIRUS SPECIMEN TYPE: NORMAL
BKV DNA # SPEC NAA+PROBE: NOT DETECTED IU/ML
FERRITIN SERPL-MCNC: 244 NG/ML (ref 11–328)
IRON BINDING CAPACITY (ROCHE): 322 UG/DL (ref 240–430)
IRON SATN MFR SERPL: 19 % (ref 15–46)
IRON SERPL-MCNC: 62 UG/DL (ref 37–145)

## 2025-07-08 NOTE — PROGRESS NOTES
Anemia Management Note - Follow Up      SUBJECTIVE/OBJECTIVE:    Referred by Dr. 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  No history of stroke, MI, and blood clots.  Hx of RCC; Kidney Tx .      Contact: Consent to Communicate Scanned on 10/29/2013.        Latest Ref Rng & Units 2025 2025 5/15/2025 2025 2025 2025 2025   Anemia   HGB Goal  9 - 10  9 - 10 9 - 10  9 - 10    HIEU Dose  40mcg  40mcg 40mcg  40mcg    Hemoglobin 11.7 - 15.7 g/dL  9.4  9.4  9.2     9.2  10.5  9.8  10.5        Multiple values from one day are sorted in reverse-chronological order     BP Readings from Last 3 Encounters:   25 101/76   25 (!) 147/88   25 (!) 147/69     Wt Readings from Last 2 Encounters:   25 54.9 kg (121 lb)   25 54.7 kg (120 lb 8 oz)         ASSESSMENT:    Hgb:Above goal - recommend hold dose  TSat: pending Ferritin: Pending   Goals Addressed    None         PLAN:  Hold Aranesp.  RTC for hgb then Aranesp if needed in 2 week(s).    Orders needed to be renewed (for next follow-up date) in EPIC: None    Iron labs due:  Pending- added to specimens drawn yesterday    Plan discussed with:  Should cancel appt on 7/10. LVM for Lexy to call back. No PHI.       NEXT FOLLOW-UP DATE:  709    Carrie Leopold, RN BSN  Anemia Services  [facility]  [email or web address]  Office: [phone]  Fax [phone]

## 2025-07-09 RX ORDER — DIPHENHYDRAMINE HYDROCHLORIDE 50 MG/ML
25 INJECTION, SOLUTION INTRAMUSCULAR; INTRAVENOUS
Start: 2025-07-09

## 2025-07-09 RX ORDER — DIPHENHYDRAMINE HYDROCHLORIDE 50 MG/ML
50 INJECTION, SOLUTION INTRAMUSCULAR; INTRAVENOUS
Start: 2025-07-09

## 2025-07-09 RX ORDER — HEPARIN SODIUM (PORCINE) LOCK FLUSH IV SOLN 100 UNIT/ML 100 UNIT/ML
5 SOLUTION INTRAVENOUS
OUTPATIENT
Start: 2025-07-09

## 2025-07-09 RX ORDER — ALBUTEROL SULFATE 0.83 MG/ML
2.5 SOLUTION RESPIRATORY (INHALATION)
OUTPATIENT
Start: 2025-07-09

## 2025-07-09 RX ORDER — HEPARIN SODIUM,PORCINE 10 UNIT/ML
5-20 VIAL (ML) INTRAVENOUS DAILY PRN
OUTPATIENT
Start: 2025-07-09

## 2025-07-09 RX ORDER — MEPERIDINE HYDROCHLORIDE 25 MG/ML
25 INJECTION INTRAMUSCULAR; INTRAVENOUS; SUBCUTANEOUS
OUTPATIENT
Start: 2025-07-09

## 2025-07-09 RX ORDER — ALBUTEROL SULFATE 90 UG/1
1-2 INHALANT RESPIRATORY (INHALATION)
Start: 2025-07-09

## 2025-07-09 RX ORDER — METHYLPREDNISOLONE SODIUM SUCCINATE 40 MG/ML
40 INJECTION INTRAMUSCULAR; INTRAVENOUS
Start: 2025-07-09

## 2025-07-09 RX ORDER — EPINEPHRINE 1 MG/ML
0.3 INJECTION, SOLUTION, CONCENTRATE INTRAVENOUS EVERY 5 MIN PRN
OUTPATIENT
Start: 2025-07-09

## 2025-07-09 NOTE — PROGRESS NOTES
RN spoke to pt yesterday to cancel her Aranesp appt for today. Iron studies had not resulted at that time.  IV iron is warranted based on iron studies.   Injectafer therapy plan entered for signature.   Spoke to Lexy re: IV iron. She will call [place] to schedule. She is aware she cannot schedule iron same day as Aranesp.        Latest Ref Rng & Units 5/15/2025    12:33 PM 5/29/2025     6:31 AM 5/29/2025    12:38 PM 6/9/2025     8:36 AM 6/26/2025    11:37 AM 6/26/2025    12:48 PM 7/7/2025     8:20 AM   Anemia   HGB Goal   9 - 10   9 - 10     HIEU Dose   40mcg   40mcg     Hemoglobin 11.7 - 15.7 g/dL 9.4   9.2     9.2  10.5   9.8  10.5    TSAT 15 - 46 %       19    Ferritin 11 - 328 ng/mL       244      Follow up date: 072425 chart John, iron    Carrie Leopold, RN BSN  Anemia Services  [facility]  [email or web address]  Office: [phone]  Fax [phone]

## 2025-07-21 ENCOUNTER — VIRTUAL VISIT (OUTPATIENT)
Dept: PHARMACY | Facility: CLINIC | Age: 80
End: 2025-07-21
Payer: COMMERCIAL

## 2025-07-21 DIAGNOSIS — I15.1 HYPERTENSION DUE TO KIDNEY TRANSPLANT: Primary | ICD-10-CM

## 2025-07-21 DIAGNOSIS — Z94.0 HYPERTENSION DUE TO KIDNEY TRANSPLANT: Primary | ICD-10-CM

## 2025-07-21 DIAGNOSIS — K21.9 GASTROESOPHAGEAL REFLUX DISEASE, UNSPECIFIED WHETHER ESOPHAGITIS PRESENT: ICD-10-CM

## 2025-07-21 PROCEDURE — 99606 MTMS BY PHARM EST 15 MIN: CPT | Mod: 93 | Performed by: PHARMACIST

## 2025-07-21 NOTE — PATIENT INSTRUCTIONS
"Recommendations from today's MTM visit:                                                      No changes    Follow-up: as needed    It was great speaking with you today.  I value your experience and would be very thankful for your time in providing feedback in our clinic survey. In the next few days, you may receive an email or text message from [facility] [employer] with a link to a survey related to your  clinical pharmacist.\"     To schedule another MTM appointment, please call the clinic directly or you may call the MTM scheduling line at [phone] or toll-free at [phone].     My Clinical Pharmacist's contact information:                                                      Please feel free to contact me with any questions or concerns you have.      Eddie Keene, PharmD  MTM Pharmacist    Phone: [phone]     "

## 2025-07-21 NOTE — PROGRESS NOTES
Medication Therapy Management (MTM) Encounter    ASSESSMENT:                            Medication Adherence/Access: No issues identified.    Hypertension   Stable. BP mostly at goal <140/90. With recent lower in the morning we will abstain from increasing nifedipine. Patient to reach out if BPs start increasing.     GERD    Stable.    PLAN:                            Follow-up: as needed    SUBJECTIVE/OBJECTIVE:                          Lexy Cobb is a 80 year old female seen for a follow-up visit.       Reason for visit: follow-up BP     Follow-up:   Allergies/ADRs: Reviewed in chart  Past Medical History: Reviewed in chart  Tobacco: She reports that she has never smoked. She has never used smokeless tobacco.  Alcohol: not currently using    Medication Adherence/Access: no issues reported.    Hypertension   Nifedipine ER 30mg every morning 60mg at bedtime   Patient reports  no side effects.   Gets dizzy with very low doses of blood pressures.   Patient self monitors blood pressure.    Home BP monitoring this visit:   morning: today 101/62 (mild lightheadedness), 145/88, 138/85, 146/90 in the evenin/87, 141/83, 123/81, 122/71, 127/62  Last visit:  Home BPs: , 125/76 this morning, 148/84, 119/79 (did feel a little dizzy when changes head position), 147/88, in the evenings 148/70s     GERD    Omeprazole 10 mg every day- tried to taper off, but threw up the next day.   Famotidine 20mg prn  Patient reports GERD improved.      Today's Vitals: There were no vitals taken for this visit.  ----------------    I spent 8 minutes with this patient today. All changes were made via collaborative practice agreement with Dr. Martin.     A summary of these recommendations was declined by pt    Eddie Keene, PharmD  MTM Pharmacist    Phone: [phone]     Telemedicine Visit Details  The patient's medications can be safely assessed via a telemedicine encounter.  Type of service:  Telephone visit  Originating  Location (pt. Location): Home    Distant Location (provider location):  Off-site  Start Time: 12:05 PM  End Time: 12:12 PM     Medication Therapy Recommendations  No medication therapy recommendations to display

## 2025-07-24 ENCOUNTER — PATIENT OUTREACH (OUTPATIENT)
Dept: CARE COORDINATION | Facility: CLINIC | Age: 80
End: 2025-07-24

## 2025-07-24 ENCOUNTER — APPOINTMENT (OUTPATIENT)
Dept: LAB | Facility: HOSPITAL | Age: 80
End: 2025-07-24
Payer: COMMERCIAL

## 2025-07-24 ENCOUNTER — LAB (OUTPATIENT)
Dept: INFUSION THERAPY | Facility: HOSPITAL | Age: 80
End: 2025-07-24
Attending: STUDENT IN AN ORGANIZED HEALTH CARE EDUCATION/TRAINING PROGRAM
Payer: COMMERCIAL

## 2025-07-24 DIAGNOSIS — Z94.0 KIDNEY REPLACED BY TRANSPLANT: Primary | ICD-10-CM

## 2025-07-24 DIAGNOSIS — N18.9 ANEMIA IN CHRONIC RENAL DISEASE: ICD-10-CM

## 2025-07-24 DIAGNOSIS — D63.1 ANEMIA IN CHRONIC RENAL DISEASE: ICD-10-CM

## 2025-07-24 DIAGNOSIS — Z94.0 KIDNEY REPLACED BY TRANSPLANT: ICD-10-CM

## 2025-07-24 LAB
HCT VFR BLD AUTO: 34.6 % (ref 35–47)
HGB BLD-MCNC: 10.7 G/DL (ref 11.7–15.7)
MCV RBC AUTO: 99 FL (ref 78–100)
MCV RBC AUTO: 99 FL (ref 78–100)

## 2025-07-24 PROCEDURE — 85014 HEMATOCRIT: CPT | Performed by: STUDENT IN AN ORGANIZED HEALTH CARE EDUCATION/TRAINING PROGRAM

## 2025-07-24 PROCEDURE — 36415 COLL VENOUS BLD VENIPUNCTURE: CPT | Performed by: STUDENT IN AN ORGANIZED HEALTH CARE EDUCATION/TRAINING PROGRAM

## 2025-07-24 PROCEDURE — 85018 HEMOGLOBIN: CPT | Performed by: STUDENT IN AN ORGANIZED HEALTH CARE EDUCATION/TRAINING PROGRAM

## 2025-07-24 RX ORDER — EPINEPHRINE 1 MG/ML
0.3 INJECTION, SOLUTION INTRAMUSCULAR; SUBCUTANEOUS EVERY 5 MIN PRN
OUTPATIENT
Start: 2025-08-07

## 2025-07-24 RX ORDER — ALBUTEROL SULFATE 0.83 MG/ML
2.5 SOLUTION RESPIRATORY (INHALATION)
OUTPATIENT
Start: 2025-08-07

## 2025-07-24 RX ORDER — ALBUTEROL SULFATE 90 UG/1
1-2 INHALANT RESPIRATORY (INHALATION)
Start: 2025-08-07

## 2025-07-24 RX ORDER — METHYLPREDNISOLONE SODIUM SUCCINATE 40 MG/ML
40 INJECTION INTRAMUSCULAR; INTRAVENOUS
Start: 2025-08-07

## 2025-07-24 RX ORDER — DIPHENHYDRAMINE HYDROCHLORIDE 50 MG/ML
25 INJECTION, SOLUTION INTRAMUSCULAR; INTRAVENOUS
Start: 2025-08-07

## 2025-07-24 RX ORDER — DIPHENHYDRAMINE HYDROCHLORIDE 50 MG/ML
50 INJECTION, SOLUTION INTRAMUSCULAR; INTRAVENOUS
Start: 2025-08-07

## 2025-07-24 RX ORDER — MEPERIDINE HYDROCHLORIDE 25 MG/ML
25 INJECTION INTRAMUSCULAR; INTRAVENOUS; SUBCUTANEOUS
OUTPATIENT
Start: 2025-08-07

## 2025-07-24 NOTE — PROGRESS NOTES
Hemoglobin   Date Value Ref Range Status   07/24/2025 10.7 (L) 11.7 - 15.7 g/dL Final   10/10/2019 9.7 (L) 11.7 - 15.7 g/dL Final   ]  Aranesp not indicated today

## 2025-07-24 NOTE — PROGRESS NOTES
Anemia Management Note - Follow Up      SUBJECTIVE/OBJECTIVE:    Referred by Dr. 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  No history of stroke, MI, and blood clots.  Hx of RCC; Kidney Tx .      Contact: Consent to Communicate Scanned on 10/29/2013.        Latest Ref Rng & Units 2025 5/15/2025 2025 2025 2025 2025 2025   Anemia   HGB Goal   9 - 10 9 - 10  9 - 10     HIEU Dose   40mcg 40mcg  40mcg     Hemoglobin 11.7 - 15.7 g/dL 9.4  9.4  9.2     9.2  10.5  9.8  10.5  10.7    TSAT 15 - 46 %      19     Ferritin 11 - 328 ng/mL      244         Multiple values from one day are sorted in reverse-chronological order     BP Readings from Last 3 Encounters:   25 101/76   25 (!) 147/88   25 (!) 147/69     Wt Readings from Last 2 Encounters:   25 54.9 kg (121 lb)   25 54.7 kg (120 lb 8 oz)         ASSESSMENT:    Hgb:Above goal - recommend hold dose  TSat: Due for iron studies 4 weeks after last IV iron infusion Ferritin: Due for iron studies 4 weeks after last IV iron infusion   Goals Addressed    None         PLAN:  Hold Aranesp.  RTC for hgb then Aranesp if needed in 2 week(s).  Injectafer infusions are scheduled on [id number] and 811    Orders needed to be renewed (for next follow-up date) in EPIC: None    Iron labs due:  TBD    Plan discussed with:  no call, chart reviewed      NEXT FOLLOW-UP DATE:  812    Carrie Leopold, RN BSN  Anemia Services  [facility]  [email or web address]  Office: [phone]  Fax  [phone]

## 2025-08-04 ENCOUNTER — LAB (OUTPATIENT)
Dept: LAB | Facility: CLINIC | Age: 80
End: 2025-08-04
Payer: COMMERCIAL

## 2025-08-04 ENCOUNTER — INFUSION THERAPY VISIT (OUTPATIENT)
Dept: INFUSION THERAPY | Facility: HOSPITAL | Age: 80
End: 2025-08-04
Payer: COMMERCIAL

## 2025-08-04 ENCOUNTER — TELEPHONE (OUTPATIENT)
Dept: NEPHROLOGY | Facility: CLINIC | Age: 80
End: 2025-08-04

## 2025-08-04 VITALS
TEMPERATURE: 98.5 F | RESPIRATION RATE: 14 BRPM | HEART RATE: 63 BPM | DIASTOLIC BLOOD PRESSURE: 63 MMHG | OXYGEN SATURATION: 100 % | SYSTOLIC BLOOD PRESSURE: 141 MMHG

## 2025-08-04 DIAGNOSIS — Z48.298 AFTERCARE FOLLOWING ORGAN TRANSPLANT: ICD-10-CM

## 2025-08-04 DIAGNOSIS — D63.8 ANEMIA IN OTHER CHRONIC DISEASES CLASSIFIED ELSEWHERE: ICD-10-CM

## 2025-08-04 DIAGNOSIS — Z79.899 ENCOUNTER FOR LONG-TERM CURRENT USE OF MEDICATION: ICD-10-CM

## 2025-08-04 DIAGNOSIS — Z94.0 HTN, KIDNEY TRANSPLANT RELATED: ICD-10-CM

## 2025-08-04 DIAGNOSIS — Z94.0 KIDNEY REPLACED BY TRANSPLANT: ICD-10-CM

## 2025-08-04 DIAGNOSIS — E86.0 DEHYDRATION: Primary | ICD-10-CM

## 2025-08-04 DIAGNOSIS — I15.1 HTN, KIDNEY TRANSPLANT RELATED: ICD-10-CM

## 2025-08-04 LAB
ANION GAP SERPL CALCULATED.3IONS-SCNC: 12 MMOL/L (ref 7–15)
BUN SERPL-MCNC: 26.9 MG/DL (ref 8–23)
CALCIUM SERPL-MCNC: 10 MG/DL (ref 8.8–10.4)
CHLORIDE SERPL-SCNC: 107 MMOL/L (ref 98–107)
CMV DNA SPEC NAA+PROBE-ACNC: 220 IU/ML (ref ?–1)
CMV DNA SPEC NAA+PROBE-LOG#: 2.3 {LOG_COPIES}/ML
CREAT SERPL-MCNC: 1.42 MG/DL (ref 0.51–0.95)
EGFRCR SERPLBLD CKD-EPI 2021: 38 ML/MIN/1.73M2
ERYTHROCYTE [DISTWIDTH] IN BLOOD BY AUTOMATED COUNT: 13.5 % (ref 10–15)
FERRITIN SERPL-MCNC: 492 NG/ML (ref 11–328)
GLUCOSE SERPL-MCNC: 104 MG/DL (ref 70–99)
HCO3 SERPL-SCNC: 22 MMOL/L (ref 22–29)
HCT VFR BLD AUTO: 34.3 % (ref 35–47)
HGB BLD-MCNC: 10.8 G/DL (ref 11.7–15.7)
IRON BINDING CAPACITY (ROCHE): 297 UG/DL (ref 240–430)
IRON SATN MFR SERPL: 33 % (ref 15–46)
IRON SERPL-MCNC: 99 UG/DL (ref 37–145)
MCH RBC QN AUTO: 31.2 PG (ref 26.5–33)
MCHC RBC AUTO-ENTMCNC: 31.5 G/DL (ref 31.5–36.5)
MCV RBC AUTO: 99 FL (ref 78–100)
PLATELET # BLD AUTO: 172 10E3/UL (ref 150–450)
POTASSIUM SERPL-SCNC: 4.2 MMOL/L (ref 3.4–5.3)
RBC # BLD AUTO: 3.46 10E6/UL (ref 3.8–5.2)
SODIUM SERPL-SCNC: 141 MMOL/L (ref 135–145)
SPECIMEN TYPE: ABNORMAL
TACROLIMUS BLD-MCNC: 11.6 UG/L (ref 5–15)
TME LAST DOSE: NORMAL H
TME LAST DOSE: NORMAL H
WBC # BLD AUTO: 6.4 10E3/UL (ref 4–11)

## 2025-08-04 PROCEDURE — 87799 DETECT AGENT NOS DNA QUANT: CPT

## 2025-08-04 PROCEDURE — 250N000011 HC RX IP 250 OP 636: Mod: JZ | Performed by: STUDENT IN AN ORGANIZED HEALTH CARE EDUCATION/TRAINING PROGRAM

## 2025-08-04 PROCEDURE — 96365 THER/PROPH/DIAG IV INF INIT: CPT

## 2025-08-04 PROCEDURE — 80048 BASIC METABOLIC PNL TOTAL CA: CPT

## 2025-08-04 PROCEDURE — 82728 ASSAY OF FERRITIN: CPT

## 2025-08-04 PROCEDURE — 85027 COMPLETE CBC AUTOMATED: CPT

## 2025-08-04 PROCEDURE — 258N000003 HC RX IP 258 OP 636: Performed by: STUDENT IN AN ORGANIZED HEALTH CARE EDUCATION/TRAINING PROGRAM

## 2025-08-04 PROCEDURE — 36415 COLL VENOUS BLD VENIPUNCTURE: CPT

## 2025-08-04 PROCEDURE — 80197 ASSAY OF TACROLIMUS: CPT

## 2025-08-04 PROCEDURE — 83550 IRON BINDING TEST: CPT

## 2025-08-04 PROCEDURE — 83540 ASSAY OF IRON: CPT

## 2025-08-04 RX ORDER — HEPARIN SODIUM,PORCINE 10 UNIT/ML
5-20 VIAL (ML) INTRAVENOUS DAILY PRN
OUTPATIENT
Start: 2025-08-06

## 2025-08-04 RX ORDER — EPINEPHRINE 1 MG/ML
0.3 INJECTION, SOLUTION INTRAMUSCULAR; SUBCUTANEOUS EVERY 5 MIN PRN
OUTPATIENT
Start: 2025-08-06

## 2025-08-04 RX ORDER — MEPERIDINE HYDROCHLORIDE 25 MG/ML
25 INJECTION INTRAMUSCULAR; INTRAVENOUS; SUBCUTANEOUS
OUTPATIENT
Start: 2025-08-06

## 2025-08-04 RX ORDER — ALBUTEROL SULFATE 0.83 MG/ML
2.5 SOLUTION RESPIRATORY (INHALATION)
OUTPATIENT
Start: 2025-08-06

## 2025-08-04 RX ORDER — DIPHENHYDRAMINE HYDROCHLORIDE 50 MG/ML
50 INJECTION, SOLUTION INTRAMUSCULAR; INTRAVENOUS
Start: 2025-08-06

## 2025-08-04 RX ORDER — ALBUTEROL SULFATE 90 UG/1
1-2 INHALANT RESPIRATORY (INHALATION)
Start: 2025-08-06

## 2025-08-04 RX ORDER — HEPARIN SODIUM (PORCINE) LOCK FLUSH IV SOLN 100 UNIT/ML 100 UNIT/ML
5 SOLUTION INTRAVENOUS
OUTPATIENT
Start: 2025-08-06

## 2025-08-04 RX ORDER — DIPHENHYDRAMINE HYDROCHLORIDE 50 MG/ML
25 INJECTION, SOLUTION INTRAMUSCULAR; INTRAVENOUS
Start: 2025-08-06

## 2025-08-04 RX ORDER — METHYLPREDNISOLONE SODIUM SUCCINATE 40 MG/ML
40 INJECTION INTRAMUSCULAR; INTRAVENOUS
Start: 2025-08-06

## 2025-08-04 RX ADMIN — SODIUM CHLORIDE 250 ML: 0.9 INJECTION, SOLUTION INTRAVENOUS at 08:43

## 2025-08-04 RX ADMIN — FERRIC CARBOXYMALTOSE INJECTION 750 MG: 50 INJECTION, SOLUTION INTRAVENOUS at 09:05

## 2025-08-05 ENCOUNTER — PATIENT OUTREACH (OUTPATIENT)
Dept: CARE COORDINATION | Facility: CLINIC | Age: 80
End: 2025-08-05
Payer: COMMERCIAL

## 2025-08-05 ENCOUNTER — TELEPHONE (OUTPATIENT)
Dept: TRANSPLANT | Facility: CLINIC | Age: 80
End: 2025-08-05
Payer: COMMERCIAL

## 2025-08-05 DIAGNOSIS — Z48.298 AFTERCARE FOLLOWING ORGAN TRANSPLANT: Primary | ICD-10-CM

## 2025-08-05 DIAGNOSIS — I15.1 HTN, KIDNEY TRANSPLANT RELATED: ICD-10-CM

## 2025-08-05 DIAGNOSIS — Z94.0 STATUS POST KIDNEY TRANSPLANT: ICD-10-CM

## 2025-08-05 DIAGNOSIS — Z94.0 HTN, KIDNEY TRANSPLANT RELATED: ICD-10-CM

## 2025-08-05 LAB
BK VIRUS SPECIMEN TYPE: NORMAL
BKV DNA # SPEC NAA+PROBE: NOT DETECTED IU/ML

## 2025-08-05 RX ORDER — SODIUM BICARBONATE 650 MG/1
650 TABLET ORAL 2 TIMES DAILY
Qty: 90 TABLET | Refills: 1 | Status: SHIPPED | OUTPATIENT
Start: 2025-08-05

## 2025-08-05 RX ORDER — TACROLIMUS 1 MG/1
2 CAPSULE ORAL 2 TIMES DAILY
Qty: 120 CAPSULE | Refills: 11 | Status: SHIPPED | OUTPATIENT
Start: 2025-08-05

## 2025-08-10 DIAGNOSIS — R19.7 DIARRHEA, UNSPECIFIED TYPE: Primary | ICD-10-CM

## 2025-08-10 DIAGNOSIS — Z94.0 KIDNEY REPLACED BY TRANSPLANT: ICD-10-CM

## 2025-08-10 RX ORDER — LOPERAMIDE HYDROCHLORIDE 2 MG/1
2 TABLET ORAL 3 TIMES DAILY PRN
Qty: 90 TABLET | Refills: 3 | Status: SHIPPED | OUTPATIENT
Start: 2025-08-10

## 2025-08-11 ENCOUNTER — THERAPY VISIT (OUTPATIENT)
Dept: PHYSICAL THERAPY | Facility: REHABILITATION | Age: 80
End: 2025-08-11
Attending: STUDENT IN AN ORGANIZED HEALTH CARE EDUCATION/TRAINING PROGRAM
Payer: COMMERCIAL

## 2025-08-11 DIAGNOSIS — Z48.298 AFTERCARE FOLLOWING ORGAN TRANSPLANT: ICD-10-CM

## 2025-08-11 DIAGNOSIS — R29.898 WEAKNESS OF BOTH LOWER EXTREMITIES: Primary | ICD-10-CM

## 2025-08-11 PROCEDURE — 97161 PT EVAL LOW COMPLEX 20 MIN: CPT | Mod: GP | Performed by: PHYSICAL THERAPIST

## 2025-08-11 PROCEDURE — 97110 THERAPEUTIC EXERCISES: CPT | Mod: GP | Performed by: PHYSICAL THERAPIST

## 2025-08-13 ENCOUNTER — TELEPHONE (OUTPATIENT)
Dept: TRANSPLANT | Facility: CLINIC | Age: 80
End: 2025-08-13
Payer: COMMERCIAL

## 2025-08-13 DIAGNOSIS — B25.9 CMV (CYTOMEGALOVIRUS INFECTION) (H): ICD-10-CM

## 2025-08-13 DIAGNOSIS — I15.1 HTN, KIDNEY TRANSPLANT RELATED: ICD-10-CM

## 2025-08-13 DIAGNOSIS — Z94.0 HTN, KIDNEY TRANSPLANT RELATED: ICD-10-CM

## 2025-08-13 DIAGNOSIS — Z48.298 AFTERCARE FOLLOWING ORGAN TRANSPLANT: Primary | ICD-10-CM

## 2025-08-13 RX ORDER — VALGANCICLOVIR 450 MG/1
450 TABLET, FILM COATED ORAL DAILY
Qty: 60 TABLET | Refills: 3 | Status: SHIPPED | OUTPATIENT
Start: 2025-08-13

## 2025-08-18 ENCOUNTER — LAB (OUTPATIENT)
Dept: LAB | Facility: CLINIC | Age: 80
End: 2025-08-18
Payer: COMMERCIAL

## 2025-08-18 ENCOUNTER — PATIENT OUTREACH (OUTPATIENT)
Dept: CARE COORDINATION | Facility: CLINIC | Age: 80
End: 2025-08-18

## 2025-08-18 ENCOUNTER — TELEPHONE (OUTPATIENT)
Dept: TRANSPLANT | Facility: CLINIC | Age: 80
End: 2025-08-18

## 2025-08-18 DIAGNOSIS — I15.1 HTN, KIDNEY TRANSPLANT RELATED: ICD-10-CM

## 2025-08-18 DIAGNOSIS — Z48.298 AFTERCARE FOLLOWING ORGAN TRANSPLANT: ICD-10-CM

## 2025-08-18 DIAGNOSIS — B25.9 CMV (CYTOMEGALOVIRUS INFECTION) (H): ICD-10-CM

## 2025-08-18 DIAGNOSIS — Z94.0 HTN, KIDNEY TRANSPLANT RELATED: ICD-10-CM

## 2025-08-18 PROCEDURE — 36415 COLL VENOUS BLD VENIPUNCTURE: CPT

## 2025-08-19 ENCOUNTER — TELEPHONE (OUTPATIENT)
Dept: TRANSPLANT | Facility: CLINIC | Age: 80
End: 2025-08-19
Payer: COMMERCIAL

## 2025-08-19 DIAGNOSIS — I15.1 HTN, KIDNEY TRANSPLANT RELATED: ICD-10-CM

## 2025-08-19 DIAGNOSIS — Z94.0 HTN, KIDNEY TRANSPLANT RELATED: ICD-10-CM

## 2025-08-19 LAB
CMV DNA SPEC NAA+PROBE-ACNC: 1280 IU/ML (ref ?–1)
CMV DNA SPEC NAA+PROBE-LOG#: 3.1 {LOG_COPIES}/ML
SPECIMEN TYPE: ABNORMAL

## 2025-08-19 RX ORDER — VALGANCICLOVIR 450 MG/1
900 TABLET, FILM COATED ORAL 2 TIMES DAILY
Qty: 120 TABLET | Refills: 3 | Status: SHIPPED | OUTPATIENT
Start: 2025-08-19

## 2025-08-26 ENCOUNTER — LAB (OUTPATIENT)
Dept: LAB | Facility: CLINIC | Age: 80
End: 2025-08-26
Payer: COMMERCIAL

## 2025-08-26 ENCOUNTER — TELEPHONE (OUTPATIENT)
Dept: TRANSPLANT | Facility: CLINIC | Age: 80
End: 2025-08-26

## 2025-08-26 DIAGNOSIS — Z79.899 ENCOUNTER FOR LONG-TERM CURRENT USE OF MEDICATION: ICD-10-CM

## 2025-08-26 DIAGNOSIS — I15.1 HTN, KIDNEY TRANSPLANT RELATED: ICD-10-CM

## 2025-08-26 DIAGNOSIS — Z48.298 AFTERCARE FOLLOWING ORGAN TRANSPLANT: ICD-10-CM

## 2025-08-26 DIAGNOSIS — Z94.0 HTN, KIDNEY TRANSPLANT RELATED: ICD-10-CM

## 2025-08-26 DIAGNOSIS — D63.8 ANEMIA IN OTHER CHRONIC DISEASES CLASSIFIED ELSEWHERE: ICD-10-CM

## 2025-08-26 DIAGNOSIS — Z94.0 KIDNEY REPLACED BY TRANSPLANT: ICD-10-CM

## 2025-08-26 LAB
ANION GAP SERPL CALCULATED.3IONS-SCNC: 16 MMOL/L (ref 7–15)
BUN SERPL-MCNC: 24.7 MG/DL (ref 8–23)
CALCIUM SERPL-MCNC: 9.4 MG/DL (ref 8.8–10.4)
CHLORIDE SERPL-SCNC: 101 MMOL/L (ref 98–107)
CREAT SERPL-MCNC: 1.17 MG/DL (ref 0.51–0.95)
EGFRCR SERPLBLD CKD-EPI 2021: 48 ML/MIN/1.73M2
ERYTHROCYTE [DISTWIDTH] IN BLOOD BY AUTOMATED COUNT: 14.7 % (ref 10–15)
FERRITIN SERPL-MCNC: 1191 NG/ML (ref 11–328)
GLUCOSE SERPL-MCNC: 102 MG/DL (ref 70–99)
HCO3 SERPL-SCNC: 22 MMOL/L (ref 22–29)
HCT VFR BLD AUTO: 28.2 % (ref 35–47)
HGB BLD-MCNC: 9.2 G/DL (ref 11.7–15.7)
IRON BINDING CAPACITY (ROCHE): 227 UG/DL (ref 240–430)
IRON SATN MFR SERPL: 49 % (ref 15–46)
IRON SERPL-MCNC: 111 UG/DL (ref 37–145)
MCH RBC QN AUTO: 31.2 PG (ref 26.5–33)
MCHC RBC AUTO-ENTMCNC: 32.6 G/DL (ref 31.5–36.5)
MCV RBC AUTO: 95.6 FL (ref 78–100)
PLATELET # BLD AUTO: 215 10E3/UL (ref 150–450)
POTASSIUM SERPL-SCNC: 4.2 MMOL/L (ref 3.4–5.3)
RBC # BLD AUTO: 2.95 10E6/UL (ref 3.8–5.2)
SODIUM SERPL-SCNC: 139 MMOL/L (ref 135–145)
WBC # BLD AUTO: 5.09 10E3/UL (ref 4–11)

## 2025-08-26 PROCEDURE — 80048 BASIC METABOLIC PNL TOTAL CA: CPT

## 2025-08-26 PROCEDURE — 36415 COLL VENOUS BLD VENIPUNCTURE: CPT

## 2025-08-26 PROCEDURE — 87799 DETECT AGENT NOS DNA QUANT: CPT

## 2025-08-26 PROCEDURE — 85027 COMPLETE CBC AUTOMATED: CPT

## 2025-08-26 PROCEDURE — 83540 ASSAY OF IRON: CPT | Mod: GZ

## 2025-08-26 PROCEDURE — 83550 IRON BINDING TEST: CPT | Mod: GZ

## 2025-08-26 PROCEDURE — 82728 ASSAY OF FERRITIN: CPT | Mod: GZ

## 2025-08-27 ENCOUNTER — APPOINTMENT (OUTPATIENT)
Dept: RADIOLOGY | Facility: HOSPITAL | Age: 80
DRG: 389 | End: 2025-08-27
Attending: HOSPITALIST
Payer: COMMERCIAL

## 2025-08-27 ENCOUNTER — HOSPITAL ENCOUNTER (INPATIENT)
Facility: HOSPITAL | Age: 80
LOS: 1 days | Discharge: HOME | End: 2025-08-28
Attending: EMERGENCY MEDICINE
Payer: COMMERCIAL

## 2025-08-27 ENCOUNTER — APPOINTMENT (OUTPATIENT)
Dept: CT IMAGING | Facility: HOSPITAL | Age: 80
DRG: 389 | End: 2025-08-27
Attending: EMERGENCY MEDICINE
Payer: COMMERCIAL

## 2025-08-27 PROBLEM — K56.609 SMALL BOWEL OBSTRUCTION (H): Status: ACTIVE | Noted: 2025-08-27

## 2025-08-27 PROBLEM — R79.89 TROPONIN I ABOVE REFERENCE RANGE: Status: ACTIVE | Noted: 2025-08-27

## 2025-08-27 PROBLEM — N18.31 STAGE 3A CHRONIC KIDNEY DISEASE (H): Status: ACTIVE | Noted: 2025-08-27

## 2025-08-27 PROBLEM — K56.609 SBO (SMALL BOWEL OBSTRUCTION) (H): Status: ACTIVE | Noted: 2025-08-27

## 2025-08-27 PROBLEM — Z94.0 KIDNEY REPLACED BY TRANSPLANT: Status: ACTIVE | Noted: 2024-09-03

## 2025-08-27 LAB
BK VIRUS SPECIMEN TYPE: NORMAL
BKV DNA # SPEC NAA+PROBE: NOT DETECTED IU/ML
CMV DNA SPEC NAA+PROBE-ACNC: 706 IU/ML (ref ?–1)
CMV DNA SPEC NAA+PROBE-LOG#: 2.8 {LOG_COPIES}/ML
SPECIMEN TYPE: ABNORMAL

## 2025-08-27 PROCEDURE — 74177 CT ABD & PELVIS W/CONTRAST: CPT

## 2025-08-27 PROCEDURE — 999N000065 XR ABDOMEN PORT 1 VIEW

## 2025-08-27 ASSESSMENT — ACTIVITIES OF DAILY LIVING (ADL)
ADLS_ACUITY_SCORE: 53
ADLS_ACUITY_SCORE: 53
ADLS_ACUITY_SCORE: 54
ADLS_ACUITY_SCORE: 54
ADLS_ACUITY_SCORE: 53
ADLS_ACUITY_SCORE: 54
ADLS_ACUITY_SCORE: 53
DEPENDENT_IADLS:: INDEPENDENT
ADLS_ACUITY_SCORE: 53
ADLS_ACUITY_SCORE: 53
ADLS_ACUITY_SCORE: 54
ADLS_ACUITY_SCORE: 53
ADLS_ACUITY_SCORE: 54
ADLS_ACUITY_SCORE: 53
ADLS_ACUITY_SCORE: 54
ADLS_ACUITY_SCORE: 53
ADLS_ACUITY_SCORE: 54

## 2025-08-27 ASSESSMENT — COLUMBIA-SUICIDE SEVERITY RATING SCALE - C-SSRS
6. HAVE YOU EVER DONE ANYTHING, STARTED TO DO ANYTHING, OR PREPARED TO DO ANYTHING TO END YOUR LIFE?: NO
1. IN THE PAST MONTH, HAVE YOU WISHED YOU WERE DEAD OR WISHED YOU COULD GO TO SLEEP AND NOT WAKE UP?: NO
2. HAVE YOU ACTUALLY HAD ANY THOUGHTS OF KILLING YOURSELF IN THE PAST MONTH?: NO

## 2025-08-28 ENCOUNTER — APPOINTMENT (OUTPATIENT)
Dept: RADIOLOGY | Facility: HOSPITAL | Age: 80
DRG: 389 | End: 2025-08-28
Payer: COMMERCIAL

## 2025-08-28 PROBLEM — K56.609 SMALL BOWEL OBSTRUCTION (H): Status: RESOLVED | Noted: 2025-08-27 | Resolved: 2025-08-28

## 2025-08-28 PROCEDURE — 74018 RADEX ABDOMEN 1 VIEW: CPT

## 2025-08-28 ASSESSMENT — ACTIVITIES OF DAILY LIVING (ADL)
ADLS_ACUITY_SCORE: 63
ADLS_ACUITY_SCORE: 53
ADLS_ACUITY_SCORE: 63
ADLS_ACUITY_SCORE: 53
ADLS_ACUITY_SCORE: 63
ADLS_ACUITY_SCORE: 53
ADLS_ACUITY_SCORE: 63
ADLS_ACUITY_SCORE: 53
ADLS_ACUITY_SCORE: 61
ADLS_ACUITY_SCORE: 61

## 2025-09-02 ENCOUNTER — LAB (OUTPATIENT)
Dept: LAB | Facility: CLINIC | Age: 80
End: 2025-09-02
Payer: COMMERCIAL

## 2025-09-02 DIAGNOSIS — Z48.298 AFTERCARE FOLLOWING ORGAN TRANSPLANT: ICD-10-CM

## 2025-09-02 LAB
ANION GAP SERPL CALCULATED.3IONS-SCNC: 11 MMOL/L (ref 7–15)
BUN SERPL-MCNC: 16.7 MG/DL (ref 8–23)
CALCIUM SERPL-MCNC: 9.4 MG/DL (ref 8.8–10.4)
CHLORIDE SERPL-SCNC: 109 MMOL/L (ref 98–107)
CREAT SERPL-MCNC: 1.02 MG/DL (ref 0.51–0.95)
EGFRCR SERPLBLD CKD-EPI 2021: 56 ML/MIN/1.73M2
ERYTHROCYTE [DISTWIDTH] IN BLOOD BY AUTOMATED COUNT: 16 % (ref 10–15)
GLUCOSE SERPL-MCNC: 96 MG/DL (ref 70–99)
HCO3 SERPL-SCNC: 19 MMOL/L (ref 22–29)
HCT VFR BLD AUTO: 28.1 % (ref 35–47)
HGB BLD-MCNC: 9.2 G/DL (ref 11.7–15.7)
MCH RBC QN AUTO: 31.4 PG (ref 26.5–33)
MCHC RBC AUTO-ENTMCNC: 32.7 G/DL (ref 31.5–36.5)
MCV RBC AUTO: 95.9 FL (ref 78–100)
PLATELET # BLD AUTO: 235 10E3/UL (ref 150–450)
POTASSIUM SERPL-SCNC: 3.9 MMOL/L (ref 3.4–5.3)
RBC # BLD AUTO: 2.93 10E6/UL (ref 3.8–5.2)
SODIUM SERPL-SCNC: 139 MMOL/L (ref 135–145)
WBC # BLD AUTO: 4.25 10E3/UL (ref 4–11)

## 2025-09-02 PROCEDURE — 85027 COMPLETE CBC AUTOMATED: CPT

## 2025-09-02 PROCEDURE — 80048 BASIC METABOLIC PNL TOTAL CA: CPT

## 2025-09-02 PROCEDURE — 36415 COLL VENOUS BLD VENIPUNCTURE: CPT

## 2025-09-03 LAB
CMV DNA SPEC NAA+PROBE-ACNC: 103 IU/ML (ref ?–1)
CMV DNA SPEC NAA+PROBE-LOG#: 2 {LOG_COPIES}/ML
SPECIMEN TYPE: ABNORMAL

## 2025-09-04 ENCOUNTER — PATIENT OUTREACH (OUTPATIENT)
Dept: CARE COORDINATION | Facility: CLINIC | Age: 80
End: 2025-09-04
Payer: COMMERCIAL

## (undated) DEVICE — TUBING SUCTION 10'X3/16" N510

## (undated) DEVICE — CLIP HORIZON SM RED WIDE SLOT 001201

## (undated) DEVICE — LINEN TOWEL PACK X6 WHITE 5487

## (undated) DEVICE — SU ETHILON 3-0 PS-1 18" 1663H

## (undated) DEVICE — SU VICRYL 3-0 SH 27" J316H

## (undated) DEVICE — SU SILK 2-0 TIE 12X30" A305H

## (undated) DEVICE — SU MONOCRYL 4-0 PS-2 27" UND Y426H

## (undated) DEVICE — SOL NACL 0.9% IRRIG 1000ML BOTTLE 2F7124

## (undated) DEVICE — SU SILK 0 TIE 6X30" A306H

## (undated) DEVICE — SU PDS II 6-0 RB-2DA 30" Z149H

## (undated) DEVICE — SU PDS II 4-0 SH 27" Z315H

## (undated) DEVICE — DRAIN JACKSON PRATT ROUND SIL 19FR W/TROCAR LF JP-2232

## (undated) DEVICE — BLADE CLIPPER SGL USE 9680

## (undated) DEVICE — SU VICRYL 3-0 RB-1 27" UND J215H

## (undated) DEVICE — CATH FOLEY 3WAY 16FR 30ML SIL 73016SI

## (undated) DEVICE — SYR BULB IRRIG DOVER 60 ML LATEX FREE 67000

## (undated) DEVICE — RX VISTASEAL FIBRIN SEALANT W/THROMBIN 10ML VST10

## (undated) DEVICE — DRAPE FLUID WARMING 52 X 60" ORS-321

## (undated) DEVICE — PLUG CATH AND DRAIN TUBE PROTECTOR DYND12200

## (undated) DEVICE — DRAPE SHEET REV FOLD 3/4 9349

## (undated) DEVICE — SUCTION MANIFOLD NEPTUNE 2 SYS 4 PORT 0702-020-000

## (undated) DEVICE — SU VICRYL+ 3-0 27IN SH UND VCP416H

## (undated) DEVICE — SU SILK 1 TIE 6X30" A307H

## (undated) DEVICE — DRSG PRIMAPORE 02X3" 7133

## (undated) DEVICE — FILTER HEPA FLUID TRAP NEPTUNE 0703-040-001

## (undated) DEVICE — DEVICE CATH STABILIZATION STATLOCK FOLEY 3-WAY FOL0105

## (undated) DEVICE — PREP CHLORAPREP 26ML TINTED HI-LITE ORANGE 930815

## (undated) DEVICE — ESU GROUND PAD ADULT W/CORD E7507

## (undated) DEVICE — SU PROLENE 5-0 RB-1DA 36"  8556H

## (undated) DEVICE — SU SILK 3-0 TIE 12X30" A304H

## (undated) DEVICE — NDL COUNTER 20CT 31142493

## (undated) DEVICE — INSERT FOGARTY 33MM TRACTION HYDRAJAW HYDRA33

## (undated) DEVICE — SU PROLENE 6-0 RB-2DA 30" 8711H

## (undated) DEVICE — Device

## (undated) DEVICE — SU PDS II 5-0 RB-1 27" Z303H

## (undated) DEVICE — SU SILK 3-0 SH CR 8X18" C013D

## (undated) DEVICE — LINEN TOWEL PACK X30 5481

## (undated) DEVICE — DRAPE IOBAN INCISE 23X17" 6650EZ

## (undated) DEVICE — SU SILK 4-0 TIE 12X30" A303H

## (undated) DEVICE — WIPES FOLEY CARE SURESTEP PROVON DFC100

## (undated) DEVICE — SU PDS II 0 TP-1 60" Z991G

## (undated) DEVICE — TUBING IRRIG CYSTO/BLADDER SET 81" LF 2C4040

## (undated) DEVICE — SOL NACL 0.9% INJ 1000ML BAG 2B1324X

## (undated) DEVICE — SURGICEL ABSORBABLE HEMOSTAT SNOW 4"X4" 2083

## (undated) DEVICE — DRAIN JACKSON PRATT RESERVOIR 100ML SU130-1305

## (undated) DEVICE — SU DERMABOND ADVANCED .7ML DNX12

## (undated) RX ORDER — SODIUM CHLORIDE 450 MG/100ML
INJECTION, SOLUTION INTRAVENOUS
Status: DISPENSED
Start: 2024-09-03

## (undated) RX ORDER — ONDANSETRON 2 MG/ML
INJECTION INTRAMUSCULAR; INTRAVENOUS
Status: DISPENSED
Start: 2024-09-03

## (undated) RX ORDER — DEXTROSE, SODIUM CHLORIDE, SODIUM LACTATE, POTASSIUM CHLORIDE, AND CALCIUM CHLORIDE 5; .6; .31; .03; .02 G/100ML; G/100ML; G/100ML; G/100ML; G/100ML
INJECTION, SOLUTION INTRAVENOUS
Status: DISPENSED
Start: 2024-09-03

## (undated) RX ORDER — METOPROLOL TARTRATE 1 MG/ML
INJECTION, SOLUTION INTRAVENOUS
Status: DISPENSED
Start: 2024-05-01

## (undated) RX ORDER — HEPARIN SODIUM 1000 [USP'U]/ML
INJECTION, SOLUTION INTRAVENOUS; SUBCUTANEOUS
Status: DISPENSED
Start: 2024-09-03

## (undated) RX ORDER — FENTANYL CITRATE 50 UG/ML
INJECTION, SOLUTION INTRAMUSCULAR; INTRAVENOUS
Status: DISPENSED
Start: 2024-09-03

## (undated) RX ORDER — REGADENOSON 0.08 MG/ML
INJECTION, SOLUTION INTRAVENOUS
Status: DISPENSED
Start: 2019-11-19

## (undated) RX ORDER — HYDROMORPHONE HYDROCHLORIDE 1 MG/ML
INJECTION, SOLUTION INTRAMUSCULAR; INTRAVENOUS; SUBCUTANEOUS
Status: DISPENSED
Start: 2024-09-03

## (undated) RX ORDER — HYDROMORPHONE HCL IN WATER/PF 6 MG/30 ML
PATIENT CONTROLLED ANALGESIA SYRINGE INTRAVENOUS
Status: DISPENSED
Start: 2024-09-03

## (undated) RX ORDER — DOBUTAMINE HYDROCHLORIDE 200 MG/100ML
INJECTION INTRAVENOUS
Status: DISPENSED
Start: 2024-05-01

## (undated) RX ORDER — ACETAMINOPHEN 325 MG/1
TABLET ORAL
Status: DISPENSED
Start: 2024-09-03

## (undated) RX ORDER — FENTANYL CITRATE-0.9 % NACL/PF 10 MCG/ML
PLASTIC BAG, INJECTION (ML) INTRAVENOUS
Status: DISPENSED
Start: 2024-09-03

## (undated) RX ORDER — PROPOFOL 10 MG/ML
INJECTION, EMULSION INTRAVENOUS
Status: DISPENSED
Start: 2024-09-03

## (undated) RX ORDER — MANNITOL 20 G/100ML
INJECTION, SOLUTION INTRAVENOUS
Status: DISPENSED
Start: 2024-09-03

## (undated) RX ORDER — SODIUM CHLORIDE 9 MG/ML
INJECTION, SOLUTION INTRAVENOUS
Status: DISPENSED
Start: 2024-09-03

## (undated) RX ORDER — FUROSEMIDE 10 MG/ML
INJECTION INTRAMUSCULAR; INTRAVENOUS
Status: DISPENSED
Start: 2024-09-03

## (undated) RX ORDER — DEXAMETHASONE SODIUM PHOSPHATE 4 MG/ML
INJECTION, SOLUTION INTRA-ARTICULAR; INTRALESIONAL; INTRAMUSCULAR; INTRAVENOUS; SOFT TISSUE
Status: DISPENSED
Start: 2024-09-03

## (undated) RX ORDER — REGADENOSON 0.08 MG/ML
INJECTION, SOLUTION INTRAVENOUS
Status: DISPENSED
Start: 2024-05-09

## (undated) RX ORDER — ATROPINE SULFATE 0.4 MG/ML
AMPUL (ML) INJECTION
Status: DISPENSED
Start: 2024-05-01